# Patient Record
Sex: FEMALE | Race: WHITE | Employment: UNEMPLOYED | URBAN - METROPOLITAN AREA
[De-identification: names, ages, dates, MRNs, and addresses within clinical notes are randomized per-mention and may not be internally consistent; named-entity substitution may affect disease eponyms.]

---

## 2013-03-01 LAB — PAP SMEAR - HIM PATIENT REPORTED: NEGATIVE

## 2017-01-03 ENCOUNTER — TELEPHONE (OUTPATIENT)
Dept: PSYCHIATRY | Facility: CLINIC | Age: 37
End: 2017-01-03

## 2017-01-05 ENCOUNTER — MYC REFILL (OUTPATIENT)
Dept: INTERNAL MEDICINE | Facility: CLINIC | Age: 37
End: 2017-01-05

## 2017-01-05 ENCOUNTER — MYC REFILL (OUTPATIENT)
Dept: PSYCHIATRY | Facility: CLINIC | Age: 37
End: 2017-01-05

## 2017-01-05 ENCOUNTER — OFFICE VISIT (OUTPATIENT)
Dept: PALLIATIVE MEDICINE | Facility: CLINIC | Age: 37
End: 2017-01-05
Payer: COMMERCIAL

## 2017-01-05 VITALS
OXYGEN SATURATION: 98 % | DIASTOLIC BLOOD PRESSURE: 74 MMHG | WEIGHT: 124.8 LBS | SYSTOLIC BLOOD PRESSURE: 141 MMHG | BODY MASS INDEX: 20.15 KG/M2 | HEART RATE: 97 BPM

## 2017-01-05 DIAGNOSIS — K21.00 GASTROESOPHAGEAL REFLUX DISEASE WITH ESOPHAGITIS: ICD-10-CM

## 2017-01-05 DIAGNOSIS — M79.18 MYOFASCIAL PAIN: Primary | ICD-10-CM

## 2017-01-05 DIAGNOSIS — M79.18 MYOFACIAL MUSCLE PAIN: ICD-10-CM

## 2017-01-05 PROCEDURE — 99214 OFFICE O/P EST MOD 30 MIN: CPT | Performed by: NURSE PRACTITIONER

## 2017-01-05 ASSESSMENT — PAIN SCALES - GENERAL: PAINLEVEL: EXTREME PAIN (9)

## 2017-01-05 NOTE — PROGRESS NOTES
[facility]    CHIEF COMPLAINT: Pain, neck and shoulders    INTERVAL HISTORY:  Last seen on 12/1/16.        Recommendations/plan at the last visit included:  1. Schedule physical therapy visits with Myrna two times per month.   2. Schedule follow-up with MORGAN Caban NP-C in 4 weeks  1. Medication recommendations:   1. Low dose naltrexone 3.5 mg one capsule daily  2. Tizanidine 2 mg tablets; 1-2 tabs up to 3 times daily.     Since her last visit, Sheyla YO Cassi reports:  - LDN has not been helpful.   - Pain has been more right sided over the last month. Previously was more left sided.     Pain Information:   Pain quality: Unbearable    Pain timing: Constant     Pain rating: intensity ranges from 8/10 to 10/10, and averages 9/10 on a 0-10 scale.   Aggravating factors include: Movement   Relieving factors include: Sleep, ice, heat    Annual Controlled Substance Agreement/UDS due date: N/A    CURRENT RELEVANT PAIN MEDICATIONS:   NONE    Patient is using the medication as prescribed:  N/A  Is your medication helpful? N/A   Medication side effects? N/A      Previous Pain Relevant Medications: (H--helped; HI--Helped initially; SWH--Somewhat helpful; NH--No help; W--worse; SE--side effects; ?--Unsure if helpful)   NOTE: This medication information taken from patient's intake form, not medical records.    Opiates: Percocet: H, hydrocodone:H, tramadol: SE, muscle spasms and increased headaches   NSAIDS: Ibuprofen: SE, stomachache. Naproxen: SE, stomachache.    Muscle Relaxants: Clonazepam: Sedation, cyclobenzaprine: H, Robaxin: SE upset stomach, Zanaflex: H   Anti-migraine mediations: Excedrin Migraine: H   Anti-depressants: Bupropion: SE effected appetite, duloxetine: SE sedation, reduced libido, fluoxetine: Mental fog, drowsy, sertraline: H, venlafaxine: H   Sleep aids: Diazepam: H, takes for spasm, clonazepam: H for spasm, lorazepam: For anxiety, zolpidem: Took in 2009 for grief issues/not  sleeping   Anti-convulsants: None: Friends took both gabapentin and pregabalin and had a bad experience so patient is not wanting to try these medications as time    Topicals: Over-the-counter gels and creams: H   Other medications not covered above: Tylenol: SWH, Low dose naltrexone:NH    Any illicit drug use: Denies  Any use of prescriptions other than how they were prescribed:shahlaies  [place] Board of Pharmacy Data Base Reviewed:    YES; No concern for abuse or misuse of controlled medications based on this report. Multiple prescribers      Medications:  Current Outpatient Prescriptions   Medication Sig Dispense Refill     escitalopram (LEXAPRO) 20 MG tablet Take 1.5 tablets (30 mg) by mouth daily 45 tablet 1     omeprazole (PRILOSEC) 20 MG CR capsule Take 1 capsule (20 mg) by mouth daily 90 capsule 0     tiZANidine (ZANAFLEX) 2 MG tablet Take 1-2 tablets (2-4 mg) by mouth 3 times daily 180 tablet 0     COMPOUND (CMPD RX) - PHARMACY TO MIX COMPOUNDED MEDICATION Take one capsule daily in the morning 30 capsule 0     Prenatal Vit-Fe Fumarate-FA (PRENATAL MULTIVITAMIN  PLUS IRON) 27-0.8 MG TABS Take 1 tablet by mouth daily 30 tablet 11     loperamide (IMODIUM A-D) 2 MG tablet Start with 2 tabs (4 mg), then take one tab (2 mg) after each diarrheal stool.  Do not use more than  8 tabs (16 mg) per day. 60 tablet 0     lidocaine (XYLOCAINE) 5 % ointment Mix nickel sized amount with diclofenac gel. Apply 4 times daily to affected area. 100 g 3     Ketoprofen POWD Apply dime sized amount to affected areas up to three times daily. 100 g 3     Acetaminophen (TYLENOL PO) Take 500 mg by mouth every 4 hours as needed for mild pain or fever Takes 2 tablets of 500 mg         Review of Systems: A 10-point review of systems was negative, with the exception of chronic pain issues.      Social History: Reviewed; unchanged from initial consultation.      Family history: Reviewed; unchanged from initial consultation.     PHYSICAL  EXAM    Filed Vitals:    01/05/17 1539   BP: 141/74   Pulse: 97   Weight: 56.609 kg (124 lb 12.8 oz)   SpO2: 98%       Constitutional: healthy, alert and no distress  HEENT: Head atraumatic, normocephalic. Eyes without conjunctival injection or jaundice. Neck supple. No obvious neck masses.  Skin: No rash, lesions, or petechiae of exposed skin.   Extremities: Peripheral pulses intact. No clubbing, cyanosis, or edema.   Psychiatric/mental status: Alert, without lethargy or stupor. Appropriate affect. Mood normal.     Neurologic exam:  CN:  Cranial nerves 2-12 are grossly normal.    Musculoskeletal exam:  Cervical spine:   Tenderness in the cervical spine at midline. No   Shoulder girdle area quite spasmed.   Tenderness in the cervical paraspinal muscles. No  Thoracic spine:    Kyphosis. No   Tenderness in the thoracic spine at midline. No   Tenderness in the thoracic paraspinal muscles. No  Lumbar/Sacral spine:   Tenderness in the lumbar spine at midline. No   Tenderness in the lumbar paraspinal muscles.No     Psychiatric:  mentation appears normal., affect and mood normal    DIRE Score for ongoing opioid management is calculated as follows:    Diagnosis = 2 pts (slowly progressive; moderate pain/objective findings)    Intractability = 1 pt (few therapies tried; passive patient role)    Risk        Psych = 3 pts (no significant personality dysfunction/mental illness; good communication with clinic)         Chem Hlth = 2 pts (use of medications to cope with stress; chemical dependency in remission) medication focused       Reliability = 2 pts (occasional difficulties with compliance; generally reliable) late to appointments even after needing to reschedule because she was so late her first appointment with me.       Social = 2 pts (reduction in some relationships/life rolls)       (Psych + Chem hlth + Reliability + Social) = 12    Efficacy = 1 pt (poor function; minimal pain relief despite mod/high med dose)    DIRE  "Score = 13        7-13: likely NOT suitable candidate for long-term opioid analgesia       14-21: may be a suitable candidate for long-term opioid analgesia       DIAGNOSTIC TESTS:  Imaging Studies:   No new imaging    BEIGHTON SCALE: Total score: 9  Forward flexion of trunk with knees fully extended so hands at rest flat on the floor: Positive, 1 point  Hyperextension of the elbows beyond 10  bilaterally: Positive 2 points  Hyperextension of the knees beyond 10  bilaterally: Positive 2 points  Passive apposition of the thumbs to the flexor aspect of the forearm bilaterally: Positive 2 points  Passive dorsiflexion of the fifth digit beyond 90  bilaterally: positive 2 points    Assessment:   1.  Myofascial pain/EDS  2.  Severe anxiety/depression    Sheyla arrived on time for her appointment today despite inclement weather and feeling quite ill. As she struggles with getting to appointments, particularly presenting on time she was commended for this effort. Reviewed that she has not scheduled any physical therapy over the last month. Also reviewed missed mental health therapy appointments and being late for visits. Patient states she feels like she is \"on my last chance\". I confirmed to her that she needs to demonstrate responsibility commitment to taking care of her health and being respectful for all healthcare providers times. As evidenced by today's ability to get her appointment on time despite her illness and bad whether she is fully capable of this.    Low-dose naltrexone was not helpful for her generalized pain. We will not continue this medication. Reviewed taking Tylenol and ibuprofen alternating every 4 hours. Maximum recommended doses are provided for her information. Referral for acupuncture is given. I would like her to schedule physical therapy with Myrna as well.    Plan:    Diagnosis reviewed, treatment option addressed, and risk/benifits discussed.  Self-care instructions given.  I am recommending " a multidisciplinary treatment plan to help this patient better manage pain.        1. Schedule physical therapy visits with Myrna  2. Schedule follow-up with MORGAN Caban, NPChantelC in 4 weeks  3. Acupuncture referral given  4. Medication recommendations: No changes at this time. Max of Tylenol 3000 mg daily, Ibuprofen 3200 mg daily. Okay to take scheduled alternating every 4 hours.     Total time spent face to face was 30 minutes and more than 50% of face to face time was spent in counseling and/or coordination of care regarding the diagnosis and recommendations above.      MORGAN Cuenca, NP-C   [facility]

## 2017-01-05 NOTE — Clinical Note
[facility]  [address]  4th [address]  [place] [address]      January 6, 2017      Sheyla Ye  [address] [place] [address]  [place] [address]        Dear Sheyla,    This letter is a reminder that you are due to see your Primary Care Provider for an Annual Visit and needed labs. You must be seen by your Primary Care Provider on a yearly basis and have appropriate labs drawn for continued care and prescription refills. Please call [phone] to schedule an appointment for an Annual Visit with Dr Myrna Watson MD.     **Next annual visit due in February 2017.      You have been given a 90 day supply/refill of your Prilosec while you get your clinic visit/labs completed.      Regards,    Primary Care Center

## 2017-01-05 NOTE — PATIENT INSTRUCTIONS
After Visit Instructions:     Thank you for coming to [facility] for your care. It is my goal to partner with you to help you reach your optimal state of health.     I am recommending multidisciplinary care at this time.  The focus of care will be to continue gradual rehabilitation and pain management with medication adjustments as needed.    Continue daily self-care, identifying contributing factors, and monitoring variations in pain level. Continue to integrate self-care into your life.      1. Schedule physical therapy visits with Myrna  2. Schedule follow-up with MORGAN Caban NP-C in 4 weeks  3. Acupuncture referral given  4. Medication recommendations: No changes at this time. Max of Tylenol 3000 mg daily, Ibuprofen 3200 mg daily. Okay to take scheduled alternating every 4 hours.     MORGAN Cuenca NP-C  [facility]    Contact information: [facility]    Please call if any side effects, questions, or concerns arise.    Nurse Triage line:  [phone]   Call this number with any questions or concerns. You may leave a detailed message anytime. Calls are typically returned Monday through Friday between 8 AM and 4:30 PM. We usually get back to you within 2 business days depending on the issue/request.    Scheduling number: [phone].  Call this number to schedule or change appointments.          Medication Refills Policy:    For non-narcotic medications, please your pharmacy directly to request a refill and the pharmacy will call the Pain Management Center for authorization. Please allow 3-4 days for these refills.    For narcotic refills, call the nurse triage line and leave a message requesting your refill along with the name of the pharmacy that you use. Narcotic prescriptions will be mailed to your pharmacy or you may pick them up at the clinic.  Please call 7-10 days before your refill is due  The above policy  allows adequate time so that you do not run out of medication.    No Show - Late Cancellation - Late Arrival Policy  We believe regular attendance is key to your success in our program.    Any time you are unable to keep your appointment we ask that you call us at  least 24 hours in advance to let us know. This will allow us to offer the appointment time to another patient. The following is our policy for missed appointments. This also applies to appointments cancelled with less than 24 hours notice.    You will receive a letter after missing your 1st and 2nd appointments without contacting the clinic before your scheduled appointment time.     After missing 3 appointments without calling first, we will cancel all of your future appointments at [facility].    At that point, you will not be able to resume services unless approved by your care team  We understand that unforseen circumstances arise, however, out of respect for all concerned and to provide this appointment to another patient, this policy will be enforced.    Please note that most follow up appointments are 30 minutes long. If you arrive late, your provider may not be able to see you for the entire 30 minutes. Please also note that if you arrive more than 15 minutes for any appointment, it may be rescheduled.

## 2017-01-05 NOTE — NURSING NOTE
"Chief Complaint   Patient presents with     Pain       Initial /74 mmHg  Pulse 97  Wt 56.609 kg (124 lb 12.8 oz)  SpO2 98% Estimated body mass index is 20.15 kg/(m^2) as calculated from the following:    Height as of 7/1/16: 1.676 m (5' 6\").    Weight as of this encounter: 56.609 kg (124 lb 12.8 oz).  BP completed using cuff size: regular    "

## 2017-01-05 NOTE — MR AVS SNAPSHOT
After Visit Summary   1/5/2017    Sheyla Ye    MRN: [id number]           Patient Information     Date Of Birth          1980        Visit Information        Provider Department      1/5/2017 3:30 PM Michell Christianson APRN CNP [facility]        Today's Diagnoses     Myofascial pain    -  1     Myofacial muscle pain           Care Instructions    After Visit Instructions:     Thank you for coming to [facility] for your care. It is my goal to partner with you to help you reach your optimal state of health.     I am recommending multidisciplinary care at this time.  The focus of care will be to continue gradual rehabilitation and pain management with medication adjustments as needed.    Continue daily self-care, identifying contributing factors, and monitoring variations in pain level. Continue to integrate self-care into your life.      1. Schedule physical therapy visits with Myrna  2. Schedule follow-up with MORGAN Caban, NP-C in 4 weeks  3. Acupuncture referral given  4. Medication recommendations: No changes at this time. Max of Tylenol 3000 mg daily, Ibuprofen 3200 mg daily. Okay to take scheduled alternating every 4 hours.     MORGAN Cuenca, NP-C  [facility]    Contact information: [facility]    Please call if any side effects, questions, or concerns arise.    Nurse Triage line:  [phone]   Call this number with any questions or concerns. You may leave a detailed message anytime. Calls are typically returned Monday through Friday between 8 AM and 4:30 PM. We usually get back to you within 2 business days depending on the issue/request.    Scheduling number: [phone].  Call this number to schedule or change appointments.          Medication Refills Policy:    For non-narcotic medications, please your pharmacy directly to request a refill and the pharmacy will  call the Pain Management Center for authorization. Please allow 3-4 days for these refills.    For narcotic refills, call the nurse triage line and leave a message requesting your refill along with the name of the pharmacy that you use. Narcotic prescriptions will be mailed to your pharmacy or you may pick them up at the clinic.  Please call 7-10 days before your refill is due  The above policy allows adequate time so that you do not run out of medication.    No Show - Late Cancellation - Late Arrival Policy  We believe regular attendance is key to your success in our program.    Any time you are unable to keep your appointment we ask that you call us at  least 24 hours in advance to let us know. This will allow us to offer the appointment time to another patient. The following is our policy for missed appointments. This also applies to appointments cancelled with less than 24 hours notice.    You will receive a letter after missing your 1st and 2nd appointments without contacting the clinic before your scheduled appointment time.     After missing 3 appointments without calling first, we will cancel all of your future appointments at [facility].    At that point, you will not be able to resume services unless approved by your care team  We understand that unforseen circumstances arise, however, out of respect for all concerned and to provide this appointment to another patient, this policy will be enforced.    Please note that most follow up appointments are 30 minutes long. If you arrive late, your provider may not be able to see you for the entire 30 minutes. Please also note that if you arrive more than 15 minutes for any appointment, it may be rescheduled.                                   Follow-ups after your visit        Additional Services     ACUPUNCTURE REFERRAL       Your provider has referred you to: patient's preference    Please be aware that coverage of these services is subject  to the terms and limitations of your health insurance plan.  Call member services at your health plan with any benefit or coverage questions.                  Your next 10 appointments already scheduled     [place] 10, 2017 11:15 AM   Return Visit with Myrna Cruz PT   [facility] ([facility])    [address]  [address]  [facility] [address] [phone]            [place] 10, 2017  3:15 PM   Adult Med Follow UP with MORGAN Infante CNS   [facility] ([facility])    [address]  [address] [facility] [address] [phone]            Jan 18, 2017  9:00 AM   Return Visit with Efrain Crocker, PhD MAYTE   [facility] ([facility])    [address]  [facility] [address] [phone]            Jan 26, 2017  2:30 PM   Return Visit with Myrna Cruz PT   [facility] ([facility])    [address]  [facility] [address] [phone]            Feb 13, 2017  1:00 PM   Return Visit with Efrain Crocker, PhD MAYTE   [facility] ([facility])    [address]  [facility] [id number] [phone]              Who to contact     If you have questions or need follow up information about today's clinic visit or your schedule please contact [facility] directly at [phone].  Normal or non-critical lab and imaging results will be communicated to you by MyChart, letter or phone within 4 business days after the clinic has received the results. If you do not hear from us within 7 days, please contact the clinic through MyChart or phone. If you have a critical or abnormal lab result, we will notify you by phone as soon as possible.  Submit refill requests through [employer] or call your pharmacy and they will forward the refill request to us. Please allow 3  business days for your refill to be completed.          Additional Information About Your Visit        MyChart Information     [facility] gives you secure access to your electronic health record. If you see a primary care provider, you can also send messages to your care team and make appointments. If you have questions, please call your primary care clinic.  If you do not have a primary care provider, please call [phone] and they will assist you.        Care EveryWhere ID     This is your Care EveryWhere ID. This could be used by other organizations to access your [facility] medical records  [id number]        Your Vitals Were     Pulse Pulse Oximetry                97 98%           Blood Pressure from Last 3 Encounters:   01/05/17 141/74   12/02/16 117/81   12/01/16 139/80    Weight from Last 3 Encounters:   01/05/17 56.609 kg (124 lb 12.8 oz)   12/02/16 57.879 kg (127 lb 9.6 oz)   11/01/16 55.974 kg (123 lb 6.4 oz)              We Performed the Following     ACUPUNCTURE REFERRAL          Where to get your medicines      These medications were sent to [facility] [place], [place] - [address]  [address], [facility] [address]     Phone:  [phone]    - tiZANidine 2 MG tablet       Primary Care Provider Office Phone # Fax #    Myrnamoriah Watson [phone]       [facility] [address]  [facility] [address]        Thank you!     Thank you for choosing [facility]  for your care. Our goal is always to provide you with excellent care. Hearing back from our patients is one way we can continue to improve our services. Please take a few minutes to complete the written survey that you may receive in the mail after your visit with us. Thank you!             Your Updated Medication List - Protect others around you: Learn how to safely use, store and throw away your medicines at www.disposemymeds.org.          This list is  accurate as of: 1/5/17  4:08 PM.  Always use your most recent med list.                   Brand Name Dispense Instructions for use    COMPOUND - PHARMACY TO MIX COMPOUNDED MEDICATION    CMPD RX    30 capsule    Take one capsule daily in the morning       escitalopram 20 MG tablet    LEXAPRO    30 tablet    Take 1 tablet (20 mg) by mouth daily       Ketoprofen Powd     100 g    Apply dime sized amount to affected areas up to three times daily.       lidocaine 5 % ointment    XYLOCAINE    100 g    Mix nickel sized amount with diclofenac gel. Apply 4 times daily to affected area.       loperamide 2 MG tablet    IMODIUM A-D    60 tablet    Start with 2 tabs (4 mg), then take one tab (2 mg) after each diarrheal stool.  Do not use more than  8 tabs (16 mg) per day.       omeprazole 20 MG CR capsule    priLOSEC    30 capsule    Take 1 capsule (20 mg) by mouth daily       prenatal multivitamin  plus iron 27-0.8 MG Tabs per tablet     30 tablet    Take 1 tablet by mouth daily       tiZANidine 2 MG tablet    ZANAFLEX    180 tablet    Take 1-2 tablets (2-4 mg) by mouth 3 times daily       TYLENOL PO      Take 500 mg by mouth every 4 hours as needed for mild pain or fever Takes 2 tablets of 500 mg

## 2017-01-06 NOTE — TELEPHONE ENCOUNTER
Message from Sami:  Mayra Hatfield, RN Thu Jan 5, 2017 4:26 PM        ----- Message -----   From: Sheyla Ye   Sent: 1/5/2017 1:38 PM   To: [facility] Nursing Staff-  Subject: Medication Renewal Request     Original authorizing provider: MD Sheyla Renee would like a refill of the following medications:  omeprazole (PRILOSEC) 20 MG capsule [Myrna Watson MD]    Preferred pharmacy: [facility] [address]    Comment:      Medication renewals requested in this message routed to other providers:  escitalopram (LEXAPRO) 20 MG tablet [Mayra Lewis, APRN CNS]  tiZANidine (ZANAFLEX) 2 MG tablet [Michell Christianson APRN CNP]

## 2017-01-06 NOTE — TELEPHONE ENCOUNTER
Omeprazole 20 mg caps      Last Written Prescription Date:  10-31-16  Last Fill Quantity: 30,   # refills: 3  Last Office Visit : 2-2-16  Future Office visit:  none    Kathleen M Doege RN

## 2017-01-07 ENCOUNTER — TELEPHONE (OUTPATIENT)
Dept: PALLIATIVE MEDICINE | Facility: CLINIC | Age: 37
End: 2017-01-07

## 2017-01-07 NOTE — TELEPHONE ENCOUNTER
Prior auth will be required for Lidocaine 5% Oint  Please call [facility] at [phone] with ID [id number]    Thank you.

## 2017-01-09 NOTE — TELEPHONE ENCOUNTER
Initiated Pa via [employer] waiting for the respond.     Danitza De León MA  [facility]

## 2017-01-10 ENCOUNTER — OFFICE VISIT (OUTPATIENT)
Dept: PSYCHIATRY | Facility: CLINIC | Age: 37
End: 2017-01-10
Attending: CLINICAL NURSE SPECIALIST
Payer: COMMERCIAL

## 2017-01-10 ENCOUNTER — OFFICE VISIT (OUTPATIENT)
Dept: PALLIATIVE MEDICINE | Facility: CLINIC | Age: 37
End: 2017-01-10
Payer: COMMERCIAL

## 2017-01-10 VITALS
HEART RATE: 71 BPM | SYSTOLIC BLOOD PRESSURE: 121 MMHG | DIASTOLIC BLOOD PRESSURE: 78 MMHG | WEIGHT: 129.4 LBS | BODY MASS INDEX: 20.9 KG/M2

## 2017-01-10 DIAGNOSIS — F32.A DEPRESSION, UNSPECIFIED DEPRESSION TYPE: ICD-10-CM

## 2017-01-10 DIAGNOSIS — G89.29 CHRONIC PAIN: Primary | ICD-10-CM

## 2017-01-10 DIAGNOSIS — F90.2 ADHD (ATTENTION DEFICIT HYPERACTIVITY DISORDER), COMBINED TYPE: ICD-10-CM

## 2017-01-10 DIAGNOSIS — F41.9 ANXIETY: Primary | ICD-10-CM

## 2017-01-10 PROCEDURE — 97110 THERAPEUTIC EXERCISES: CPT | Mod: GP | Performed by: PHYSICAL THERAPIST

## 2017-01-10 PROCEDURE — 97112 NEUROMUSCULAR REEDUCATION: CPT | Mod: GP | Performed by: PHYSICAL THERAPIST

## 2017-01-10 PROCEDURE — 99212 OFFICE O/P EST SF 10 MIN: CPT | Mod: ZF

## 2017-01-10 RX ORDER — ESCITALOPRAM OXALATE 20 MG/1
30 TABLET ORAL DAILY
Qty: 45 TABLET | Refills: 1 | Status: SHIPPED | OUTPATIENT
Start: 2017-01-10 | End: 2017-02-10

## 2017-01-10 NOTE — TELEPHONE ENCOUNTER
If not covered, please let her know that Lidocaine 4% cream is available OTC.   MORGAN Cuenca, NP-C  [facility]

## 2017-01-10 NOTE — NURSING NOTE
Chief Complaint   Patient presents with     Recheck Medication     depression; ADHD; anxiety     Reviewed allergies, smoking status, and pharmacy preference  Administered abuse screening questions   Obtained weight, blood pressure and heart rate

## 2017-01-10 NOTE — Clinical Note
Thought I should let you know that I accidentally reopened your note because I thought it was my note (but actually I had not closed my note yet. To close your note (I did not make any changes since it wasn't my noted) I had to sign as if I addend it.  Scary mistake.  So it something looks like I signed your note it is an error. Mayra
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
## 2017-01-10 NOTE — PROGRESS NOTES
"Patient Name: Sheyla Ye    YOB: 1980   Medical Record Number: [id number]  Diagnosis: Data Unavailable    Subjective: Pt last seen by this therapist on 6/9/16.  At that time PT services were put on hold d/t patient's MH issues interfering with her ability to participate in PT meaningfully.  She returns today upon pain provider's recommendations and she indicates she feels she will be able to participate more consistently now.    Patient reports: reports currently her pain is right sided (\"mostly right shoulder/neck and hip\") \"90% right sided thing 10% left sided thing\" - states this change happened over past few months (previously her pain c/o were left sided > right sided - though \"widespread\").    Pain ranges: at best 7/10 at worst 9-10/10  Activity tolerance: Walking tolerance about 20 min; dynamic standing tolerance about 30 min; hard to get comfortable for sleep - wants to sleep too much; getting up from lower positions are challenging; household tasks that are challenging - laundry and dishes; work about 20H/wk tolerance - was previously working at [employer] but d/t her own limitations is doing less - transitioning out of this job.  All reported activity tolerance today is less than what patient reported at time of initial PT eval on 5/3/16.)  HEP/Self Care/Home Management Training:   Pacing/Mini Breaks/Self Care: not addressed today.  ;   Relaxation Practice: trying to implement relaxation techniques learned previously - 3-4x/wk ;   Posture Corrections: occasionally is aware of neutral but not very often ;   Walking program: no regular routine currently ;   Heat/Ice/TENS: using ice and heat daily  ;   HEP no consistent routine currently         Objective Findings:  Pt arrives 15 min late.    OBSERVATION: Is engaged, has flat affect, appears mildly anxious.  Sits with arms crossed tightly over her chest.  She demonstrates kyphotic posture in sitting and standing - in standing she also locks her " knees and hips in increased extension.  GAIT & LOCOMOTION: Unremarkable  RANGE OF MOTION: All WNL and at major joints demonstrates hypermobility.    MUSCLE PERFORMANCE: No core engagement observed to support posture.       Treatment Interventions:  Therapeutic Procedures/Exercises: (15 min)  Recumbent bike x 7 min, no resistance.  Instructed in walking program - do either outside or in hallways /rampwells (pt lives in handicapped accessible building where ramps are present instead of stairs).  5 min 1-2x/day, start with 4x/wk - gradually increase consistency.    Neuromuscular Reeducation:  (15 min)  Posture - reviewed neutral posture - sitting and standing and Luh Hutchison's power poses concept.      Discussed need for consistent follow through on coming to appts if she wishes to continue in PT;  additionally reviewed the need for arriving to appts on time which has been a significant problem for patient in the past.    __________________________________________________________________  Instruction on home program: walking program, posture/body awareness practice    Assessment:  Pt continues to express pain c/o limiting her functional mobility.  She demonstrates poor body awareness, posture mm imbalances and decreased strength (steven core) and endurance.  Her MH continues to be a significant contributing factor to her pain experience.  Her reported mobility tolerance is less today than what she reported at the time of her initial PT eval on 5/3/16.  Pt would continue to benefit from PT to work on developing sustainable and self directed self care strategies for pain management through HEP instruction, posture / body awareness training and self care education.  PT goals continue to be valid.      Recommendations: schedule 4 PT visits at 1x/wk.  If pt engages and participates consistently will reassess need for further visits.  If she does not participate consistently she will be discharged from PT.      Intensity Level: 1  (1=low intensity; 5=high intensity)  Demonstrates/Verbalizes Technique: 3 (1= poor technique-difficulty performing exercises,significant cues required; 5= good technique-performs exercises without cues)  Body Awareness: 2 (1=low awareness; 5=high awareness)  Posture/Stability: 2 (1= poor posture, stability; 5= good posture, stability)  Motivational Level: Cooperative, mildly anxious  Response to Teaching: cooperative, demonstrated ability and verbalized, recall/understanding  Factors that affect learning: None    _______________________________________________________________________  Plan of Care  Cont PT to support reactivation and integration of self regulation pain management skills.      Present:  MARTIN    Total Visit Time:  30 minutes    Therapist: Myrna Cruz PT             Date: 1/10/2017

## 2017-01-10 NOTE — TELEPHONE ENCOUNTER
Received PA denial for the Lidocaine 5% Patch. Left pt vm informing her that she can try the Lidocaine 4% cream OTC.     Danitza De León MA  [facility]

## 2017-01-11 ENCOUNTER — OFFICE VISIT (OUTPATIENT)
Dept: PSYCHIATRY | Facility: CLINIC | Age: 37
End: 2017-01-11
Attending: PSYCHOLOGIST
Payer: COMMERCIAL

## 2017-01-11 DIAGNOSIS — F33.1 MAJOR DEPRESSIVE DISORDER, RECURRENT EPISODE, MODERATE (H): ICD-10-CM

## 2017-01-11 DIAGNOSIS — F45.1 SOMATIC SYMPTOM DISORDER, PERSISTENT, MODERATE, WITH PREDOMINANT PAIN: Primary | ICD-10-CM

## 2017-01-11 NOTE — PROGRESS NOTES
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  Supervisor:   Robyn Dawn Psy.D.    Narrative Description of Session:   The patient was seen by Landry Solorzano, Ph.D., Postdoctoral Associate. The patient arrived on time for her appointment. She presented with casual attire and appropriate grooming. Mood was depressed with depressive and anxious affect.. Patient endorsed passive SI, but denied any intent or means. Patient elaborated by saying her passive SI was more related to hopelessness about her chronic pain issues. No abnormalities were noted in speech/through processes or content.    Patient began session by discussing worries that she had  screwed up again  by missing her last appointment with the writer.  Patient and clinician created collaborative, rather than punitive, plan to help increase the patient s probability of attending appointments on time and feel motivated to meaningfully participate in her therapy.     The rest of the session as spent helping the patient identify maladaptive and adaptive ways of handling interpersonal conflict. The patient mentioned that when feeling anger towards others, she often cries in front of these individuals, but then later expresses her anger to herself when alone.       Assessment:   Appearance:  Appropriately groomed, casually dressed  Behavior/relationship to examiner/demeanor:  Normal  Motor activity/EPS:  Normal  Gait:  Normal  Speech rate:  Normal  Speech volume:  Normal  Speech articulation:  Normal  Speech coherence:  Normal  Speech spontaneity:  Somewhat limited  Mood (subjective report):  Depressed  Affect (objective appearance):  Anxious and  depressed  Thought Process (Associations):  Goal-directed  Thought process (Rate):  Normal  Thought content: Normal  Abnormal Perception:  Denies  Insight:  Fair  Judgment:  Fair    Plan: Current goals include continuing to monitor the patient s passive SI, in addition to her depression and anxiety symptoms. Helping the patient understand the relationship between physical pain exacerbation and emotional suppression should help the patient express her emotions in a more adaptive manner.   I did not see this pt directly. This pt is discussed with me in individual psychotherapy supervision, and I agree with the plan as documented. Robyn Dawn Psy.D. , L.P.

## 2017-01-31 ENCOUNTER — OFFICE VISIT (OUTPATIENT)
Dept: PALLIATIVE MEDICINE | Facility: CLINIC | Age: 37
End: 2017-01-31
Payer: COMMERCIAL

## 2017-01-31 ENCOUNTER — OFFICE VISIT (OUTPATIENT)
Dept: PSYCHIATRY | Facility: CLINIC | Age: 37
End: 2017-01-31
Attending: PSYCHOLOGIST
Payer: COMMERCIAL

## 2017-01-31 DIAGNOSIS — G89.29 CHRONIC PAIN: Primary | ICD-10-CM

## 2017-01-31 DIAGNOSIS — F33.1 MAJOR DEPRESSIVE DISORDER, RECURRENT EPISODE, MODERATE (H): ICD-10-CM

## 2017-01-31 DIAGNOSIS — F45.1 SOMATIC SYMPTOM DISORDER, PERSISTENT, MODERATE, WITH PREDOMINANT PAIN: Primary | ICD-10-CM

## 2017-01-31 PROCEDURE — 97535 SELF CARE MNGMENT TRAINING: CPT | Mod: GP | Performed by: PHYSICAL THERAPIST

## 2017-01-31 PROCEDURE — 97112 NEUROMUSCULAR REEDUCATION: CPT | Mod: GP | Performed by: PHYSICAL THERAPIST

## 2017-01-31 NOTE — PROGRESS NOTES
"Patient Name: Sheyla Ye      YOB: 1980     Medical Record Number: [id number]  Diagnosis: Chronic pain  Visit Info Length of Visit: 50 (of which 25 minutes was spent counseling and reassuring pt with no actual PT treatment)  Arrived: On Time  Therapeutic Procedures/Exercises: NA; Therapeutic Activities: NA ; Neuromuscular Re-education: 10 min   Self Care / Home Management Training: 15 min   Exercise/Activity Education Instruction:  Self Care - discussed self care strategies to use day to day and importance of consistency in use.    Activity:  Kinesthetic Awareness of joint neutral positions for UEs.       Notes: Pt arrives today to PT session after two no-shows to PT.  She is apologetic and very tearful - worried that she has had \"three strikes\" and therefore she is going to be kicked out of the pain program.  (Pt was also a NS for the pain psychologist in the past week or two as well.)   Had a candid conversation with pt RE: no-shows, expectations for participation and this therapist's concerns that pt needs more intensive MH treatment (day program?) as her MH is continuing to get in the way of her participation at the pain clinic.  She became more tearful and indicated she is overwhelmed, feeling like she is \"at the end of her rope\" \"don't know what to do\", etc.  She did endorse suicidal thoughts at times (\"sometimes I think I should just jump off the bridge\") but denies that she will do it.  Asked pt directly if she is having these thoughts currently and she stated that while she thinks about it now and again she does not have a plan to do it at this point in time. She indicates she doesn't think anyone is hearing her when she is expressing how bad things are for her (her mood/anxiety, ability to cope, ability to focus her attention, \"be in my own skin\") and how much she is struggling day to day.      Patient reports:  widespread pain continues (making specific reference today to right " shoulder pain with occasional feelings of shoulder instability).      HEP/Self Care/Home Management Training:   Pacing/Mini Breaks/Self Care: not addressed today.  ;   Relaxation Practice: continues to practice some of the things she has learned previously ;   Posture Corrections: not consistent currently in neural positions ;   Walking program: indicates she did do some walking over the interval ;   Heat/Ice/TENS: not addressed today.   ;   HEP not addressed today.     Much of today's session spent in conversation with patient RE: her fears about being kicked out of pain program and the need for her to get her emotional well being more stable d/t its interference in meaningful participation in PT and at the pain clinic overall.  Have strongly encouraged her to keep her appt here at the pain clinic with MORGAN Caban CNP on 2/2/17 to discuss next steps.  Relayed to pt that this therapist has recommended the need for more intensive MH services to this pain provider and this will be part of the conversation with her provider when discussing continued POC at the pain clinic.  Pt appeared to be in agreement that something more intensive in regard to her MH may be beneficial but had many questions in terms of what those options might be.  Deferred to pain provider and also encouraged pt to discuss this potential option with her MH providers.  Additionally, recommended pt go to ER if she experiences active suicidal thoughts.            Demonstrates/Verbalizes Technique: 2 (1= poor technique-difficulty performing exercises,significant cues required; 5= good technique-performs exercises without cues)  Body Awareness: 1,2 (1=low awareness; 5=high awareness)  Posture/Stability: 2 (1= poor posture, stability; 5= good posture, stability)   Motivational Level:   Cooperative but emotional / tearful throughout session Participation:  Full   Patient Satisfaction:  satisfied   Response to Teaching:   demonstrated ability and  verbalized, recall/understanding  Factors that affect learning: None   Plan of Care  Plan to place PT on hold again until pt's MH is more stable such that she is better able to participate in pain PT services.     Therapist: Myrna Cruz PT                 Date: 1/31/2017

## 2017-02-01 NOTE — PROGRESS NOTES
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  Supervisor:   Robyn Dawn Psy.D.    Narrative Description of Session:   The patient was seen by Landry Solorzano, Ph.D., Postdoctoral Associate. The patient arrived on time for her appointment with casual attire and appropriate grooming. Mood was depressed with congruent affect. No abnormalities were noted in speech/through processes or content.    Patient continued to express worries about being  kicked out  of the pain management program to due to her poor adherence history. Patient asked the clinician for advice on how to handle this conversation with the other provider, and a collaborative approach was taken to help empower the patient to have a difficult interpersonal conversation.    Patient and clinician also addressed the patient s intermittent attendance with the writer. Through exploring the patient s ambivalence towards feelings that may arise in therapy, the patient seemed to have a renewed interest in committing to weekly therapy with the clinician.     The rest of the session as spent helping the patient identify maladaptive and adaptive ways of handling interpersonal conflict. The patient mentioned that when feeling anger towards others, she often cries in front of these individuals, but then later expresses her anger to herself when alone.     Assessment:   Appearance:  Appropriately groomed, casually dressed  Behavior/relationship to examiner/demeanor:  Normal  Motor activity/EPS:  Normal  Gait:  Normal  Speech rate:  Normal  Speech volume:  Normal  Speech articulation:  Normal  Speech coherence:  Normal  Speech spontaneity:  Somewhat  limited  Mood (subjective report):  Depressed  Affect (objective appearance):  Anxious and depressed  Thought Process (Associations):  Goal-directed  Thought process (Rate):  Normal  Thought content: Normal  Abnormal Perception:  Denies  Insight:  Fair  Judgment:  Fair    Plan: Work on developing a consistent attendance record will precede therapeutic goals. Working on organization (e.g., appointments, messiness at home) may help the patient feel more motivated and self-efficacious, thereby resulting in a higher attendance record.  Patient said she would begin a thought/feeling log and bring it to next session for discussion.     I did not see this pt directly. This pt is discussed with me in individual psychotherapy supervision, and I agree with the plan as documented. Robyn Dawn Psy.D. , L.P.

## 2017-02-02 ENCOUNTER — OFFICE VISIT (OUTPATIENT)
Dept: PALLIATIVE MEDICINE | Facility: CLINIC | Age: 37
End: 2017-02-02
Payer: COMMERCIAL

## 2017-02-02 VITALS
OXYGEN SATURATION: 100 % | BODY MASS INDEX: 21.32 KG/M2 | DIASTOLIC BLOOD PRESSURE: 82 MMHG | WEIGHT: 132 LBS | SYSTOLIC BLOOD PRESSURE: 127 MMHG | HEART RATE: 90 BPM | RESPIRATION RATE: 16 BRPM

## 2017-02-02 DIAGNOSIS — M62.838 MUSCLE SPASM: Primary | ICD-10-CM

## 2017-02-02 PROCEDURE — 99214 OFFICE O/P EST MOD 30 MIN: CPT | Performed by: NURSE PRACTITIONER

## 2017-02-02 RX ORDER — TIZANIDINE 2 MG/1
2-4 TABLET ORAL 3 TIMES DAILY
Qty: 90 TABLET | Refills: 0 | Status: SHIPPED | OUTPATIENT
Start: 2017-02-02 | End: 2017-05-31

## 2017-02-02 ASSESSMENT — PAIN SCALES - GENERAL: PAINLEVEL: EXTREME PAIN (8)

## 2017-02-02 NOTE — PATIENT INSTRUCTIONS
After Visit Instructions:     Thank you for coming to [facility] for your care.     At this time we will hold on work with the multidisciplinary pain management program. Work with your mental health team to address concerns. You can send a My Chart Message or call Michell at anytime but we will not be scheduling follow up visits with therapies.     MORGAN Cuenca, NP-C  [facility]    Contact information: [facility]    Please call if any side effects, questions, or concerns arise.    Nurse Triage line:  [phone]   Call this number with any questions or concerns. You may leave a detailed message anytime. Calls are typically returned Monday through Friday between 8 AM and 4:30 PM. We usually get back to you within 2 business days depending on the issue/request.    Scheduling number: [phone].  Call this number to schedule or change appointments.          Medication Refills Policy:    For non-narcotic medications, please your pharmacy directly to request a refill and the pharmacy will call the [facility] for authorization. Please allow 3-4 days for these refills.    For narcotic refills, call the nurse triage line and leave a message requesting your refill along with the name of the pharmacy that you use. Narcotic prescriptions will be mailed to your pharmacy or you may pick them up at the clinic.  Please call 7-10 days before your refill is due  The above policy allows adequate time so that you do not run out of medication.    No Show - Late Cancellation - Late Arrival Policy  We believe regular attendance is key to your success in our program.    Any time you are unable to keep your appointment we ask that you call us at  least 24 hours in advance to let us know. This will allow us to offer the appointment time to another patient. The following is our policy for missed appointments. This also applies to  appointments cancelled with less than 24 hours notice.    You will receive a letter after missing your 1st and 2nd appointments without contacting the clinic before your scheduled appointment time.     After missing 3 appointments without calling first, we will cancel all of your future appointments at [facility].    At that point, you will not be able to resume services unless approved by your care team  We understand that unforseen circumstances arise, however, out of respect for all concerned and to provide this appointment to another patient, this policy will be enforced.    Please note that most follow up appointments are 30 minutes long. If you arrive late, your provider may not be able to see you for the entire 30 minutes. Please also note that if you arrive more than 15 minutes for any appointment, it may be rescheduled.

## 2017-02-02 NOTE — MR AVS SNAPSHOT
After Visit Summary   2/2/2017    Sheyla Ye    MRN: [id number]           Patient Information     Date Of Birth          1980        Visit Information        Provider Department      2/2/2017 4:00 PM Michell Christianson APRN CNP [facility]        Today's Diagnoses     Muscle spasm    -  1       Care Instructions    After Visit Instructions:     Thank you for coming to [facility] for your care.     At this time we will hold on work with the multidisciplinary pain management program. Work with your mental health team to address concerns. You can send a My Chart Message or call Michell at anytime but we will not be scheduling follow up visits with therapies.     MORGAN Cuenca, NP-C  [facility]    Contact information: [facility]    Please call if any side effects, questions, or concerns arise.    Nurse Triage line:  [phone]   Call this number with any questions or concerns. You may leave a detailed message anytime. Calls are typically returned Monday through Friday between 8 AM and 4:30 PM. We usually get back to you within 2 business days depending on the issue/request.    Scheduling number: [phone].  Call this number to schedule or change appointments.          Medication Refills Policy:    For non-narcotic medications, please your pharmacy directly to request a refill and the pharmacy will call the [facility] for authorization. Please allow 3-4 days for these refills.    For narcotic refills, call the nurse triage line and leave a message requesting your refill along with the name of the pharmacy that you use. Narcotic prescriptions will be mailed to your pharmacy or you may pick them up at the clinic.  Please call 7-10 days before your refill is due  The above policy allows adequate time so that you do not run out of medication.    No Show - Late Cancellation -  Late Arrival Policy  We believe regular attendance is key to your success in our program.    Any time you are unable to keep your appointment we ask that you call us at  least 24 hours in advance to let us know. This will allow us to offer the appointment time to another patient. The following is our policy for missed appointments. This also applies to appointments cancelled with less than 24 hours notice.    You will receive a letter after missing your 1st and 2nd appointments without contacting the clinic before your scheduled appointment time.     After missing 3 appointments without calling first, we will cancel all of your future appointments at [facility].    At that point, you will not be able to resume services unless approved by your care team  We understand that unforseen circumstances arise, however, out of respect for all concerned and to provide this appointment to another patient, this policy will be enforced.    Please note that most follow up appointments are 30 minutes long. If you arrive late, your provider may not be able to see you for the entire 30 minutes. Please also note that if you arrive more than 15 minutes for any appointment, it may be rescheduled.                                   Follow-ups after your visit        Your next 10 appointments already scheduled     Feb 10, 2017  4:15 PM   Adult Med Follow UP with MORGAN Infante CNS   Psychiatry Clinic ([facility])    [address]  [address] [facility] [address]   [phone]            Feb 13, 2017  1:00 PM   Return Visit with Efrain Crocker, PhD [facility] ([facility])    [address] [facility] [address]  [facility] [address]   [phone]              Who to contact     If you have questions or need follow up information about today's clinic visit or your schedule please contact [facility]  directly at [phone].  Normal or non-critical lab and imaging results will be communicated to you by MyChart, letter or phone within 4 business days after the clinic has received the results. If you do not hear from us within 7 days, please contact the clinic through YouLicense[employer] or phone. If you have a critical or abnormal lab result, we will notify you by phone as soon as possible.  Submit refill requests through Eferio or call your pharmacy and they will forward the refill request to us. Please allow 3 business days for your refill to be completed.          Additional Information About Your Visit        YouLicensehart Information     Eferio gives you secure access to your electronic health record. If you see a primary care provider, you can also send messages to your care team and make appointments. If you have questions, please call your primary care clinic.  If you do not have a primary care provider, please call [phone] and they will assist you.        Care EveryWhere ID     This is your Care EveryWhere ID. This could be used by other organizations to access your [facility] medical records  [id number]        Your Vitals Were     Pulse Respirations Pulse Oximetry             90 16 100%          Blood Pressure from Last 3 Encounters:   02/02/17 127/82   01/10/17 121/78   01/05/17 141/74    Weight from Last 3 Encounters:   02/02/17 59.875 kg (132 lb)   01/10/17 58.695 kg (129 lb 6.4 oz)   01/05/17 56.609 kg (124 lb 12.8 oz)              Today, you had the following     No orders found for display         Where to get your medicines      These medications were sent to [facility] [place], [place] - [address]  [address] [address]     Phone:  [phone]    - tiZANidine 2 MG tablet       Primary Care Provider Office Phone # Fax #    Myrna Watson [phone]       [address]  [facility] [address]        Thank you!      Thank you for choosing [facility]  for your care. Our goal is always to provide you with excellent care. Hearing back from our patients is one way we can continue to improve our services. Please take a few minutes to complete the written survey that you may receive in the mail after your visit with us. Thank you!             Your Updated Medication List - Protect others around you: Learn how to safely use, store and throw away your medicines at www.disposemymeds.org.          This list is accurate as of: 2/2/17  4:48 PM.  Always use your most recent med list.                   Brand Name Dispense Instructions for use    COMPOUND - PHARMACY TO MIX COMPOUNDED MEDICATION    CMPD RX    30 capsule    Take one capsule daily in the morning       escitalopram 20 MG tablet    LEXAPRO    45 tablet    Take 1.5 tablets (30 mg) by mouth daily       Ketoprofen Powd     100 g    Apply dime sized amount to affected areas up to three times daily.       lidocaine 5 % ointment    XYLOCAINE    100 g    Mix nickel sized amount with diclofenac gel. Apply 4 times daily to affected area.       loperamide 2 MG tablet    IMODIUM A-D    60 tablet    Start with 2 tabs (4 mg), then take one tab (2 mg) after each diarrheal stool.  Do not use more than  8 tabs (16 mg) per day.       omeprazole 20 MG CR capsule    priLOSEC    90 capsule    Take 1 capsule (20 mg) by mouth daily       prenatal multivitamin  plus iron 27-0.8 MG Tabs per tablet     30 tablet    Take 1 tablet by mouth daily       tiZANidine 2 MG tablet    ZANAFLEX    90 tablet    Take 1-2 tablets (2-4 mg) by mouth 3 times daily       TYLENOL PO      Take 500 mg by mouth every 4 hours as needed for mild pain or fever Takes 2 tablets of 500 mg

## 2017-02-02 NOTE — NURSING NOTE
"Chief Complaint   Patient presents with     Pain       Initial /82 mmHg  Pulse 90  Resp 16  Wt 59.875 kg (132 lb)  SpO2 100% Estimated body mass index is 21.32 kg/(m^2) as calculated from the following:    Height as of 7/1/16: 1.676 m (5' 6\").    Weight as of this encounter: 59.875 kg (132 lb).  BP completed using cuff size: regular    Danitza De León MA  [facility]      "

## 2017-02-07 NOTE — PROGRESS NOTES
[facility]    CHIEF COMPLAINT: Pain in neck, shoulders, low back    INTERVAL HISTORY:  Last seen on 1/5/17.        Recommendations/plan at the last visit included:  1. Schedule physical therapy visits with Myrna  2. Schedule follow-up with MORGAN Caban NP-C in 4 weeks  3. Acupuncture referral given  4. Medication recommendations: No changes at this time. Max of Tylenol 3000 mg daily, Ibuprofen 3200 mg daily. Okay to take scheduled alternating every 4 hours    Since her last visit, Sheyla Ye reports:  - Since her last visit with me Sheyla has no showed one appointment with health psychology and 2 with physical therapy. She did attend 1 physical therapy session.  - Pain condition remains difficult for her.    Pain Information:   Pain quality: Unbearable    Pain timing: Constant     Pain rating: intensity ranges from 7/10 to 10/10, and averages 9/10 on a 0-10 scale.   Aggravating factors include: Moving   Relieving factors include: Sleep      Annual Controlled Substance Agreement/UDS due date: N/A    CURRENT RELEVANT PAIN MEDICATIONS:   OTC Tylenol and ibuprofen per package instructions    Patient is using the medication as prescribed:  N/A  Is your medication helpful? N/A   Medication side effects? N/A      Previous Pain Relevant Medications: (H--helped; HI--Helped initially; SWH--Somewhat helpful; NH--No help; W--worse; SE--side effects; ?--Unsure if helpful)   NOTE: This medication information taken from patient's intake form, not medical records.    Opiates: Percocet: H, hydrocodone:H, tramadol: SE, muscle spasms and increased headaches   NSAIDS: Ibuprofen: SE, stomachache. Naproxen: SE, stomachache.    Muscle Relaxants: Clonazepam: Sedation, cyclobenzaprine: H, Robaxin: SE upset stomach, Zanaflex: H   Anti-migraine mediations: Excedrin Migraine: H   Anti-depressants: Bupropion: SE effected appetite, duloxetine: SE sedation, reduced libido, fluoxetine: Mental fog, drowsy,  sertraline: H, venlafaxine: H   Sleep aids: Diazepam: H, takes for spasm, clonazepam: H for spasm, lorazepam: For anxiety, zolpidem: Took in 2009 for grief issues/not sleeping   Anti-convulsants: None: Friends took both gabapentin and pregabalin and had a bad experience so patient is not wanting to try these medications as time    Topicals: Over-the-counter gels and creams: H   Other medications not covered above: Tylenol: [facility], Low dose naltrexone:NH    Any illicit drug use: Denies  Any use of prescriptions other than how they were prescribed:denies  [place] Board of Pharmacy Data Base Reviewed:    YES; No concern for abuse or misuse of controlled medications based on this report. No controlled prescriptions given since July 2016        Medications:  Current Outpatient Prescriptions   Medication Sig Dispense Refill     tiZANidine (ZANAFLEX) 2 MG tablet Take 1-2 tablets (2-4 mg) by mouth 3 times daily 90 tablet 0     escitalopram (LEXAPRO) 20 MG tablet Take 1.5 tablets (30 mg) by mouth daily 45 tablet 1     omeprazole (PRILOSEC) 20 MG CR capsule Take 1 capsule (20 mg) by mouth daily 90 capsule 0     COMPOUND (CMPD RX) - PHARMACY TO MIX COMPOUNDED MEDICATION Take one capsule daily in the morning 30 capsule 0     Prenatal Vit-Fe Fumarate-FA (PRENATAL MULTIVITAMIN  PLUS IRON) 27-0.8 MG TABS Take 1 tablet by mouth daily 30 tablet 11     loperamide (IMODIUM A-D) 2 MG tablet Start with 2 tabs (4 mg), then take one tab (2 mg) after each diarrheal stool.  Do not use more than  8 tabs (16 mg) per day. 60 tablet 0     lidocaine (XYLOCAINE) 5 % ointment Mix nickel sized amount with diclofenac gel. Apply 4 times daily to affected area. 100 g 3     Ketoprofen POWD Apply dime sized amount to affected areas up to three times daily. 100 g 3     Acetaminophen (TYLENOL PO) Take 500 mg by mouth every 4 hours as needed for mild pain or fever Takes 2 tablets of 500 mg         Review of Systems: A 10-point review of systems was  negative, with the exception of chronic pain issues.      Social History: Reviewed; unchanged from initial consultation.      Family history: Reviewed; unchanged from initial consultation.     PHYSICAL EXAM    Filed Vitals:    02/02/17 1611   BP: 127/82   Pulse: 90   Resp: 16   Weight: 59.875 kg (132 lb)   SpO2: 100%       Constitutional: healthy, alert, moderate distress and crying  HEENT: Head atraumatic, normocephalic. Eyes without conjunctival injection or jaundice. Neck supple. No obvious neck masses.  Skin: No rash, lesions, or petechiae of exposed skin.  Psychiatric/mental status: Alert, without lethargy or stupor. Appropriate affect. Mood upset, tearful    Neurologic exam:  CN:  Cranial nerves 2-12 are grossly normal.      DIRE Score for ongoing opioid management is calculated as follows:    Diagnosis = 2 pts (slowly progressive; moderate pain/objective findings)    Intractability = 1 pt (few therapies tried; passive patient role)    Risk        Psych = 1 pt (serious personality dysfunction/mental illness that significantly interferes with care)         Chem Hlth = 2 pts (use of medications to cope with stress; chemical dependency in remission)       Reliability = 1 pt (medication misuse; missed appointments; rarely follows through)       Social = 1 pt (life in chaos; little family support/close relationships; loss normal life rolls)       (Psych + Chem hlth + Reliability + Social) = 8    Efficacy = 2 pts (moderate benefit/function; low med dose; too early/not tried meds)    DIRE Score = 10        7-13: likely NOT suitable candidate for long-term opioid analgesia       14-21: may be a suitable candidate for long-term opioid analgesia       DIAGNOSTIC TESTS:  Imaging Studies:   No new imaging    Assessment:   1.  Munir Danlos syndrome  2.  Chronic myofascial pain  3.  Depression with anxiety, severe    Sheyla presents for follow-up regarding chronic myofascial pain and joint pain. Majority of this  appointment was spent in discussion regarding her future in this multidisciplinary pain program. Since the onset of her treatment in this program she has struggled to follow through with recommendations with many no-show appointments. She remains focused on her perception that if I would prescribe medication for her she would be able to follow-through however when she initially came to me I was prescribing opiates for her and she continued to no-show appointments which is part of the reason that I discontinued prescribing for her. We discussed extensively that her severe anxiety is interfering with her her from receiving the care that she needs to manage her pain. I am recommending an intensive mental health program either a day day program or inpatient mental health care. I provided her with a brochure for the [facility] day behavioral health treatment program and asked that she discuss this with her current mental health providers.     She remains focused on passive external treatment options to manage her pain as opposed to following through on therapy recommendations. Until she has received significant treatment for her severe anxiety disorder I do not see that she has the potential to receive benefit from a multidisciplinary pain management program. I have explained to the patient that she is not discharged from the program but on hold until she has addressed her mental health issues. She is not to schedule with any therapies in the [facility] pain management clinic but she is allowed to schedule with me if she wishes to follow up.    Plan:    Diagnosis reviewed, treatment option addressed, and risk/benifits discussed.  Self-care instructions given.      At this time we will hold on work with the multidisciplinary pain management program. Work with your mental health team to address concerns. You can send a My Chart Message or call Michell at anytime but we will not be scheduling follow up visits with therapies.        Total time spent face to face was 45 minutes and more than 50% of face to face time was spent in counseling and/or coordination of care regarding the diagnosis and recommendations above.      MORGAN Cuenca, NP-C   [facility] [place]

## 2017-02-10 ENCOUNTER — OFFICE VISIT (OUTPATIENT)
Dept: PSYCHIATRY | Facility: CLINIC | Age: 37
End: 2017-02-10
Attending: CLINICAL NURSE SPECIALIST
Payer: COMMERCIAL

## 2017-02-10 VITALS
SYSTOLIC BLOOD PRESSURE: 133 MMHG | BODY MASS INDEX: 20.61 KG/M2 | WEIGHT: 127.6 LBS | DIASTOLIC BLOOD PRESSURE: 89 MMHG | HEART RATE: 104 BPM

## 2017-02-10 DIAGNOSIS — F41.9 ANXIETY: Primary | ICD-10-CM

## 2017-02-10 DIAGNOSIS — F32.A DEPRESSION, UNSPECIFIED DEPRESSION TYPE: ICD-10-CM

## 2017-02-10 DIAGNOSIS — F90.2 ADHD (ATTENTION DEFICIT HYPERACTIVITY DISORDER), COMBINED TYPE: ICD-10-CM

## 2017-02-10 PROCEDURE — 99212 OFFICE O/P EST SF 10 MIN: CPT | Mod: 25,ZF

## 2017-02-10 RX ORDER — ESCITALOPRAM OXALATE 20 MG/1
20 TABLET ORAL DAILY
Qty: 30 TABLET | Refills: 1 | Status: SHIPPED | OUTPATIENT
Start: 2017-02-10 | End: 2017-05-31

## 2017-02-10 NOTE — MR AVS SNAPSHOT
After Visit Summary   2/10/2017    Sheyla Ye    MRN: [id number]           Patient Information     Date Of Birth          1980        Visit Information        Provider Department      2/10/2017 4:15 PM Mayra Lewis APRN CNS [facility]iatry Clinic        Today's Diagnoses     Anxiety    -  1    ADHD (attention deficit hyperactivity disorder), combined type        Depression, unspecified depression type           Follow-ups after your visit        Who to contact     Please call your clinic at [phone] to:    Ask questions about your health    Make or cancel appointments    Discuss your medicines    Learn about your test results    Speak to your doctor   If you have compliments or concerns about an experience at your clinic, or if you wish to file a complaint, please contact [facility] Patient Relations at [phone] or email us at [email or web address]         Additional Information About Your Visit        MyChart Information     [employer] gives you secure access to your electronic health record. If you see a primary care provider, you can also send messages to your care team and make appointments. If you have questions, please call your primary care clinic.  If you do not have a primary care provider, please call [phone] and they will assist you.      [employer] is an electronic gateway that provides easy, online access to your medical records. With [employer], you can request a clinic appointment, read your test results, renew a prescription or communicate with your care team.     To access your existing account, please contact your [facility] or call [phone] for assistance.        Care EveryWhere ID     This is your Care EveryWhere ID. This could be used by other organizations to access your [place] medical records  [id number]        Your Vitals Were     Pulse BMI (Body Mass Index)                104 20.6  kg/m2           Blood Pressure from Last 3 Encounters:   02/10/17 133/89   02/02/17 127/82   01/10/17 121/78    Weight from Last 3 Encounters:   02/10/17 57.9 kg (127 lb 9.6 oz)   02/02/17 59.9 kg (132 lb)   01/10/17 58.7 kg (129 lb 6.4 oz)              Today, you had the following     No orders found for display         Today's Medication Changes          These changes are accurate as of: 2/10/17 11:59 PM.  If you have any questions, ask your nurse or doctor.               These medicines have changed or have updated prescriptions.        Dose/Directions    escitalopram 20 MG tablet   Commonly known as:  LEXAPRO   This may have changed:  how much to take   Used for:  Depression, unspecified depression type, Anxiety   Changed by:  Mayra Lewis APRN CNS        Dose:  20 mg   Take 1 tablet (20 mg) by mouth daily   Quantity:  30 tablet   Refills:  1            Where to get your medicines      These medications were sent to [facility] [address]  [address] [facility] [address], [facility] [address]     Phone:  [phone]     escitalopram 20 MG tablet                Primary Care Provider Office Phone # Fax #    Myrna Watson [phone]       [facility] [address] [facility] [address]  [facility] [address]        Thank you!     Thank you for choosing PSYCHIATRY CLINIC  for your care. Our goal is always to provide you with excellent care. Hearing back from our patients is one way we can continue to improve our services. Please take a few minutes to complete the written survey that you may receive in the mail after your visit with us. Thank you!             Your Updated Medication List - Protect others around you: Learn how to safely use, store and throw away your medicines at www.disposemymeds.org.          This list is accurate as of: 2/10/17 11:59 PM.  Always use your most recent med list.                   Brand Name Dispense Instructions for use     escitalopram 20 MG tablet    LEXAPRO    30 tablet    Take 1 tablet (20 mg) by mouth daily       Ketoprofen Powd     100 g    Apply dime sized amount to affected areas up to three times daily.       loperamide 2 MG tablet    IMODIUM A-D    60 tablet    Start with 2 tabs (4 mg), then take one tab (2 mg) after each diarrheal stool.  Do not use more than  8 tabs (16 mg) per day.       omeprazole 20 MG CR capsule    priLOSEC    90 capsule    Take 1 capsule (20 mg) by mouth daily       prenatal multivitamin  plus iron 27-0.8 MG Tabs per tablet     30 tablet    Take 1 tablet by mouth daily       tiZANidine 2 MG tablet    ZANAFLEX    90 tablet    Take 1-2 tablets (2-4 mg) by mouth 3 times daily       TYLENOL PO      Take 500 mg by mouth every 4 hours as needed for mild pain or fever Takes 2 tablets of 500 mg
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
## 2017-02-14 ENCOUNTER — OFFICE VISIT (OUTPATIENT)
Dept: PSYCHIATRY | Facility: CLINIC | Age: 37
End: 2017-02-14
Attending: PSYCHOLOGIST
Payer: COMMERCIAL

## 2017-02-14 DIAGNOSIS — F45.1 SOMATIC SYMPTOM DISORDER, PERSISTENT, MODERATE, WITH PREDOMINANT PAIN: Primary | ICD-10-CM

## 2017-02-14 DIAGNOSIS — F33.1 MAJOR DEPRESSIVE DISORDER, RECURRENT EPISODE, MODERATE (H): ICD-10-CM

## 2017-02-14 NOTE — MR AVS SNAPSHOT
After Visit Summary   2/14/2017    Sheyla Ye    MRN: [id number]           Patient Information     Date Of Birth          1980        Visit Information        Provider Department      2/14/2017 1:00 PM Landry Solorzano, PhD Psychiatry Clinic        Today's Diagnoses     Somatic symptom disorder, persistent, moderate, with predominant pain    -  1    Major depressive disorder, recurrent episode, moderate (H)           Follow-ups after your visit        Your next 10 appointments already scheduled     Feb 21, 2017  1:00 PM CST   Adult Psychotherapy with Landry Solorzano, PhD   Psychiatry Clinic ([facility])    [address]  [address] [place] [address]   [phone]            Feb 28, 2017  1:00 PM CST   Adult Psychotherapy with Landry Solorzano, PhD   Psychiatry Clinic ([facility])    [address]  [address] [place] [address]   [phone]            Mar 14, 2017  1:00 PM CDT   Adult Psychotherapy with Landry Solorzano, PhD   Psychiatry Clinic ([facility])    [address]  [address] [place] [address]   [phone]            Mar 21, 2017  1:00 PM CDT   Adult Psychotherapy with Landry Solorzano, PhD   Psychiatry Clinic ([facility])    [address]  [address] [place] [address]   [phone]            Mar 28, 2017  1:00 PM CDT   Adult Psychotherapy with Landry Solorzano, PhD   Psychiatry Clinic ([facility])    [address]  [address] [place] [address]   [phone]            Apr 04, 2017  1:00 PM CDT   Adult Psychotherapy with Landry Solorzano, PhD   Psychiatry Clinic ([facility])    [address]  [address] [place] [address]   [phone]            Apr 11, 2017  1:00 PM CDT   Adult Psychotherapy with  Landry Solorzano, PhD   Psychiatry Clinic ([facility])    [address]  [address] [facility] [address]   [phone]            Apr 18, 2017  1:00 PM CDT   Adult Psychotherapy with Landry Solorzano PhD   Psychiatry Clinic ([facility])    [address]  [address] [facility] [address]   [phone]            Apr 25, 2017  1:00 PM CDT   Adult Psychotherapy with Landry Solorzano PhD   Psychiatry Clinic ([facility])    [address]  [address] [facility] [address] [phone]              Who to contact     Please call your clinic at [phone] to:    Ask questions about your health    Make or cancel appointments    Discuss your medicines    Learn about your test results    Speak to your doctor   If you have compliments or concerns about an experience at your clinic, or if you wish to file a complaint, please contact [facility] Patient Relations at [phone] or email us at [email or web address]         Additional Information About Your Visit        [employer] Information     [employer] gives you secure access to your electronic health record. If you see a primary care provider, you can also send messages to your care team and make appointments. If you have questions, please call your primary care clinic.  If you do not have a primary care provider, please call [phone] and they will assist you.      [employer] is an electronic gateway that provides easy, online access to your medical records. With [employer], you can request a clinic appointment, read your test results, renew a prescription or communicate with your care team.     To access your existing account, please contact your [facility] or call [phone] for assistance.        Care EveryWhere ID     This is your Care EveryWhere ID. This could be used by  other organizations to access your [place] medical records  [id number]         Blood Pressure from Last 3 Encounters:   02/10/17 133/89   02/02/17 127/82   01/10/17 121/78    Weight from Last 3 Encounters:   02/10/17 57.9 kg (127 lb 9.6 oz)   02/02/17 59.9 kg (132 lb)   01/10/17 58.7 kg (129 lb 6.4 oz)              Today, you had the following     No orders found for display       Primary Care Provider Office Phone # Fax #    Myrna Nhung Watson [phone]       [facility] [address] [facility] [address]  [facility] [address]        Thank you!     Thank you for choosing PSYCHIATRY CLINIC  for your care. Our goal is always to provide you with excellent care. Hearing back from our patients is one way we can continue to improve our services. Please take a few minutes to complete the written survey that you may receive in the mail after your visit with us. Thank you!             Your Updated Medication List - Protect others around you: Learn how to safely use, store and throw away your medicines at www.disposemymeds.org.          This list is accurate as of: 2/14/17 11:59 PM.  Always use your most recent med list.                   Brand Name Dispense Instructions for use    escitalopram 20 MG tablet    LEXAPRO    30 tablet    Take 1 tablet (20 mg) by mouth daily       Ketoprofen Powd     100 g    Apply dime sized amount to affected areas up to three times daily.       loperamide 2 MG tablet    IMODIUM A-D    60 tablet    Start with 2 tabs (4 mg), then take one tab (2 mg) after each diarrheal stool.  Do not use more than  8 tabs (16 mg) per day.       omeprazole 20 MG CR capsule    priLOSEC    90 capsule    Take 1 capsule (20 mg) by mouth daily       prenatal multivitamin  plus iron 27-0.8 MG Tabs per tablet     30 tablet    Take 1 tablet by mouth daily       tiZANidine 2 MG tablet    ZANAFLEX    90 tablet    Take 1-2 tablets (2-4 mg) by mouth 3 times daily       TYLENOL PO      Take 500 mg by  mouth every 4 hours as needed for mild pain or fever Takes 2 tablets of 500 mg

## 2017-02-15 ASSESSMENT — PATIENT HEALTH QUESTIONNAIRE - PHQ9: SUM OF ALL RESPONSES TO PHQ QUESTIONS 1-9: 22

## 2017-02-15 NOTE — PROGRESS NOTES
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  Supervisor:   Robyn Dawn Psy.D.    Narrative Description of Session:   The patient was seen by Landry Solorzano, Ph.D., Postdoctoral Associate. The patient arrived ten minutes late for her appointment, and was dressed casually with appropriate grooming/hygiene. Her mood was anxious and depressed with congruent, tearful affect. During session, she became tearful and struggled to not laugh while crying, and her face trembled as she attempted to stay with strong negative emotion.  No abnormalities were noted in speech/through processes or content, and she denied SI.     Patient reported that her participation in the Pain Management Program has been  temporarily suspended  due to her intermittent attendance record. The patient reported feeling somewhat  confused  and  unsure  why she was being completely excluded from all aspects of the program, but a discussion of treatment boundaries appeared to help her gain insight into this issue.    As the session progressed, the patient agreed that she has  a lot of pain  that she  keep well-hidden.  As the clinician and patient discussed the costs of hiding this pain, the patient began to tear up, her face trembled, and she struggled to stay with her emotion. She was redirectable by the clinician once she began to intellectualize her experience to lessen the intensity of the negative affect.     Assessment:   Appearance:  Appropriately groomed, casually dressed  Behavior/relationship to examiner/demeanor:  Normal  Motor activity/EPS:  Normal  Gait:  Normal  Speech rate:  Normal  Speech volume:  Normal  Speech articulation:   Normal  Speech coherence:  Normal  Speech spontaneity:  Normal  Thought Process (Associations):  Goal-directed  Thought process (Rate):  Normal  Thought content: Normal  Abnormal Perception:  Denies  Insight:  Fair  Judgment:  Fair    Plan: The patient was encouraged to explore alternative methods of experiencing and understanding her emotion throughout the next week.    I did not see this pt directly. This pt is discussed with me in individual psychotherapy supervision, and I agree with the plan as documented. Robyn Dawn Psy.D. , L.P.

## 2017-02-21 ENCOUNTER — OFFICE VISIT (OUTPATIENT)
Dept: PSYCHIATRY | Facility: CLINIC | Age: 37
End: 2017-02-21
Attending: PSYCHOLOGIST
Payer: COMMERCIAL

## 2017-02-21 DIAGNOSIS — F45.1 SOMATIC SYMPTOM DISORDER, PERSISTENT, MODERATE, WITH PREDOMINANT PAIN: Primary | ICD-10-CM

## 2017-02-21 DIAGNOSIS — F33.1 MAJOR DEPRESSIVE DISORDER, RECURRENT EPISODE, MODERATE (H): ICD-10-CM

## 2017-02-21 NOTE — MR AVS SNAPSHOT
After Visit Summary   2/21/2017    Sheyla Ye    MRN: [id number]           Patient Information     Date Of Birth          1980        Visit Information        Provider Department      2/21/2017 1:00 PM Landry Solorzano, PhD Psychiatry Clinic        Today's Diagnoses     Somatic symptom disorder, persistent, moderate, with predominant pain    -  1    Major depressive disorder, recurrent episode, moderate (H)           Follow-ups after your visit        Your next 10 appointments already scheduled     Feb 28, 2017  1:00 PM CST   Adult Psychotherapy with Landry Solorzano, PhD   Psychiatry Clinic ([facility])    [address]  [address] [place] [address]   [phone]            Mar 14, 2017  1:00 PM CDT   Adult Psychotherapy with Landry Solorzano, PhD   Psychiatry Clinic ([facility])    [address]  [address] [place] [address]   [phone]            Mar 21, 2017  1:00 PM CDT   Adult Psychotherapy with Landry Solorzano, PhD   Psychiatry Clinic ([facility])    [address]  [address] [place] [address]   [phone]            Mar 28, 2017  1:00 PM CDT   Adult Psychotherapy with Landry Solorzano, PhD   Psychiatry Clinic ([facility])    [address]  [address] [place] [address]   [phone]            Apr 04, 2017  1:00 PM CDT   Adult Psychotherapy with Landry Solorzano, PhD   Psychiatry Clinic ([facility])    [address]  [address] [place] [address]   [phone]            Apr 11, 2017  1:00 PM CDT   Adult Psychotherapy with Landry Solorzano, PhD   Psychiatry Clinic ([facility])    [address]  [address] [place] [address]   [phone]            Apr 18, 2017  1:00 PM CDT   Adult Psychotherapy with  Landry Solorzano, PhD   Psychiatry Clinic ([facility])    [address]  [address] [facility] [address] [phone]            Apr 25, 2017  1:00 PM CDT   Adult Psychotherapy with Landry Solorzano, PhD   Psychiatry Clinic ([facility])    [address]  [address] [facility] [address] [phone]              Who to contact     Please call your clinic at [phone] to:    Ask questions about your health    Make or cancel appointments    Discuss your medicines    Learn about your test results    Speak to your doctor   If you have compliments or concerns about an experience at your clinic, or if you wish to file a complaint, please contact [facility] Patient Relations at [phone] or email us at [email or web address]         Additional Information About Your Visit        [employer] Information     [employer] gives you secure access to your electronic health record. If you see a primary care provider, you can also send messages to your care team and make appointments. If you have questions, please call your primary care clinic.  If you do not have a primary care provider, please call [phone] and they will assist you.      [employer] is an electronic gateway that provides easy, online access to your medical records. With [employer], you can request a clinic appointment, read your test results, renew a prescription or communicate with your care team.     To access your existing account, please contact your [facility] or call [phone] for assistance.        Care EveryWhere ID     This is your Care EveryWhere ID. This could be used by other organizations to access your [place] medical records  [id number]         Blood Pressure from Last 3 Encounters:   02/10/17 133/89   02/02/17 127/82   01/10/17 121/78    Weight from Last 3 Encounters:   02/10/17 57.9 kg  (127 lb 9.6 oz)   02/02/17 59.9 kg (132 lb)   01/10/17 58.7 kg (129 lb 6.4 oz)              Today, you had the following     No orders found for display       Primary Care Provider Office Phone # Fax #    Myrna Nhung Watson [phone]       [address]  [facility] [address]        Thank you!     Thank you for choosing PSYCHIATRY CLINIC  for your care. Our goal is always to provide you with excellent care. Hearing back from our patients is one way we can continue to improve our services. Please take a few minutes to complete the written survey that you may receive in the mail after your visit with us. Thank you!             Your Updated Medication List - Protect others around you: Learn how to safely use, store and throw away your medicines at www.disposemymeds.org.          This list is accurate as of: 2/21/17 11:59 PM.  Always use your most recent med list.                   Brand Name Dispense Instructions for use    escitalopram 20 MG tablet    LEXAPRO    30 tablet    Take 1 tablet (20 mg) by mouth daily       Ketoprofen Powd     100 g    Apply dime sized amount to affected areas up to three times daily.       loperamide 2 MG tablet    IMODIUM A-D    60 tablet    Start with 2 tabs (4 mg), then take one tab (2 mg) after each diarrheal stool.  Do not use more than  8 tabs (16 mg) per day.       omeprazole 20 MG CR capsule    priLOSEC    90 capsule    Take 1 capsule (20 mg) by mouth daily       prenatal multivitamin  plus iron 27-0.8 MG Tabs per tablet     30 tablet    Take 1 tablet by mouth daily       tiZANidine 2 MG tablet    ZANAFLEX    90 tablet    Take 1-2 tablets (2-4 mg) by mouth 3 times daily       TYLENOL PO      Take 500 mg by mouth every 4 hours as needed for mild pain or fever Takes 2 tablets of 500 mg

## 2017-02-22 NOTE — PROGRESS NOTES
[facility]  Therapy Progress Note    Patient Name:  Sheyla Ye    :   1980    Date:   2017     Diagnostic Codes:  Somatic Symptom Disorder (F45.1)   Major Depressive Disorder, Moderate, Recurrent, with anxious features (F33.1)  Type of Session:  Individual psychotherapy  Length of Session:   55 minutes  Practitioner:   Landry Solorzano, Ph.D.  Supervisor:   Robyn Dawn Psy.D.    Narrative Description of Session:   The patient was seen by Landry Solorzano, Ph.D., Postdoctoral Associate. The patient arrived on time for her session and was dressed casually with appropriate grooming/hygiene. Her mood was anxious and depressed with congruent affect. No abnormalities were noted in speech/through processes or content, and she denied SI.     The patient began the session by discussing the contrast in how she experiences herself, versus how others perceive her mental state.  That is, she shared how many people view her as  better  than she feels on  the inside.   The patient shared that if she allowed herself inside to be reflected on the outside, she would  disheveled, weak, and slow-moving.      This idea led the patient to share a prominent conflict she is currently having with her family. Specifically, the patient shared she feels pressure to move back home to care for ailing family members. Although the patient said she believes she would feel  more understood  by her family regarding her pain issues, but on the other hand, but she does not want to incur guilt for  not being around  for her family (e.g., taking care of her mother).     The rest of session was spent explaining the difference between shame and guilt, and gaining a better understanding of how the patient phenomenologically experiences and awais with these two emotions.     Assessment:   Appearance:  Appropriately groomed, casually dressed  Behavior/relationship to examiner/demeanor:  Normal  Motor activity/EPS:  Normal  Gait:  Normal  Speech  rate:  Normal  Speech volume:  Normal  Speech articulation:  Normal  Speech coherence:  Normal  Speech spontaneity:  Normal  Thought Process (Associations):  Goal-directed  Thought process (Rate):  Normal  Thought content: Normal  Abnormal Perception:  Denies  Insight:  Fair  Judgment:  Fair    Plan: Keeping journal of emotional experiences and corresponding physical symptoms.     I did not see this pt directly. This pt is discussed with me in individual psychotherapy supervision, and I agree with the plan as documented. Robyn Dawn Psy.D. , L.P.

## 2017-03-14 ENCOUNTER — OFFICE VISIT (OUTPATIENT)
Dept: PSYCHIATRY | Facility: CLINIC | Age: 37
End: 2017-03-14
Attending: PSYCHOLOGIST
Payer: COMMERCIAL

## 2017-03-14 DIAGNOSIS — F33.1 MAJOR DEPRESSIVE DISORDER, RECURRENT EPISODE, MODERATE (H): ICD-10-CM

## 2017-03-14 DIAGNOSIS — F45.1 SOMATIC SYMPTOM DISORDER, PERSISTENT, MODERATE, WITH PREDOMINANT PAIN: Primary | ICD-10-CM

## 2017-03-14 NOTE — MR AVS SNAPSHOT
After Visit Summary   3/14/2017    Sheyla Ye    MRN: [id number]           Patient Information     Date Of Birth          1980        Visit Information        Provider Department      3/14/2017 1:00 PM Landry Solorzano, PhD Psychiatry Clinic        Today's Diagnoses     Somatic symptom disorder, persistent, moderate, with predominant pain    -  1    Major depressive disorder, recurrent episode, moderate (H)           Follow-ups after your visit        Your next 10 appointments already scheduled     Mar 21, 2017  1:00 PM CDT   Adult Psychotherapy with Landry Solorzano, PhD   Psychiatry Clinic ([facility])    [address]  [address] [place] [address]   [phone]            Mar 28, 2017  1:00 PM CDT   Adult Psychotherapy with Landry Solorzano, PhD   Psychiatry Clinic ([facility])    [address]  [address] [place] [address]   [phone]            Apr 04, 2017  1:00 PM CDT   Adult Psychotherapy with Landry Solorzano, PhD   Psychiatry Clinic ([facility])    [address]  [address] [place] [address]   [phone]            Apr 11, 2017  1:00 PM CDT   Adult Psychotherapy with Landry Solorzano, PhD   Psychiatry Clinic ([facility])    [address]  [address] [place] [address]   [phone]            Apr 18, 2017  1:00 PM CDT   Adult Psychotherapy with Landry Solorzano, PhD   Psychiatry Clinic ([facility])    [address]  [address] [place] [address]   [phone]            Apr 25, 2017  1:00 PM CDT   Adult Psychotherapy with Landry Solorzano, PhD   Psychiatry Clinic ([facility])    [address]  [address] [place] [address]   [phone]              Who to contact     Please call your clinic at  [phone] to:    Ask questions about your health    Make or cancel appointments    Discuss your medicines    Learn about your test results    Speak to your doctor   If you have compliments or concerns about an experience at your clinic, or if you wish to file a complaint, please contact [facility] Patient Relations at [phone] or email us at [email or web address]         Additional Information About Your Visit        MyChart Information     [employer] gives you secure access to your electronic health record. If you see a primary care provider, you can also send messages to your care team and make appointments. If you have questions, please call your primary care clinic.  If you do not have a primary care provider, please call [phone] and they will assist you.      [employer] is an electronic gateway that provides easy, online access to your medical records. With [employer], you can request a clinic appointment, read your test results, renew a prescription or communicate with your care team.     To access your existing account, please contact your [facility] or call [phone] for assistance.        Care EveryWhere ID     This is your Care EveryWhere ID. This could be used by other organizations to access your [place] medical records  [id number]         Blood Pressure from Last 3 Encounters:   02/10/17 133/89   02/02/17 127/82   01/10/17 121/78    Weight from Last 3 Encounters:   02/10/17 57.9 kg (127 lb 9.6 oz)   02/02/17 59.9 kg (132 lb)   01/10/17 58.7 kg (129 lb 6.4 oz)              Today, you had the following     No orders found for display       Primary Care Provider Office Phone # Fax #    Myrna Watson [phone]       [address] [address]        Thank you!     Thank you for choosing [facility]ATRY CLINIC  for your care. Our goal is always to provide you with excellent  care. Hearing back from our patients is one way we can continue to improve our services. Please take a few minutes to complete the written survey that you may receive in the mail after your visit with us. Thank you!             Your Updated Medication List - Protect others around you: Learn how to safely use, store and throw away your medicines at www.disposemymeds.org.          This list is accurate as of: 3/14/17 11:59 PM.  Always use your most recent med list.                   Brand Name Dispense Instructions for use    escitalopram 20 MG tablet    LEXAPRO    30 tablet    Take 1 tablet (20 mg) by mouth daily       Ketoprofen Powd     100 g    Apply dime sized amount to affected areas up to three times daily.       loperamide 2 MG tablet    IMODIUM A-D    60 tablet    Start with 2 tabs (4 mg), then take one tab (2 mg) after each diarrheal stool.  Do not use more than  8 tabs (16 mg) per day.       omeprazole 20 MG CR capsule    priLOSEC    90 capsule    Take 1 capsule (20 mg) by mouth daily       prenatal multivitamin  plus iron 27-0.8 MG Tabs per tablet     30 tablet    Take 1 tablet by mouth daily       tiZANidine 2 MG tablet    ZANAFLEX    90 tablet    Take 1-2 tablets (2-4 mg) by mouth 3 times daily       TYLENOL PO      Take 500 mg by mouth every 4 hours as needed for mild pain or fever Takes 2 tablets of 500 mg

## 2017-03-16 NOTE — PROGRESS NOTES
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  Supervisor:   Robyn Dawn Psy.D.    Narrative Description of Session:   The patient was seen by Landry Solorzano, Ph.D., Postdoctoral Associate. She arrived on time for her session and was dressed casually with appropriate grooming/hygiene. Her mood was anxious and depressed with congruent affect. No abnormalities were noted in speech/thought processes or content, and she denied SI.     The patient opened session by sharing her reaction to the clinician after hearing two weeks ago that he could not accommodate her after she missed her last appointment. The patient said she  just fell apart crying and broke down,  but in-session was unable to identify reasons for this emotion. Likewise, she appeared unable to reconnect with her emotional experience and instead said she felt  puzzled.      The patient proposed that her reaction may be rooted in difficulties she experienced  from her family, but this idea appeared to be a more intellectualized hypothesis than a  felt  wondering. Instead, the clinician reframed the issue as a recapitulation of frustration she experiences towards herself, as she missed the session due to not setting her alarm the night before the appointment.     Towards the end of the session, the patient expressed ambivalent fear towards a provider in the clinic because she worried that this provider was  mad  at her for not following through on appointments. As the session ended, the clinician asked the patient to reflect on why this pattern (i.e., repeatedly missing appointments with various providers until they ultimately refuse to see  her anymore) continues to persist.     Assessment:   Appearance:  Appropriately groomed, casually dressed  Behavior/relationship to examiner/demeanor:  Normal  Motor activity/EPS:  Normal  Gait:  Normal  Speech rate:  Normal  Speech volume:  Normal  Speech articulation:  Normal  Speech coherence:  Normal  Speech spontaneity:  Normal  Thought Process (Associations):  Goal-directed  Thought process (Rate):  Normal  Thought content: Normal  Abnormal Perception:  Denies  Insight:  Fair  Judgment:  Fair    Plan: Help the patient reflect on, and gain insight into, treatment-interfering behavior. Once addressed, this should allow the patient to increase her engagement and investment into treatment.     I did not see this pt directly. This pt is discussed with me in individual psychotherapy supervision, and I agree with the plan as documented. Robyn Dawn Psy.D. , L.P.

## 2017-03-21 ENCOUNTER — OFFICE VISIT (OUTPATIENT)
Dept: PSYCHIATRY | Facility: CLINIC | Age: 37
End: 2017-03-21
Attending: PSYCHOLOGIST
Payer: COMMERCIAL

## 2017-03-21 DIAGNOSIS — F33.1 MAJOR DEPRESSIVE DISORDER, RECURRENT EPISODE, MODERATE (H): ICD-10-CM

## 2017-03-21 DIAGNOSIS — R19.7 DIARRHEA: ICD-10-CM

## 2017-03-21 DIAGNOSIS — F45.1 SOMATIC SYMPTOM DISORDER, PERSISTENT, MODERATE, WITH PREDOMINANT PAIN: Primary | ICD-10-CM

## 2017-03-21 RX ORDER — LOPERAMIDE HYDROCHLORIDE 2 MG/1
TABLET ORAL
Qty: 60 TABLET | Refills: 0 | Status: SHIPPED | OUTPATIENT
Start: 2017-03-21 | End: 2018-10-12

## 2017-03-21 NOTE — MR AVS SNAPSHOT
After Visit Summary   3/21/2017    Sheyla Ye    MRN: [id number]           Patient Information     Date Of Birth          1980        Visit Information        Provider Department      3/21/2017 1:00 PM Landry Solorzano, PhD Psychiatry Clinic        Today's Diagnoses     Somatic symptom disorder, persistent, moderate, with predominant pain    -  1    Major depressive disorder, recurrent episode, moderate (H)           Follow-ups after your visit        Your next 10 appointments already scheduled     Mar 28, 2017  1:00 PM CDT   Adult Psychotherapy with Landry Solorzano, PhD   Psychiatry Clinic ([facility])    [address]  [address] [place] [address]   [phone]            Apr 04, 2017  1:00 PM CDT   Adult Psychotherapy with Landry Solorzano, PhD   Psychiatry Clinic ([facility])    [address]  [address] [place] [address]   [phone]            Apr 11, 2017  1:00 PM CDT   Adult Psychotherapy with Landry Solorzano, PhD   Psychiatry Clinic ([facility])    [address]  [address] [place] [address]   [phone]            Apr 18, 2017  1:00 PM CDT   Adult Psychotherapy with Landry Solorzano, PhD   Psychiatry Clinic ([facility])    [address]  [address] [place] [address]   [phone]            Apr 25, 2017  1:00 PM CDT   Adult Psychotherapy with Landry Solorzano, PhD   Psychiatry Clinic ([facility])    [address]  [address] [place] [address]   [phone]              Who to contact     Please call your clinic at [phone] to:    Ask questions about your health    Make or cancel appointments    Discuss your medicines    Learn about your test results    Speak to your doctor   If you have compliments or concerns about an experience at your  clinic, or if you wish to file a complaint, please contact [facility] Patient Relations at [phone] or email us at [email or web address]         Additional Information About Your Visit        MyChart Information     [employer] gives you secure access to your electronic health record. If you see a primary care provider, you can also send messages to your care team and make appointments. If you have questions, please call your primary care clinic.  If you do not have a primary care provider, please call [phone] and they will assist you.      [employer] is an electronic gateway that provides easy, online access to your medical records. With [employer], you can request a clinic appointment, read your test results, renew a prescription or communicate with your care team.     To access your existing account, please contact your [facility] or call [phone] for assistance.        Care EveryWhere ID     This is your Care EveryWhere ID. This could be used by other organizations to access your [place] medical records  [id number]         Blood Pressure from Last 3 Encounters:   02/10/17 133/89   02/02/17 127/82   01/10/17 121/78    Weight from Last 3 Encounters:   02/10/17 57.9 kg (127 lb 9.6 oz)   02/02/17 59.9 kg (132 lb)   01/10/17 58.7 kg (129 lb 6.4 oz)              Today, you had the following     No orders found for display         Where to get your medicines      These medications were sent to [facility] [place], [place] - [address]  [address] [address]     Phone:  [phone]     loperamide 2 MG tablet          Primary Care Provider Office Phone # Fax #    Myrna Watson [phone]       [address]  [facility] [address]        Thank you!     Thank you for choosing PSY[facility]IATRY CLINIC  for your care. Our goal is always to provide you with  excellent care. Hearing back from our patients is one way we can continue to improve our services. Please take a few minutes to complete the written survey that you may receive in the mail after your visit with us. Thank you!             Your Updated Medication List - Protect others around you: Learn how to safely use, store and throw away your medicines at www.disposemymeds.org.          This list is accurate as of: 3/21/17 11:59 PM.  Always use your most recent med list.                   Brand Name Dispense Instructions for use    escitalopram 20 MG tablet    LEXAPRO    30 tablet    Take 1 tablet (20 mg) by mouth daily       Ketoprofen Powd     100 g    Apply dime sized amount to affected areas up to three times daily.       loperamide 2 MG tablet    IMODIUM A-D    60 tablet    Start with 2 tabs (4 mg), then take one tab (2 mg) after each diarrheal stool.  Do not use more than  8 tabs (16 mg) per day.       omeprazole 20 MG CR capsule    priLOSEC    90 capsule    Take 1 capsule (20 mg) by mouth daily       prenatal multivitamin  plus iron 27-0.8 MG Tabs per tablet     30 tablet    Take 1 tablet by mouth daily       tiZANidine 2 MG tablet    ZANAFLEX    90 tablet    Take 1-2 tablets (2-4 mg) by mouth 3 times daily       TYLENOL PO      Take 500 mg by mouth every 4 hours as needed for mild pain or fever Takes 2 tablets of 500 mg

## 2017-03-21 NOTE — TELEPHONE ENCOUNTER
loperamide (IMODIUM A-D) 2 MG tablet      Last Written Prescription Date:  10/31/2016  Last Fill Quantity: 60,   # refills: 0  Last Office Visit : 2/2/2016  Future Office visit:  0    Appointment overdue-  Letter sent and [employer] message to patient.  Refill sent for 30 day supply

## 2017-03-21 NOTE — LETTER
[facility]IMARY [facility]ARE [facility]INIC  [address]  [address]  [facility] [facility] [address]      March 21, 2017      Sheyla Ye  [address]     [facility] [address]        Dear Sheyla,    This letter is a reminder that you are overdue to see your Primary Care Provider for an Annual Visit. You must be seen by your Primary Care Provider on a yearly basis and have appropriate labs drawn for continued care and prescription refills. Please call [phone] to schedule an appointment for an Annual Visit with Dr PIERO AGUIRRE.     You have been given a 30 day supply/refill of your loperamide (IMODIUM A-D) 2 MG tablet while you get your clinic visit/labs completed.      Regards,    Primary Care Center

## 2017-03-22 NOTE — PROGRESS NOTES
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  Supervisor:   Robyn Dawn Psy.D.    Narrative Description of Session:   The patient was seen by Landry Solorzano, Ph.D., Postdoctoral Associate. She arrived on time for her session and was dressed casually with appropriate grooming/hygiene. Her mood was anxious and depressed with congruent affect. No abnormalities were noted in speech/thought processes or content, and she denied SI.     Session began by the patient talking about the confusion she experiences from connecting to, and then quickly disconnecting from her emotional experience. The clinician highlighted the adaptiveness of this process, and asked how it might be reflected in the therapeutic relationship. This led to a discussion again about the patient s self-reported  crying episode  she had a few weeks ago when she was reminded that the clinician would be out of town. The patient was able to say aloud that she  missed and needed  the clinician, and elaborated by saying that this therapeutic space is a rare instance in which she can talk about her issues without fears of being judged, persecuted, or told to  suck it up.     The patient reported that after she noticed the thought of  needing  to talk to the clinician, she had a subsequent thought of  [she will] be fine  for the next few weeks. This sharing sparked a conversation about the patient s tendency to facilitate between needing and rejecting help, to which the patient responded that she has always had difficulty with healthy dependence.     The patient also asked for advice about how handle a new, upcoming appointment with another psychiatrist  at an outside clinic. The patient said she realized that she was feeling  nervous  about this appointment, but was not sure why she felt this way.  Objective advice and suggestions were given to the patient as session ended, which appeared to lessen some of her overt anxiety.     Assessment:   Appearance:  Appropriately groomed, casually dressed  Behavior/relationship to examiner/demeanor:  Normal  Motor activity/EPS:  Normal  Gait:  Normal  Speech rate:  Normal  Speech volume:  Normal  Speech articulation:  Normal  Speech coherence:  Normal  Speech spontaneity:  Normal  Thought Process (Associations):  Goal-directed  Thought process (Rate):  Normal  Thought content: Normal  Abnormal Perception:  Denies  Insight:  Fair  Judgment:  Fair    Plan: Continue helping the patient deepen her emotional experience and gain insight into the reasons why she is often afraid to seek help that may contain a possibility of refusal (i.e., need/fear dilemma).     I did not see this pt directly. This pt is discussed with me in individual psychotherapy supervision, and I agree with the plan as documented. Robyn Dawn Psy.D. , L.P.

## 2017-04-04 ENCOUNTER — TELEPHONE (OUTPATIENT)
Dept: PSYCHIATRY | Facility: CLINIC | Age: 37
End: 2017-04-04

## 2017-04-04 ENCOUNTER — OFFICE VISIT (OUTPATIENT)
Dept: PSYCHIATRY | Facility: CLINIC | Age: 37
End: 2017-04-04
Attending: PSYCHOLOGIST
Payer: COMMERCIAL

## 2017-04-04 DIAGNOSIS — F33.1 MAJOR DEPRESSIVE DISORDER, RECURRENT EPISODE, MODERATE (H): ICD-10-CM

## 2017-04-04 DIAGNOSIS — F45.1 SOMATIC SYMPTOM DISORDER, PERSISTENT, MODERATE, WITH PREDOMINANT PAIN: Primary | ICD-10-CM

## 2017-04-04 NOTE — MR AVS SNAPSHOT
After Visit Summary   4/4/2017    Sheyla Ye    MRN: [id number]           Patient Information     Date Of Birth          1980        Visit Information        Provider Department      4/4/2017 1:00 PM Landry Solorzano, PhD [facility]        Today's Diagnoses     Somatic symptom disorder, persistent, moderate, with predominant pain    -  1    Major depressive disorder, recurrent episode, moderate (H)           Follow-ups after your visit        Your next 10 appointments already scheduled     Apr 07, 2017  3:30 PM CDT   (Arrive by 3:15 PM)   PHYSICAL with Myrna Watson MD   [facility] ([facility])    [address] [place] [address]  [facility] [address] [phone]            Apr 11, 2017  1:00 PM CDT   Adult Psychotherapy with Landry Solorzano, PhD   Psychiatry Clinic ([facility])    [address]  [facility] [address] [phone]            Apr 18, 2017  1:00 PM CDT   Adult Psychotherapy with Landry Solorzano, PhD   Psychiatry Clinic ([facility])    [address]  [address] [place] [id number] [phone]            Apr 25, 2017  1:00 PM CDT   Adult Psychotherapy with Landry Solorzano, PhD   Psychiatry Clinic ([facility])    [address]  [facility] [id number] [phone]              Who to contact     Please call your clinic at [phone] to:    Ask questions about your health    Make or cancel appointments    Discuss your medicines    Learn about your test results    Speak to your doctor   If you have compliments or concerns about an experience at your clinic, or if you wish to file a complaint, please contact [facility] Patient Relations at [phone] or email us at [email or web address]         Additional Information  About Your Visit        [employer] Information     [employer] gives you secure access to your electronic health record. If you see a primary care provider, you can also send messages to your care team and make appointments. If you have questions, please call your primary care clinic.  If you do not have a primary care provider, please call [phone] and they will assist you.      [employer] is an electronic gateway that provides easy, online access to your medical records. With [employer], you can request a clinic appointment, read your test results, renew a prescription or communicate with your care team.     To access your existing account, please contact your [facility] or call [phone] for assistance.        Care EveryWhere ID     This is your Care EveryWhere ID. This could be used by other organizations to access your [place] medical records  [id number]         Blood Pressure from Last 3 Encounters:   02/10/17 133/89   02/02/17 127/82   01/10/17 121/78    Weight from Last 3 Encounters:   02/10/17 57.9 kg (127 lb 9.6 oz)   02/02/17 59.9 kg (132 lb)   01/10/17 58.7 kg (129 lb 6.4 oz)              Today, you had the following     No orders found for display       Primary Care Provider Office Phone # Fax #    Myrnamoriah Watson [phone]       [address] [address]        Thank you!     Thank you for choosing [facility]  for your care. Our goal is always to provide you with excellent care. Hearing back from our patients is one way we can continue to improve our services. Please take a few minutes to complete the written survey that you may receive in the mail after your visit with us. Thank you!             Your Updated Medication List - Protect others around you: Learn how to safely use, store and throw away your medicines at www.disposemymeds.org.          This list is accurate as of: 4/4/17 11:59 PM.  Always use  your most recent med list.                   Brand Name Dispense Instructions for use    escitalopram 20 MG tablet    LEXAPRO    30 tablet    Take 1 tablet (20 mg) by mouth daily       Ketoprofen Powd     100 g    Apply dime sized amount to affected areas up to three times daily.       loperamide 2 MG tablet    IMODIUM A-D    60 tablet    Start with 2 tabs (4 mg), then take one tab (2 mg) after each diarrheal stool.  Do not use more than  8 tabs (16 mg) per day.       omeprazole 20 MG CR capsule    priLOSEC    90 capsule    Take 1 capsule (20 mg) by mouth daily       prenatal multivitamin  plus iron 27-0.8 MG Tabs per tablet     30 tablet    Take 1 tablet by mouth daily       tiZANidine 2 MG tablet    ZANAFLEX    90 tablet    Take 1-2 tablets (2-4 mg) by mouth 3 times daily       TYLENOL PO      Take 500 mg by mouth every 4 hours as needed for mild pain or fever Takes 2 tablets of 500 mg

## 2017-04-05 NOTE — PROGRESS NOTES
[facility]  Therapy Progress Note    Patient Name:  Sheyla Ye    :   1980    Date:   2017     Diagnostic Codes:  Somatic Symptom Disorder (F45.1)   Major Depressive Disorder, Moderate, Recurrent, with anxious features (F33.1)  Type of Session:  Individual psychotherapy  Length of Session:   40 minutes  Practitioner:   Landry Solorzano, Ph.D.  Supervisor:   Robyn Dawn Psy.D.    Narrative Description of Session:   The patient was seen by Landry Solorzano, Ph.D., Postdoctoral Associate. She arrived fifteen minutes late for her session and was dressed casually with appropriate grooming/hygiene. Her mood was anxious and with congruent affect. No abnormalities were noted in speech/thought processes or content, and she denied SI.     Session began by processing the patient s fear of  punishment  for being late to session. Although the patient acknowledged her rapid heartrate, shortness of breath, and flushed face likely reflected her lateness, she was unable to identify concrete emotions or thoughts around the reaction she feared the clinician would have.    The patient then shared a bind she was placed in by her mother, where she could either move home to take care of her mother, or her mother would cut her off entirely from the family (i.e.,  don t even bother coming to my ). The patient struggled to connect with the feelings of sadness and/or anger she felt, instead saying she did not want to cry, as she put her hands in front of her face while saying this to the clinician.     The patient said she missed last session because she thought the clinician was out of town, and felt  embarrassed  to the point that she chose to not call the clinician back. This led into a discussion of the patient s fear of depending on others, as the clinician observed that the patient frequently does not attend session when her week is full of negative events.  The session ended by the patient saying she would return to  session next week  if [the clinician] wants [her] to come back.      Assessment:   Appearance:  Appropriately groomed, casually dressed  Behavior/relationship to examiner/demeanor:  Normal  Motor activity/EPS:  Normal  Gait:  Normal  Speech rate:  Normal  Speech volume:  Normal  Speech articulation:  Normal  Speech coherence:  Normal  Speech spontaneity:  Normal  Thought Process (Associations):  Goal-directed  Thought process (Rate):  Normal  Thought content: Normal  Abnormal Perception:  Denies  Insight:  Fair  Judgment:  Fair    Plan: Help the patient reconnect mind and body, as she often struggles to identify physical correlates of her intense emotional experiences related to dependence, enmeshment, and fears of abandonment or rejection.      I did not see this pt directly. This pt is discussed with me in individual psychotherapy supervision, and I agree with the plan as documented. Robyn Dawn Psy.D. , L.P.

## 2017-04-11 ENCOUNTER — TELEPHONE (OUTPATIENT)
Dept: PSYCHIATRY | Facility: CLINIC | Age: 37
End: 2017-04-11

## 2017-04-11 NOTE — TELEPHONE ENCOUNTER
"Writer contacted patient 15 minutes after the hour to inquire about whether she was planning on attending session, after which she said she was \"five minutes away.\" The patient arrived to the  at 1:45 (instead of 1:00 PM, as planned), at which the clinician said that they could not meet. Writer gave the patient the BDI-II and CHIQUIS, and asked her to complete these measures before leaving, and confirmed next week's appt at 1:00 time slot.   "

## 2017-04-14 ENCOUNTER — HOSPITAL ENCOUNTER (EMERGENCY)
Facility: CLINIC | Age: 37
Discharge: HOME | End: 2017-04-14
Attending: EMERGENCY MEDICINE | Admitting: EMERGENCY MEDICINE
Payer: COMMERCIAL

## 2017-04-14 VITALS
DIASTOLIC BLOOD PRESSURE: 62 MMHG | SYSTOLIC BLOOD PRESSURE: 108 MMHG | OXYGEN SATURATION: 98 % | TEMPERATURE: 98.2 F | WEIGHT: 129.56 LBS | BODY MASS INDEX: 20.91 KG/M2 | RESPIRATION RATE: 16 BRPM | HEART RATE: 94 BPM

## 2017-04-14 DIAGNOSIS — S61.011A LACERATION OF RIGHT THUMB, INITIAL ENCOUNTER: ICD-10-CM
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
## 2017-04-14 DIAGNOSIS — Q79.60 EHLERS-DANLOS DISEASE: ICD-10-CM

## 2017-04-14 DIAGNOSIS — S61.411A HAND LACERATION, RIGHT, INITIAL ENCOUNTER: ICD-10-CM

## 2017-04-14 DIAGNOSIS — S61.210A LACERATION OF RIGHT INDEX FINGER WITHOUT FOREIGN BODY WITHOUT DAMAGE TO NAIL, INITIAL ENCOUNTER: ICD-10-CM

## 2017-04-14 PROCEDURE — 12002 RPR S/N/AX/GEN/TRNK2.6-7.5CM: CPT | Mod: Z6 | Performed by: EMERGENCY MEDICINE

## 2017-04-14 PROCEDURE — 99283 EMERGENCY DEPT VISIT LOW MDM: CPT | Performed by: EMERGENCY MEDICINE

## 2017-04-14 PROCEDURE — 12002 RPR S/N/AX/GEN/TRNK2.6-7.5CM: CPT | Performed by: EMERGENCY MEDICINE

## 2017-04-14 PROCEDURE — 99283 EMERGENCY DEPT VISIT LOW MDM: CPT | Mod: 25 | Performed by: EMERGENCY MEDICINE

## 2017-04-14 RX ORDER — LIDOCAINE HYDROCHLORIDE 20 MG/ML
INJECTION, SOLUTION EPIDURAL; INFILTRATION; INTRACAUDAL; PERINEURAL
Status: DISCONTINUED
Start: 2017-04-14 | End: 2017-04-14 | Stop reason: HOSPADM

## 2017-04-14 RX ORDER — LIDOCAINE HYDROCHLORIDE 10 MG/ML
INJECTION, SOLUTION INFILTRATION; PERINEURAL
Status: DISCONTINUED
Start: 2017-04-14 | End: 2017-04-14 | Stop reason: HOSPADM

## 2017-04-14 NOTE — DISCHARGE INSTRUCTIONS
Please make an appointment to follow up with Your Primary Care Provider in 7 days for suture removal   You can call to discuss the appropriate follow up timing with your doctor.         Extremity Laceration: Sutures, Staples, or Tape  A laceration is a cut through the skin. If it is deep, it may require stitches (sutures) or staples to close so it can heal. Minor cuts may be treated with surgical tape closures.   X-rays may be done if something may have entered the skin through the cut. You may also need a tetanus shot if you are not up to date on this vaccination.  Home care    Follow the health care provider s instructions on how to care for the cut.    Wash your hands with soap and warm water before and after caring for your wound. This is to help prevent infection.    Keep the wound clean and dry. If a bandage was applied and it becomes wet or dirty, replace it. Otherwise, leave it in place for the first 24 hours, then change it once a day or as directed.    If sutures or staples were used, clean the wound daily:    After removing the bandage, wash the area with soap and water. Use a wet cotton swab to loosen and remove any blood or crust that forms.    After cleaning, keep the wound clean and dry. Talk with your doctor before applying any antibiotic ointment to the wound. Reapply the bandage.    You may remove the bandage to shower as usual after the first 24 hours, but do not soak the area in water (no swimming) until the stitches or staples are removed.    If surgical tape closures were used, keep the area clean and dry. If it becomes wet, blot it dry with a towel.    The doctor may prescribe an antibiotic cream or ointment to prevent infection. Do not stop taking this medication until you have finished the prescribed course or the doctor tells you to stop. The doctor may also prescribe medications for pain. Follow the doctor s instructions for taking these medications.    Avoid activities that may reopen  your wound.  Follow-up care  Follow up with your health care provider. Most skin wounds heal within ten days. However, an infection may sometimes occur despite proper treatment. Therefore, check the wound daily for the signs of infection listed below. Stitches and staples should be removed within 7-14 days. If surgical tape closures were used, you may remove them after 10 days if they have not fallen off by then.   When to seek medical advice  Call your health care provider right away if any of these occur:    Wound bleeding not controlled by direct pressure    Signs of infection, including increasing pain in the wound, increasing wound redness or swelling, or pus or bad odor coming from the wound    Fever of 100.4 F (38 C) or higher or as directed by your healthcare provider    Stitches or staples come apart or fall out or surgical tape falls off before 7 days    Wound edges re-open    Wound changes colors    Numbness around the wound     Decreased movement around the injured area    8579-2470 [employer]. [address] [place] [address], [place] [address]. All rights reserved. This information is not intended as a substitute for professional medical care. Always follow your healthcare professional's instructions.

## 2017-04-14 NOTE — ED PROVIDER NOTES
History     Chief Complaint   Patient presents with     Laceration     States she was standing on a stool.  Fell.  Sustained laceration on left index finger.  Bleeding controlled.      HPI  Sheyla Ye 36 female with h/o EDS presenting  s/p mechanical fall from a stool. She grabbed onto a cardboard boxes of paper and sustained multiple lacerations against this. She believes she may have hit her head uncertain  of loc but remembers the events leading up to and those immediately right after her fall. She is right-handed.     She sustained cuts to her  index finger, fourth finger, and thumb all on her right hand and immediately cleaned them with soap and copious amounts of water, and subsequently covered them with band aids and presents to the ED. Laceration sustained at approximately 6 AM while at work.    This part of the medical record was transcribed by Mayo Hopson Medical Scribe, from a dictation done by Mando Villavicencio MD.     PAST MEDICAL HISTORY  Past Medical History:   Diagnosis Date     Anxiety      Chronic pain      DJD (degenerative joint disease), lumbar      Munir-Danlos disease      Fibromyalgia      PAST SURGICAL HISTORY  History reviewed. No pertinent surgical history.  FAMILY HISTORY  Family History   Problem Relation Age of Onset     Aneurysm Brother      20's, autopsy showed RP bleed     Thyroid Disease Mother      Aneurysm Other      brain aneurysm, 30's     SOCIAL HISTORY  Social History   Substance Use Topics     Smoking status: Current Every Day Smoker     Packs/day: 0.25     Types: Cigarettes     Smokeless tobacco: Never Used      Comment: smokes 6 cigarettes a day     Alcohol use 4.0 oz/week     8 Standard drinks or equivalent per week     MEDICATIONS  Current Facility-Administered Medications   Medication     lidocaine (PF) (XYLOCAINE) 2 % injection     lidocaine 1 % injection     Current Outpatient Prescriptions   Medication     escitalopram (LEXAPRO) 20 MG tablet     tiZANidine  (ZANAFLEX) 2 MG tablet     omeprazole (PRILOSEC) 20 MG CR capsule     Prenatal Vit-Fe Fumarate-FA (PRENATAL MULTIVITAMIN  PLUS IRON) 27-0.8 MG TABS     Acetaminophen (TYLENOL PO)     loperamide (IMODIUM A-D) 2 MG tablet     Ketoprofen POWD     ALLERGIES  Allergies   Allergen Reactions     Seasonal Allergies        I have reviewed the Medications, Allergies, Past Medical and Surgical History, and Social History in the Epic system.    Review of Systems   12 point review of symptoms was performed and is negative except as noted above.     Physical Exam   BP: 117/65  Pulse: 94  Temp: 98.2  F (36.8  C)  Resp: 16  Weight: 58.8 kg (129 lb 9 oz)  SpO2: 98 %  Physical Exam   HEENT: The head is normocephalic and atraumatic. Pupils are equal round and reactive to light. Extraocular motions are intact. There is no scleral icterus. There is no facial swelling. The neck nontender and supple .   CV: . 2+ radial pulses bilaterally, regular no tachycardia   PULM: unlabored   EXT: Full range of motion.  No edema.  NEURO: Cranial nerves II through XII are intact and symmetric. Bilateral upper and lower extremities grossly show full range of motion without any focal deficits.  SKIN: No rashes or ecchymosis.   HAND: All lacerations are on her right hand. Laceration #1 is over her thumb very superficial 1 cm along the radial aspect of the thumb overlying the first phalanx. Laceration #2 is a  curvilinear 1.5 cm laceration oblique against the pulp finger pad of the index finger laceration #3 is a curvilinear 1 cm laceration over the pulp laying obliquely along the pad of the fourth finger reveals. There are also some very small superficial scratches along the palm.    No active bleeding.     PSYCH: Calm and cooperative, interactive.         ED Course     ED Course     Laceration repair  Date/Time: 4/14/2017 1:12 PM  Performed by: BEE POTTER  Authorized by: BEE POTTER   Consent: Verbal consent obtained.  Risks and benefits:  risks, benefits and alternatives were discussed  Consent given by: patient  Patient understanding: patient states understanding of the procedure being performed  Patient consent: the patient's understanding of the procedure matches consent given  Procedure consent: procedure consent matches procedure scheduled  Relevant documents: relevant documents present and verified  Site marked: the operative site was marked  Patient identity confirmed: verbally with patient and arm band  Body area: upper extremity (R thumb, index and ring fingers as in note)  Wound length (cm): 6 total.  Foreign bodies: no foreign bodies  Tendon involvement: none  Nerve involvement: superficial  Vascular damage: no  Anesthesia: local infiltration    Anesthesia:  Anesthesia: local infiltration  Local Anesthetic: lidocaine 1% without epinephrine   Anesthetic total: 5 mL  Sedation:  Patient sedated: no    Preparation: Patient was prepped and draped in the usual sterile fashion.  Irrigation solution: tap water  Irrigation method: tap  Amount of cleaning: standard  Debridement: none  Degree of undermining: none  Skin closure: 5-0 nylon  Number of sutures: 14  Technique: simple  Approximation: close  Approximation difficulty: simple  Dressing: 4x4 sterile gauze (finger splint to index finger)                     Labs Ordered and Resulted from Time of ED Arrival Up to the Time of Departure from the ED - No data to display    Assessments & Plan (with Medical Decision Making)   35F with   Multiple small hand lacerations as described. She does have some numbness along the pad distal to the laceration of her index finger of her dominant hand.  These were all cleaned and anesthetized irrigated and subsequently repaired as noted above.  Tetanus up-to-date.  We ll follow with PCP for suture removal approximately 5-7 days.   She ll return for any worsening signs or symptoms of infection, suture loss, or any other concerns of worsening.    - Patient ready and  eager for discharge. Care plan, follow up plan, and reasons to return immediately to the ED were dicussed with the patient and summarized as noted in the discharge instructions.       This part of the medical record was transcribed by Mayo Hopson Medical Scribe, from a dictation done by Mando Villavicencio MD.     I have reviewed the nursing notes.    I have reviewed the findings, diagnosis, plan and need for follow up with the patient.    New Prescriptions    No medications on file       Final diagnoses:   Hand laceration, right, initial encounter   Munir-Danlos disease       4/14/2017   [facility], [place], EMERGENCY DEPARTMENT     Mando Villavicencio MD  04/14/17 [id number]

## 2017-04-14 NOTE — ED AVS SNAPSHOT
[facility], Emergency Department    [address]    [place] [address]    Phone:  [phone]    Fax:  [phone]                                       Sheyla Ye   MRN: [id number]    Department:  [facility], Emergency Department   Date of Visit:  4/14/2017           After Visit Summary Signature Page     I have received my discharge instructions, and my questions have been answered. I have discussed any challenges I see with this plan with the nurse or doctor.    ..........................................................................................................................................  Patient/Patient Representative Signature      ..........................................................................................................................................  Patient Representative Print Name and Relationship to Patient    ..................................................               ................................................  Date                                            Time    ..........................................................................................................................................  Reviewed by Signature/Title    ...................................................              ..............................................  Date                                                            Time

## 2017-04-14 NOTE — ED AVS SNAPSHOT
[facility], Emergency Department    [address]    [place] [address]    Phone:  [phone]    Fax:  [phone]                                       Sheyla Ye   MRN: [id number]    Department:  [facility], Emergency Department   Date of Visit:  4/14/2017           Patient Information     Date Of Birth          1980        Your diagnoses for this visit were:     Hand laceration, right, initial encounter     Munir-Danlos disease        You were seen by Mando Villavicencio MD.        Discharge Instructions       Please make an appointment to follow up with Your Primary Care Provider in 7 days for suture removal   You can call to discuss the appropriate follow up timing with your doctor.         Extremity Laceration: Sutures, Staples, or Tape  A laceration is a cut through the skin. If it is deep, it may require stitches (sutures) or staples to close so it can heal. Minor cuts may be treated with surgical tape closures.   X-rays may be done if something may have entered the skin through the cut. You may also need a tetanus shot if you are not up to date on this vaccination.  Home care    Follow the health care provider s instructions on how to care for the cut.    Wash your hands with soap and warm water before and after caring for your wound. This is to help prevent infection.    Keep the wound clean and dry. If a bandage was applied and it becomes wet or dirty, replace it. Otherwise, leave it in place for the first 24 hours, then change it once a day or as directed.    If sutures or staples were used, clean the wound daily:    After removing the bandage, wash the area with soap and water. Use a wet cotton swab to loosen and remove any blood or crust that forms.    After cleaning, keep the wound clean and dry. Talk with your doctor before applying any antibiotic ointment to the wound. Reapply the bandage.    You may remove the bandage to shower as usual after the first 24 hours, but do  not soak the area in water (no swimming) until the stitches or staples are removed.    If surgical tape closures were used, keep the area clean and dry. If it becomes wet, blot it dry with a towel.    The doctor may prescribe an antibiotic cream or ointment to prevent infection. Do not stop taking this medication until you have finished the prescribed course or the doctor tells you to stop. The doctor may also prescribe medications for pain. Follow the doctor s instructions for taking these medications.    Avoid activities that may reopen your wound.  Follow-up care  Follow up with your health care provider. Most skin wounds heal within ten days. However, an infection may sometimes occur despite proper treatment. Therefore, check the wound daily for the signs of infection listed below. Stitches and staples should be removed within 7-14 days. If surgical tape closures were used, you may remove them after 10 days if they have not fallen off by then.   When to seek medical advice  Call your health care provider right away if any of these occur:    Wound bleeding not controlled by direct pressure    Signs of infection, including increasing pain in the wound, increasing wound redness or swelling, or pus or bad odor coming from the wound    Fever of 100.4 F (38 C) or higher or as directed by your healthcare provider    Stitches or staples come apart or fall out or surgical tape falls off before 7 days    Wound edges re-open    Wound changes colors    Numbness around the wound     Decreased movement around the injured area    2605-9753 [employer]. [address] [place] [address]. All rights reserved. This information is not intended as a substitute for professional medical care. Always follow your healthcare professional's instructions.          Future Appointments        Provider Department Dept Phone Center    4/18/2017 1:00 PM Landry Solorzano, PhD Psychiatry Clinic [phone] [facility] CLIN     4/25/2017 1:00 PM Landry Solorzano, PhD Psychiatry Clinic [phone] [facility]    5/31/2017 3:00 PM Myrna Watson MD [facility] [phone] [facility]      24 Hour Appointment Hotline       To make an appointment at any [facility] call [phone] ([phone]). If you don't have a family doctor or clinic, we will help you find one. [facility] are conveniently located to serve the needs of you and your family.             Review of your medicines      Our records show that you are taking the medicines listed below. If these are incorrect, please call your family doctor or clinic.        Dose / Directions Last dose taken    escitalopram 20 MG tablet   Commonly known as:  LEXAPRO   Dose:  20 mg   Quantity:  30 tablet        Take 1 tablet (20 mg) by mouth daily   Refills:  1        Ketoprofen Powd   Quantity:  100 g        Apply dime sized amount to affected areas up to three times daily.   Refills:  3        loperamide 2 MG tablet   Commonly known as:  IMODIUM A-D   Quantity:  60 tablet        Start with 2 tabs (4 mg), then take one tab (2 mg) after each diarrheal stool.  Do not use more than  8 tabs (16 mg) per day.   Refills:  0        omeprazole 20 MG CR capsule   Commonly known as:  priLOSEC   Dose:  20 mg   Quantity:  90 capsule        Take 1 capsule (20 mg) by mouth daily   Refills:  0        prenatal multivitamin  plus iron 27-0.8 MG Tabs per tablet   Dose:  1 tablet   Quantity:  30 tablet        Take 1 tablet by mouth daily   Refills:  11        tiZANidine 2 MG tablet   Commonly known as:  ZANAFLEX   Dose:  2-4 mg   Quantity:  90 tablet        Take 1-2 tablets (2-4 mg) by mouth 3 times daily   Refills:  0        TYLENOL PO   Dose:  500 mg        Take 500 mg by mouth every 4 hours as needed for mild pain or fever Takes 2 tablets of 500 mg   Refills:  0                Orders Needing Specimen Collection     None      Pending Results     No orders found from 4/12/2017  to 4/15/2017.            Pending Culture Results     No orders found from 4/12/2017 to 4/15/2017.            Thank you for choosing [facility]       Thank you for choosing [facility] for your care. Our goal is always to provide you with excellent care. Hearing back from our patients is one way we can continue to improve our services. Please take a few minutes to complete the written survey that you may receive in the mail after you visit with us. Thank you!        [employer] Information     [employer] gives you secure access to your electronic health record. If you see a primary care provider, you can also send messages to your care team and make appointments. If you have questions, please call your primary care clinic.  If you do not have a primary care provider, please call [phone] and they will assist you.        Care EveryWhere ID     This is your Care EveryWhere ID. This could be used by other organizations to access your [facility] medical records  [id number]        After Visit Summary       This is your record. Keep this with you and show to your community pharmacist(s) and doctor(s) at your next visit.

## 2017-04-19 ENCOUNTER — TELEPHONE (OUTPATIENT)
Dept: PSYCHIATRY | Facility: CLINIC | Age: 37
End: 2017-04-19

## 2017-04-21 ENCOUNTER — ALLIED HEALTH/NURSE VISIT (OUTPATIENT)
Dept: INTERNAL MEDICINE | Facility: CLINIC | Age: 37
End: 2017-04-21

## 2017-04-21 DIAGNOSIS — Z48.02 ENCOUNTER FOR REMOVAL OF SUTURES: Primary | ICD-10-CM

## 2017-04-21 NOTE — NURSING NOTE
Chief Complaint   Patient presents with     Allied Health Visit     pt is here for a suture removal      Sheyla Ye comes into clinic today at the request of [facility] for suture removal.    This service provided today was under the direct supervision of Danielle MORA CNP, who was available, if needed.    Per chart sutures were placed 4/14/17 at [facility] and to be removes 5-7 days later.   Sutures x 14 were removed from patients right hand (thumb, index and ring fingers) with the help of Danielle MORA CNP. Right Index and Ring finger steri-striped.       Manuela Plata CMA at 2:47 PM on 4/21/2017

## 2017-04-21 NOTE — MR AVS SNAPSHOT
After Visit Summary   4/21/2017    Sheyla Ye    MRN: [id number]           Patient Information     Date Of Birth          1980        Visit Information        Provider Department      4/21/2017 2:00 PM Nurse, [facility]        Today's Diagnoses     Encounter for removal of sutures    -  1       Follow-ups after your visit        Your next 10 appointments already scheduled     Apr 25, 2017  1:00 PM CDT   Adult Psychotherapy with Landry Solorzano, PhD   Psychiatry Clinic ([facility])    [address]  [address] [facility] [id number] [phone]            May 31, 2017  3:00 PM CDT   (Arrive by 2:45 PM)   PHYSICAL with Myrna Watson MD   [facility] ([facility])    [address] [address] [phone]              Who to contact     Please call your clinic at [phone] to:    Ask questions about your health    Make or cancel appointments    Discuss your medicines    Learn about your test results    Speak to your doctor   If you have compliments or concerns about an experience at your clinic, or if you wish to file a complaint, please contact [facility] Patient Relations at [phone] or email us at [email or web address]         Additional Information About Your Visit        MyChart Information     [employer] gives you secure access to your electronic health record. If you see a primary care provider, you can also send messages to your care team and make appointments. If you have questions, please call your primary care clinic.  If you do not have a primary care provider, please call [phone] and they will assist you.      [employer] is an electronic gateway that provides easy, online access to your medical records. With [employer], you can request a clinic appointment, read your test results, renew a  prescription or communicate with your care team.     To access your existing account, please contact your [facility] or call [phone] for assistance.        Care EveryWhere ID     This is your Care EveryWhere ID. This could be used by other organizations to access your [facility] medical records  [id number]        Your Vitals Were     Last Period                   04/13/2017            Blood Pressure from Last 3 Encounters:   04/14/17 108/62   02/02/17 127/82   01/05/17 141/74    Weight from Last 3 Encounters:   04/14/17 58.8 kg (129 lb 9 oz)   02/02/17 59.9 kg (132 lb)   01/05/17 56.6 kg (124 lb 12.8 oz)              Today, you had the following     No orders found for display       Primary Care Provider Office Phone # Fax #    Myrnamoriah Watson [phone]       [address]  [facility] [address]        Thank you!     Thank you for choosing [facility]  for your care. Our goal is always to provide you with excellent care. Hearing back from our patients is one way we can continue to improve our services. Please take a few minutes to complete the written survey that you may receive in the mail after your visit with us. Thank you!             Your Updated Medication List - Protect others around you: Learn how to safely use, store and throw away your medicines at www.disposemymeds.org.          This list is accurate as of: 4/21/17  3:20 PM.  Always use your most recent med list.                   Brand Name Dispense Instructions for use    escitalopram 20 MG tablet    LEXAPRO    30 tablet    Take 1 tablet (20 mg) by mouth daily       Ketoprofen Powd     100 g    Apply dime sized amount to affected areas up to three times daily.       loperamide 2 MG tablet    IMODIUM A-D    60 tablet    Start with 2 tabs (4 mg), then take one tab (2 mg) after each diarrheal stool.  Do not use more than  8 tabs (16 mg) per day.        omeprazole 20 MG CR capsule    priLOSEC    90 capsule    Take 1 capsule (20 mg) by mouth daily       prenatal multivitamin  plus iron 27-0.8 MG Tabs per tablet     30 tablet    Take 1 tablet by mouth daily       tiZANidine 2 MG tablet    ZANAFLEX    90 tablet    Take 1-2 tablets (2-4 mg) by mouth 3 times daily       TYLENOL PO      Take 500 mg by mouth every 4 hours as needed for mild pain or fever Takes 2 tablets of 500 mg

## 2017-04-24 DIAGNOSIS — M62.838 MUSCLE SPASM: ICD-10-CM

## 2017-04-24 RX ORDER — TIZANIDINE 2 MG/1
2-4 TABLET ORAL 3 TIMES DAILY
Qty: 90 TABLET | Refills: 0 | OUTPATIENT
Start: 2017-04-24

## 2017-04-24 NOTE — TELEPHONE ENCOUNTER
Received fax request from   pharmacy requesting refill(s) for tiZANidine (ZANAFLEX) 2 MG tablet    Last refilled on 3/14/17    Pt last seen on 2/2/17  Next appt scheduled for none    Danitza De León MA  [facility]      Will facilitate refill.

## 2017-04-25 ENCOUNTER — OFFICE VISIT (OUTPATIENT)
Dept: PSYCHIATRY | Facility: CLINIC | Age: 37
End: 2017-04-25
Attending: PSYCHOLOGIST
Payer: COMMERCIAL

## 2017-04-25 DIAGNOSIS — F33.1 MAJOR DEPRESSIVE DISORDER, RECURRENT EPISODE, MODERATE (H): ICD-10-CM

## 2017-04-25 DIAGNOSIS — F45.1 SOMATIC SYMPTOM DISORDER, PERSISTENT, MODERATE, WITH PREDOMINANT PAIN: Primary | ICD-10-CM

## 2017-04-25 NOTE — MR AVS SNAPSHOT
After Visit Summary   4/25/2017    Sheyla Ye    MRN: [id number]           Patient Information     Date Of Birth          1980        Visit Information        Provider Department      4/25/2017 4:00 PM Landry Solorzano, PhD Psychiatry Clinic        Today's Diagnoses     Somatic symptom disorder, persistent, moderate, with predominant pain    -  1    Major depressive disorder, recurrent episode, moderate (H)           Follow-ups after your visit        Your next 10 appointments already scheduled     May 01, 2017  4:00 PM CDT   Adult Psychotherapy with Landry Solorzano, PhD   Psychiatry Clinic ([facility])    [address]  [address] [place] [address]   [phone]            May 08, 2017  4:00 PM CDT   Adult Psychotherapy with Lnadry Solorzano, PhD   Psychiatry Clinic ([facility])    [address]  [address] [place] [address]   [phone]            May 15, 2017  4:00 PM CDT   Adult Psychotherapy with Landry Solorzano, PhD   Psychiatry Clinic ([facility])    [address]  [address] [place] [address]   [phone]            May 22, 2017  4:00 PM CDT   Adult Psychotherapy with Landry Solorzano, PhD   Psychiatry Clinic ([facility])    [address]  [address] [place] [address]   [phone]            May 31, 2017  3:00 PM CDT   (Arrive by 2:45 PM)   PHYSICAL with Myrna Watson MD   [facility] ([facility])    [address] [place] [id number] [phone]              Who to contact     Please call your clinic at [phone] to:    Ask questions about your health    Make or cancel appointments    Discuss your medicines    Learn about your test results    Speak to your doctor   If you have compliments or concerns about an  experience at your clinic, or if you wish to file a complaint, please contact [facility] Patient Relations at [phone] or email us at [email or web address]         Additional Information About Your Visit        [employer] Information     [employer] gives you secure access to your electronic health record. If you see a primary care provider, you can also send messages to your care team and make appointments. If you have questions, please call your primary care clinic.  If you do not have a primary care provider, please call [phone] and they will assist you.      [employer] is an electronic gateway that provides easy, online access to your medical records. With [employer], you can request a clinic appointment, read your test results, renew a prescription or communicate with your care team.     To access your existing account, please contact your [facility] or call [phone] for assistance.        Care EveryWhere ID     This is your Care EveryWhere ID. This could be used by other organizations to access your [facility] medical records  [id number]        Your Vitals Were     Last Period                   04/13/2017            Blood Pressure from Last 3 Encounters:   04/14/17 108/62   02/10/17 133/89   02/02/17 127/82    Weight from Last 3 Encounters:   04/14/17 58.8 kg (129 lb 9 oz)   02/10/17 57.9 kg (127 lb 9.6 oz)   02/02/17 59.9 kg (132 lb)              Today, you had the following     No orders found for display       Primary Care Provider Office Phone # Fax #    Myrna Nhung Watson [phone]       [place] [id number]  [facility] [address]        Thank you!     Thank you for choosing PSYCHIATRY CLINIC  for your care. Our goal is always to provide you with excellent care. Hearing back from our patients is one way we can continue to improve our services. Please take a few minutes to complete the written  survey that you may receive in the mail after your visit with us. Thank you!             Your Updated Medication List - Protect others around you: Learn how to safely use, store and throw away your medicines at www.disposemymeds.org.          This list is accurate as of: 4/25/17 11:59 PM.  Always use your most recent med list.                   Brand Name Dispense Instructions for use    escitalopram 20 MG tablet    LEXAPRO    30 tablet    Take 1 tablet (20 mg) by mouth daily       Ketoprofen Powd     100 g    Apply dime sized amount to affected areas up to three times daily.       loperamide 2 MG tablet    IMODIUM A-D    60 tablet    Start with 2 tabs (4 mg), then take one tab (2 mg) after each diarrheal stool.  Do not use more than  8 tabs (16 mg) per day.       omeprazole 20 MG CR capsule    priLOSEC    90 capsule    Take 1 capsule (20 mg) by mouth daily       prenatal multivitamin  plus iron 27-0.8 MG Tabs per tablet     30 tablet    Take 1 tablet by mouth daily       tiZANidine 2 MG tablet    ZANAFLEX    90 tablet    Take 1-2 tablets (2-4 mg) by mouth 3 times daily       TYLENOL PO      Take 500 mg by mouth every 4 hours as needed for mild pain or fever Takes 2 tablets of 500 mg

## 2017-04-27 NOTE — PROGRESS NOTES
"[facility]  Therapy Progress Note    Patient Name:  Sheyla Ye    :   1980    Date:   2017     Diagnostic Codes:  Somatic Symptom Disorder (F45.1)   Major Depressive Disorder, Moderate, Recurrent, with anxious features (F33.1)  Type of Session:  Individual psychotherapy  Length of Session:   45 minutes  Practitioner:   Landry Solorzano, Ph.D.  Supervisor:   Robyn Dawn Psy.D.    Narrative Description of Session:   The patient was seen by Landry Solorzano, Ph.D., Postdoctoral Associate. She arrived ten minutes late for her session and was dressed casually with appropriate grooming/hygiene. Her mood was anxious and with congruent affect. No abnormalities were noted in speech/thought processes or content, and she denied SI.     Session began by reiterating therapeutic boundaries, with an emphasis on the importance of consistent attendance if the patient truly wants to make progress in therapy. The patient still continues to express confusion as to why she struggles to be consistent. A discussion of abandonment fears and compulsive self-reliance appeared to give the patient some degree of insight, as she often questions whether she is \"worth\" other people's (e.g., the clinician) time. The patient was able to link how her inability to depend on others, in addition to her \"stubborness,\" make it difficult to express painful emotions in the presence of other people.     The patient said she spent a majority of the past week \"in bed feeling really depressed, which led to a discussion of whether this level of care is appropriate for her, or whether she needs to attend an intensive outpatient therapy program, as her BDI-II and CHIQUIS indicated severe anxiety and depression. The patient agreed to think about this over the next week and discuss this issue in session. She was able to contract for safety, and insisted she knew to go to the ER if her depression worsens, or if she begins to experience significant suicidal " ideation.     Assessment:   Appearance:  Appropriately groomed, casually dressed  Behavior/relationship to examiner/demeanor:  Normal  Motor activity/EPS:  Normal  Gait:  Normal  Speech rate:  Normal  Speech volume:  Normal  Speech articulation:  Normal  Speech coherence:  Normal  Speech spontaneity:  Normal  Thought Process (Associations):  Goal-directed  Thought process (Rate):  Normal  Thought content: Normal  Abnormal Perception:  Denies  Insight:  Poor-to-fair  Judgment:  Fair    Plan: Increase the patient's intrinsic motivation for consistent session attendance, in addition to psychosomatic interventions aimed at helping the patient understand the link between emotional suppression and pain symptoms.       I did not see this pt directly. This pt is discussed with me in individual psychotherapy supervision, and I agree with the plan as documented. Robyn Dawn Psy.D. , L.P.

## 2017-05-08 ENCOUNTER — TELEPHONE (OUTPATIENT)
Dept: PSYCHIATRY | Facility: CLINIC | Age: 37
End: 2017-05-08

## 2017-05-31 ENCOUNTER — OFFICE VISIT (OUTPATIENT)
Dept: INTERNAL MEDICINE | Facility: CLINIC | Age: 37
End: 2017-05-31

## 2017-05-31 VITALS
TEMPERATURE: 98.7 F | HEART RATE: 108 BPM | WEIGHT: 126.3 LBS | RESPIRATION RATE: 18 BRPM | BODY MASS INDEX: 20.3 KG/M2 | OXYGEN SATURATION: 96 % | SYSTOLIC BLOOD PRESSURE: 103 MMHG | DIASTOLIC BLOOD PRESSURE: 71 MMHG | HEIGHT: 66 IN

## 2017-05-31 DIAGNOSIS — K21.9 GASTROESOPHAGEAL REFLUX DISEASE WITHOUT ESOPHAGITIS: ICD-10-CM

## 2017-05-31 DIAGNOSIS — M62.838 MUSCLE SPASM: ICD-10-CM

## 2017-05-31 DIAGNOSIS — Z30.9 ENCOUNTER FOR CONTRACEPTIVE MANAGEMENT, UNSPECIFIED CONTRACEPTIVE ENCOUNTER TYPE: Primary | ICD-10-CM

## 2017-05-31 DIAGNOSIS — F41.9 ANXIETY: ICD-10-CM

## 2017-05-31 RX ORDER — LORAZEPAM 0.5 MG/1
0.5 TABLET ORAL EVERY 6 HOURS PRN
Qty: 10 TABLET | Refills: 0 | Status: SHIPPED | OUTPATIENT
Start: 2017-05-31 | End: 2018-10-12

## 2017-05-31 RX ORDER — HYDROXYZINE HYDROCHLORIDE 50 MG/1
50-100 TABLET, FILM COATED ORAL EVERY 6 HOURS PRN
Qty: 30 TABLET | Refills: 0 | Status: SHIPPED | OUTPATIENT
Start: 2017-05-31 | End: 2018-10-12

## 2017-05-31 RX ORDER — ACETAMINOPHEN AND CODEINE PHOSPHATE 120; 12 MG/5ML; MG/5ML
1 SOLUTION ORAL DAILY
Qty: 28 TABLET | Refills: 11 | Status: SHIPPED | OUTPATIENT
Start: 2017-05-31 | End: 2018-10-12

## 2017-05-31 RX ORDER — TIZANIDINE 2 MG/1
2-4 TABLET ORAL 3 TIMES DAILY
Qty: 90 TABLET | Refills: 0 | Status: SHIPPED | OUTPATIENT
Start: 2017-05-31 | End: 2018-03-15

## 2017-05-31 ASSESSMENT — PAIN SCALES - GENERAL: PAINLEVEL: EXTREME PAIN (8)

## 2017-05-31 NOTE — PATIENT INSTRUCTIONS
[facility] Medication Refill Request Information:  * Please contact your pharmacy regarding ANY request for medication refills.  ** [facility] Prescription Fax = [phone]  * Please allow 3 business days for routine medication refills.  * Please allow 5 business days for controlled substance medication refills.     [facility] Test Result notification information:  *You will be notified with in 7-10 days of your appointment day regarding the results of your test.  If you are on MyChart you will be notified as soon as the provider has reviewed the results and signed off on them.    [facility] [phone] (follow up in 1 month for physical)

## 2017-05-31 NOTE — NURSING NOTE
Chief Complaint   Patient presents with     Physical     Patient is here for annual physical.      Leonarda Khalil LPN at 3:32 PM on 5/31/2017.

## 2017-05-31 NOTE — MR AVS SNAPSHOT
After Visit Summary   5/31/2017    Sheyla Ye    MRN: [id number]           Patient Information     Date Of Birth          1980        Visit Information        Provider Department      5/31/2017 3:00 PM Myrna Watson MD [facility]        Today's Diagnoses     Encounter for contraceptive management, unspecified contraceptive encounter type    -  1    Muscle spasm        Anxiety        Gastroesophageal reflux disease without esophagitis          Care Instructions    Primary Care Center Medication Refill Request Information:  * Please contact your pharmacy regarding ANY request for medication refills.  ** [facility] Prescription Fax = [phone]  * Please allow 3 business days for routine medication refills.  * Please allow 5 business days for controlled substance medication refills.     Primary Care Center Test Result notification information:  *You will be notified with in 7-10 days of your appointment day regarding the results of your test.  If you are on MyChart you will be notified as soon as the provider has reviewed the results and signed off on them.    [facility] [phone] (follow up in 1 month for physical)          Follow-ups after your visit        Who to contact     Please call your clinic at [phone] to:    Ask questions about your health    Make or cancel appointments    Discuss your medicines    Learn about your test results    Speak to your doctor   If you have compliments or concerns about an experience at your clinic, or if you wish to file a complaint, please contact [facility] Patient Relations at [phone] or email us at [email or web address]         Additional Information About Your Visit        MyChart Information     [employer] gives you secure access to your electronic health record. If you see a primary care provider, you can also send messages to your care team and make appointments. If you  "have questions, please call your primary care clinic.  If you do not have a primary care provider, please call [phone] and they will assist you.      Revee is an electronic gateway that provides easy, online access to your medical records. With Revee, you can request a clinic appointment, read your test results, renew a prescription or communicate with your care team.     To access your existing account, please contact your [facility] or call [phone] for assistance.        Care EveryWhere ID     This is your Care EveryWhere ID. This could be used by other organizations to access your [place] medical records  [id number]        Your Vitals Were     Pulse Temperature Respirations Height Last Period Pulse Oximetry    108 98.7  F (37.1  C) (Oral) 18 1.677 m (5' 6.02\") 05/15/2017 (Approximate) 96%    Breastfeeding? BMI (Body Mass Index)                No 20.37 kg/m2           Blood Pressure from Last 3 Encounters:   05/31/17 103/71   04/14/17 108/62   02/02/17 127/82    Weight from Last 3 Encounters:   05/31/17 57.3 kg (126 lb 4.8 oz)   04/14/17 58.8 kg (129 lb 9 oz)   02/02/17 59.9 kg (132 lb)              Today, you had the following     No orders found for display         Today's Medication Changes          These changes are accurate as of: 5/31/17  4:57 PM.  If you have any questions, ask your nurse or doctor.               Start taking these medicines.        Dose/Directions    hydrOXYzine 50 MG tablet   Commonly known as:  ATARAX   Used for:  Anxiety   Started by:  Myrna Watson MD        Dose:   mg   Take 1-2 tablets ( mg) by mouth every 6 hours as needed for anxiety   Quantity:  30 tablet   Refills:  0       LORazepam 0.5 MG tablet   Commonly known as:  ATIVAN   Used for:  Anxiety   Started by:  Myrna Watson MD        Dose:  0.5 mg   Take 1 tablet (0.5 mg) by mouth every 6 hours as needed for anxiety   Quantity:  10 tablet "   Refills:  0       norethindrone 0.35 MG per tablet   Commonly known as:  MICRONOR   Used for:  Encounter for contraceptive management, unspecified contraceptive encounter type   Started by:  Myrna Watson MD        Dose:  1 tablet   Take 1 tablet (0.35 mg) by mouth daily   Quantity:  28 tablet   Refills:  11       ranitidine 150 MG tablet   Commonly known as:  ZANTAC   Used for:  Gastroesophageal reflux disease without esophagitis   Started by:  Myrna Watson MD        Dose:  150 mg   Take 1 tablet (150 mg) by mouth 2 times daily   Quantity:  60 tablet   Refills:  11         Stop taking these medicines if you haven't already. Please contact your care team if you have questions.     escitalopram 20 MG tablet   Commonly known as:  LEXAPRO   Stopped by:  Myrna Watson MD                Where to get your medicines      These medications were sent to [facility] [address] [facility] [address]  [address] [facility] [address] [place] [id number]    Hours:  TRANSPLANT PHONE NUMBER [phone] Phone:  [phone]     hydrOXYzine 50 MG tablet    norethindrone 0.35 MG per tablet    ranitidine 150 MG tablet    tiZANidine 2 MG tablet         Some of these will need a paper prescription and others can be bought over the counter.  Ask your nurse if you have questions.     Bring a paper prescription for each of these medications     LORazepam 0.5 MG tablet                Primary Care Provider Office Phone # Fax #    Myrna Watson [phone]       [address] [address]        Thank you!     Thank you for choosing [facility]  for your care. Our goal is always to provide you with excellent care. Hearing back from our patients is one way we can continue to improve our services. Please take a few minutes to complete the written survey that you may receive in the mail after your  visit with us. Thank you!             Your Updated Medication List - Protect others around you: Learn how to safely use, store and throw away your medicines at www.disposemymeds.org.          This list is accurate as of: 5/31/17  4:57 PM.  Always use your most recent med list.                   Brand Name Dispense Instructions for use    hydrOXYzine 50 MG tablet    ATARAX    30 tablet    Take 1-2 tablets ( mg) by mouth every 6 hours as needed for anxiety       Ketoprofen Powd     100 g    Apply dime sized amount to affected areas up to three times daily.       loperamide 2 MG tablet    IMODIUM A-D    60 tablet    Start with 2 tabs (4 mg), then take one tab (2 mg) after each diarrheal stool.  Do not use more than  8 tabs (16 mg) per day.       LORazepam 0.5 MG tablet    ATIVAN    10 tablet    Take 1 tablet (0.5 mg) by mouth every 6 hours as needed for anxiety       norethindrone 0.35 MG per tablet    MICRONOR    28 tablet    Take 1 tablet (0.35 mg) by mouth daily       omeprazole 20 MG CR capsule    priLOSEC    90 capsule    Take 1 capsule (20 mg) by mouth daily       prenatal multivitamin  plus iron 27-0.8 MG Tabs per tablet     30 tablet    Take 1 tablet by mouth daily       ranitidine 150 MG tablet    ZANTAC    60 tablet    Take 1 tablet (150 mg) by mouth 2 times daily       tiZANidine 2 MG tablet    ZANAFLEX    90 tablet    Take 1-2 tablets (2-4 mg) by mouth 3 times daily       TYLENOL PO      Take 500 mg by mouth every 4 hours as needed for mild pain or fever Takes 2 tablets of 500 mg

## 2017-05-31 NOTE — PROGRESS NOTES
Chief complaint:  Sheyla Ye is a 36 year old female presents for   Chief Complaint   Patient presents with     Physical     Patient is here for annual physical.         SUBJECTIVE:  Therapy had been going well, but her insurance lapsed.  She had to reaply and coverage will start in June.      Got frustrated with trying ssri's.  She went through a number of different ones without much success; although now that she is off the lexapro she does note an increase in anxiety and panic attacks.  Attacks will limit her ability to get out of the house and to appts.  She will be establishing with a new psychiatrist in June.    She is currently working as a PCA.  She struggles with the shoulder pain as she has to do some lifting.  She is planning to quit soon and will need to find new housing.      Continues to smoke.  Struggles with stopping due to anxiety.    Medications and allergies were reviewed by me today.     SocHx:   History   Smoking Status     Current Every Day Smoker     Packs/day: 0.25     Types: Cigarettes   Smokeless Tobacco     Never Used     Comment: smokes 6 cigarettes a day        Patient Active Problem List   Diagnosis     Cervical kyphosis     Cervical spondylosis without myelopathy     Cervical stenosis of spinal canal     Cervical radiculopathy     Pain management contract agreement     Anxiety     ADHD (attention deficit hyperactivity disorder), combined type     Joint hyperextensibility of multiple sites     [facility]     Other chronic pain     Tobacco use disorder     Chronic pain     Long term (current) use of opiate analgesic     Bilateral low back pain without sciatica     Depression, unspecified depression type     Munir-Danlos disease       Review Of Systems   5 point ROS completed and negative except noted above, including Gen, CV, Resp, GI, MS    PE:  /71 (BP Location: Right arm, Patient Position: Chair, Cuff Size: Adult  "Regular)  Pulse 108  Temp 98.7  F (37.1  C) (Oral)  Resp 18  Ht 1.677 m (5' 6.02\")  Wt 57.3 kg (126 lb 4.8 oz)  LMP 05/15/2017 (Approximate)  SpO2 96%  Breastfeeding? No  BMI 20.37 kg/m2  Gen: no distress, comfortable, pleasant   Eyes: anicteric, normal extra-ocular movements   Cardiovascular: regular rate and rhythm, normal S1 and S2, no murmurs, rubs or gallops, peripheral pulses full and symmetric   Respiratory: clear to auscultation, no wheezes or crackles, normal breath sounds   Gastrointestinal: positive bowel sounds, nontender  MSK: full ROM of shoulder bl.  Pain with palpation over the cervical spinal muscles  Skin: no concerning lesions, no jaundice   Psychological: appropriate mood       ASSESSMENT/PLAN:    Encounter for contraceptive management, unspecified contraceptive encounter type  Resume OCP.  [facility]  - norethindrone (MICRONOR) 0.35 MG per tablet  Dispense: 28 tablet; Refill: 11    Muscle spasm  Discussed lack of evidence for opiods.  She is frustrated with this.  - tiZANidine (ZANAFLEX) 2 MG tablet  Dispense: 90 tablet; Refill: 0    Anxiety  She is agreeable to restarting a SSRI.  Given that she has tried numerous ones, will defer starting the SSRI to her upcoming psych appt.  Limited quantity of ativan given to use for panic attacks until that time  - LORazepam (ATIVAN) 0.5 MG tablet  Dispense: 10 tablet; Refill: 0  - hydrOXYzine (ATARAX) 50 MG tablet  Dispense: 30 tablet; Refill: 0    Gastroesophageal reflux disease without esophagitis  Try zantac in place of prilosec  - ranitidine (ZANTAC) 150 MG tablet  Dispense: 60 tablet; Refill: 11    HM:  Return for pap if needed (getting results)    RTC: 1-2 mo    Sugey Watson MD  "

## 2017-06-05 ENCOUNTER — OFFICE VISIT (OUTPATIENT)
Dept: PSYCHIATRY | Facility: CLINIC | Age: 37
End: 2017-06-05
Attending: PSYCHOLOGIST

## 2017-06-05 DIAGNOSIS — F45.1 SOMATIC SYMPTOM DISORDER, PERSISTENT, MODERATE, WITH PREDOMINANT PAIN: Primary | ICD-10-CM

## 2017-06-05 DIAGNOSIS — F33.1 MAJOR DEPRESSIVE DISORDER, RECURRENT EPISODE, MODERATE (H): ICD-10-CM

## 2017-06-05 NOTE — MR AVS SNAPSHOT
After Visit Summary   6/5/2017    Sheyla Ye    MRN: [id number]           Patient Information     Date Of Birth          1980        Visit Information        Provider Department      6/5/2017 4:00 PM Landry Solorzano, PhD [facility]        Today's Diagnoses     Somatic symptom disorder, persistent, moderate, with predominant pain    -  1    Major depressive disorder, recurrent episode, moderate (H)           Follow-ups after your visit        Your next 10 appointments already scheduled     Jun 12, 2017  4:00 PM CDT   Adult Psychotherapy with Landry Solorzano, PhD   Psychiatry Clinic ([facility])    [address]  [address] [place] [address]   [phone]            Jun 19, 2017  4:00 PM CDT   Adult Psychotherapy with Landry Solorzano, PhD   Psychiatry Clinic ([facility])    [address]  [address] [place] [address]   [phone]            Jun 26, 2017  4:00 PM CDT   Adult Psychotherapy with Landry Solorzano, PhD   Psychiatry Clinic ([facility])    [address]  [address] [place] [address]   [phone]            Jul 03, 2017  4:00 PM CDT   Adult Psychotherapy with Landry Solorzano, PhD   Psychiatry Clinic ([facility])    [address]  [address] [place] [address]   [phone]            Jul 10, 2017  4:00 PM CDT   Adult Psychotherapy with Landry Solorzano, PhD   Psychiatry Clinic ([facility])    [address]  [address] [place] [address]   [phone]              Who to contact     Please call your clinic at [phone] to:    Ask questions about your health    Make or cancel appointments    Discuss your medicines    Learn about your test results    Speak to your doctor   If you have compliments or concerns about an experience at your  clinic, or if you wish to file a complaint, please contact [facility] Patient Relations at [phone] or email us at [email or web address]         Additional Information About Your Visit        Kinoptohart Information     SplitG[employer]st gives you secure access to your electronic health record. If you see a primary care provider, you can also send messages to your care team and make appointments. If you have questions, please call your primary care clinic.  If you do not have a primary care provider, please call [phone] and they will assist you.      Zipcar is an electronic gateway that provides easy, online access to your medical records. With Zipcar, you can request a clinic appointment, read your test results, renew a prescription or communicate with your care team.     To access your existing account, please contact your [facility] or call [phone] for assistance.        Care EveryWhere ID     This is your Care EveryWhere ID. This could be used by other organizations to access your [place] medical records  [id number]        Your Vitals Were     Last Period                   05/15/2017 (Approximate)            Blood Pressure from Last 3 Encounters:   05/31/17 103/71   04/14/17 108/62   02/10/17 133/89    Weight from Last 3 Encounters:   05/31/17 57.3 kg (126 lb 4.8 oz)   04/14/17 58.8 kg (129 lb 9 oz)   02/10/17 57.9 kg (127 lb 9.6 oz)              Today, you had the following     No orders found for display       Primary Care Provider Office Phone # Fax #    Myrnamoriah Watson [phone]       [address] [address]        Thank you!     Thank you for choosing PS[facility]ATRY CLINIC  for your care. Our goal is always to provide you with excellent care. Hearing back from our patients is one way we can continue to improve our services. Please take a few minutes to complete the written  survey that you may receive in the mail after your visit with us. Thank you!             Your Updated Medication List - Protect others around you: Learn how to safely use, store and throw away your medicines at www.disposemymeds.org.          This list is accurate as of: 6/5/17 11:59 PM.  Always use your most recent med list.                   Brand Name Dispense Instructions for use    hydrOXYzine 50 MG tablet    ATARAX    30 tablet    Take 1-2 tablets ( mg) by mouth every 6 hours as needed for anxiety       Ketoprofen Powd     100 g    Apply dime sized amount to affected areas up to three times daily.       loperamide 2 MG tablet    IMODIUM A-D    60 tablet    Start with 2 tabs (4 mg), then take one tab (2 mg) after each diarrheal stool.  Do not use more than  8 tabs (16 mg) per day.       LORazepam 0.5 MG tablet    ATIVAN    10 tablet    Take 1 tablet (0.5 mg) by mouth every 6 hours as needed for anxiety       norethindrone 0.35 MG per tablet    MICRONOR    28 tablet    Take 1 tablet (0.35 mg) by mouth daily       omeprazole 20 MG CR capsule    priLOSEC    90 capsule    Take 1 capsule (20 mg) by mouth daily       prenatal multivitamin  plus iron 27-0.8 MG Tabs per tablet     30 tablet    Take 1 tablet by mouth daily       ranitidine 150 MG tablet    ZANTAC    60 tablet    Take 1 tablet (150 mg) by mouth 2 times daily       tiZANidine 2 MG tablet    ZANAFLEX    90 tablet    Take 1-2 tablets (2-4 mg) by mouth 3 times daily       TYLENOL PO      Take 500 mg by mouth every 4 hours as needed for mild pain or fever Takes 2 tablets of 500 mg

## 2017-06-06 NOTE — PROGRESS NOTES
[facility]  Therapy Progress Note    Patient Name:  Sheyla Ye    :   1980    Date:   2017     Diagnostic Codes:  Somatic Symptom Disorder (F45.1)   Major Depressive Disorder, Moderate, Recurrent, with anxious features (F33.1)  Type of Session:  Individual psychotherapy  Length of Session:   45 minutes  Practitioner:   Landry Solorzano, Ph.D.  Supervisor:   Robyn Dawn Psy.D.    Narrative Description of Session:   The patient was seen by Landry Solorzano, Ph.D., Postdoctoral Associate. She arrived ten minutes late for her session and was dressed casually with appropriate grooming/hygiene. Her mood was anxious and with congruent affect. No abnormalities were noted in speech/thought processes or content, and she denied SI.     Session began by the patient sharing the last month had been quite difficult for her because her insurance was inactive. 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 The clinician offered that her increased pain, anxiety, and depression may be related, if not exacerbated, by her inability or unwillingness to put herself first when she is struggling. The rest of session was spent helping the patient identify certain techniques that she could use to lessen the frequency and severity of panic attacks she has recently experienced.      Assessment:   Appearance:  Appropriately groomed, casually dressed  Behavior/relationship to examiner/demeanor:  Normal  Motor activity/EPS:  Normal  Gait:  Normal  Speech rate:  Normal  Speech volume:  Normal  Speech articulation:  Normal  Speech coherence:  Normal  Speech spontaneity:  Normal  Thought Process (Associations):  Goal-directed  Thought process (Rate):  Normal  Thought content: Normal  Abnormal Perception:  Denies  Insight:  Poor-to-fair  Judgment:  Fair    Plan: Help the patient establish effective anxiety management techniques, and  increase her ability to assert her own needs and maintain healthy boundaries with others.     I did not see this pt directly. This pt is discussed with me in individual psychotherapy supervision, and I agree with the plan as documented. Robyn Dawn Psy.D. , L.P.

## 2017-06-22 ENCOUNTER — TELEPHONE (OUTPATIENT)
Dept: PSYCHIATRY | Facility: CLINIC | Age: 37
End: 2017-06-22

## 2017-06-22 NOTE — TELEPHONE ENCOUNTER
"At 2:30 PM today, patient called writer to inform him that she \"just [could] not make it today to the appt. Pt elaborated stating she had begun walking, felt anxious and sick, and decided to turn around and walk back home. Given that she was calling 1/2 hour before her appt time, the writer encouraged the patient to consider the role of her panic anxiety in her decision to go back home, and to instead consider getting a ride to her appointment. Patient said she would consider this option, and call the writer back shortly to inform him whether or not she would be attending her scheduled appointment.      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     "

## 2017-06-26 ENCOUNTER — OFFICE VISIT (OUTPATIENT)
Dept: PSYCHIATRY | Facility: CLINIC | Age: 37
End: 2017-06-26
Attending: PSYCHOLOGIST

## 2017-06-26 DIAGNOSIS — F45.1 SOMATIC SYMPTOM DISORDER, PERSISTENT, MODERATE, WITH PREDOMINANT PAIN: Primary | ICD-10-CM

## 2017-06-26 DIAGNOSIS — F33.1 MAJOR DEPRESSIVE DISORDER, RECURRENT EPISODE, MODERATE (H): ICD-10-CM

## 2017-06-26 NOTE — MR AVS SNAPSHOT
After Visit Summary   6/26/2017    Sheyla Ye    MRN: [id number]           Patient Information     Date Of Birth          1980        Visit Information        Provider Department      6/26/2017 4:00 PM Landry Solorzano, PhD [facility]iatry Clinic        Today's Diagnoses     Somatic symptom disorder, persistent, moderate, with predominant pain    -  1    Major depressive disorder, recurrent episode, moderate (H)           Follow-ups after your visit        Your next 10 appointments already scheduled     Jul 10, 2017  4:00 PM CDT   Adult Psychotherapy with Landry Solorzano, PhD   Psychiatry Clinic ([facility])    [address]  [address] [facility] [id number] [phone]              Who to contact     Please call your clinic at [phone] to:    Ask questions about your health    Make or cancel appointments    Discuss your medicines    Learn about your test results    Speak to your doctor   If you have compliments or concerns about an experience at your clinic, or if you wish to file a complaint, please contact [facility] Patient Relations at [phone] or email us at [email or web address]         Additional Information About Your Visit        MyChart Information     [employer] gives you secure access to your electronic health record. If you see a primary care provider, you can also send messages to your care team and make appointments. If you have questions, please call your primary care clinic.  If you do not have a primary care provider, please call [phone] and they will assist you.      [employer] is an electronic gateway that provides easy, online access to your medical records. With IfOnly, you can request a clinic appointment, read your test results, renew a prescription or communicate with your care team.     To access your existing account, please contact your [facility]  or call [phone] for assistance.        Care EveryWhere ID     This is your Care EveryWhere ID. This could be used by other organizations to access your [place] medical records  [id number]        Your Vitals Were     Last Period                   05/15/2017 (Approximate)            Blood Pressure from Last 3 Encounters:   05/31/17 103/71   04/14/17 108/62   02/10/17 133/89    Weight from Last 3 Encounters:   05/31/17 57.3 kg (126 lb 4.8 oz)   04/14/17 58.8 kg (129 lb 9 oz)   02/10/17 57.9 kg (127 lb 9.6 oz)              Today, you had the following     No orders found for display       Primary Care Provider Office Phone # Fax #    Myrna Nhung Watson [phone]       [facility] [address] [facility] [address]  [facility] [address]        Equal Access to Services     JOSE STALLINGS : Hadii aad ku hadasho Soomaali, waaxda luqadaha, qaybta kaalmada adeegyada, waxay idiin hayaan boo larios . So [facility] [phone].    ATENCIÓN: Si habla español, tiene a owens disposición servicios gratuitos de asistencia lingüística. Gina al [phone].    We comply with applicable federal civil rights laws and [place] laws. We do not discriminate on the basis of race, color, national origin, age, disability sex, sexual orientation or gender identity.            Thank you!     Thank you for choosing PSYCHIATRY CLINIC  for your care. Our goal is always to provide you with excellent care. Hearing back from our patients is one way we can continue to improve our services. Please take a few minutes to complete the written survey that you may receive in the mail after your visit with us. Thank you!             Your Updated Medication List - Protect others around you: Learn how to safely use, store and throw away your medicines at www.disposemymeds.org.          This list is accurate as of: 6/26/17 11:59 PM.  Always use your most recent med list.                   Brand Name Dispense Instructions for use  Diagnosis    hydrOXYzine 50 MG tablet    ATARAX    30 tablet    Take 1-2 tablets ( mg) by mouth every 6 hours as needed for anxiety    Anxiety       Ketoprofen Powd     100 g    Apply dime sized amount to affected areas up to three times daily.    Myofacial muscle pain       loperamide 2 MG tablet    IMODIUM A-D    60 tablet    Start with 2 tabs (4 mg), then take one tab (2 mg) after each diarrheal stool.  Do not use more than  8 tabs (16 mg) per day.    Diarrhea       LORazepam 0.5 MG tablet    ATIVAN    10 tablet    Take 1 tablet (0.5 mg) by mouth every 6 hours as needed for anxiety    Anxiety       norethindrone 0.35 MG per tablet    MICRONOR    28 tablet    Take 1 tablet (0.35 mg) by mouth daily    Encounter for contraceptive management, unspecified contraceptive encounter type       omeprazole 20 MG CR capsule    priLOSEC    90 capsule    Take 1 capsule (20 mg) by mouth daily    Gastroesophageal reflux disease with esophagitis       prenatal multivitamin  plus iron 27-0.8 MG Tabs per tablet     30 tablet    Take 1 tablet by mouth daily    Depression, unspecified depression type       ranitidine 150 MG tablet    ZANTAC    60 tablet    Take 1 tablet (150 mg) by mouth 2 times daily    Gastroesophageal reflux disease without esophagitis       tiZANidine 2 MG tablet    ZANAFLEX    90 tablet    Take 1-2 tablets (2-4 mg) by mouth 3 times daily    Muscle spasm       TYLENOL PO      Take 500 mg by mouth every 4 hours as needed for mild pain or fever Takes 2 tablets of 500 mg

## 2017-06-28 NOTE — PROGRESS NOTES
"[facility]  Therapy Progress Note    Patient Name:  Sheyla Ye    :   1980    Date:   2017     Diagnostic Codes:  Somatic Symptom Disorder (F45.1)   Major Depressive Disorder, Moderate, Recurrent, with anxious features (F33.1)  Type of Session:  Individual psychotherapy  Length of Session:   52 minutes  Practitioner:   Landry Solorzano, Ph.D.  Supervisor:   Robyn Dawn Psy.D.    Narrative Description of Session:   The patient was seen by Landry Solorzano, Ph.D., Postdoctoral Associate. She arrived on time to session, and was casually dressed with appropriate grooming. Her mood was anxious with congruent affect, and no abnormalities were noted in speech/thought processes or content. She denied any current SI.    Session began by processing last week's events, as the patient described feeling very overwhelmed, depressed, and anxious all week. The clinician reminded the patient that during such times of extreme distress, it appears that she typically stays away from potential sources of relief (i.e., treatment). The patient shared that her fear of crying in public or being pitied by others was very uncomfortable for her. The patient then began to share how her maladaptive childhood/adolescent experiences of crying, relying on others for emotional support, and attempting to set caretaking boundaries with others have largely failed. The patient shared that she also went through a significant traumatic experience that she spends a lot of time \"trying to not think about [the trauma].\"     Clinician shared he would be out of town next week, and the dyad's last session in this clinician would be Monday, 7/10/2017. Lastly, termination was revisited, and the patient shared that she would like to continue therapy with the clinician at his new clinician in mid-August.      Assessment:   Appearance:  Appropriately groomed, casually dressed  Behavior/relationship to examiner/demeanor:  Normal  Motor activity/EPS:  " Normal  Gait:  Normal  Speech rate:  Normal  Speech volume:  Normal  Speech articulation:  Normal  Speech coherence:  Normal  Speech spontaneity:  Normal  Thought Process (Associations):  Goal-directed  Thought process (Rate):  Normal  Thought content: Normal  Abnormal Perception:  Denies  Insight:  Poor-to-fair  Judgment:  Fair    Plan: Continue helping the patient transition to the new clinic in mid-July.         I did not see this pt directly. This pt is discussed with me in individual psychotherapy supervision, and I agree with the plan as documented. Robyn Dawn Psy.D. , L.P.

## 2018-03-15 ENCOUNTER — OFFICE VISIT (OUTPATIENT)
Dept: INTERNAL MEDICINE | Facility: CLINIC | Age: 38
End: 2018-03-15
Payer: MEDICAID

## 2018-03-15 VITALS
DIASTOLIC BLOOD PRESSURE: 85 MMHG | HEART RATE: 103 BPM | OXYGEN SATURATION: 96 % | TEMPERATURE: 98.6 F | SYSTOLIC BLOOD PRESSURE: 121 MMHG | WEIGHT: 126.1 LBS | BODY MASS INDEX: 20.34 KG/M2

## 2018-03-15 DIAGNOSIS — R42 LIGHTHEADEDNESS: ICD-10-CM

## 2018-03-15 DIAGNOSIS — G89.29 OTHER CHRONIC PAIN: ICD-10-CM

## 2018-03-15 DIAGNOSIS — R39.15 URINARY URGENCY: Primary | ICD-10-CM

## 2018-03-15 DIAGNOSIS — M62.838 MUSCLE SPASM: ICD-10-CM

## 2018-03-15 DIAGNOSIS — K21.9 GASTROESOPHAGEAL REFLUX DISEASE, ESOPHAGITIS PRESENCE NOT SPECIFIED: ICD-10-CM

## 2018-03-15 LAB
ALBUMIN UR-MCNC: NEGATIVE MG/DL
ANION GAP SERPL CALCULATED.3IONS-SCNC: 5 MMOL/L (ref 3–14)
APPEARANCE UR: CLEAR
BILIRUB UR QL STRIP: NEGATIVE
BUN SERPL-MCNC: 13 MG/DL (ref 7–30)
CALCIUM SERPL-MCNC: 8.5 MG/DL (ref 8.5–10.1)
CHLORIDE SERPL-SCNC: 107 MMOL/L (ref 94–109)
CO2 SERPL-SCNC: 25 MMOL/L (ref 20–32)
COLOR UR AUTO: YELLOW
CREAT SERPL-MCNC: 0.75 MG/DL (ref 0.52–1.04)
ERYTHROCYTE [DISTWIDTH] IN BLOOD BY AUTOMATED COUNT: 12.2 % (ref 10–15)
GFR SERPL CREATININE-BSD FRML MDRD: 87 ML/MIN/1.7M2
GLUCOSE SERPL-MCNC: 83 MG/DL (ref 70–99)
GLUCOSE UR STRIP-MCNC: NEGATIVE MG/DL
HCT VFR BLD AUTO: 36.8 % (ref 35–47)
HGB BLD-MCNC: 12.5 G/DL (ref 11.7–15.7)
HGB UR QL STRIP: NEGATIVE
KETONES UR STRIP-MCNC: NEGATIVE MG/DL
LEUKOCYTE ESTERASE UR QL STRIP: ABNORMAL
MCH RBC QN AUTO: 32.8 PG (ref 26.5–33)
MCHC RBC AUTO-ENTMCNC: 34 G/DL (ref 31.5–36.5)
MCV RBC AUTO: 97 FL (ref 78–100)
NITRATE UR QL: NEGATIVE
PH UR STRIP: 7 PH (ref 5–7)
PLATELET # BLD AUTO: 326 10E9/L (ref 150–450)
POTASSIUM SERPL-SCNC: 4 MMOL/L (ref 3.4–5.3)
RBC # BLD AUTO: 3.81 10E12/L (ref 3.8–5.2)
RBC #/AREA URNS AUTO: 1 /HPF (ref 0–2)
SODIUM SERPL-SCNC: 137 MMOL/L (ref 133–144)
SOURCE: ABNORMAL
SP GR UR STRIP: 1.01 (ref 1–1.03)
SQUAMOUS #/AREA URNS AUTO: 1 /HPF (ref 0–1)
TSH SERPL DL<=0.005 MIU/L-ACNC: 0.5 MU/L (ref 0.4–4)
UROBILINOGEN UR STRIP-MCNC: 0 MG/DL (ref 0–2)
WBC # BLD AUTO: 10.2 10E9/L (ref 4–11)
WBC #/AREA URNS AUTO: <1 /HPF (ref 0–5)

## 2018-03-15 RX ORDER — LIDOCAINE 50 MG/G
OINTMENT TOPICAL PRN
Qty: 50 G | Refills: 0 | Status: SHIPPED | OUTPATIENT
Start: 2018-03-15 | End: 2019-01-29

## 2018-03-15 RX ORDER — TIZANIDINE 2 MG/1
2-4 TABLET ORAL 3 TIMES DAILY PRN
Qty: 90 TABLET | Refills: 0 | Status: SHIPPED | OUTPATIENT
Start: 2018-03-15 | End: 2018-10-12

## 2018-03-15 RX ORDER — LORATADINE 10 MG/1
10 TABLET ORAL DAILY
COMMUNITY
End: 2020-09-29

## 2018-03-15 ASSESSMENT — PAIN SCALES - GENERAL: PAINLEVEL: EXTREME PAIN (8)

## 2018-03-15 NOTE — MR AVS SNAPSHOT
After Visit Summary   3/15/2018    Sheyla Ye    MRN: [id number]           Patient Information     Date Of Birth          1980        Visit Information        Provider Department      3/15/2018 3:35 PM Radha Karimi MD [facility]        Today's Diagnoses     Urinary urgency    -  1    Muscle spasm        Lightheadedness        Other chronic pain        Gastroesophageal reflux disease, esophagitis presence not specified          Care Instructions    Primary Care Center: [phone]     Primary Care Center Medication Refill Request Information:  * Please contact your pharmacy regarding ANY request for medication refills.  ** [facility] Prescription Fax = [phone]  * Please allow 3 business days for routine medication refills.  * Please allow 5 business days for controlled substance medication refills.     Primary Care Center Test Result notification information:  *You will be notified with in 7-10 days of your appointment day regarding the results of your test.  If you are on MyChart you will be notified as soon as the provider has reviewed the results and signed off on them.          Follow-ups after your visit        Additional Services     PAIN MANAGEMENT REFERRAL       Your provider has referred you to: [facility]: [facility] [place] [phone] [email or web address]      Please call [phone] to make an appointment. Clinic is located: [facility] [address] [place] [address] [id number] 4th [place] [address]      Please complete the following questions:    Procedure/Referral: Referral Only -  Comprehensive Evaluation and Management    What is your diagnosis for the patient's pain? Chronic neck, shoulder, and back pain. Known degenerative disease    What are your specific questions for the pain specialist? Reestablishing care    Are there any red flags  that may impact the assessment or management of the patient? None         Please note the Pre-Op Pain Consult must be scheduled 2-3 weeks prior to the patient's surgery.  Patient's trying to schedule within 2 weeks of surgery may not be accommodated.     Pre-Op Pain Consults are only good for 30 days.    **ANY DIAGNOSTIC TESTS THAT ARE NOT IN EPIC SHOULD BE SENT TO THE PAIN CENTER**    REGARDING OPIOID MEDICATIONS:  The discussion of opioids management, appropriateness of therapy, and dosing will be discussed in patients being seen for evaluation.  The pain management clinics are not long-term prescribing clinics, with transition of prescribing of medications ultimately going back to the referring provider/PCP.  If prescribing is taken over at the pain clinic, it is in actively involved patients whom are appropriate for opioids, urine drug screening is completed, and long-term prescribing plan has been determined.  Therefore, we will not be automatically taking over prescribing at the patient's first visit.  Is this agreeable to you? agrees.     Please be aware that coverage of these services is subject to the terms and limitations of your health insurance plan.  Call member services at your health plan with any benefit or coverage questions.      Please bring the following with you to your appointment:    (1) Any X-Rays, CTs or MRIs which have been performed.  Contact the facility where they were done to arrange for  prior to your scheduled appointment.    (2) List of current medications   (3) This referral request   (4) Any documents/labs given to you for this referral                  Your next 10 appointments already scheduled     Mar 15, 2018  5:00 PM CDT   LAB with  LAB    Health Lab ([facility])    [address]  1st Floor  [facility] [address] [phone]           Please do not eat 10-12 hours before your appointment if you are coming in fasting for labs on  lipids, cholesterol, or glucose (sugar). This does not apply to pregnant women. Water, hot tea and black coffee (with nothing added) are okay. Do not drink other fluids, diet soda or chew gum.            Apr 25, 2018  4:00 PM CDT   (Arrive by 3:45 PM)   Return Visit with Myrna Watson MD   [facility] ([facility])    [address]  4th [facility] [address] [phone]              Future tests that were ordered for you today     Open Future Orders        Priority Expected Expires Ordered    CBC with platelets Routine 3/15/2018 3/29/2018 3/15/2018    TSH with free T4 reflex Routine 3/15/2018 3/29/2018 3/15/2018    UA with Micro reflex to Culture Routine 3/15/2018 3/15/2019 3/15/2018    Basic metabolic panel Routine 3/15/2018 3/29/2018 3/15/2018            Who to contact     Please call your clinic at [phone] to:    Ask questions about your health    Make or cancel appointments    Discuss your medicines    Learn about your test results    Speak to your doctor            Additional Information About Your Visit        [employer] Information     [employer] gives you secure access to your electronic health record. If you see a primary care provider, you can also send messages to your care team and make appointments. If you have questions, please call your primary care clinic.  If you do not have a primary care provider, please call [phone] and they will assist you.      [employer] is an electronic gateway that provides easy, online access to your medical records. With [employer], you can request a clinic appointment, read your test results, renew a prescription or communicate with your care team.     To access your existing account, please contact your [facility] or call [phone] for assistance.        Care EveryWhere ID     This is your Care EveryWhere ID. This could be used by other organizations to access your  [place] medical records  [id number]        Your Vitals Were     Pulse Temperature Pulse Oximetry BMI (Body Mass Index)          103 98.6  F (37  C) (Oral) 96% 20.34 kg/m2         Blood Pressure from Last 3 Encounters:   03/15/18 121/85   05/31/17 103/71   04/14/17 108/62    Weight from Last 3 Encounters:   03/15/18 57.2 kg (126 lb 1.6 oz)   05/31/17 57.3 kg (126 lb 4.8 oz)   04/14/17 58.8 kg (129 lb 9 oz)              We Performed the Following     PAIN MANAGEMENT REFERRAL          Today's Medication Changes          These changes are accurate as of 3/15/18  4:50 PM.  If you have any questions, ask your nurse or doctor.               Start taking these medicines.        Dose/Directions    lidocaine 5 % ointment   Commonly known as:  XYLOCAINE   Used for:  Muscle spasm, Other chronic pain   Started by:  Radha Karimi MD        Apply topically as needed for moderate pain   Quantity:  50 g   Refills:  0         These medicines have changed or have updated prescriptions.        Dose/Directions    * omeprazole 20 MG CR capsule   Commonly known as:  priLOSEC   This may have changed:  Another medication with the same name was added. Make sure you understand how and when to take each.   Used for:  Gastroesophageal reflux disease with esophagitis   Changed by:  Radha Karimi MD        Dose:  20 mg   Take 1 capsule (20 mg) by mouth daily   Quantity:  90 capsule   Refills:  0       * omeprazole 20 MG CR capsule   Commonly known as:  priLOSEC   This may have changed:  You were already taking a medication with the same name, and this prescription was added. Make sure you understand how and when to take each.   Used for:  Gastroesophageal reflux disease, esophagitis presence not specified   Changed by:  Radha Karimi MD        Dose:  20 mg   Take 1 capsule (20 mg) by mouth daily   Quantity:  90 capsule   Refills:  1       tiZANidine 2 MG tablet   Commonly known as:  ZANAFLEX   This may have changed:    -  when to take this  - reasons to take this   Used for:  Muscle spasm   Changed by:  Radha Karimi MD        Dose:  2-4 mg   Take 1-2 tablets (2-4 mg) by mouth 3 times daily as needed for muscle spasms   Quantity:  90 tablet   Refills:  0       * Notice:  This list has 2 medication(s) that are the same as other medications prescribed for you. Read the directions carefully, and ask your doctor or other care provider to review them with you.         Where to get your medicines      These medications were sent to [place], [place] - [address]  [address], [place] [address]    Hours:  TRANSPLANT PHONE NUMBER [phone] Phone:  [phone]     lidocaine 5 % ointment    omeprazole 20 MG CR capsule    tiZANidine 2 MG tablet                Primary Care Provider Office Phone # Fax #    Myrna Nhung Watson [phone]       [address]  [facility] [place] [address]        Equal Access to Services     GARRETT STALLINGS AH: Hadii leonard ku hadasho Soomaali, waaxda luqadaha, qaybta kaalmada adeegyada, waxay idiin haygino larios . So [facility] [phone].    ATENCIÓN: Si habla español, tiene a owens disposición servicios gratuitos de asistencia lingüística. [facility] [phone].    We comply with applicable federal civil rights laws and [place] laws. We do not discriminate on the basis of race, color, national origin, age, disability, sex, sexual orientation, or gender identity.            Thank you!     Thank you for choosing [facility]IMARY [facility]ARE CLINIC  for your care. Our goal is always to provide you with excellent care. Hearing back from our patients is one way we can continue to improve our services. Please take a few minutes to complete the written survey that you may receive in the mail after your visit with us. Thank you!             Your Updated Medication List - Protect others around you: Learn how to safely  use, store and throw away your medicines at www.disposemymeds.org.          This list is accurate as of 3/15/18  4:50 PM.  Always use your most recent med list.                   Brand Name Dispense Instructions for use Diagnosis    ADVIL PO      Take by mouth as needed for moderate pain        hydrOXYzine 50 MG tablet    ATARAX    30 tablet    Take 1-2 tablets ( mg) by mouth every 6 hours as needed for anxiety    Anxiety       Ketoprofen Powd     100 g    Apply dime sized amount to affected areas up to three times daily.    Myofacial muscle pain       lidocaine 5 % ointment    XYLOCAINE    50 g    Apply topically as needed for moderate pain    Muscle spasm, Other chronic pain       loperamide 2 MG tablet    IMODIUM A-D    60 tablet    Start with 2 tabs (4 mg), then take one tab (2 mg) after each diarrheal stool.  Do not use more than  8 tabs (16 mg) per day.    Diarrhea       loratadine 10 MG tablet    CLARITIN     Take 10 mg by mouth daily        LORazepam 0.5 MG tablet    ATIVAN    10 tablet    Take 1 tablet (0.5 mg) by mouth every 6 hours as needed for anxiety    Anxiety       norethindrone 0.35 MG per tablet    MICRONOR    28 tablet    Take 1 tablet (0.35 mg) by mouth daily    Encounter for contraceptive management, unspecified contraceptive encounter type       * omeprazole 20 MG CR capsule    priLOSEC    90 capsule    Take 1 capsule (20 mg) by mouth daily    Gastroesophageal reflux disease with esophagitis       * omeprazole 20 MG CR capsule    priLOSEC    90 capsule    Take 1 capsule (20 mg) by mouth daily    Gastroesophageal reflux disease, esophagitis presence not specified       prenatal multivitamin plus iron 27-0.8 MG Tabs per tablet     30 tablet    Take 1 tablet by mouth daily    Depression, unspecified depression type       ranitidine 150 MG tablet    ZANTAC    60 tablet    Take 1 tablet (150 mg) by mouth 2 times daily    Gastroesophageal reflux disease without esophagitis       tiZANidine 2 MG  tablet    ZANAFLEX    90 tablet    Take 1-2 tablets (2-4 mg) by mouth 3 times daily as needed for muscle spasms    Muscle spasm       TYLENOL PO      Take 500 mg by mouth every 4 hours as needed for mild pain or fever Takes 2 tablets of 500 mg        * Notice:  This list has 2 medication(s) that are the same as other medications prescribed for you. Read the directions carefully, and ask your doctor or other care provider to review them with you.

## 2018-03-15 NOTE — NURSING NOTE
Chief Complaint   Patient presents with     Pain     Patient is here to discuss pain in neck and shoulders and low back.      UTI     Patient was dx at [facility] with a UTI about a month ago and finished course of antibiotics. Pt still has some concerns.      Leonarda Khalil LPN at 3:41 PM on 3/15/2018.

## 2018-03-15 NOTE — PROGRESS NOTES
"                     PRIMARY CARE CENTER       SUBJECTIVE:  Sheyla Ye is a 37 year old female with a history of Munir-Danlos, degenerative joint disease, ADHD, anxiety, and depression who presents with urinary urgency, chronic neck, shoulder, and low back pain, and lightheadedness when standing.    Patient usually follows with Dr. Watson. Last seen by Dr. Watson in 5/2017 with shoulder pain and was prescribed tizanidine. Had lapse in insurance coverage for 6-8 months.    1. Urinary urgency: Was diagnosed with an UTI at [facility] 4 weeks ago when she had urinary urgency and was treated with an antibiotic for 3 days. Patient uncertain what antibiotic she was treated with, was likely Bactrim. Notes that she has now has recurrent urinary urgency, feelings of incomplete voiding, and occasional left-sided pelvic cramping. No dysuria. Has some blood in urine but notes that she has had some vaginal spotting with the use of her birth control. No fevers or chills.    2. Requests for referral to Pain Management. Previously followed with Pain Management at [facility] for chronic neck/shoulder/back pain. Patient notes that she has had chronic pain since 2012. Previous imaging has showed degenerative changes of tne cervical spine. Left shoulder x-ray in 2015 was normal. Per patient, she was placed on \"hold\" from her previous Pain Management clinic for frequent no-shows. She currently works as a live-in PCA and notes that she may lose her position since her pain has been limiting her work. She is wondering whether to apply for disability. Requests for refill of tizanidine and lidocaine ointment.    3. Lightheadedness: Since 6/2017, has had lightheadedness and tremors when standing from sitting position. No LOC.    4. GERD: Would like prescription for omeprazole 20 mg/day. Currently buys OTC.    5. Anxiety and depression: Patient is currently working on reestablishing care with a psychiatrist.    Medications " and allergies reviewed by me today.     ROS:   Constitutional, neuro, ENT, endocrine, pulmonary, cardiac, gastrointestinal, genitourinary, musculoskeletal, integument and psychiatric systems are negative, except as otherwise noted.    OBJECTIVE:    /85 (BP Location: Right arm, Patient Position: Standing, Cuff Size: Adult Regular)  Pulse 103  Temp 98.6  F (37  C) (Oral)  Wt 57.2 kg (126 lb 1.6 oz)  SpO2 96%  BMI 20.34 kg/m2   Wt Readings from Last 1 Encounters:   03/15/18 57.2 kg (126 lb 1.6 oz)     Gen: Alert, oriented, pleasant, NAD, talkative and rapid speech  HEENT: NC/AT, PERRL, EOMI, moist mucous membranes, oropharynx w/o erythema, exudate, or lesions.  Neck: No cervical lymphadenopathy  CV: tachycardic, regular rhythm, normal S1 and S2, no murmurs, 2+ bilateral radial pulses  Respiratory: CTAB, good air movement bilaterally, no wheezes, rales, or rhonchi  Abdomen: Soft, NTND. BS present. No palpable masses.  Extremities: No LE edema  Neuro: CN II-XII grossly intact, moves all extremities, occasional bilateral upper extremity tremors  Skin: No visible rashes. Scattered ecchymoses on upper extremities     ASSESSMENT/PLAN:    Sheyla was seen today for pain and uti.    Diagnoses and all orders for this visit:    Urinary urgency  Will assess for possible UTI. Treated for UTI 4 weeks ago. Was likely treated with Bactrim (patient did not know what antibiotic she was treatd with).        -     UA with Micro reflex to Culture; Future    Muscle spasm  Has chronic neck, back, and shoulder pain. Has known degenerative joint disease.  -     tiZANidine (ZANAFLEX) 2 MG tablet; Take 1-2 tablets (2-4 mg) by mouth 3 times daily as needed for muscle spasms  -     lidocaine (XYLOCAINE) 5 % ointment; Apply topically as needed for moderate pain  -     PAIN MANAGEMENT REFERRAL    Lightheadedness  Orthostatics were assessed in clinic and were negative. Will assess for anemia, electrolyte abnormalities, and thyroid  dysfunction, especially in the setting of tremors.  -     CBC with platelets; Future  -     TSH with free T4 reflex; Future  -     Basic metabolic panel; Future    Gastroesophageal reflux disease, esophagitis presence not specified  -     omeprazole (PRILOSEC) 20 MG CR capsule; Take 1 capsule (20 mg) by mouth daily      Health maintenance: Notes that she has completed a Pap with HPV testing at [facility] within the last year. Was advised by [facility] to complete Pap smears every 3 years. Notes history of a colposcopy.    Patient should return to clinic for follow-up with me in 2 months or with Dr. Watson.    Patient was seen and plan of care discussed with Dr. Steward.     Radha Karimi MD, PhD  PGY-2 Internal Medicine  Pager: [phone]    Mar 15, 2018      I was present during the visit and the patient was seen and examined by me in conjunction with the resident.  I discussed the pertinent history, exam, results and plan with the resident and agree with the documentation above with the following exceptions: none.      Joceline Steward MD

## 2018-03-15 NOTE — PATIENT INSTRUCTIONS
[facility]: [phone]     [facility] Medication Refill Request Information:  * Please contact your pharmacy regarding ANY request for medication refills.  ** [facility] Prescription Fax = [phone]  * Please allow 3 business days for routine medication refills.  * Please allow 5 business days for controlled substance medication refills.     [facility] Test Result notification information:  *You will be notified with in 7-10 days of your appointment day regarding the results of your test.  If you are on MyChart you will be notified as soon as the provider has reviewed the results and signed off on them.

## 2018-03-16 ENCOUNTER — NURSE TRIAGE (OUTPATIENT)
Dept: NURSING | Facility: CLINIC | Age: 38
End: 2018-03-16

## 2018-03-16 NOTE — TELEPHONE ENCOUNTER
"Pt saw a different doctor yesterday (not her PCP) and got Rx for tizanidine (2mg) take 1-2 tabs TID PRN. She was dispensed #90 tabs. Says she usually gets #180 tabs of tizanidine when she fills this Rx. Advised pt call clinic on Mon 3/18 when they reopen. She has enough tizanidine for now so the quantity issue can be addressed during clinic. Likely provider was unfamiliar w/ her usual quantity. Pt voiced understanding and agreement. Malia Hernandez RN/FNA      Additional Information    Negative: Drug overdose and nurse unable to answer question    Negative: Caller requesting information not related to medicine    Negative: Caller requesting a prescription for Strep throat and has a positive culture result    Negative: Rash while taking a medication or within 3 days of stopping it    Negative: Immunization reaction suspected    Negative: [1] Asthma and [2] having symptoms of asthma (cough, wheezing, etc)    Negative: MORE THAN A DOUBLE DOSE of a prescription or over-the-counter (OTC) drug    Negative: [1] DOUBLE DOSE (an extra dose or lesser amount) of over-the-counter (OTC) drug AND [2] any symptoms (e.g., dizziness, nausea, pain, sleepiness)    Negative: [1] DOUBLE DOSE (an extra dose or lesser amount) of prescription drug AND [2] any symptoms (e.g., dizziness, nausea, pain, sleepiness)    Negative: Took another person's prescription drug    Negative: [1] DOUBLE DOSE (an extra dose or lesser amount) of prescription drug AND [2] NO symptoms (Exception: a double dose of antibiotics)    Negative: Diabetes drug error or overdose (e.g., insulin or extra dose)    Negative: [1] Request for URGENT new prescription or refill of \"essential\" medication (i.e., likelihood of harm to patient if not taken) AND [2] triager unable to fill per unit policy    Negative: [1] Prescription not at pharmacy AND [2] was prescribed today by PCP    Negative: Pharmacy calling with prescription questions and triager unable to answer question    " Negative: Caller has URGENT medication question about med that PCP prescribed and triager unable to answer question    Negative: Caller requesting a NON-URGENT new prescription or refill and triager unable to refill per unit policy    Negative: Caller has medication question about med not prescribed by PCP and triager unable to answer question (e.g., compatibility with other med, storage)    Negative: [1] DOUBLE DOSE (an extra dose or lesser amount) of over-the-counter (OTC) drug AND [2] NO symptoms (all triage questions negative)    Negative: [1] DOUBLE DOSE (an extra dose or lesser amount) of antibiotic drug AND [2] NO symptoms (all triage questions negative)    Negative: Caller has medication question only, adult not sick, and triager answers question    Caller has medication question, adult has minor symptoms, caller declines triage, and triager answers question    Commented on: Caller has NON-URGENT medication question about med that PCP prescribed and triager unable to answer question     Has enough Rx for weekend so question can be addressed during clinic    Protocols used: MEDICATION QUESTION CALL-ADULT-

## 2018-03-29 ENCOUNTER — TELEPHONE (OUTPATIENT)
Dept: INTERNAL MEDICINE | Facility: CLINIC | Age: 38
End: 2018-03-29

## 2018-03-29 NOTE — TELEPHONE ENCOUNTER
Baclofen would be acceptable (doesn't have the same contraindication as tizanidine and Flexeril). Methocarbamol would also be okay.

## 2018-03-29 NOTE — TELEPHONE ENCOUNTER
Regarding: Please call   Perri from [facility] downstairs   [phone]     Calling regarding a drug interaction with a medication that was sent yesterday evening    March 29, 2018 12:33 PM  Spoke with Alphonse Rayo at [facility]. Patient was seen by Dr. Karimi on 3/15/18, and was prescribed tizanidine. When Perri tried to fill it, she was alerted to the fact that patient is also on fluvoxamine, which is prescribed by her psychiatrist. Perri spoke with patient who said that she isn't taking her fluvoxamine all the time, and is considering talking to her psychiatrist about stopping it. However, tizanidine is absolutely contraindicated with fluvoxamine, as is cyclobenzaprine. Is there something else that could be prescribed.     I do have a message out to patient to call back to find out dosing of fluvoxamine.   Rhoda Valdez RN, Care Coordinator

## 2018-04-17 ENCOUNTER — OFFICE VISIT (OUTPATIENT)
Dept: ANESTHESIOLOGY | Facility: CLINIC | Age: 38
End: 2018-04-17
Payer: MEDICAID

## 2018-04-17 VITALS
BODY MASS INDEX: 20.41 KG/M2 | SYSTOLIC BLOOD PRESSURE: 124 MMHG | WEIGHT: 127 LBS | RESPIRATION RATE: 16 BRPM | DIASTOLIC BLOOD PRESSURE: 83 MMHG | HEIGHT: 66 IN | HEART RATE: 98 BPM

## 2018-04-17 DIAGNOSIS — M79.18 MYOFASCIAL PAIN: Primary | ICD-10-CM

## 2018-04-17 RX ORDER — DIAZEPAM 5 MG
TABLET ORAL
Refills: 0 | Status: ON HOLD | COMMUNITY
Start: 2018-03-12 | End: 2021-01-01

## 2018-04-17 RX ORDER — FLUVOXAMINE MALEATE 25 MG
TABLET ORAL
Refills: 11 | COMMUNITY
Start: 2018-02-02 | End: 2018-11-28

## 2018-04-17 RX ORDER — DEXTROAMPHETAMINE SACCHARATE, AMPHETAMINE ASPARTATE, DEXTROAMPHETAMINE SULFATE AND AMPHETAMINE SULFATE 5; 5; 5; 5 MG/1; MG/1; MG/1; MG/1
40 TABLET ORAL DAILY
Refills: 0 | COMMUNITY
Start: 2018-03-12 | End: 2020-09-29

## 2018-04-17 RX ORDER — MELOXICAM 7.5 MG/1
7.5 TABLET ORAL DAILY
Qty: 30 TABLET | Refills: 1 | Status: SHIPPED | OUTPATIENT
Start: 2018-04-17 | End: 2018-10-12

## 2018-04-17 ASSESSMENT — ANXIETY QUESTIONNAIRES
6. BECOMING EASILY ANNOYED OR IRRITABLE: MORE THAN HALF THE DAYS
5. BEING SO RESTLESS THAT IT IS HARD TO SIT STILL: SEVERAL DAYS
GAD7 TOTAL SCORE: 17
GAD7 TOTAL SCORE: 17
2. NOT BEING ABLE TO STOP OR CONTROL WORRYING: NEARLY EVERY DAY
4. TROUBLE RELAXING: NEARLY EVERY DAY
3. WORRYING TOO MUCH ABOUT DIFFERENT THINGS: NEARLY EVERY DAY
7. FEELING AFRAID AS IF SOMETHING AWFUL MIGHT HAPPEN: MORE THAN HALF THE DAYS
1. FEELING NERVOUS, ANXIOUS, OR ON EDGE: NEARLY EVERY DAY
7. FEELING AFRAID AS IF SOMETHING AWFUL MIGHT HAPPEN: MORE THAN HALF THE DAYS
GAD7 TOTAL SCORE: 17

## 2018-04-17 ASSESSMENT — ENCOUNTER SYMPTOMS
SORE THROAT: 0
STIFFNESS: 1
NERVOUS/ANXIOUS: 1
DECREASED CONCENTRATION: 1
NECK MASS: 0
EXERCISE INTOLERANCE: 1
HALLUCINATIONS: 0
LIGHT-HEADEDNESS: 1
NUMBNESS: 1
WEIGHT LOSS: 0
NAUSEA: 1
NECK PAIN: 1
BACK PAIN: 1
MUSCLE CRAMPS: 1
EYE PAIN: 0
SPEECH CHANGE: 0
DOUBLE VISION: 0
BOWEL INCONTINENCE: 0
ORTHOPNEA: 0
ABDOMINAL PAIN: 0
RECTAL PAIN: 0
LOSS OF CONSCIOUSNESS: 0
EYE IRRITATION: 0
DECREASED APPETITE: 1
SWOLLEN GLANDS: 0
HYPERTENSION: 0
TASTE DISTURBANCE: 0
HOARSE VOICE: 1
INSOMNIA: 1
ALTERED TEMPERATURE REGULATION: 1
LEG PAIN: 1
PARALYSIS: 0
NIGHT SWEATS: 1
SLEEP DISTURBANCES DUE TO BREATHING: 0
TINGLING: 1
PANIC: 1
DIARRHEA: 1
DEPRESSION: 1
HEADACHES: 1
WEIGHT GAIN: 0
HEARTBURN: 1
FEVER: 0
FATIGUE: 1
BRUISES/BLEEDS EASILY: 1
SYNCOPE: 0
DIZZINESS: 1
MYALGIAS: 1
SINUS CONGESTION: 1
POLYPHAGIA: 0
HYPOTENSION: 0
JOINT SWELLING: 0
CHILLS: 1
WEAKNESS: 1
EYE WATERING: 1
SMELL DISTURBANCE: 0
CONSTIPATION: 0
VOMITING: 1
DISTURBANCES IN COORDINATION: 0
JAUNDICE: 1
BLOATING: 0
SEIZURES: 0
BLOOD IN STOOL: 0
INCREASED ENERGY: 1
TREMORS: 1
SINUS PAIN: 0
MEMORY LOSS: 1
POLYDIPSIA: 0
TROUBLE SWALLOWING: 1
MUSCLE WEAKNESS: 1
ARTHRALGIAS: 1
PALPITATIONS: 1
EYE REDNESS: 1

## 2018-04-17 ASSESSMENT — PATIENT HEALTH QUESTIONNAIRE - PHQ9
SUM OF ALL RESPONSES TO PHQ QUESTIONS 1-9: 19
SUM OF ALL RESPONSES TO PHQ QUESTIONS 1-9: 19
10. IF YOU CHECKED OFF ANY PROBLEMS, HOW DIFFICULT HAVE THESE PROBLEMS MADE IT FOR YOU TO DO YOUR WORK, TAKE CARE OF THINGS AT HOME, OR GET ALONG WITH OTHER PEOPLE: EXTREMELY DIFFICULT

## 2018-04-17 ASSESSMENT — PAIN SCALES - GENERAL: PAINLEVEL: EXTREME PAIN (8)

## 2018-04-17 NOTE — MR AVS SNAPSHOT
After Visit Summary   4/17/2018    Sheyla Ye    MRN: [id number]           Patient Information     Date Of Birth          1980        Visit Information        Provider Department      4/17/2018 1:50 PM Swapnil Carlisle MD [facility]        Today's Diagnoses     Myofascial pain    -  1      Care Instructions    1.  tiZANidine (ZANAFLEX) 2 MG tablets from the pharmacy. Use medication as directed.     2. Meloxicam- 7.5 mg take 1 time daily,  Take with food.     Follow up: 6-8 weeks in clinic with Dr. Carlisle.       To speak with a nurse, schedule/reschedule/cancel a clinic appointment, or request a medication refill call: [phone]     You can also reach us by [email or web address]: [email or web address]    For refills, please call on Monday, 1 week before your medication runs out. The doctors are not always in clinic, so this gives us time to get your prescriptions ready.  Please let us know the name of the medication you are requesting a refill of.                                     Follow-ups after your visit        Your next 10 appointments already scheduled     Apr 25, 2018  4:00 PM CDT   (Arrive by 3:45 PM)   Return Visit with Myrna Watson MD   [facility] ([facility])    [address] [place] [id number] [phone]              Who to contact     Please call your clinic at [phone] to:    Ask questions about your health    Make or cancel appointments    Discuss your medicines    Learn about your test results    Speak to your doctor            Additional Information About Your Visit        MyChart Information     [employer] gives you secure access to your electronic health record. If you see a primary care provider, you can also send messages to your care team and make appointments. If you have questions, please call your primary care clinic.  If you do  "not have a primary care provider, please call [phone] and they will assist you.      [employer] is an electronic gateway that provides easy, online access to your medical records. With [employer], you can request a clinic appointment, read your test results, renew a prescription or communicate with your care team.     To access your existing account, please contact your [facility] or call [phone] for assistance.        Care EveryWhere ID     This is your Care EveryWhere ID. This could be used by other organizations to access your [place] medical records  [id number]        Your Vitals Were     Pulse Respirations Height BMI (Body Mass Index)          98 16 1.676 m (5' 6\") 20.5 kg/m2         Blood Pressure from Last 3 Encounters:   04/17/18 124/83   03/15/18 121/85   05/31/17 103/71    Weight from Last 3 Encounters:   04/17/18 57.6 kg (127 lb)   03/15/18 57.2 kg (126 lb 1.6 oz)   05/31/17 57.3 kg (126 lb 4.8 oz)              Today, you had the following     No orders found for display         Today's Medication Changes          These changes are accurate as of 4/17/18  3:46 PM.  If you have any questions, ask your nurse or doctor.               Start taking these medicines.        Dose/Directions    meloxicam 7.5 MG tablet   Commonly known as:  MOBIC   Used for:  Myofascial pain   Started by:  Swapnil Carlisle MD        Dose:  7.5 mg   Take 1 tablet (7.5 mg) by mouth daily   Quantity:  30 tablet   Refills:  1            Where to get your medicines      These medications were sent to [place], [place] - [address]  [address], [facility] [address]    Hours:  TRANSPLANT PHONE NUMBER [phone] Phone:  [phone]     meloxicam 7.5 MG tablet                Primary Care Provider Office Phone # Fax #    Myrna Watson [phone]       [address] [place] [address] [address] [place] [id number]      "   Equal Access to Services     [facility] HospitalHOWIE : Hadii leonard edwards vinay Couch, waaxda luqadaha, qaybta kahirenmarcie isaacs, luis enrique knott. So [facility] [phone].    ATENCIÓN: Si habla español, tiene a owens disposición servicios gratuitos de asistencia lingüística. Gina al [phone].    We comply with applicable federal civil rights laws and [place] laws. We do not discriminate on the basis of race, color, national origin, age, disability, sex, sexual orientation, or gender identity.            Thank you!     Thank you for choosing [facility]  for your care. Our goal is always to provide you with excellent care. Hearing back from our patients is one way we can continue to improve our services. Please take a few minutes to complete the written survey that you may receive in the mail after your visit with us. Thank you!             Your Updated Medication List - Protect others around you: Learn how to safely use, store and throw away your medicines at www.disposemymeds.org.          This list is accurate as of 4/17/18  3:46 PM.  Always use your most recent med list.                   Brand Name Dispense Instructions for use Diagnosis    ADVIL PO      Take by mouth as needed for moderate pain        amphetamine-dextroamphetamine 20 MG per tablet    ADDERALL     TAKE UP TO 1 TABLET BY MOUTH 3 TIMES DAILY ABOUT 4 HOURS APART AS NEEDED FOR ADHD SYMPTOMS        diazepam 5 MG tablet    VALIUM     TAKE UP TO 2 TABS DAILY BY MOUTH AS NEEDED FOR ANXIETY. USE SPARINGLY        fluvoxaMINE 25 MG tablet    LUVOX     TAKE 1/2 PILL BY MOUTH TWICE DAILY, INCREASING TO 1 PILL TWICE DAILY WHEN TOLERATED.        hydrOXYzine 50 MG tablet    ATARAX    30 tablet    Take 1-2 tablets ( mg) by mouth every 6 hours as needed for anxiety    Anxiety       Ketoprofen Powd     100 g    Apply dime sized amount to affected areas up to three times daily.    Myofacial muscle pain        lidocaine 5 % ointment    XYLOCAINE    50 g    Apply topically as needed for moderate pain    Muscle spasm, Other chronic pain       loperamide 2 MG tablet    IMODIUM A-D    60 tablet    Start with 2 tabs (4 mg), then take one tab (2 mg) after each diarrheal stool.  Do not use more than  8 tabs (16 mg) per day.    Diarrhea       loratadine 10 MG tablet    CLARITIN     Take 10 mg by mouth daily        LORazepam 0.5 MG tablet    ATIVAN    10 tablet    Take 1 tablet (0.5 mg) by mouth every 6 hours as needed for anxiety    Anxiety       meloxicam 7.5 MG tablet    MOBIC    30 tablet    Take 1 tablet (7.5 mg) by mouth daily    Myofascial pain       norethindrone 0.35 MG per tablet    MICRONOR    28 tablet    Take 1 tablet (0.35 mg) by mouth daily    Encounter for contraceptive management, unspecified contraceptive encounter type       omeprazole 20 MG CR capsule    priLOSEC    90 capsule    Take 1 capsule (20 mg) by mouth daily    Gastroesophageal reflux disease with esophagitis       prenatal multivitamin plus iron 27-0.8 MG Tabs per tablet     30 tablet    Take 1 tablet by mouth daily    Depression, unspecified depression type       ranitidine 150 MG tablet    ZANTAC    60 tablet    Take 1 tablet (150 mg) by mouth 2 times daily    Gastroesophageal reflux disease without esophagitis       tiZANidine 2 MG tablet    ZANAFLEX    90 tablet    Take 1-2 tablets (2-4 mg) by mouth 3 times daily as needed for muscle spasms    Muscle spasm       TYLENOL PO      Take 500 mg by mouth every 4 hours as needed for mild pain or fever Takes 2 tablets of 500 mg

## 2018-04-17 NOTE — PROGRESS NOTES
The patient is a 37-year-old female with a history of Munir-Danlos syndrome and a chief complaint of neck pain.  Her pain is stated to be 7-8 out of 10 in severity.  Her pain is stated to be dull, achy, and constant.  She states that most of her pain is on the right side of her neck.  She has been seen in several different pain clinics including the [facility] and the [facility].  Her stated goal for pain is to achieve a level of approximately 5 out of 10 in severity.  She states that her pain is alleviated by lidocaine gel, tizanidine, Percocet, ice, heat, and pressure.  She states that her pain is exacerbated by work, doing laundry, doing dishes, showering, and excessive movement.  She has tried several different modalities with varying amounts of efficacy to include trigger point injections nerve root injections topical analgesics and Ultram.  These agents were not effective she states that she has had muscle relaxants and Percocet and these have helped her.  She is not sure if a TENS unit was effective.  She states that she is never never tried gabapentin.  She presents today for initial evaluation.  Current Outpatient Prescriptions   Medication     Acetaminophen (TYLENOL PO)     amphetamine-dextroamphetamine (ADDERALL) 20 MG per tablet     diazepam (VALIUM) 5 MG tablet     fluvoxaMINE (LUVOX) 25 MG tablet     Ibuprofen (ADVIL PO)     lidocaine (XYLOCAINE) 5 % ointment     loperamide (IMODIUM A-D) 2 MG tablet     loratadine (CLARITIN) 10 MG tablet     meloxicam (MOBIC) 7.5 MG tablet     omeprazole (PRILOSEC) 20 MG CR capsule     Prenatal Vit-Fe Fumarate-FA (PRENATAL MULTIVITAMIN  PLUS IRON) 27-0.8 MG TABS     hydrOXYzine (ATARAX) 50 MG tablet     Ketoprofen POWD     LORazepam (ATIVAN) 0.5 MG tablet     norethindrone (MICRONOR) 0.35 MG per tablet     ranitidine (ZANTAC) 150 MG tablet     tiZANidine (ZANAFLEX) 2 MG tablet     No current facility-administered medications for this visit.      Allergies    Allergen Reactions     Seasonal Allergies      Past Medical History:   Diagnosis Date     Anxiety      Chronic pain      DJD (degenerative joint disease), lumbar      Munir-Danlos disease      Fibromyalgia      Past Surgical History:   Procedure Laterality Date     wisdom teeth removal       Family History   Problem Relation Age of Onset     Aneurysm Brother      20's, autopsy showed RP bleed     Thyroid Disease Mother      Aneurysm Other      brain aneurysm, 30's     Social History     Social History     Marital status: Single     Spouse name: N/A     Number of children: N/A     Years of education: N/A     Occupational History     Not on file.     Social History Main Topics     Smoking status: Current Every Day Smoker     Packs/day: 0.50     Years: 20.00     Types: Cigarettes     Smokeless tobacco: Never Used     Alcohol use 4.0 oz/week     8 Standard drinks or equivalent per week     Drug use: No     Sexual activity: Not on file     Other Topics Concern     Not on file     Social History Narrative      ROS: 10 point ROS neg other than the symptoms noted above in the HPI.  Physical Exam   Constitutional: She is oriented to person, place, and time. She appears well-developed and well-nourished.   HENT:   Head: Normocephalic and atraumatic.   Right Ear: External ear normal.   Left Ear: External ear normal.   Nose: Nose normal.   Mouth/Throat: Oropharynx is clear and moist.   Eyes: Conjunctivae and EOM are normal. Pupils are equal, round, and reactive to light.   Neck: Spinous process tenderness and muscular tenderness present. No rigidity. No edema, no erythema and normal range of motion present.   Cardiovascular: Normal rate, regular rhythm, normal heart sounds and intact distal pulses.    Pulmonary/Chest: Effort normal and breath sounds normal.   Abdominal: Soft. Bowel sounds are normal.   Musculoskeletal:        Cervical back: She exhibits decreased range of motion, tenderness, bony tenderness, pain and spasm.  She exhibits no swelling, no edema, no deformity, no laceration and normal pulse.   Neurological: She is alert and oriented to person, place, and time. She has normal reflexes.   Skin: Skin is warm and dry.   Psychiatric: She has a normal mood and affect.   Nursing note and vitals reviewed.    A/P: The patient is a 37-year-old woman with a history of chronic neck pain.  She has a history of Munir-Danlos syndrome.  Based on her historical and physical exam findings it seems that her pain may be secondary to a myofascial cause.  My plan is to  1.  Continue Zanaflex  2.  Add meloxicam 7.5 mg p.o. daily.  3.  Patient may consider Tylenol as needed with a maximum dose of 3 g per day.  4.  Consider a TENS unit.  5.  Consider Neurontin  Patient verbalizes an understanding of our plan is to return to clinic in 4-6 weeks for reevaluation.        CT CERVICAL SPINE W/O CONTRAST 5/7/2015 4:41 PM     History: eval facet joints, assess OPLL,Spinal stenosis in cervical  region     Comparison:  Cervical spine MRI 12/30/2014.     Technique: Using multidetector thin collimation helical acquisition  technique, axial, coronal and sagittal 3 mm thickness CT images of the  cervical spine were obtained without intravenous contrast. Images are  reviewed in bone and soft tissue windows.     Findings:   There is reversal of the normal cervical lordosis with  apex at C5-C6.  AP diameter of the spinal canal in the mid and lower  cervical spine is at the lower limit of normal. The lateral masses of  C1 appear normally aligned on C2. Cervical vertebral alignment is  within normal limits. There is no evidence of fracture or   prevertebral soft tissue swelling.  There is diffuse sclerosis of the  C6 vertebral body with anterior bony bridging of C5-C7 and moderate  disc height loss at C4-C5, C5-C6 and C6-C7.     Findings on a level by level basis are as follows:     C2-3:  No spinal canal or neural foraminal stenosis.     C3-4:  No spinal canal or  neural foraminal stenosis. Focal hypodense  lesion of the right lateral mass of C3 with thickened internal  trabeculae and somewhat thickened surrounding cortex without any  cortical destruction. This corresponds to the area of T1 and T2  hyperintensity, which does not fat suppress on STIR, noted on MRI of  the cervical spine of 12/30/2014.     C4-5:  Moderate disc height narrowing.     C5-6:  Moderate disc height narrowing with prominent anterior  hypertrophic spurring. Posterolaterally oriented osteophytes  contribute to mild right neural foraminal stenosis. Left neural  foramen is patent.     C6-7:  Moderate disc height loss with prominent anterior hypertrophic  spurring. No evident spinal canal stenosis or neural foraminal  stenosis.     C7-T1: No spinal canal or neural foraminal stenosis.     No abnormality is noted of the visualized paraspinous tissues.     IMPRESSION  Impression:    1. Reversal of the normal cervical lordosis with apex at C5-C6 and  focal degenerative changes, predominantly anterior hypertrophic  spurring, at C5-C7 with diffuese sclerosis of C6 vertebral body . Mild  right neural foraminal stenosis at C5-C6 is also noted.  No spinal  canal stenosis.     2. Focal lucent lesion of the right C3 lateral mass with thickened  surrounding cortex and thickened internal trabeculae. This corresponds  to a T1 and T2 hyperintense lesion noted on 12/30/2014 MRI and most  likely represents a benign hemangioma.     I have personally reviewed the examination and initial interpretation  and I agree with the findings.     ANNI AMADO MD  Answers for HPI/ROS submitted by the patient on 4/17/2018   General Symptoms: Yes  Skin Symptoms: No  HENT Symptoms: Yes  EYE SYMPTOMS: Yes  HEART SYMPTOMS: Yes  LUNG SYMPTOMS: No  INTESTINAL SYMPTOMS: Yes  URINARY SYMPTOMS: No  GYNECOLOGIC SYMPTOMS: No  BREAST SYMPTOMS: No  SKELETAL SYMPTOMS: Yes  BLOOD SYMPTOMS: Yes  NERVOUS SYSTEM SYMPTOMS: Yes  MENTAL HEALTH SYMPTOMS:  Yes  Fever: No  Loss of appetite: Yes  Weight loss: No  Weight gain: No  Fatigue: Yes  Night sweats: Yes  Chills: Yes  Increased stress: Yes  Excessive hunger: No  Excessive thirst: No  Feeling hot or cold when others believe the temperature is normal: Yes  Loss of height: No  Post-operative complications: No  Surgical site pain: No  Hallucinations: No  Change in or Loss of Energy: Yes  Hyperactivity: No  Confusion: Yes  Ear pain: No  Ear discharge: No  Hearing loss: No  Tinnitus: Yes  Nosebleeds: No  Congestion: Yes  Sinus pain: No  Trouble swallowing: Yes   Voice hoarseness: Yes  Mouth sores: No  Sore throat: No  Tooth pain: No  Gum tenderness: No  Bleeding gums: No  Change in taste: No  Change in sense of smell: No  Dry mouth: No  Hearing aid used: No  Neck lump: No  Eye pain: No  Vision loss: No  Dry eyes: No  Watery eyes: Yes  Eye bulging: No  Double vision: No  Flashing of lights: Yes  Spots: No  Floaters: No  Redness: Yes  Crossed eyes: No  Tunnel Vision: No  Yellowing of eyes: No  Eye irritation: No  Chest pain or pressure: Yes  Fast or irregular heartbeat: Yes  Pain in legs with walking: Yes  Trouble breathing while lying down: No  Fingers or toes appear blue: No  High blood pressure: No  Low blood pressure: No  Fainting: No  Murmurs: No  Pacemaker: No  Varicose veins: No  Edema or swelling: No  Wake up at night with shortness of breath: No  Light-headedness: Yes  Exercise intolerance: Yes  Heart burn or indigestion: Yes  Nausea: Yes  Vomiting: Yes  Abdominal pain: No  Bloating: No  Constipation: No  Diarrhea: Yes  Blood in stool: No  Black stools: No  Rectal or Anal pain: No  Fecal incontinence: No  Yellowing of skin or eyes: Yes  Vomit with blood: No  Change in stools: No  Back pain: Yes  Muscle aches: Yes  Neck pain: Yes  Swollen joints: No  Joint pain: Yes  Bone pain: Yes  Muscle cramps: Yes  Muscle weakness: Yes  Joint stiffness: Yes  Bone fracture: No  Anemia: No  Swollen glands: No  Easy bleeding or  bruising: Yes  Trouble with coordination: No  Dizziness or trouble with balance: Yes  Fainting or black-out spells: No  Memory loss: Yes  Headache: Yes  Seizures: No  Speech problems: No  Tingling: Yes  Tremor: Yes  Weakness: Yes  Difficulty walking: Yes  Paralysis: No  Numbness: Yes  Nervous or Anxious: Yes  Depression: Yes  Trouble sleeping: Yes  Trouble thinking or concentrating: Yes  Mood changes: Yes  Panic attacks: Yes  LASHAWN 7 TOTAL SCORE: 17  PHQ-2 Score: 6  If you checked off any problems, how difficult have these problems made it for you to do your work, take care of things at home, or get along with other people?: Extremely difficult  PHQ9 TOTAL SCORE: 19

## 2018-04-17 NOTE — PATIENT INSTRUCTIONS
1.  tiZANidine (ZANAFLEX) 2 MG tablets from the pharmacy. Use medication as directed.     2. Meloxicam- 7.5 mg take 1 time daily,  Take with food.     Follow up: 6-8 weeks in clinic with Dr. Cralisle.       To speak with a nurse, schedule/reschedule/cancel a clinic appointment, or request a medication refill call: [phone]     You can also reach us by [employer]: [email or web address]    For refills, please call on Monday, 1 week before your medication runs out. The doctors are not always in clinic, so this gives us time to get your prescriptions ready.  Please let us know the name of the medication you are requesting a refill of.

## 2018-04-17 NOTE — LETTER
4/17/2018       RE: Sheyla Ye  [address]   [place] [address]     Dear Colleague,    Thank you for referring your patient, Sheyla Ye, to the [facility] at [facility]. Please see a copy of my visit note below.    The patient is a 37-year-old female with a history of Munir-Danlos syndrome and a chief complaint of neck pain.  Her pain is stated to be 7-8 out of 10 in severity.  Her pain is stated to be dull, achy, and constant.  She states that most of her pain is on the right side of her neck.  She has been seen in several different pain clinics including the [facility] [place] [facility] and the [facility].  Her stated goal for pain is to achieve a level of approximately 5 out of 10 in severity.  She states that her pain is alleviated by lidocaine gel, tizanidine, Percocet, ice, heat, and pressure.  She states that her pain is exacerbated by work, doing laundry, doing dishes, showering, and excessive movement.  She has tried several different modalities with varying amounts of efficacy to include trigger point injections nerve root injections topical analgesics and Ultram.  These agents were not effective she states that she has had muscle relaxants and Percocet and these have helped her.  She is not sure if a TENS unit was effective.  She states that she is never never tried gabapentin.  She presents today for initial evaluation.  Current Outpatient Prescriptions   Medication     Acetaminophen (TYLENOL PO)     amphetamine-dextroamphetamine (ADDERALL) 20 MG per tablet     diazepam (VALIUM) 5 MG tablet     fluvoxaMINE (LUVOX) 25 MG tablet     Ibuprofen (ADVIL PO)     lidocaine (XYLOCAINE) 5 % ointment     loperamide (IMODIUM A-D) 2 MG tablet     loratadine (CLARITIN) 10 MG tablet     meloxicam (MOBIC) 7.5 MG tablet     omeprazole (PRILOSEC) 20 MG CR capsule     Prenatal Vit-Fe Fumarate-FA (PRENATAL MULTIVITAMIN  PLUS IRON)  27-0.8 MG TABS     hydrOXYzine (ATARAX) 50 MG tablet     Ketoprofen POWD     LORazepam (ATIVAN) 0.5 MG tablet     norethindrone (MICRONOR) 0.35 MG per tablet     ranitidine (ZANTAC) 150 MG tablet     tiZANidine (ZANAFLEX) 2 MG tablet     No current facility-administered medications for this visit.      Allergies   Allergen Reactions     Seasonal Allergies      Past Medical History:   Diagnosis Date     Anxiety      Chronic pain      DJD (degenerative joint disease), lumbar      Munir-Danlos disease      Fibromyalgia      Past Surgical History:   Procedure Laterality Date     wisdom teeth removal       Family History   Problem Relation Age of Onset     Aneurysm Brother      20's, autopsy showed RP bleed     Thyroid Disease Mother      Aneurysm Other      brain aneurysm, 30's     Social History     Social History     Marital status: Single     Spouse name: N/A     Number of children: N/A     Years of education: N/A     Occupational History     Not on file.     Social History Main Topics     Smoking status: Current Every Day Smoker     Packs/day: 0.50     Years: 20.00     Types: Cigarettes     Smokeless tobacco: Never Used     Alcohol use 4.0 oz/week     8 Standard drinks or equivalent per week     Drug use: No     Sexual activity: Not on file     Other Topics Concern     Not on file     Social History Narrative      ROS: 10 point ROS neg other than the symptoms noted above in the HPI.  Physical Exam   Constitutional: She is oriented to person, place, and time. She appears well-developed and well-nourished.   HENT:   Head: Normocephalic and atraumatic.   Right Ear: External ear normal.   Left Ear: External ear normal.   Nose: Nose normal.   Mouth/Throat: Oropharynx is clear and moist.   Eyes: Conjunctivae and EOM are normal. Pupils are equal, round, and reactive to light.   Neck: Spinous process tenderness and muscular tenderness present. No rigidity. No edema, no erythema and normal range of motion present.    Cardiovascular: Normal rate, regular rhythm, normal heart sounds and intact distal pulses.    Pulmonary/Chest: Effort normal and breath sounds normal.   Abdominal: Soft. Bowel sounds are normal.   Musculoskeletal:        Cervical back: She exhibits decreased range of motion, tenderness, bony tenderness, pain and spasm. She exhibits no swelling, no edema, no deformity, no laceration and normal pulse.   Neurological: She is alert and oriented to person, place, and time. She has normal reflexes.   Skin: Skin is warm and dry.   Psychiatric: She has a normal mood and affect.   Nursing note and vitals reviewed.    A/P: The patient is a 37-year-old woman with a history of chronic neck pain.  She has a history of Munir-Danlos syndrome.  Based on her historical and physical exam findings it seems that her pain may be secondary to a myofascial cause.  My plan is to  1.  Continue Zanaflex  2.  Add meloxicam 7.5 mg p.o. daily.  3.  Patient may consider Tylenol as needed with a maximum dose of 3 g per day.  4.  Consider a TENS unit.  5.  Consider Neurontin  Patient verbalizes an understanding of our plan is to return to clinic in 4-6 weeks for reevaluation.        CT CERVICAL SPINE W/O CONTRAST 5/7/2015 4:41 PM     History: eval facet joints, assess OPLL,Spinal stenosis in cervical  region     Comparison:  Cervical spine MRI 12/30/2014.     Technique: Using multidetector thin collimation helical acquisition  technique, axial, coronal and sagittal 3 mm thickness CT images of the  cervical spine were obtained without intravenous contrast. Images are  reviewed in bone and soft tissue windows.     Findings:   There is reversal of the normal cervical lordosis with  apex at C5-C6.  AP diameter of the spinal canal in the mid and lower  cervical spine is at the lower limit of normal. The lateral masses of  C1 appear normally aligned on C2. Cervical vertebral alignment is  within normal limits. There is no evidence of fracture or    prevertebral soft tissue swelling.  There is diffuse sclerosis of the  C6 vertebral body with anterior bony bridging of C5-C7 and moderate  disc height loss at C4-C5, C5-C6 and C6-C7.     Findings on a level by level basis are as follows:     C2-3:  No spinal canal or neural foraminal stenosis.     C3-4:  No spinal canal or neural foraminal stenosis. Focal hypodense  lesion of the right lateral mass of C3 with thickened internal  trabeculae and somewhat thickened surrounding cortex without any  cortical destruction. This corresponds to the area of T1 and T2  hyperintensity, which does not fat suppress on STIR, noted on MRI of  the cervical spine of 12/30/2014.     C4-5:  Moderate disc height narrowing.     C5-6:  Moderate disc height narrowing with prominent anterior  hypertrophic spurring. Posterolaterally oriented osteophytes  contribute to mild right neural foraminal stenosis. Left neural  foramen is patent.     C6-7:  Moderate disc height loss with prominent anterior hypertrophic  spurring. No evident spinal canal stenosis or neural foraminal  stenosis.     C7-T1: No spinal canal or neural foraminal stenosis.     No abnormality is noted of the visualized paraspinous tissues.     IMPRESSION  Impression:    1. Reversal of the normal cervical lordosis with apex at C5-C6 and  focal degenerative changes, predominantly anterior hypertrophic  spurring, at C5-C7 with diffuese sclerosis of C6 vertebral body . Mild  right neural foraminal stenosis at C5-C6 is also noted.  No spinal  canal stenosis.     2. Focal lucent lesion of the right C3 lateral mass with thickened  surrounding cortex and thickened internal trabeculae. This corresponds  to a T1 and T2 hyperintense lesion noted on 12/30/2014 MRI and most  likely represents a benign hemangioma.     I have personally reviewed the examination and initial interpretation  and I agree with the findings.     ANNI AMADO MD      Again, thank you for allowing me to  participate in the care of your patient.      Sincerely,    Swapnil Carlisle MD

## 2018-04-18 ASSESSMENT — PATIENT HEALTH QUESTIONNAIRE - PHQ9: SUM OF ALL RESPONSES TO PHQ QUESTIONS 1-9: 19

## 2018-04-18 ASSESSMENT — ANXIETY QUESTIONNAIRES: GAD7 TOTAL SCORE: 17

## 2018-04-23 NOTE — TELEPHONE ENCOUNTER
Left additional message for patient. Have not yet spoken with her about alternative medication for tizanidine. She does have an appointment scheduled to see Dr. Watson on Wed, 4/25, so will provide her with update, and this can be discussed at appointment.

## 2018-05-08 ENCOUNTER — HEALTH MAINTENANCE LETTER (OUTPATIENT)
Age: 38
End: 2018-05-08

## 2018-08-03 DIAGNOSIS — R93.0 ABNORMAL COMPUTED TOMOGRAPHY ANGIOGRAPHY OF HEAD: ICD-10-CM

## 2018-08-03 DIAGNOSIS — Z82.49 FAMILY HISTORY OF ANEURYSM OF BLOOD VESSEL OF BRAIN: Primary | ICD-10-CM

## 2018-08-27 ENCOUNTER — OFFICE VISIT (OUTPATIENT)
Dept: ANESTHESIOLOGY | Facility: CLINIC | Age: 38
End: 2018-08-27
Payer: MEDICAID

## 2018-08-27 VITALS
HEIGHT: 66 IN | SYSTOLIC BLOOD PRESSURE: 120 MMHG | WEIGHT: 130 LBS | HEART RATE: 83 BPM | DIASTOLIC BLOOD PRESSURE: 76 MMHG | BODY MASS INDEX: 20.89 KG/M2 | RESPIRATION RATE: 16 BRPM

## 2018-08-27 DIAGNOSIS — M62.830 BACK MUSCLE SPASM: Primary | ICD-10-CM

## 2018-08-27 DIAGNOSIS — M54.12 CERVICAL RADICULOPATHY: ICD-10-CM

## 2018-08-27 RX ORDER — BACLOFEN 5 MG/1
5 TABLET ORAL 3 TIMES DAILY
Qty: 90 TABLET | Refills: 1 | Status: SHIPPED | OUTPATIENT
Start: 2018-08-27 | End: 2018-11-02

## 2018-08-27 ASSESSMENT — ANXIETY QUESTIONNAIRES
GAD7 TOTAL SCORE: 16
7. FEELING AFRAID AS IF SOMETHING AWFUL MIGHT HAPPEN: MORE THAN HALF THE DAYS
1. FEELING NERVOUS, ANXIOUS, OR ON EDGE: NEARLY EVERY DAY
4. TROUBLE RELAXING: NEARLY EVERY DAY
2. NOT BEING ABLE TO STOP OR CONTROL WORRYING: NEARLY EVERY DAY
7. FEELING AFRAID AS IF SOMETHING AWFUL MIGHT HAPPEN: MORE THAN HALF THE DAYS
GAD7 TOTAL SCORE: 16
6. BECOMING EASILY ANNOYED OR IRRITABLE: SEVERAL DAYS
5. BEING SO RESTLESS THAT IT IS HARD TO SIT STILL: SEVERAL DAYS
3. WORRYING TOO MUCH ABOUT DIFFERENT THINGS: NEARLY EVERY DAY
GAD7 TOTAL SCORE: 16

## 2018-08-27 ASSESSMENT — PATIENT HEALTH QUESTIONNAIRE - PHQ9
SUM OF ALL RESPONSES TO PHQ QUESTIONS 1-9: 19
10. IF YOU CHECKED OFF ANY PROBLEMS, HOW DIFFICULT HAVE THESE PROBLEMS MADE IT FOR YOU TO DO YOUR WORK, TAKE CARE OF THINGS AT HOME, OR GET ALONG WITH OTHER PEOPLE: EXTREMELY DIFFICULT
SUM OF ALL RESPONSES TO PHQ QUESTIONS 1-9: 19

## 2018-08-27 ASSESSMENT — PAIN SCALES - GENERAL: PAINLEVEL: EXTREME PAIN (9)

## 2018-08-27 NOTE — MR AVS SNAPSHOT
After Visit Summary   8/27/2018    Sheyla Ye    MRN: [id number]           Patient Information     Date Of Birth          1980        Visit Information        Provider Department      8/27/2018 10:00 AM Swapnil Carlisle MD [facility]        Today's Diagnoses     Back muscle spasm    -  1    Cervical radiculopathy          Care Instructions    1. Stop taking mobic (meloxicam)    2. Increase baclofen to 5mg three times per day    3. Make an appointment for a cervical thoracic MRI    IMAGING SERVICES HOURS:    Please call [phone] to schedule imaging exams.    All imaging modalities are available from 6:30 a.m. - 8:30 p.m. Monday through Friday  X-ray, CT, and General Ultrasound appointments are available from 7 a.m. - 3:30 p.m. on Saturdays    X-ray and CT appointments are available from 8 a.m. - 4:30 p.m. on Sundays  MRI is available 7 - a.m. - 6 p.m. on Saturdays and Sundays    4. Make an appointment with physical therapy for a TENS Unit. Call [phone]      Follow up:    After your MRI at your convenenice      To speak with a nurse, schedule/reschedule/cancel a clinic appointment, or request a medication refill call: [phone]     You can also reach us by [employer] net: [email or web address]    For refills, please call on Monday, 1 week before your medication runs out. The doctors are not always in clinic, so this gives us time to get your prescriptions ready.  Please let us know the name of the medication you are requesting a refill of.                                     Follow-ups after your visit        Additional Services     PHYSICAL THERAPY REFERRAL       *This therapy referral will be filtered to a centralized scheduling office at [facility] and the patient will receive a call to schedule an appointment at a [place] location most convenient for them. *     [facility] provides  "Physical Therapy evaluation and treatment and many specialty services across the [facility] system.  If requesting a specialty program, please choose from the list below.    If you have not heard from the scheduling office within 2 business days, please call [phone] for all locations, with the exception of [place], please call [phone] and [place], please call [phone]  Treatment: Evaluation & Treatment  Special Instructions/Modalities: TENS Unit trial  Special Programs: None    Please be aware that coverage of these services is subject to the terms and limitations of your health insurance plan.  Call member services at your health plan with any benefit or coverage questions.      **Note to Provider:  If you are referring outside of [facility] for the therapy appointment, please list the name of the location in the \"special instructions\" above, print the referral and give to the patient to schedule the appointment.                  Future tests that were ordered for you today     Open Future Orders        Priority Expected Expires Ordered    MR Cervical Spine w/o Contrast Routine  8/27/2019 8/27/2018    MR Thoracic Spine w/o Contrast Routine  8/27/2019 8/27/2018            Who to contact     Please call your clinic at [phone] to:    Ask questions about your health    Make or cancel appointments    Discuss your medicines    Learn about your test results    Speak to your doctor            Additional Information About Your Visit        SkyRank Information     SkyRank gives you secure access to your electronic health record. If you see a primary care provider, you can also send messages to your care team and make appointments. If you have questions, please call your primary care clinic.  If you do not have a primary care provider, please call [phone] and they will assist you.      SkyRank is an electronic gateway that provides easy, online access to your medical records. With SkyRank, you can " "request a clinic appointment, read your test results, renew a prescription or communicate with your care team.     To access your existing account, please contact your [facility] or call [phone] for assistance.        Care EveryWhere ID     This is your Care EveryWhere ID. This could be used by other organizations to access your [facility] medical records  [id number]        Your Vitals Were     Pulse Respirations Height BMI (Body Mass Index)          83 16 1.676 m (5' 6\") 20.98 kg/m2         Blood Pressure from Last 3 Encounters:   08/27/18 120/76   04/17/18 124/83   03/15/18 121/85    Weight from Last 3 Encounters:   08/27/18 59 kg (130 lb)   04/17/18 57.6 kg (127 lb)   03/15/18 57.2 kg (126 lb 1.6 oz)              We Performed the Following     PHYSICAL THERAPY REFERRAL          Today's Medication Changes          These changes are accurate as of 8/27/18 11:50 AM.  If you have any questions, ask your nurse or doctor.               These medicines have changed or have updated prescriptions.        Dose/Directions    * BACLOFEN PO   This may have changed:  Another medication with the same name was added. Make sure you understand how and when to take each.   Changed by:  Swapnil Carlisle MD        Dose:  5 mg   Take 5 mg by mouth 2 times daily   Refills:  0       * Baclofen 5 MG Tabs   This may have changed:  You were already taking a medication with the same name, and this prescription was added. Make sure you understand how and when to take each.   Used for:  Back muscle spasm   Changed by:  Swapnil Carlisle MD        Dose:  5 mg   Take 5 mg by mouth 3 times daily   Quantity:  90 tablet   Refills:  1       * Notice:  This list has 2 medication(s) that are the same as other medications prescribed for you. Read the directions carefully, and ask your doctor or other care provider to review them with you.         Where to get your medicines      These medications were sent to " [place], [place] - [address]  [address], [facility] [address]    Hours:  TRANSPLANT PHONE NUMBER [phone] Phone:  [phone]     Baclofen 5 MG Tabs                Primary Care Provider Office Phone # Fax #    Myrna Nhung Watson [phone]       [address]  [facility] [address]        Equal Access to Services     JOSE STALLINGS : Hadii aad ku hadasho Soomaali, waaxda luqadaha, qaybta kaalmada adeegyada, waxay idiin hayaan adeeg kharash la'gino knott. So [facility] [phone].    ATENCIÓN: Si habla español, tiene a owens disposición servicios gratuitos de asistencia lingüística. [facility] [phone].    We comply with applicable federal civil rights laws and [place] laws. We do not discriminate on the basis of race, color, national origin, age, disability, sex, sexual orientation, or gender identity.            Thank you!     Thank you for choosing [facility]  for your care. Our goal is always to provide you with excellent care. Hearing back from our patients is one way we can continue to improve our services. Please take a few minutes to complete the written survey that you may receive in the mail after your visit with us. Thank you!             Your Updated Medication List - Protect others around you: Learn how to safely use, store and throw away your medicines at www.disposemymeds.org.          This list is accurate as of 8/27/18 11:50 AM.  Always use your most recent med list.                   Brand Name Dispense Instructions for use Diagnosis    ADVIL PO      Take by mouth as needed for moderate pain        amphetamine-dextroamphetamine 20 MG per tablet    ADDERALL     TAKE UP TO 1 TABLET BY MOUTH 3 TIMES DAILY ABOUT 4 HOURS APART AS NEEDED FOR ADHD SYMPTOMS        * BACLOFEN PO      Take 5 mg by mouth 2 times daily        * Baclofen 5 MG Tabs     90 tablet    Take 5 mg  by mouth 3 times daily    Back muscle spasm       diazepam 5 MG tablet    VALIUM     TAKE UP TO 2 TABS DAILY BY MOUTH AS NEEDED FOR ANXIETY. USE SPARINGLY        fluvoxaMINE 25 MG tablet    LUVOX     TAKE 1/2 PILL BY MOUTH TWICE DAILY, INCREASING TO 1 PILL TWICE DAILY WHEN TOLERATED.        hydrOXYzine 50 MG tablet    ATARAX    30 tablet    Take 1-2 tablets ( mg) by mouth every 6 hours as needed for anxiety    Anxiety       Ketoprofen Powd     100 g    Apply dime sized amount to affected areas up to three times daily.    Myofacial muscle pain       LAMICTAL PO      Take 50 mg by mouth 2 times daily        lidocaine 5 % ointment    XYLOCAINE    50 g    Apply topically as needed for moderate pain    Muscle spasm, Other chronic pain       loperamide 2 MG tablet    IMODIUM A-D    60 tablet    Start with 2 tabs (4 mg), then take one tab (2 mg) after each diarrheal stool.  Do not use more than  8 tabs (16 mg) per day.    Diarrhea       loratadine 10 MG tablet    CLARITIN     Take 10 mg by mouth daily        LORazepam 0.5 MG tablet    ATIVAN    10 tablet    Take 1 tablet (0.5 mg) by mouth every 6 hours as needed for anxiety    Anxiety       meloxicam 7.5 MG tablet    MOBIC    30 tablet    Take 1 tablet (7.5 mg) by mouth daily    Myofascial pain       norethindrone 0.35 MG per tablet    MICRONOR    28 tablet    Take 1 tablet (0.35 mg) by mouth daily    Encounter for contraceptive management, unspecified contraceptive encounter type       omeprazole 20 MG CR capsule    priLOSEC    90 capsule    Take 1 capsule (20 mg) by mouth daily    Gastroesophageal reflux disease with esophagitis       prenatal multivitamin plus iron 27-0.8 MG Tabs per tablet     30 tablet    Take 1 tablet by mouth daily    Depression, unspecified depression type       ranitidine 150 MG tablet    ZANTAC    60 tablet    Take 1 tablet (150 mg) by mouth 2 times daily    Gastroesophageal reflux disease without esophagitis       tiZANidine 2 MG tablet     ZANAFLEX    90 tablet    Take 1-2 tablets (2-4 mg) by mouth 3 times daily as needed for muscle spasms    Muscle spasm       TYLENOL PO      Take 500 mg by mouth every 4 hours as needed for mild pain or fever Takes 2 tablets of 500 mg        * Notice:  This list has 2 medication(s) that are the same as other medications prescribed for you. Read the directions carefully, and ask your doctor or other care provider to review them with you.

## 2018-08-27 NOTE — PROGRESS NOTES
"[facility] Outpatient Consultation    REASON FOR PAIN CONSULTATION:  Sheyla Ye is a 38 year old female I am seeing in clinic for a follow up regarding her neck and arm pain. Previous history is significant for Munir-Danlos syndrome. She states that most of her pain is on the right side of her neck, however today it is left sided and radiates to the fourth and fifth digit on her left hand. She has been seen in several different pain clinics including the [place] [facility] and the [facility].     PAIN HISTORY: Started in 2012  Pain ranges in intensity from 5 to 9,   and averages 8 on the zero to ten numerical rating scale  Quality of the pain is dull, aching constant  Aggravating factors include standing or sitting for long periods  Relieving factors include pressure application and sleep    IMPACT OF PAIN: Cannot work, difficulty performing self care. When pain is right sided she has difficulty witting.     DIAGNOSTIC TESTS: Cervical MRI in 2014 showed kyphotic deformity near C4, Posterior disc osteophyte complex at C5-C6 and C6-C7 with associated neural foraminal narrowing. Facet arthropathy in upper cervical C3-C4. High T2 focus within canal posterior to T1. (taken from MRI report)    PAST PAIN TREATMENT: Has been seen by several other physicians and been on multiple different medications, none of which improve the pain. Has been to PT, acupuncture and had \"nerve injections\"     CURRENT MEDICATIONS:   Current Outpatient Prescriptions Ordered in [facility]   Medication Sig Dispense Refill     Acetaminophen (TYLENOL PO) Take 500 mg by mouth every 4 hours as needed for mild pain or fever Takes 2 tablets of 500 mg       amphetamine-dextroamphetamine (ADDERALL) 20 MG per tablet TAKE UP TO 1 TABLET BY MOUTH 3 TIMES DAILY ABOUT 4 HOURS APART AS NEEDED FOR ADHD SYMPTOMS  0     BACLOFEN PO Take 5 mg by mouth 2 times daily       diazepam (VALIUM) 5 MG tablet TAKE UP TO 2 TABS DAILY BY MOUTH AS " NEEDED FOR ANXIETY. USE SPARINGLY  0     LamoTRIgine (LAMICTAL PO) Take 50 mg by mouth 2 times daily       lidocaine (XYLOCAINE) 5 % ointment Apply topically as needed for moderate pain 50 g 0     meloxicam (MOBIC) 7.5 MG tablet Take 1 tablet (7.5 mg) by mouth daily 30 tablet 1     omeprazole (PRILOSEC) 20 MG CR capsule Take 1 capsule (20 mg) by mouth daily 90 capsule 0     fluvoxaMINE (LUVOX) 25 MG tablet TAKE 1/2 PILL BY MOUTH TWICE DAILY, INCREASING TO 1 PILL TWICE DAILY WHEN TOLERATED.  11     hydrOXYzine (ATARAX) 50 MG tablet Take 1-2 tablets ( mg) by mouth every 6 hours as needed for anxiety (Patient not taking: Reported on 3/15/2018) 30 tablet 0     Ibuprofen (ADVIL PO) Take by mouth as needed for moderate pain       Ketoprofen POWD Apply dime sized amount to affected areas up to three times daily. (Patient not taking: Reported on 3/15/2018) 100 g 3     loperamide (IMODIUM A-D) 2 MG tablet Start with 2 tabs (4 mg), then take one tab (2 mg) after each diarrheal stool.  Do not use more than  8 tabs (16 mg) per day. (Patient not taking: Reported on 8/27/2018) 60 tablet 0     loratadine (CLARITIN) 10 MG tablet Take 10 mg by mouth daily       LORazepam (ATIVAN) 0.5 MG tablet Take 1 tablet (0.5 mg) by mouth every 6 hours as needed for anxiety (Patient not taking: Reported on 3/15/2018) 10 tablet 0     norethindrone (MICRONOR) 0.35 MG per tablet Take 1 tablet (0.35 mg) by mouth daily (Patient not taking: Reported on 4/17/2018) 28 tablet 11     Prenatal Vit-Fe Fumarate-FA (PRENATAL MULTIVITAMIN  PLUS IRON) 27-0.8 MG TABS Take 1 tablet by mouth daily (Patient not taking: Reported on 8/27/2018) 30 tablet 11     ranitidine (ZANTAC) 150 MG tablet Take 1 tablet (150 mg) by mouth 2 times daily (Patient not taking: Reported on 3/15/2018) 60 tablet 11     tiZANidine (ZANAFLEX) 2 MG tablet Take 1-2 tablets (2-4 mg) by mouth 3 times daily as needed for muscle spasms (Patient not taking: Reported on 4/17/2018) 90 tablet 0      No current Epic-ordered facility-administered medications on file.        Answers for HPI/ROS submitted by the patient on 8/27/2018   LASHAWN 7 TOTAL SCORE: 16  PHQ-2 Score: 6  If you checked off any problems, how difficult have these problems made it for you to do your work, take care of things at home, or get along with other people?: Extremely difficult  PHQ9 TOTAL SCORE: 19      ALLERGIES:   Allergies   Allergen Reactions     Seasonal Allergies        PAST MEDICATION TRIALS:  Opiates: Percocet: H, hydrocodone:H, tramadol: SE, muscle spasms and increased headaches  NSAIDS: Ibuprofen: SE, stomachache. Naproxen: SE, stomachache.  Muscle Relaxants: Clonazepam: Sedation, cyclobenzaprine: H, Robaxin: SE upset stomach, Zanaflex: H   Anti-migraine mediations: Excedrin Migraine: H  Anti-depressants: Bupropion: SE effected appetite, duloxetine: SE sedation, reduced libido, fluoxetine: Mental fog, drowsy, sertraline: H, venlafaxine: H  Sleep aids: Diazepam: H, takes for spasm, clonazepam: H for spasm, lorazepam: For anxiety, zolpidem: Took in 2009 for grief issues/not sleeping  Anti-convulsants: None: Friends took both gabapentin and pregabalin and had a bad experience so patient is not wanting to try these medications as time.          Topicals: Over-the-counter gels and creams: H  Other medications not covered above: Tylenol: [facility], Low dose naltrexone:[facility]    PAST MEDICAL AND PSYCHIATRIC HISTORY:  has a past medical history of Anxiety; Chronic pain; DJD (degenerative joint disease), lumbar; Munir-Danlos disease; and Fibromyalgia.    PAST SURGICAL HISTORY:  has a past surgical history that includes wisdom teeth removal.    FAMILY HISTORY: @[facility]@     [facility] [employer]:       Smoking: Smokes 0.5 ppd (10 pack year history)      Alcohol: 8 standard drinks a week      Illicit drugs: denies    SOCIAL HISTORY: Has had difficulties recently with a family emergency. Was also recently fired from her job. Please see the  "separate psychosocial evaluation by the health psychologist for additional information.    REVIEW OF SYSTEMS: Please refer to the patient's health questionnaire which I reviewed in detail with her.  This review covered 13 bodily systems. Among the positives she reports muscle pain, increased stress, and loss of energy.     PHYSICAL EXAMINATION:  VITAL SIGNS:  Blood pressure 120/76, pulse 83, resp. rate 16, height 1.676 m (5' 6\"), weight 59 kg (130 lb), not currently breastfeeding.  ROS: 10 point ROS neg other than the symptoms noted above in the HPI.  Physical Exam   Constitutional: She is oriented to person, place, and time. She appears well-developed and well-nourished.   Head: Normocephalic and atraumatic.    Neck: Spinous process tenderness and muscular tenderness present. No rigidity. No edema, no erythema and normal range of motion present.    Musculoskeletal:        Cervical back: She exhibits decreased range of motion, tenderness, bony tenderness, pain and spasm. She exhibits no swelling, no edema, no deformity, no laceration and normal pulse. Hyperextensible joints in upper extremities.    Neurological: She is alert and oriented to person, place, and time. She has normal reflexes and strength bilaterally.   Skin: Skin is warm and dry.   Psychiatric: She has a normal mood and affect.   Nursing note and vitals reviewed.       A/P:The patient is a 37-year-old woman with a history of chronic neck pain.  She has a history of Munir-Danlos syndrome.  Based on her historical and physical exam findings it seems that her pain may be secondary to a myofascial cause.  My plan is to  1. Increase Baclofen to 5mg tid  2. discontinue meloxicam (due to stomach upset)  3.  Patient may consider Tylenol as needed with a maximum dose of 3 g per day.  4.  Refer to PT for TENS unit trial  5. Schedule for a Cervical and Thoracic MRI  Patient verbalizes an understanding of our plan is to return to clinicfor reevaluation after " MRI.      FOLLOW-UP: Come back after MRI and we will try some trigger point injections for her scapular pain.          TIME SPENT: 40 minutes including 20 minutes counseling her about her diagnosis and treatment options.    Myrna Buck - Medical student     I saw and examined the patient with the resident and agree with the findings and the plan of care as documented in the medical student note.   Swapnil Carlisle IV, MD

## 2018-08-27 NOTE — LETTER
"8/27/2018     RE: Sheyla Ye  [address]  [facility] [address]     Dear Colleague,    Thank you for referring your patient, Sheyla Ye, to the [facility] at [facility]. Please see a copy of my visit note below.    [facility] Adult Chronic Pain Service Outpatient Consultation    REASON FOR PAIN CONSULTATION:  Sheyla Ye is a 38 year old female I am seeing in clinic for a follow up regarding her neck and arm pain. Previous history is significant for Munir-Danlos syndrome. She states that most of her pain is on the right side of her neck, however today it is left sided and radiates to the fourth and fifth digit on her left hand. She has been seen in several different pain clinics including the [place] [facility] and the [facility] clinic.      PAIN HISTORY: Started in 2012  Pain ranges in intensity from 5 to 9,   and averages 8 on the zero to ten numerical rating scale  Quality of the pain is dull, aching constant  Aggravating factors include standing or sitting for long periods  Relieving factors include pressure application and sleep    IMPACT OF PAIN: Cannot work, difficulty performing self care. When pain is right sided she has difficulty witting.     DIAGNOSTIC TESTS: Cervical MRI in 2014 showed kyphotic deformity near C4, Posterior disc osteophyte complex at C5-C6 and C6-C7 with associated neural foraminal narrowing. Facet arthropathy in upper cervical C3-C4. High T2 focus within canal posterior to T1. (taken from MRI report)    PAST PAIN TREATMENT: Has been seen by several other physicians and been on multiple different medications, none of which improve the pain. Has been to PT, acupuncture and had \"nerve injections\"     CURRENT MEDICATIONS:   Current Outpatient Prescriptions Ordered in [facility]   Medication Sig Dispense Refill     Acetaminophen (TYLENOL PO) Take 500 mg by mouth every 4 hours as needed for mild pain or " fever Takes 2 tablets of 500 mg       amphetamine-dextroamphetamine (ADDERALL) 20 MG per tablet TAKE UP TO 1 TABLET BY MOUTH 3 TIMES DAILY ABOUT 4 HOURS APART AS NEEDED FOR ADHD SYMPTOMS  0     BACLOFEN PO Take 5 mg by mouth 2 times daily       diazepam (VALIUM) 5 MG tablet TAKE UP TO 2 TABS DAILY BY MOUTH AS NEEDED FOR ANXIETY. USE SPARINGLY  0     LamoTRIgine (LAMICTAL PO) Take 50 mg by mouth 2 times daily       lidocaine (XYLOCAINE) 5 % ointment Apply topically as needed for moderate pain 50 g 0     meloxicam (MOBIC) 7.5 MG tablet Take 1 tablet (7.5 mg) by mouth daily 30 tablet 1     omeprazole (PRILOSEC) 20 MG CR capsule Take 1 capsule (20 mg) by mouth daily 90 capsule 0     fluvoxaMINE (LUVOX) 25 MG tablet TAKE 1/2 PILL BY MOUTH TWICE DAILY, INCREASING TO 1 PILL TWICE DAILY WHEN TOLERATED.  11     hydrOXYzine (ATARAX) 50 MG tablet Take 1-2 tablets ( mg) by mouth every 6 hours as needed for anxiety (Patient not taking: Reported on 3/15/2018) 30 tablet 0     Ibuprofen (ADVIL PO) Take by mouth as needed for moderate pain       Ketoprofen POWD Apply dime sized amount to affected areas up to three times daily. (Patient not taking: Reported on 3/15/2018) 100 g 3     loperamide (IMODIUM A-D) 2 MG tablet Start with 2 tabs (4 mg), then take one tab (2 mg) after each diarrheal stool.  Do not use more than  8 tabs (16 mg) per day. (Patient not taking: Reported on 8/27/2018) 60 tablet 0     loratadine (CLARITIN) 10 MG tablet Take 10 mg by mouth daily       LORazepam (ATIVAN) 0.5 MG tablet Take 1 tablet (0.5 mg) by mouth every 6 hours as needed for anxiety (Patient not taking: Reported on 3/15/2018) 10 tablet 0     norethindrone (MICRONOR) 0.35 MG per tablet Take 1 tablet (0.35 mg) by mouth daily (Patient not taking: Reported on 4/17/2018) 28 tablet 11     Prenatal Vit-Fe Fumarate-FA (PRENATAL MULTIVITAMIN  PLUS IRON) 27-0.8 MG TABS Take 1 tablet by mouth daily (Patient not taking: Reported on 8/27/2018) 30 tablet 11      ranitidine (ZANTAC) 150 MG tablet Take 1 tablet (150 mg) by mouth 2 times daily (Patient not taking: Reported on 3/15/2018) 60 tablet 11     tiZANidine (ZANAFLEX) 2 MG tablet Take 1-2 tablets (2-4 mg) by mouth 3 times daily as needed for muscle spasms (Patient not taking: Reported on 4/17/2018) 90 tablet 0     No current Epic-ordered facility-administered medications on file.        Answers for HPI/ROS submitted by the patient on 8/27/2018   LASHAWN 7 TOTAL SCORE: 16  PHQ-2 Score: 6  If you checked off any problems, how difficult have these problems made it for you to do your work, take care of things at home, or get along with other people?: Extremely difficult  PHQ9 TOTAL SCORE: 19      ALLERGIES:   Allergies   Allergen Reactions     Seasonal Allergies        PAST MEDICATION TRIALS:  Opiates: Percocet: H, hydrocodone:H, tramadol: SE, muscle spasms and increased headaches  NSAIDS: Ibuprofen: SE, stomachache. Naproxen: SE, stomachache.  Muscle Relaxants: Clonazepam: Sedation, cyclobenzaprine: H, Robaxin: SE upset stomach, Zanaflex: H   Anti-migraine mediations: Excedrin Migraine: H  Anti-depressants: Bupropion: SE effected appetite, duloxetine: SE sedation, reduced libido, fluoxetine: Mental fog, drowsy, sertraline: H, venlafaxine: H  Sleep aids: Diazepam: H, takes for spasm, clonazepam: H for spasm, lorazepam: For anxiety, zolpidem: Took in 2009 for grief issues/not sleeping  Anti-convulsants: None: Friends took both gabapentin and pregabalin and had a bad experience so patient is not wanting to try these medications as time.          Topicals: Over-the-counter gels and creams: H  Other medications not covered above: Tylenol: [facility], Low dose naltrexone:[facility]    PAST MEDICAL AND PSYCHIATRIC HISTORY:  has a past medical history of Anxiety; Chronic pain; DJD (degenerative joint disease), lumbar; Munir-Danlos disease; and Fibromyalgia.    PAST SURGICAL HISTORY:  has a past surgical history that includes wisdom teeth  "removal.    FAMILY HISTORY: @[facility]@     HEALTH & LIFESTYLE PRACTICES:       Smoking: Smokes 0.5 ppd (10 pack year history)      Alcohol: 8 standard drinks a week      Illicit drugs: denies    SOCIAL HISTORY: Has had difficulties recently with a family emergency. Was also recently fired from her job. Please see the separate psychosocial evaluation by the health psychologist for additional information.    REVIEW OF SYSTEMS: Please refer to the patient's health questionnaire which I reviewed in detail with her.  This review covered 13 bodily systems. Among the positives she reports muscle pain, increased stress, and loss of energy.     PHYSICAL EXAMINATION:  VITAL SIGNS:  Blood pressure 120/76, pulse 83, resp. rate 16, height 1.676 m (5' 6\"), weight 59 kg (130 lb), not currently breastfeeding.  ROS: 10 point ROS neg other than the symptoms noted above in the HPI.  Physical Exam   Constitutional: She is oriented to person, place, and time. She appears well-developed and well-nourished.   Head: Normocephalic and atraumatic.    Neck: Spinous process tenderness and muscular tenderness present. No rigidity. No edema, no erythema and normal range of motion present.    Musculoskeletal:        Cervical back: She exhibits decreased range of motion, tenderness, bony tenderness, pain and spasm. She exhibits no swelling, no edema, no deformity, no laceration and normal pulse. Hyperextensible joints in upper extremities.    Neurological: She is alert and oriented to person, place, and time. She has normal reflexes and strength bilaterally.   Skin: Skin is warm and dry.   Psychiatric: She has a normal mood and affect.   Nursing note and vitals reviewed.       A/P:The patient is a 37-year-old woman with a history of chronic neck pain.  She has a history of Munir-Danlos syndrome.  Based on her historical and physical exam findings it seems that her pain may be secondary to a myofascial cause.  My plan is to  1. Increase Baclofen to 5mg " tid  2. discontinue meloxicam (due to stomach upset)  3.  Patient may consider Tylenol as needed with a maximum dose of 3 g per day.  4.  Refer to PT for TENS unit trial  5. Schedule for a Cervical and Thoracic MRI  Patient verbalizes an understanding of our plan is to return to clinicfor reevaluation after MRI.      FOLLOW-UP: Come back after MRI and we will try some trigger point injections for her scapular pain.          TIME SPENT: 40 minutes including 20 minutes counseling her about her diagnosis and treatment options.    Myrna Buck - Medical student     I saw and examined the patient with the resident and agree with the findings and the plan of care as documented in the medical student note.   Swapnil Carlisle IV, MD

## 2018-08-27 NOTE — PATIENT INSTRUCTIONS
1. Stop taking mobic (meloxicam)    2. Increase baclofen to 5mg three times per day    3. Make an appointment for a cervical thoracic MRI    IMAGING SERVICES HOURS:    Please call [phone] to schedule imaging exams.    All imaging modalities are available from 6:30 a.m. - 8:30 p.m. Monday through Friday  X-ray, CT, and General Ultrasound appointments are available from 7 a.m. - 3:30 p.m. on Saturdays    X-ray and CT appointments are available from 8 a.m. - 4:30 p.m. on Sundays  MRI is available 7 - a.m. - 6 p.m. on Saturdays and Sundays    4. Make an appointment with physical therapy for a TENS Unit. Call [phone]      Follow up:    After your MRI at your convenenice      To speak with a nurse, schedule/reschedule/cancel a clinic appointment, or request a medication refill call: [phone]     You can also reach us by Cycle: [email or web address]    For refills, please call on Monday, 1 week before your medication runs out. The doctors are not always in clinic, so this gives us time to get your prescriptions ready.  Please let us know the name of the medication you are requesting a refill of.

## 2018-08-28 ASSESSMENT — PATIENT HEALTH QUESTIONNAIRE - PHQ9: SUM OF ALL RESPONSES TO PHQ QUESTIONS 1-9: 19

## 2018-08-28 ASSESSMENT — ANXIETY QUESTIONNAIRES: GAD7 TOTAL SCORE: 16

## 2018-10-04 ENCOUNTER — THERAPY VISIT (OUTPATIENT)
Dept: PHYSICAL THERAPY | Facility: CLINIC | Age: 38
End: 2018-10-04
Payer: COMMERCIAL

## 2018-10-04 DIAGNOSIS — M54.2 NECK PAIN: Primary | ICD-10-CM

## 2018-10-04 PROCEDURE — 97530 THERAPEUTIC ACTIVITIES: CPT | Mod: GP | Performed by: PHYSICAL THERAPIST

## 2018-10-04 PROCEDURE — 97161 PT EVAL LOW COMPLEX 20 MIN: CPT | Mod: GP | Performed by: PHYSICAL THERAPIST

## 2018-10-04 PROCEDURE — 97014 ELECTRIC STIMULATION THERAPY: CPT | Mod: GP | Performed by: PHYSICAL THERAPIST

## 2018-10-04 NOTE — LETTER
DEPARTMENT OF HEALTH AND HUMAN SERVICES  CENTERS FOR MEDICARE & MEDICAID SERVICES    PLAN/UPDATED PLAN OF PROGRESS FOR OUTPATIENT REHABILITATION    PATIENTS NAME:  Sheyla Ye   : 1980  PROVIDER NUMBER:    [id number]  [id number]   PROVIDER NAME: [facility]  MEDICAL RECORD NUMBER: [id number]   START OF CARE DATE:  SOC Date: 10/04/18   TYPE:  PT  PRIMARY/TREATMENT DIAGNOSIS: (Pertinent Medical Diagnosis)  Neck pain  VISITS FROM START OF CARE:  Rxs Used: 1     Physical Therapy Initial Evaluation  2018  MD Instructions/Precautions/Restrictions: PT eval and treat.   Therapist Impression:   Sheyla Ye presents with findings consistent with mechanical back pain and EDS, with related impairments limiting her ability to sit, stand, walk, work on computer. Skilled PT services are necessary in order to reduce impairments and improve independent function.     Subjective:   Date of Onset: 18 (MD orders, chronic condition)  C/C: Mostly neck/B shoulder pain, also occasional LBP, though this is not as much of a problem right now. 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    Quality of pain is dull and aching. Pains are described as constant in nature. Pain is worse: constant. Pain is rated 8/10.   History of symptoms: Pains began gradually as the result of insidious onset. Since onset, symptoms are unchanged.  Worsened by: Nothing in particular.    Alleviated by: Nothing, maybe Theracane.   General health as reported by patient: good  Pertinent medical/surgical history: Refer to health history in EMR. Imaging: MRI (cervical/thoracic scheduled for next week). Current occupational status: Currently not working. Patient's goals are: decrease pain. Return to MD:  PRN.     Objective:  CERVICAL EXAMINATION    Posture: Forward head on neck and Rounded shoulders            PATIENTS NAME:  Sheyla Ye   : 1980    Active ROM Limitation   Flexion Min,  no effect   Extension Min, incr pain   Protraction    Retraction Min, incr pain    L R   Rotation Min/nil, incr on R Min/nil, incr on L   Sidebend Min, no effect (stretch) Min, no effect (stretch)     Assessment/Plan:    The patient is a 38 year old female with chief complaint of back pain.    The patient has the following significant findings with corresponding treatment plan.  Diagnosis 1:  Mechanical back pain    Pain -  hot/cold therapy, US, electric stimulation, mechanical traction, manual therapy, splint/taping/bracing/orthotics, self management, education, directional preference exercise and home program  Decreased ROM/flexibility - manual therapy, therapeutic exercise and home program  Decreased joint mobility - manual therapy, therapeutic exercise and home program  Decreased strength - therapeutic exercise, therapeutic activities and home program  Impaired muscle performance - neuro re-education and home program  Decreased function - therapeutic activities and home program  Impaired posture - neuro re-education, therapeutic activities and home program  Instability -  Therapeutic Activity, Therapeutic Exercise, Neuromuscular Re-education, Splinting/Taping/Bracing/Orthotic, home program  Therapy Evaluation Codes:   1) History comprised of:   Personal factors that impact the plan of care:      Please refer to health history in EMR.    Comorbidity factors that impact the plan of care are:      Please refer to health history in EMR.     Medications impacting care: None.  2) Examination of Body Systems comprised of:   Body structures and functions that impact the plan of care:      Cervical spine.   Activity limitations that impact the plan of care are:      Lifting, Reading/Computer work, Sitting and Standing.   Clinical presentation characteristics are:    Stable/Uncomplicated.  3) Presentation comprised of:   Presentation scored as Low complexity with uncomplicated characteristics..  4) Decision-Making    Low  "complexity using standardized patient assessment instrument and/or measureable assessment of functional outcome.  Cumulative Therapy Evaluation is: Low complexity.    Previous and current functional limitations:  (See Goal Flow Sheet for this information)    Short term and Long term goals: (See Goal Flow Sheet for this information)   Communication ability:  Patient appears to be able to clearly communicate and understand verbal and written communication and follow directions correctly.  Treatment Explanation - The following has been discussed with the patient: RX ordered/plan of care, anticipated outcomes, and possible risks and side effects.  This patient would benefit from PT intervention to resume normal activities.   Rehab potential is fair to meet PT goals, limited by pain coping behaviors, patient expectations, comorbidities, chronicity of symptoms, and failure to improve with prior interventions.  Frequency:  1 X week, once daily    PATIENTS NAME:  Sheyla Ye   : 1980    Duration:  for 3 weeks to trial TENS, instruct in use of home-unit, and establish independence in HEP  Discharge Plan: Achieve all LTGs, be independent in home treatment program, and reach maximal therapeutic benefit.  Please refer to the daily flowsheet for treatment today, total treatment time and time spent performing 1:1 timed codes.     Caregiver Signature/Credentials _____________________________ Date ________       Treating Provider: Олег Clement DPT, OCS   I have reviewed and certified the need for these services and plan of treatment while under my care.        PHYSICIAN'S SIGNATURE:   ____________________________________  Date___________    Swapnil Carlisle MD    Certification period:  Beginning of Cert date period: 10/04/18 to  End of Cert period date: 19     Functional Level Progress Report: Please see attached \"Goal Flow sheet for Functional level.\"    ____X____ Continue Services or       ________ DC Services   "              Service dates: From  SOC Date: 10/04/18 date to present

## 2018-10-04 NOTE — PROGRESS NOTES
Physical Therapy Initial Evaluation  October 4, 2018     MD Instructions/Precautions/Restrictions: PT eval and treat.     Therapist Impression:   Sheyla Ye presents with findings consistent with mechanical back pain and EDS, with related impairments limiting her ability to sit, stand, walk, work on computer. Skilled PT services are necessary in order to reduce impairments and improve independent function.     Subjective:   Date of Onset: 8/27/18 (MD orders, chronic condition)  C/C: Mostly neck/B shoulder pain, also occasional LBP, though this is not as much of a problem right now. 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    Quality of pain is dull and aching. Pains are described as constant in nature. Pain is worse: constant. Pain is rated 8/10.   History of symptoms: Pains began gradually as the result of insidious onset. Since onset, symptoms are unchanged.  Worsened by: Nothing in particular.    Alleviated by: Nothing, maybe Theracane.   General health as reported by patient: good  Pertinent medical/surgical history: Refer to health history in EMR. Imaging: MRI (cervical/thoracic scheduled for next week). Current occupational status: Currently not working. Patient's goals are: decrease pain. Return to MD:  PRN.     Objective:  CERVICAL EXAMINATION    Posture: Forward head on neck and Rounded shoulders    Active ROM Limitation   Flexion Min, no effect   Extension Min, incr pain   Protraction    Retraction Min, incr pain    L R   Rotation Min/nil, incr on R Min/nil, incr on L   Sidebend Min, no effect (stretch) Min, no effect (stretch)     Assessment/Plan:    The patient is a 38 year old female with chief complaint of back pain.    The patient has the following significant findings with corresponding treatment plan.  Diagnosis 1:  Mechanical back pain    Pain -  hot/cold therapy, US, electric stimulation, mechanical traction, manual therapy, splint/taping/bracing/orthotics, self  management, education, directional preference exercise and home program  Decreased ROM/flexibility - manual therapy, therapeutic exercise and home program  Decreased joint mobility - manual therapy, therapeutic exercise and home program  Decreased strength - therapeutic exercise, therapeutic activities and home program  Impaired muscle performance - neuro re-education and home program  Decreased function - therapeutic activities and home program  Impaired posture - neuro re-education, therapeutic activities and home program  Instability -  Therapeutic Activity, Therapeutic Exercise, Neuromuscular Re-education, Splinting/Taping/Bracing/Orthotic, home program    Therapy Evaluation Codes:   1) History comprised of:   Personal factors that impact the plan of care:      Please refer to health history in EMR.    Comorbidity factors that impact the plan of care are:      Please refer to health history in EMR.     Medications impacting care: None.  2) Examination of Body Systems comprised of:   Body structures and functions that impact the plan of care:      Cervical spine.   Activity limitations that impact the plan of care are:      Lifting, Reading/Computer work, Sitting and Standing.   Clinical presentation characteristics are:    Stable/Uncomplicated.  3) Presentation comprised of:   Presentation scored as Low complexity with uncomplicated characteristics..  4) Decision-Making    Low complexity using standardized patient assessment instrument and/or measureable assessment of functional outcome.  Cumulative Therapy Evaluation is: Low complexity.    Previous and current functional limitations:  (See Goal Flow Sheet for this information)    Short term and Long term goals: (See Goal Flow Sheet for this information)     Communication ability:  Patient appears to be able to clearly communicate and understand verbal and written communication and follow directions correctly.  Treatment Explanation - The following has been  discussed with the patient: RX ordered/plan of care, anticipated outcomes, and possible risks and side effects.  This patient would benefit from PT intervention to resume normal activities.   Rehab potential is fair to meet PT goals, limited by pain coping behaviors, patient expectations, comorbidities, chronicity of symptoms, and failure to improve with prior interventions.    Frequency:  1 X week, once daily  Duration:  for 3 weeks to trial TENS, instruct in use of home-unit, and establish independence in HEP  Discharge Plan: Achieve all LTGs, be independent in home treatment program, and reach maximal therapeutic benefit.    Please refer to the daily flowsheet for treatment today, total treatment time and time spent performing 1:1 timed codes.

## 2018-10-04 NOTE — MR AVS SNAPSHOT
After Visit Summary   10/4/2018    Sheyla Ye    MRN: [id number]           Patient Information     Date Of Birth          1980        Visit Information        Provider Department      10/4/2018 12:20 PM Олег Clement, PT [facility]        Today's Diagnoses     Neck pain    -  1       Follow-ups after your visit        Your next 10 appointments already scheduled     Oct 08, 2018  5:00 PM CDT   CTA  HEAD NECK WITH CONTRAST with UCCT1   [facility] [place] [facility] ([facility])    [address] [address] [phone]           How do I prepare for my exam? (Food and drink instructions) **You will have contrast for this exam.** Do not eat or drink for 2 hours before your exam. If you need to take medicine, you may take it with small sips of water. (We may ask you to take liquid medicine as well.)  The day before your exam, drink extra fluids at least six 8-ounce glasses (unless your doctor tells you to restrict your fluids).  How do I prepare for my exam? (Other instructions) Patients over 70 or patients with diabetes or kidney problems: If you haven t had a blood test (creatinine test) within the last 30 days, the Cardiologist/Radiologist may require you to get this test prior to your exam.  What should I wear: Please wear loose clothing, such as a sweat suit or jogging clothes.  Avoid snaps, zippers and other metal. We may ask you to undress and put on a hospital gown.  How long does the exam take: Most scans take less than 20 minutes.  What should I bring: Please bring any scans or X-rays taken at other hospitals, if similar tests were done. Also bring a list of your medicines, including vitamins, minerals and over-the-counter drugs. It is safest to leave personal items at home.  Do I need a :  No  is needed.  What do I need to tell my doctor? Be sure to tell your doctor: * If you have any allergies. * If  there s any chance you are pregnant. * If you are breastfeeding. * If you have diabetes as your medication may need to be adjusted for this exam.  What should I do after the exam: No restrictions, You may resume normal activities.  What is this test: A CT (computed tomography) scan is a series of pictures that allows us to look inside your body. The scanner creates images of the body in cross sections, much like slices of bread. This helps us see any problems more clearly. You may receive contrast (X-ray dye) before or during your scan. Contrast is given through an IV (small needle in your arm).  Who should I call with questions: If you have any questions, please call the Imaging Department where you will have your exam. Directions, parking instructions, and other information is available on our website, [email or web address].            Oct 08, 2018  5:30 PM CDT   MR CERVICAL SPINE W/O CONTRAST with [id number]   [facility] MRI ([facility])    [address] [place] [address] [phone]           How do I prepare for my exam? (Food and drink instructions) **If you will be receiving sedation or general anesthesia, please see special notes below.**  How do I prepare for my exam? (Other instructions) Take your medicines as usual, unless your doctor tells you not to. Please remove any body piercings and hair extensions before you arrive. Follow your doctor s orders. If you do not, we may have to postpone your exam. You may or may not receive IV contrast for this exam pending the discretion of the Radiologist.  You do not need to do anything special to prepare. **If you will be receiving sedation or general anesthesia, please see special notes below.**  What should I wear:  The MRI machine uses a strong magnet. Please wear clothes without metal (snaps, zippers). A sweatsuit works well, or we may give you a hospital gown.  How long does the exam take: Most  tests take 30 to 60 minutes.  HOWEVER, IF YOUR DOCTOR PRESCRIBES ANESTHESIA please plan on spending four to five hours in the recovery room.  What should I bring: Bring a list of your current medicines to your exam (including vitamins, minerals and over-the-counter drugs). Also bring the results of similar scans you may have had.  Do I need a : **If you will be receiving sedation or general anesthesia, please see special notes below.**  What should I do after the exam? No Restrictions, You may resume normal activities.  What is this test: MRI (magnetic resonance imaging) uses a strong magnet and radio waves to look inside the body. An MRA (magnetic resonance angiogram) does the same thing, but it lets us look at your blood vessels. A computer turns the radio waves into pictures showing cross sections of the body, much like slices of bread. This helps us see any problems more clearly.  Who should I call with questions: Please call the Imaging Department at your exam site with any questions. Directions, parking instructions, and other information is available on our website, [email or web address].  How do I prepare if I m having sedation or anesthesia? **IMPORTANT** THE INSTRUCTIONS BELOW ARE ONLY FOR THOSE PATIENTS WHO HAVE BEEN TOLD THEY WILL RECEIVE SEDATION OR GENERAL ANESTHESIA DURING THEIR MRI PROCEDURE:  IF YOU WILL RECEIVE SEDATION (take medicine to help you relax during your exam): You must get the medicine from your doctor before you arrive. Bring the medicine to the exam. Do not take it at home. Arrive one hour early. Bring someone who can take you home after the test. Your medicine will make you sleepy. After the exam, you may not drive, take a bus or take a taxi by yourself. No eating 8 hours before your exam. You may have clear liquids up until 4 hours before your exam. (Clear liquids include water, clear tea, black coffee and fruit juice without pulp.)  IF YOU WILL RECEIVE ANESTHESIA (be asleep for  your exam): Arrive 1 1/2 hours early. Bring someone who can take you home after the test. You may not drive, take a bus or take a taxi by yourself. No eating 8 hours before your exam. You may have clear liquids up until 4 hours before your exam. (Clear liquids include water, clear tea, black coffee and fruit juice without pulp.) You will spend four to five hours in the recovery room.            Oct 08, 2018  6:15 PM CDT   MR THORACIC SPINE W/O CONTRAST with [id number]   [facility] MRI ([facility])    [address] [address] [phone]           How do I prepare for my exam? (Food and drink instructions) **If you will be receiving sedation or general anesthesia, please see special notes below.**  How do I prepare for my exam? (Other instructions) Take your medicines as usual, unless your doctor tells you not to. Please remove any body piercings and hair extensions before you arrive. Follow your doctor s orders. If you do not, we may have to postpone your exam. You may or may not receive IV contrast for this exam pending the discretion of the Radiologist.  You do not need to do anything special to prepare. **If you will be receiving sedation or general anesthesia, please see special notes below.**  What should I wear:  The MRI machine uses a strong magnet. Please wear clothes without metal (snaps, zippers). A sweatsuit works well, or we may give you a hospital gown.  How long does the exam take: Most tests take 30 to 60 minutes.  HOWEVER, IF YOUR DOCTOR PRESCRIBES ANESTHESIA please plan on spending four to five hours in the recovery room.  What should I bring: Bring a list of your current medicines to your exam (including vitamins, minerals and over-the-counter drugs). Also bring the results of similar scans you may have had.  Do I need a : **If you will be receiving sedation or general anesthesia, please see special notes below.**  What  should I do after the exam? No Restrictions, You may resume normal activities.  What is this test: MRI (magnetic resonance imaging) uses a strong magnet and radio waves to look inside the body. An MRA (magnetic resonance angiogram) does the same thing, but it lets us look at your blood vessels. A computer turns the radio waves into pictures showing cross sections of the body, much like slices of bread. This helps us see any problems more clearly.  Who should I call with questions: Please call the Imaging Department at your exam site with any questions. Directions, parking instructions, and other information is available on our website, [email or web address].  How do I prepare if I m having sedation or anesthesia? **IMPORTANT** THE INSTRUCTIONS BELOW ARE ONLY FOR THOSE PATIENTS WHO HAVE BEEN TOLD THEY WILL RECEIVE SEDATION OR GENERAL ANESTHESIA DURING THEIR MRI PROCEDURE:  IF YOU WILL RECEIVE SEDATION (take medicine to help you relax during your exam): You must get the medicine from your doctor before you arrive. Bring the medicine to the exam. Do not take it at home. Arrive one hour early. Bring someone who can take you home after the test. Your medicine will make you sleepy. After the exam, you may not drive, take a bus or take a taxi by yourself. No eating 8 hours before your exam. You may have clear liquids up until 4 hours before your exam. (Clear liquids include water, clear tea, black coffee and fruit juice without pulp.)  IF YOU WILL RECEIVE ANESTHESIA (be asleep for your exam): Arrive 1 1/2 hours early. Bring someone who can take you home after the test. You may not drive, take a bus or take a taxi by yourself. No eating 8 hours before your exam. You may have clear liquids up until 4 hours before your exam. (Clear liquids include water, clear tea, black coffee and fruit juice without pulp.) You will spend four to five hours in the recovery room.            Oct 11, 2018  4:50 PM CDT   [facility] Spine with Mariel YO  DANN Hi   [facility] ([facility] [place])    [address] 5th Floor  [facility] [id number] [phone]            Oct 12, 2018 10:30 AM CDT   (Arrive by 10:15 AM)   Return Vascular Visit with Peewee Chavez MD   [facility] ([facility])    [address] [facility] [address] [address] [facility]  [address]  [facility] [id number] [phone]            Nov 28, 2018  2:50 PM CST   (Arrive by 2:35 PM)   New Patient Visit with MORGAN Chaudhry CNP   [facility] ([facility])    [address] [facility]  4th Floor  [facility] [address] [phone]              Who to contact     If you have questions or need follow up information about today's clinic visit or your schedule please contact [facility] directly at [phone].  Normal or non-critical lab and imaging results will be communicated to you by [employer], letter or phone within 4 business days after the clinic has received the results. If you do not hear from us within 7 days, please contact the clinic through [employer] or phone. If you have a critical or abnormal lab result, we will notify you by phone as soon as possible.  Submit refill requests through [employer] or call your pharmacy and they will forward the refill request to us. Please allow 3 business days for your refill to be completed.          Additional Information About Your Visit        [employer] Information     [employer] gives you secure access to your electronic health record. If you see a primary care provider, you can also send messages to your care team and make appointments. If you have questions, please call your primary care clinic.  If you do not have a primary care provider, please call [phone] and they will assist you.        Care EveryWhere ID     This is your Care EveryWhere ID. This could be used by other organizations to access  your [place] medical records  [id number]         Blood Pressure from Last 3 Encounters:   08/27/18 120/76   04/17/18 124/83   03/15/18 121/85    Weight from Last 3 Encounters:   08/27/18 59 kg (130 lb)   04/17/18 57.6 kg (127 lb)   03/15/18 57.2 kg (126 lb 1.6 oz)              We Performed the Following     ELECTRIC STIMULATION THERAPY     HC PT EVAL, LOW COMPLEXITY     JES CERT REPORT     [facility] INITIAL EVAL REPORT     THERAPEUTIC ACTIVITIES        Primary Care Provider Office Phone # Fax #    Myrna Nhung Watson [phone]       [address] [place] [address] [address] [place] [id number]        Equal Access to Services     JOSE STALLINGS : Hadii leonard woodwardo Iza, waaxda luqadaha, qaybta kaalmada adeegyamarcie, luis enrique knott. So [facility] [phone].    ATENCIÓN: Si habla español, tiene a owens disposición servicios gratuitos de asistencia lingüística. [facility] [phone].    We comply with applicable federal civil rights laws and [place] laws. We do not discriminate on the basis of race, color, national origin, age, disability, sex, sexual orientation, or gender identity.            Thank you!     Thank you for choosing [facility]  for your care. Our goal is always to provide you with excellent care. Hearing back from our patients is one way we can continue to improve our services. Please take a few minutes to complete the written survey that you may receive in the mail after your visit with us. Thank you!             Your Updated Medication List - Protect others around you: Learn how to safely use, store and throw away your medicines at www.disposemymeds.org.          This list is accurate as of 10/4/18  4:19 PM.  Always use your most recent med list.                   Brand Name Dispense Instructions for use Diagnosis    ADVIL PO      Take by mouth as needed for moderate pain        amphetamine-dextroamphetamine 20 MG per tablet    ADDERALL     TAKE UP TO  1 TABLET BY MOUTH 3 TIMES DAILY ABOUT 4 HOURS APART AS NEEDED FOR ADHD SYMPTOMS        * BACLOFEN PO      Take 5 mg by mouth 2 times daily        * Baclofen 5 MG Tabs     90 tablet    Take 5 mg by mouth 3 times daily    Back muscle spasm       diazepam 5 MG tablet    VALIUM     TAKE UP TO 2 TABS DAILY BY MOUTH AS NEEDED FOR ANXIETY. USE SPARINGLY        fluvoxaMINE 25 MG tablet    LUVOX     TAKE 1/2 PILL BY MOUTH TWICE DAILY, INCREASING TO 1 PILL TWICE DAILY WHEN TOLERATED.        hydrOXYzine 50 MG tablet    ATARAX    30 tablet    Take 1-2 tablets ( mg) by mouth every 6 hours as needed for anxiety    Anxiety       Ketoprofen Powd     100 g    Apply dime sized amount to affected areas up to three times daily.    Myofacial muscle pain       LAMICTAL PO      Take 50 mg by mouth 2 times daily        lidocaine 5 % ointment    XYLOCAINE    50 g    Apply topically as needed for moderate pain    Muscle spasm, Other chronic pain       loperamide 2 MG tablet    IMODIUM A-D    60 tablet    Start with 2 tabs (4 mg), then take one tab (2 mg) after each diarrheal stool.  Do not use more than  8 tabs (16 mg) per day.    Diarrhea       loratadine 10 MG tablet    CLARITIN     Take 10 mg by mouth daily        LORazepam 0.5 MG tablet    ATIVAN    10 tablet    Take 1 tablet (0.5 mg) by mouth every 6 hours as needed for anxiety    Anxiety       meloxicam 7.5 MG tablet    MOBIC    30 tablet    Take 1 tablet (7.5 mg) by mouth daily    Myofascial pain       norethindrone 0.35 MG per tablet    MICRONOR    28 tablet    Take 1 tablet (0.35 mg) by mouth daily    Encounter for contraceptive management, unspecified contraceptive encounter type       omeprazole 20 MG CR capsule    priLOSEC    90 capsule    Take 1 capsule (20 mg) by mouth daily    Gastroesophageal reflux disease with esophagitis       prenatal multivitamin plus iron 27-0.8 MG Tabs per tablet     30 tablet    Take 1 tablet by mouth daily    Depression, unspecified depression  type       ranitidine 150 MG tablet    ZANTAC    60 tablet    Take 1 tablet (150 mg) by mouth 2 times daily    Gastroesophageal reflux disease without esophagitis       tiZANidine 2 MG tablet    ZANAFLEX    90 tablet    Take 1-2 tablets (2-4 mg) by mouth 3 times daily as needed for muscle spasms    Muscle spasm       TYLENOL PO      Take 500 mg by mouth every 4 hours as needed for mild pain or fever Takes 2 tablets of 500 mg        * Notice:  This list has 2 medication(s) that are the same as other medications prescribed for you. Read the directions carefully, and ask your doctor or other care provider to review them with you.

## 2018-10-08 ENCOUNTER — RADIANT APPOINTMENT (OUTPATIENT)
Dept: CT IMAGING | Facility: CLINIC | Age: 38
End: 2018-10-08
Attending: INTERNAL MEDICINE
Payer: MEDICAID

## 2018-10-08 ENCOUNTER — RADIANT APPOINTMENT (OUTPATIENT)
Dept: MRI IMAGING | Facility: CLINIC | Age: 38
End: 2018-10-08
Payer: MEDICAID

## 2018-10-08 DIAGNOSIS — M54.12 CERVICAL RADICULOPATHY: ICD-10-CM

## 2018-10-08 DIAGNOSIS — Z82.49 FAMILY HISTORY OF ANEURYSM OF BLOOD VESSEL OF BRAIN: ICD-10-CM

## 2018-10-08 DIAGNOSIS — R93.0 ABNORMAL COMPUTED TOMOGRAPHY ANGIOGRAPHY OF HEAD: ICD-10-CM

## 2018-10-08 RX ORDER — IOPAMIDOL 755 MG/ML
75 INJECTION, SOLUTION INTRAVASCULAR ONCE
Status: COMPLETED | OUTPATIENT
Start: 2018-10-08 | End: 2018-10-08

## 2018-10-08 RX ADMIN — IOPAMIDOL 75 ML: 755 INJECTION, SOLUTION INTRAVASCULAR at 17:04

## 2018-10-08 NOTE — DISCHARGE INSTRUCTIONS

## 2018-10-12 ENCOUNTER — OFFICE VISIT (OUTPATIENT)
Dept: CARDIOLOGY | Facility: CLINIC | Age: 38
End: 2018-10-12
Attending: INTERNAL MEDICINE
Payer: COMMERCIAL

## 2018-10-12 VITALS
SYSTOLIC BLOOD PRESSURE: 121 MMHG | OXYGEN SATURATION: 100 % | WEIGHT: 124.9 LBS | BODY MASS INDEX: 20.07 KG/M2 | DIASTOLIC BLOOD PRESSURE: 75 MMHG | HEART RATE: 86 BPM | HEIGHT: 66 IN

## 2018-10-12 DIAGNOSIS — Z82.49 FAMILY HISTORY OF ANEURYSM OF BLOOD VESSEL OF BRAIN: ICD-10-CM

## 2018-10-12 DIAGNOSIS — M24.80 JOINT HYPEREXTENSIBILITY OF MULTIPLE SITES: Primary | ICD-10-CM

## 2018-10-12 PROCEDURE — 99214 OFFICE O/P EST MOD 30 MIN: CPT | Mod: ZP | Performed by: INTERNAL MEDICINE

## 2018-10-12 ASSESSMENT — PAIN SCALES - GENERAL: PAINLEVEL: NO PAIN (0)

## 2018-10-12 NOTE — PROGRESS NOTES
Sheyla Ye is a 38 year old female who is presenting at the current time to discuss her diagnosi(es) of :       Joint hyperextensibility of multiple sites  Family history of aneurysm of blood vessel of brain .      Review Of Systems  Skin: negative  Eyes: negative  Ears/Nose/Throat: negative  Respiratory: No shortness of breath, dyspnea on exertion, cough, or hemoptysis  Cardiovascular: negative  Gastrointestinal: negative  Genitourinary: negative  Musculoskeletal: negative  Neurologic: negative  Psychiatric: negative  Hematologic/Lymphatic/Immunologic: negative  Endocrine: negative    PAST MEDICAL HISTORY:                  Past Medical History:   Diagnosis Date     Anxiety      Chronic pain      DJD (degenerative joint disease), lumbar      Munir-Danlos disease      Fibromyalgia        PAST SURGICAL HISTORY:                  Past Surgical History:   Procedure Laterality Date     wisdom teeth removal         CURRENT MEDICATIONS:                  Current Outpatient Prescriptions   Medication Sig Dispense Refill     Acetaminophen (TYLENOL PO) Take 500 mg by mouth every 4 hours as needed for mild pain or fever Takes 2 tablets of 500 mg       amphetamine-dextroamphetamine (ADDERALL) 20 MG per tablet TAKE UP TO 1 TABLET BY MOUTH 3 TIMES DAILY ABOUT 4 HOURS APART AS NEEDED FOR ADHD SYMPTOMS  0     Baclofen 5 MG TABS Take 5 mg by mouth 3 times daily 90 tablet 1     diazepam (VALIUM) 5 MG tablet TAKE UP TO 2 TABS DAILY BY MOUTH AS NEEDED FOR ANXIETY. USE SPARINGLY  0     Ibuprofen (ADVIL PO) Take by mouth as needed for moderate pain       LamoTRIgine (LAMICTAL PO) Take 50 mg by mouth 2 times daily       lidocaine (XYLOCAINE) 5 % ointment Apply topically as needed for moderate pain 50 g 0     omeprazole (PRILOSEC) 20 MG CR capsule Take 1 capsule (20 mg) by mouth daily 90 capsule 0     fluvoxaMINE (LUVOX) 25 MG tablet TAKE 1/2 PILL BY MOUTH TWICE DAILY, INCREASING TO 1 PILL TWICE DAILY WHEN TOLERATED.  11      hydrOXYzine (ATARAX) 50 MG tablet Take 1-2 tablets ( mg) by mouth every 6 hours as needed for anxiety (Patient not taking: Reported on 3/15/2018) 30 tablet 0     Ketoprofen POWD Apply dime sized amount to affected areas up to three times daily. (Patient not taking: Reported on 3/15/2018) 100 g 3     loperamide (IMODIUM A-D) 2 MG tablet Start with 2 tabs (4 mg), then take one tab (2 mg) after each diarrheal stool.  Do not use more than  8 tabs (16 mg) per day. (Patient not taking: Reported on 8/27/2018) 60 tablet 0     loratadine (CLARITIN) 10 MG tablet Take 10 mg by mouth daily       LORazepam (ATIVAN) 0.5 MG tablet Take 1 tablet (0.5 mg) by mouth every 6 hours as needed for anxiety (Patient not taking: Reported on 3/15/2018) 10 tablet 0     meloxicam (MOBIC) 7.5 MG tablet Take 1 tablet (7.5 mg) by mouth daily (Patient not taking: Reported on 10/12/2018) 30 tablet 1     norethindrone (MICRONOR) 0.35 MG per tablet Take 1 tablet (0.35 mg) by mouth daily (Patient not taking: Reported on 4/17/2018) 28 tablet 11     Prenatal Vit-Fe Fumarate-FA (PRENATAL MULTIVITAMIN  PLUS IRON) 27-0.8 MG TABS Take 1 tablet by mouth daily (Patient not taking: Reported on 8/27/2018) 30 tablet 11     ranitidine (ZANTAC) 150 MG tablet Take 1 tablet (150 mg) by mouth 2 times daily (Patient not taking: Reported on 3/15/2018) 60 tablet 11     tiZANidine (ZANAFLEX) 2 MG tablet Take 1-2 tablets (2-4 mg) by mouth 3 times daily as needed for muscle spasms (Patient not taking: Reported on 4/17/2018) 90 tablet 0       ALLERGIES:                  Allergies   Allergen Reactions     Seasonal Allergies        SOCIAL HISTORY:                  Social History     Social History     Marital status: Single     Spouse name: N/A     Number of children: N/A     Years of education: N/A     Occupational History     Not on file.     Social History Main Topics     Smoking status: Current Every Day Smoker     Packs/day: 0.50     Years: 20.00     Types:  Cigarettes     Smokeless tobacco: Never Used     Alcohol use 4.0 oz/week     8 Standard drinks or equivalent per week     Drug use: No     Sexual activity: Not on file     Other Topics Concern     Not on file     Social History Narrative       FAMILY HISTORY:                   Family History   Problem Relation Age of Onset     Aneurysm Brother      20's, autopsy showed RP bleed     Thyroid Disease Mother      Aneurysm Other      brain aneurysm, 30's          Physical exam Reveals:    O/P: WNL  HEENT: WNL  NECK: No JVD, thyromegaly, or lymphadenopathy  HEART: RRR, no murmurs, gallops, or rubs  LUNGS: CTA bilaterally without rales, wheezes, or rhonchi  GI: NABS, nondistended, nontender, soft  EXT:without cyanosis, clubbing, or edema  NEURO: nonfocal  : no flank tenderness      CTA HEAD NECK WITH CONTRAST 10/8/2018 5:14 PM     Head CT without contrast  CT angiogram of the neck   CT angiogram of the base of the brain with contrast  Reconstruction by the Radiologist on the 3D workstation     Provided History:  ; Family history of aneurysm of blood vessel of  brain; Abnormal computed tomography angiography of head  ICD-10: Family history of aneurysm of blood vessel of brain; Abnormal  computed tomography angiography of head     Comparison:  CT of the head and neck 10/2/2015..       Technique:  HEAD CT:  Using multidetector thin collimation helical acquisition  technique, axial, coronal and sagittal CT images from the skull base  to the vertex were obtained without intravenous contrast.   HEAD and NECK CTA: During rapid bolus intravenous injection of  nonionic contrast material, axial images were obtained using thin  collimation multidetector helical technique from the base of the skull  through the [place] of [place]. This CT angiogram data was reconstructed  at thin intervals with mild overlap. Images were sent to the [employer]  workstation, and 3D reconstructions were obtained. The axial source  images, multiplanar  reformations, 3D reconstructions in both maximum  intensity projection display and volume rendered models were reviewed,  with reconstructions performed by the technologist and the  radiologist.     Contrast: ISOVUE 370 75cc     Findings:  Head CT: There is no intracranial hemorrhage, mass effect, or midline  shift. Gray/white matter differentiation in both cerebral hemispheres  is preserved. Ventricles are proportionate to the cerebral sulci.      Head CTA demonstrates no aneurysm or stenosis of the major  intracranial arteries. 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     Neck CTA demonstrates no stenosis of the major cervical arteries. The  origins of the great vessels from the aortic arch are patent. The  normal distal right internal carotid artery measures 4 mm. The normal  distal left internal carotid artery measures 4 mm.     Multilevel spondylosis of the cervical spine, moderate at C5-C6 and  C6-C7. Unchanged sclerosis of the C6 vertebral body. Old fractures of  anterior osteophytes at C5-C6 and C6-C7. Straightening of the normal  cervical lordosis.      No mass is noted within the visualized portions of the cervical soft  tissues or lung apices.          Impression:      1. Head CTA demonstrates no aneurysm or stenosis of the major  intracranial arteries.     2. Neck CTA demonstrates no stenosis of the major cervical arteries.     3. No intracranial hemorrhage on the noncontrast head CT.       I have personally reviewed the examination and initial interpretation  and I agree with the findings.     OC AGUIRRE MD      CTA ANGIOGRAM HEAD NECK 10/2/2015 5:19 PM     Head CT without contrast  CT angiogram of the neck   CT angiogram of the base of the brain with contrast  Reconstruction by the Radiologist on the 3D workstation     History:  joint hyperextensibility, chronic pain, family history  mutltifocal aneurysmal disease in multiple family members,Other  specific joint  derangements of unspecified joint, not elsewhere  classified     Comparison:  None.       Technique:  HEAD CT:  Using multidetector thin collimation helical acquisition  technique, axial, coronal and sagittal CT images from the skull base  to the vertex were obtained without intravenous contrast.   HEAD and NECK CTA: During rapid bolus intravenous injection of  nonionic contrast material, axial images were obtained using thin  collimation multidetector helical technique from the base of the skull  through the [place] of [place]. This CT angiogram data was reconstructed  at thin intervals with mild overlap. Images were sent to the [employer]  workstation, and 3D reconstructions were obtained. The axial source  images, multiplanar reformations, 3D reconstructions in both maximum  intensity projection display and volume rendered models were reviewed,  with reconstructions performed by the technologist and the  radiologist.     Contrast: 75cc of Isovue 370     Findings:  Head CT: There is no evidence of intracranial hemorrhage, mass effect,  or midline shift. Gray/white matter differentiation in both cerebral  hemispheres is preserved. Ventricles are proportionate to the cerebral  sulci. Bony calvaria and bones of the skull base are normal.  Visualized mastoid air cells and paranasal sinuses are clear.     Head CTA demonstrates no definite aneurysm or stenosis of the major  intracranial arteries. The anterior communicating artery is patent.  The posterior communicating arteries are patent bilaterally. Mild  normal variant fenestration of the proximal basilar artery.     Neck CTA demonstrates no evident stenosis of the major cervical  arteries. The origins of the great vessels from the aortic arch are  patent.     No mass is noted within the visualized portions of the cervical soft  tissues or lung apices. Slight reversal of the cervical lordosis.  Moderate degenerative at C5-C6 and C6-7.  Probable old fractures of  prominent  anterior osteophytes at C5 and C6. Moderate sclerosis of the  C6 vertebral body, given the probable old fractures of the prominent  anterior osteophytes, this is likely posttraumatic.     IMPRESSION    Impression:      1. 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 Basilar  artery fenestration can be associated with formation of saccular  aneurysms. Otherwise no definite aneurysm, stenosis, or occlusion of  the major intracranial arteries    2. Neck CTA demonstrates no stenosis of the major cervical arteries.     3. Normal noncontrast head CT.    4. Moderate degenerative change at C5-C6 and C6-C7 with apparent  probable old fractures of prominent anterior osteophytes with  sclerosis of the T6 vertebral body which is likely posttraumatic.     I have personally reviewed the examination and initial interpretation  and I agree with the findings.     PRIMITIVO ASIF MD      A/P:        (M24.80) Joint hyperextensibility of multiple sites  (primary encounter diagnosis)  Comment: She has had genomic testing for aortopathies including EDS, and no known mutations were ascertained. Her head CTA in  revealed fenestration of the basilar artery, and none is noted presently.  Plan: repeat CTA head and neck in three years. If no aneurysmal changes noted at that time, stop imaging. Also, as her brother  of an aortic dissection in his mid 20s, she is concenred about aortic dissection risk and as such we will order a CTA C/A/P in three years as well as a TTE    (Z82.49) Family history of aneurysm of blood vessel of brain  Plan: as above

## 2018-10-12 NOTE — NURSING NOTE
Cardiac/Vascular Testing: Patient given instructions regarding  echocardiogram , CTA chest/abdomen/pelvis and CTA head/neck in 3 years. Discussed purpose, preparation, procedure and when to expect results reported back to the patient. Patient demonstrated understanding of this information and agreed to call with further questions or concerns.  Med Reconcile: Reviewed and verified all current medications with the patient. The updated medication list was printed and given to the patient.  Return Appointment: 3 years with Dr. Chavez.  Imaging should be done at least 3 days prior.  Patient given instructions regarding scheduling next clinic visit. Patient demonstrated understanding of this information and agreed to call with further questions or concerns.  Patient stated she understood all health information given and agreed to call with further questions or concerns.

## 2018-10-12 NOTE — NURSING NOTE
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     MERNA Almaraz  10:38 AM

## 2018-10-12 NOTE — MR AVS SNAPSHOT
After Visit Summary   10/12/2018    Sheyla Ye    MRN: [id number]           Patient Information     Date Of Birth          1980        Visit Information        Provider Department      10/12/2018 10:30 AM Peewee Chavez MD [facility]        Today's Diagnoses     Joint hyperextensibility of multiple sites    -  1    Family history of aneurysm of blood vessel of brain          Care Instructions    You were seen today in the Cardiovascular Clinic at the [facility].      Cardiology Providers you saw during your visit:   Dr. Chavez    Diagnosis:    (M24.80) Joint hyperextensibility of multiple sites  (primary encounter diagnosis)    (Z82.49) Family history of aneurysm of blood vessel of brain      Results:  CTA head/neck reviewed    Recommendations:   CTA head/neck, CT chest/abdomen/pelvis and echo in 3 years.  At your convenience at least 3 days prior to follow up appointment.    Follow-up:  3 years with Dr. Chavez.      For emergencies call 911.    For any scheduling needs, please call [phone].     Thank you for your visit today!     Please call if you have any questions or concerns.  Marc Villegas RN              Follow-ups after your visit        Additional Services     Follow-Up with Vascular Cardiologist       Schedule CTA head/neck for a tleast 3 days prior to appointment.                  Your next 10 appointments already scheduled     Oct 19, 2018 10:50 AM CDT   JES Spine with Олег Clement PT   [facility] ([address] [address]   [phone]            Oct 22, 2018  5:10 PM CDT   JES Spine with Олег Clement PT   [facility] ([address] [address]   [phone]            Oct 29, 2018  4:30 PM CDT   JES Spine with Олег Clement PT   [facility]  [place] ([facility])    [address] [address]   [phone]            Nov 28, 2018  2:50 PM CST   (Arrive by 2:35 PM)   New Patient Visit with MORGAN Chaudhry CNP   [facility] ([facility])    [address]  [address]  [facility] [address] [phone]              Future tests that were ordered for you today     Open Future Orders        Priority Expected Expires Ordered    CTA Chest Abdomen Pelvis w Contrast Routine 10/12/2019 10/12/2019 10/12/2018    Echocardiogram Routine 10/12/2019 10/12/2019 10/12/2018    CTA Head Neck with Contrast Routine 10/12/2019 10/12/2019 10/12/2018    Follow-Up with Vascular Cardiologist Routine 8/12/2021 2/10/2022 10/12/2018            Who to contact     If you have questions or need follow up information about today's clinic visit or your schedule please contact [facility] directly at [phone].  Normal or non-critical lab and imaging results will be communicated to you by [employer], letter or phone within 4 business days after the clinic has received the results. If you do not hear from us within 7 days, please contact the clinic through [employer] or phone. If you have a critical or abnormal lab result, we will notify you by phone as soon as possible.  Submit refill requests through [employer] or call your pharmacy and they will forward the refill request to us. Please allow 3 business days for your refill to be completed.          Additional Information About Your Visit        [employer] Information     [employer] gives you secure access to your electronic health record. If you see a primary care provider, you can also send messages to your care team and make appointments. If you have questions, please call your primary care clinic.  If you do not have a primary care provider, please call [phone] and they will assist you.        Care EveryWhere  "ID     This is your Care EveryWhere ID. This could be used by other organizations to access your [facility] medical records  [id number]        Your Vitals Were     Pulse Height Pulse Oximetry BMI (Body Mass Index)          86 1.676 m (5' 6\") 100% 20.16 kg/m2         Blood Pressure from Last 3 Encounters:   10/12/18 121/75   08/27/18 120/76   04/17/18 124/83    Weight from Last 3 Encounters:   10/12/18 56.7 kg (124 lb 14.4 oz)   08/27/18 59 kg (130 lb)   04/17/18 57.6 kg (127 lb)               Primary Care Provider Office Phone # Fax #    Myrna Nhung Watson [phone]       [address] [place] [address] [address] [place] [address]        Equal Access to Services     GARRETT STALLINGS : Hadii leonard edwards hadasho Sotoan, waaxda luqadaha, qaybta kaalmada adeegyada, luis enrique larios . So [facility] [phone].    ATENCIÓN: Si habla español, tiene a owens disposición servicios gratuitos de asistencia lingüística. Gina al [phone].    We comply with applicable federal civil rights laws and [place] laws. We do not discriminate on the basis of race, color, national origin, age, disability, sex, sexual orientation, or gender identity.            Thank you!     Thank you for choosing [facility]  for your care. Our goal is always to provide you with excellent care. Hearing back from our patients is one way we can continue to improve our services. Please take a few minutes to complete the written survey that you may receive in the mail after your visit with us. Thank you!             Your Updated Medication List - Protect others around you: Learn how to safely use, store and throw away your medicines at www.disposemymeds.org.          This list is accurate as of 10/12/18 11:30 AM.  Always use your most recent med list.                   Brand Name Dispense Instructions for use Diagnosis    ADVIL PO      Take by mouth as needed for moderate pain        amphetamine-dextroamphetamine 20 MG per " tablet    ADDERALL     TAKE UP TO 1 TABLET BY MOUTH 3 TIMES DAILY ABOUT 4 HOURS APART AS NEEDED FOR ADHD SYMPTOMS        Baclofen 5 MG Tabs     90 tablet    Take 5 mg by mouth 3 times daily    Back muscle spasm       diazepam 5 MG tablet    VALIUM     TAKE UP TO 2 TABS DAILY BY MOUTH AS NEEDED FOR ANXIETY. USE SPARINGLY        fluvoxaMINE 25 MG tablet    LUVOX     TAKE 1/2 PILL BY MOUTH TWICE DAILY, INCREASING TO 1 PILL TWICE DAILY WHEN TOLERATED.        LAMICTAL PO      Take 50 mg by mouth 2 times daily        lidocaine 5 % ointment    XYLOCAINE    50 g    Apply topically as needed for moderate pain    Muscle spasm, Other chronic pain       loratadine 10 MG tablet    CLARITIN     Take 10 mg by mouth daily        omeprazole 20 MG CR capsule    priLOSEC    90 capsule    Take 1 capsule (20 mg) by mouth daily    Gastroesophageal reflux disease with esophagitis       TYLENOL PO      Take 500 mg by mouth every 4 hours as needed for mild pain or fever Takes 2 tablets of 500 mg

## 2018-10-12 NOTE — LETTER
10/12/2018      RE: Sheyla Ye  [address]  [facility] [address]       Dear Colleague,    Thank you for the opportunity to participate in the care of your patient, Sheyla Ye, at the [facility] at [facility]. Please see a copy of my visit note below.    Sheyla Ye is a 38 year old female who is presenting at the current time to discuss her diagnosi(es) of :       Joint hyperextensibility of multiple sites  Family history of aneurysm of blood vessel of brain .      Review Of Systems  Skin: negative  Eyes: negative  Ears/Nose/Throat: negative  Respiratory: No shortness of breath, dyspnea on exertion, cough, or hemoptysis  Cardiovascular: negative  Gastrointestinal: negative  Genitourinary: negative  Musculoskeletal: negative  Neurologic: negative  Psychiatric: negative  Hematologic/Lymphatic/Immunologic: negative  Endocrine: negative    PAST MEDICAL HISTORY:                  Past Medical History:   Diagnosis Date     Anxiety      Chronic pain      DJD (degenerative joint disease), lumbar      Munir-Danlos disease      Fibromyalgia        PAST SURGICAL HISTORY:                  Past Surgical History:   Procedure Laterality Date     wisdom teeth removal         CURRENT MEDICATIONS:                  Current Outpatient Prescriptions   Medication Sig Dispense Refill     Acetaminophen (TYLENOL PO) Take 500 mg by mouth every 4 hours as needed for mild pain or fever Takes 2 tablets of 500 mg       amphetamine-dextroamphetamine (ADDERALL) 20 MG per tablet TAKE UP TO 1 TABLET BY MOUTH 3 TIMES DAILY ABOUT 4 HOURS APART AS NEEDED FOR ADHD SYMPTOMS  0     Baclofen 5 MG TABS Take 5 mg by mouth 3 times daily 90 tablet 1     diazepam (VALIUM) 5 MG tablet TAKE UP TO 2 TABS DAILY BY MOUTH AS NEEDED FOR ANXIETY. USE SPARINGLY  0     Ibuprofen (ADVIL PO) Take by mouth as needed for moderate pain       LamoTRIgine (LAMICTAL PO) Take 50 mg by mouth 2 times daily        lidocaine (XYLOCAINE) 5 % ointment Apply topically as needed for moderate pain 50 g 0     omeprazole (PRILOSEC) 20 MG CR capsule Take 1 capsule (20 mg) by mouth daily 90 capsule 0     fluvoxaMINE (LUVOX) 25 MG tablet TAKE 1/2 PILL BY MOUTH TWICE DAILY, INCREASING TO 1 PILL TWICE DAILY WHEN TOLERATED.  11     hydrOXYzine (ATARAX) 50 MG tablet Take 1-2 tablets ( mg) by mouth every 6 hours as needed for anxiety (Patient not taking: Reported on 3/15/2018) 30 tablet 0     Ketoprofen POWD Apply dime sized amount to affected areas up to three times daily. (Patient not taking: Reported on 3/15/2018) 100 g 3     loperamide (IMODIUM A-D) 2 MG tablet Start with 2 tabs (4 mg), then take one tab (2 mg) after each diarrheal stool.  Do not use more than  8 tabs (16 mg) per day. (Patient not taking: Reported on 8/27/2018) 60 tablet 0     loratadine (CLARITIN) 10 MG tablet Take 10 mg by mouth daily       LORazepam (ATIVAN) 0.5 MG tablet Take 1 tablet (0.5 mg) by mouth every 6 hours as needed for anxiety (Patient not taking: Reported on 3/15/2018) 10 tablet 0     meloxicam (MOBIC) 7.5 MG tablet Take 1 tablet (7.5 mg) by mouth daily (Patient not taking: Reported on 10/12/2018) 30 tablet 1     norethindrone (MICRONOR) 0.35 MG per tablet Take 1 tablet (0.35 mg) by mouth daily (Patient not taking: Reported on 4/17/2018) 28 tablet 11     Prenatal Vit-Fe Fumarate-FA (PRENATAL MULTIVITAMIN  PLUS IRON) 27-0.8 MG TABS Take 1 tablet by mouth daily (Patient not taking: Reported on 8/27/2018) 30 tablet 11     ranitidine (ZANTAC) 150 MG tablet Take 1 tablet (150 mg) by mouth 2 times daily (Patient not taking: Reported on 3/15/2018) 60 tablet 11     tiZANidine (ZANAFLEX) 2 MG tablet Take 1-2 tablets (2-4 mg) by mouth 3 times daily as needed for muscle spasms (Patient not taking: Reported on 4/17/2018) 90 tablet 0       ALLERGIES:                  Allergies   Allergen Reactions     Seasonal Allergies        SOCIAL HISTORY:                   Social History     Social History     Marital status: Single     Spouse name: N/A     Number of children: N/A     Years of education: N/A     Occupational History     Not on file.     Social History Main Topics     Smoking status: Current Every Day Smoker     Packs/day: 0.50     Years: 20.00     Types: Cigarettes     Smokeless tobacco: Never Used     Alcohol use 4.0 oz/week     8 Standard drinks or equivalent per week     Drug use: No     Sexual activity: Not on file     Other Topics Concern     Not on file     Social History Narrative       FAMILY HISTORY:                   Family History   Problem Relation Age of Onset     Aneurysm Brother      20's, autopsy showed RP bleed     Thyroid Disease Mother      Aneurysm Other      brain aneurysm, 30's          Physical exam Reveals:    O/P: WNL  HEENT: WNL  NECK: No JVD, thyromegaly, or lymphadenopathy  HEART: RRR, no murmurs, gallops, or rubs  LUNGS: CTA bilaterally without rales, wheezes, or rhonchi  GI: NABS, nondistended, nontender, soft  EXT:without cyanosis, clubbing, or edema  NEURO: nonfocal  : no flank tenderness      CTA HEAD NECK WITH CONTRAST 10/8/2018 5:14 PM     Head CT without contrast  CT angiogram of the neck   CT angiogram of the base of the brain with contrast  Reconstruction by the Radiologist on the 3D workstation     Provided History:  ; Family history of aneurysm of blood vessel of  brain; Abnormal computed tomography angiography of head  ICD-10: Family history of aneurysm of blood vessel of brain; Abnormal  computed tomography angiography of head     Comparison:  CT of the head and neck 10/2/2015..       Technique:  HEAD CT:  Using multidetector thin collimation helical acquisition  technique, axial, coronal and sagittal CT images from the skull base  to the vertex were obtained without intravenous contrast.   HEAD and NECK CTA: During rapid bolus intravenous injection of  nonionic contrast material, axial images were obtained using  thin  collimation multidetector helical technique from the base of the skull  through the [place]. This CT angiogram data was reconstructed  at thin intervals with mild overlap. Images were sent to the [employer]  workstation, and 3D reconstructions were obtained. The axial source  images, multiplanar reformations, 3D reconstructions in both maximum  intensity projection display and volume rendered models were reviewed,  with reconstructions performed by the technologist and the  radiologist.     Contrast: ISOVUE 370 75cc     Findings:  Head CT: There is no intracranial hemorrhage, mass effect, or midline  shift. Gray/white matter differentiation in both cerebral hemispheres  is preserved. Ventricles are proportionate to the cerebral sulci.      Head CTA demonstrates no aneurysm or stenosis of the major  intracranial arteries. 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     Neck CTA demonstrates no stenosis of the major cervical arteries. The  origins of the great vessels from the aortic arch are patent. The  normal distal right internal carotid artery measures 4 mm. The normal  distal left internal carotid artery measures 4 mm.     Multilevel spondylosis of the cervical spine, moderate at C5-C6 and  C6-C7. Unchanged sclerosis of the C6 vertebral body. Old fractures of  anterior osteophytes at C5-C6 and C6-C7. Straightening of the normal  cervical lordosis.      No mass is noted within the visualized portions of the cervical soft  tissues or lung apices.          Impression:      1. Head CTA demonstrates no aneurysm or stenosis of the major  intracranial arteries.     2. Neck CTA demonstrates no stenosis of the major cervical arteries.     3. No intracranial hemorrhage on the noncontrast head CT.       I have personally reviewed the examination and initial interpretation  and I agree with the findings.     OC AGUIRRE MD      CTA ANGIOGRAM HEAD NECK 10/2/2015 5:19 PM     Head  CT without contrast  CT angiogram of the neck   CT angiogram of the base of the brain with contrast  Reconstruction by the Radiologist on the 3D workstation     History:  joint hyperextensibility, chronic pain, family history  mutltifocal aneurysmal disease in multiple family members,Other  specific joint derangements of unspecified joint, not elsewhere  classified     Comparison:  None.       Technique:  HEAD CT:  Using multidetector thin collimation helical acquisition  technique, axial, coronal and sagittal CT images from the skull base  to the vertex were obtained without intravenous contrast.   HEAD and NECK CTA: During rapid bolus intravenous injection of  nonionic contrast material, axial images were obtained using thin  collimation multidetector helical technique from the base of the skull  through the [place]. This CT angiogram data was reconstructed  at thin intervals with mild overlap. Images were sent to the [employer]  workstation, and 3D reconstructions were obtained. The axial source  images, multiplanar reformations, 3D reconstructions in both maximum  intensity projection display and volume rendered models were reviewed,  with reconstructions performed by the technologist and the  radiologist.     Contrast: 75cc of Isovue 370     Findings:  Head CT: There is no evidence of intracranial hemorrhage, mass effect,  or midline shift. Gray/white matter differentiation in both cerebral  hemispheres is preserved. Ventricles are proportionate to the cerebral  sulci. Bony calvaria and bones of the skull base are normal.  Visualized mastoid air cells and paranasal sinuses are clear.     Head CTA demonstrates no definite aneurysm or stenosis of the major  intracranial arteries. The anterior communicating artery is patent.  The posterior communicating arteries are patent bilaterally. Mild  normal variant fenestration of the proximal basilar artery.     Neck CTA demonstrates no evident stenosis of the major  cervical  arteries. The origins of the great vessels from the aortic arch are  patent.     No mass is noted within the visualized portions of the cervical soft  tissues or lung apices. Slight reversal of the cervical lordosis.  Moderate degenerative at C5-C6 and C6-7.  Probable old fractures of  prominent anterior osteophytes at C5 and C6. Moderate sclerosis of the  C6 vertebral body, given the probable old fractures of the prominent  anterior osteophytes, this is likely posttraumatic.     IMPRESSION    Impression:      1. 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 Basilar  artery fenestration can be associated with formation of saccular  aneurysms. Otherwise no definite aneurysm, stenosis, or occlusion of  the major intracranial arteries    2. Neck CTA demonstrates no stenosis of the major cervical arteries.     3. Normal noncontrast head CT.    4. Moderate degenerative change at C5-C6 and C6-C7 with apparent  probable old fractures of prominent anterior osteophytes with  sclerosis of the T6 vertebral body which is likely posttraumatic.     I have personally reviewed the examination and initial interpretation  and I agree with the findings.     PRIMITIVO ASIF MD      A/P:        (M24.80) Joint hyperextensibility of multiple sites  (primary encounter diagnosis)  Comment: She has had genomic testing for aortopathies including EDS, and no known mutations were ascertained. Her head CTA in  revealed fenestration of the basilar artery, and none is noted presently.  Plan: repeat CTA head and neck in three years. If no aneurysmal changes noted at that time, stop imaging. Also, as her brother  of an aortic dissection in his mid 20s, she is concenred about aortic dissection risk and as such we will order a CTA C/A/P in three years as well as a TTE    (Z82.49) Family history of aneurysm of blood vessel of brain  Plan: as above       Peewee Chavez MD

## 2018-10-12 NOTE — PATIENT INSTRUCTIONS
You were seen today in the Cardiovascular Clinic at the [facility].      Cardiology Providers you saw during your visit:   Dr. Chavez    Diagnosis:    (M24.80) Joint hyperextensibility of multiple sites  (primary encounter diagnosis)    (Z82.49) Family history of aneurysm of blood vessel of brain      Results:  CTA head/neck reviewed    Recommendations:   CTA head/neck, CT chest/abdomen/pelvis and echo in 3 years.  At your convenience at least 3 days prior to follow up appointment.    Follow-up:  3 years with Dr. Chavez.      For emergencies call [phone].    For any scheduling needs, please call [phone].     Thank you for your visit today!     Please call if you have any questions or concerns.  Marc Villegas RN

## 2018-10-19 ENCOUNTER — THERAPY VISIT (OUTPATIENT)
Dept: PHYSICAL THERAPY | Facility: CLINIC | Age: 38
End: 2018-10-19
Payer: COMMERCIAL

## 2018-10-19 DIAGNOSIS — M54.2 NECK PAIN: ICD-10-CM

## 2018-10-19 PROCEDURE — 97110 THERAPEUTIC EXERCISES: CPT | Mod: GP | Performed by: PHYSICAL THERAPIST

## 2018-10-19 PROCEDURE — 97530 THERAPEUTIC ACTIVITIES: CPT | Mod: GP | Performed by: PHYSICAL THERAPIST

## 2018-10-19 NOTE — MR AVS SNAPSHOT
After Visit Summary   10/19/2018    Sheyla Ye    MRN: [id number]           Patient Information     Date Of Birth          1980        Visit Information        Provider Department      10/19/2018 10:50 AM Олег Clement PT [facility] [employer] [facility] [employer] [facility]        Today's Diagnoses     Neck pain           Follow-ups after your visit        Your next 10 appointments already scheduled     Oct 29, 2018  4:30 PM CDT   [facility] with Олег Clement PT   [facility]    [address] [id number] [phone]            Nov 28, 2018  2:50 PM CST   (Arrive by 2:35 PM)   New Patient Visit with MORGAN [facility] for Comprehensive [facility] Management ([facility])    [address]  [address] [facility] [address] [phone]              Who to contact     If you have questions or need follow up information about today's clinic visit or your schedule please contact [email or web address] [facility] directly at [phone].  Normal or non-critical lab and imaging results will be communicated to you by HealthSynchhart, letter or phone within 4 business days after the clinic has received the results. If you do not hear from us within 7 days, please contact the clinic through [employer] Mediat or phone. If you have a critical or abnormal lab result, we will notify you by phone as soon as possible.  Submit refill requests through ADARTIS or call your pharmacy and they will forward the refill request to us. Please allow 3 business days for your refill to be completed.          Additional Information About Your Visit        HealthSynchhart Information     ADARTIS gives you secure access to your electronic health record. If you see a primary care provider, you can also send messages to your care team and make appointments. If you have questions, please call your primary care  clinic.  If you do not have a primary care provider, please call [phone] and they will assist you.        Care EveryWhere ID     This is your Care EveryWhere ID. This could be used by other organizations to access your [place] medical records  [id number]         Blood Pressure from Last 3 Encounters:   10/12/18 121/75   08/27/18 120/76   04/17/18 124/83    Weight from Last 3 Encounters:   10/12/18 56.7 kg (124 lb 14.4 oz)   08/27/18 59 kg (130 lb)   04/17/18 57.6 kg (127 lb)              We Performed the Following     THERAPEUTIC ACTIVITIES     THERAPEUTIC EXERCISES        Primary Care Provider Office Phone # Fax #    Myrna Nhung Watson [phone]       [address] [place] [address] [address] [place] [address]        Equal Access to Services     JOSE STALLINGS : Hadii aad ku hadasho Soomaali, waaxda luqadaha, qaybta kaalmada adeegyada, luis enrique drummond haygino larios . So [facility] [phone].    ATENCIÓN: Si habla español, tiene a owens disposición servicios gratuitos de asistencia lingüística. Gina al [phone].    We comply with applicable federal civil rights laws and [place] laws. We do not discriminate on the basis of race, color, national origin, age, disability, sex, sexual orientation, or gender identity.            Thank you!     Thank you for choosing [facility]  for your care. Our goal is always to provide you with excellent care. Hearing back from our patients is one way we can continue to improve our services. Please take a few minutes to complete the written survey that you may receive in the mail after your visit with us. Thank you!             Your Updated Medication List - Protect others around you: Learn how to safely use, store and throw away your medicines at www.disposemymeds.org.          This list is accurate as of 10/19/18 12:47 PM.  Always use your most recent med list.                   Brand Name Dispense Instructions for use  Diagnosis    ADVIL PO      Take by mouth as needed for moderate pain        amphetamine-dextroamphetamine 20 MG per tablet    ADDERALL     TAKE UP TO 1 TABLET BY MOUTH 3 TIMES DAILY ABOUT 4 HOURS APART AS NEEDED FOR ADHD SYMPTOMS        Baclofen 5 MG Tabs     90 tablet    Take 5 mg by mouth 3 times daily    Back muscle spasm       diazepam 5 MG tablet    VALIUM     TAKE UP TO 2 TABS DAILY BY MOUTH AS NEEDED FOR ANXIETY. USE SPARINGLY        fluvoxaMINE 25 MG tablet    LUVOX     TAKE 1/2 PILL BY MOUTH TWICE DAILY, INCREASING TO 1 PILL TWICE DAILY WHEN TOLERATED.        LAMICTAL PO      Take 50 mg by mouth 2 times daily        lidocaine 5 % ointment    XYLOCAINE    50 g    Apply topically as needed for moderate pain    Muscle spasm, Other chronic pain       loratadine 10 MG tablet    CLARITIN     Take 10 mg by mouth daily        omeprazole 20 MG CR capsule    priLOSEC    90 capsule    Take 1 capsule (20 mg) by mouth daily    Gastroesophageal reflux disease with esophagitis       TYLENOL PO      Take 500 mg by mouth every 4 hours as needed for mild pain or fever Takes 2 tablets of 500 mg

## 2018-11-02 DIAGNOSIS — M62.830 BACK MUSCLE SPASM: ICD-10-CM

## 2018-11-02 RX ORDER — BACLOFEN 5 MG/1
5 TABLET ORAL 3 TIMES DAILY
Qty: 90 TABLET | Refills: 3 | Status: SHIPPED | OUTPATIENT
Start: 2018-11-02 | End: 2018-11-28

## 2018-11-28 ENCOUNTER — OFFICE VISIT (OUTPATIENT)
Dept: ANESTHESIOLOGY | Facility: CLINIC | Age: 38
End: 2018-11-28
Payer: MEDICAID

## 2018-11-28 VITALS
RESPIRATION RATE: 16 BRPM | BODY MASS INDEX: 19.93 KG/M2 | DIASTOLIC BLOOD PRESSURE: 71 MMHG | WEIGHT: 124 LBS | HEIGHT: 66 IN | HEART RATE: 93 BPM | SYSTOLIC BLOOD PRESSURE: 112 MMHG

## 2018-11-28 DIAGNOSIS — M62.830 BACK MUSCLE SPASM: ICD-10-CM

## 2018-11-28 DIAGNOSIS — Q79.60 EHLERS-DANLOS SYNDROME: Primary | ICD-10-CM

## 2018-11-28 DIAGNOSIS — M47.812 SPONDYLOSIS OF CERVICAL REGION WITHOUT MYELOPATHY OR RADICULOPATHY: ICD-10-CM

## 2018-11-28 RX ORDER — BACLOFEN 5 MG/1
5-10 TABLET ORAL 3 TIMES DAILY PRN
Qty: 90 TABLET | Refills: 3 | Status: SHIPPED | OUTPATIENT
Start: 2018-11-28 | End: 2019-02-15 | Stop reason: DRUGHIGH

## 2018-11-28 RX ORDER — CELECOXIB 100 MG/1
100 CAPSULE ORAL 2 TIMES DAILY PRN
Qty: 60 CAPSULE | Refills: 1 | Status: SHIPPED | OUTPATIENT
Start: 2018-11-28 | End: 2019-02-07

## 2018-11-28 ASSESSMENT — PAIN SCALES - GENERAL: PAINLEVEL: EXTREME PAIN (8)

## 2018-11-28 NOTE — PATIENT INSTRUCTIONS
1. Call to schedule your procedure.     2. Trigger Point Injections completed today-   Call us with any concerns for infection: redness and drainage at the injection site, fever, chills, sweats    Follow up: 4-6 weeks after Procedure.       When calling to schedule your procedure appointment, also make your follow up clinic appointment 4-6 weeks after the procedure.    Please call [phone] to schedule, reschedule, or cancel your procedure appointment.   Phones are answered Monday - Friday from 7:30 - 4:00pm.  Leave a voicemail with your name, birth date, and phone number if no one is available to take your call.     Your procedure: Cervical Epidural Steroid Injection.     On the day of the procedure  1. Arrive 1 hour earlier than your scheduled time, to the [facility]  Address: [address] [place] [address]  2. Check in on the 5th floor for your procedure    If you must reschedule your procedure more than two times, you must follow up in clinic before rescheduling again.  Preparing for your procedure    CAUTION - FAILURE TO FOLLOW THESE PRE-PROCEDURE INSTRUCTIONS WILL RESULT IN YOUR PROCEDURE BEING RESCHEDULED.      Pregnancy  If you are pregnant, or think you may be pregnant, please notify our staff. This may or may not affect the ability to perform the procedure.       You must have a  take you home after your procedure. Transportation by taxi or para-transit is okay as long as you have a responsible adult accompany you. You must provide your 's full name and contact number at time of check in.     Fasting Protocol You may have NOTHING SOLID TO EAT 8 HOURS prior to arrival at the procedure area.     You may have CLEAR LIQUIDS UP TO 2 HOURS prior to arrival.    Broth and candy are considered solid food and require an eight hour fast.     Clear liquids include water, clear fruit juice (no pulp), carbonated beverages, ice, black coffee, black tea, clear jello. No  alcohol containing beverages.   Medications If you take any medications, DO NOT STOP. Take your medications as usual the day of your procedure with a sip of water AT LEAST 2 HOURS PRIOR TO ARRIVAL.    Antibiotics If you are currently taking antibiotics, you must complete the entire dose 7 days prior to your scheduled procedure. You must be clear of any signs or symptoms of infection. If you begin antibiotics, please contact our clinic for instructions.     Fever, Chills, or Rash If you experience a fever of higher than 100 degrees, chills, rash, or open wounds during the one week before your procedure, please call the clinic to see if you may proceed with your procedure.      Medication Hold List  **Patients under Cardiology/Neurology care should consult their provider prior to the pain procedure to verify pre-procedure medication instructions. The information below contains general guidelines.**    Blood Thinners If you are taking daily ASPIRIN, PLAVIX, OR OTHER BLOOD THINNERS SUCH AS COUMADIN/WARFARIN, we will need your prescribing doctor to sign a release permitting you to stop these medications. Once approved by your prescribing doctor - STOP ALL BLOOD THINNERS BASED ON THE TIME TABLE BELOW PRIOR TO YOUR PROCEDURE. If you have been instructed to stop WARFARIN(COUMADIN), you must have an INR lab drawn the day before your procedure. . Your INR must be within normal limits before we can perform your injection. MEDICATIONS CAN BE RESTARTED AFTER YOUR PROCEDURE.    14 DAY HOLD  Ticlid (ticlopidine)    10 DAY HOLD  Effient (Prasugel)    3 DAY HOLD  Xarelto (rivaroxaban) 7 DAY HOLD  Anacin, Bufferin, Ecotrin, Excedrin, Aggrenox (Aspirin)  Brilinta (ticagrelor)  Coumadin (Warfarin)  Pradexa (Dabigatran)  Elmiron (Pentosan)  Plavix (Clopidogrel Bisulfate)  Pletal (Cilostazol)    24 HOUR HOLD  Lovenox (enoxaparin)  Agrylin (Anagrelide)        Non-steroidal Anti-inflammatories (NSAIDs) DO NOT TAKE any non-steroidal  anti-inflammatory medications (NSAIDs) listed on the table below. MEDICATIONS CAN BE RESTARTED AFTER YOUR PROCEDURE. Celebrex is OK to take and does not need to be discontinued.     Medications to stop:  3 DAY HOLD  Advil, Motrin (Ibuprofen)  Arthrotec (diciofenac sodium/misoprostol)  Clinoril (Sulindac)  Indocin (Indomethacin)  Lodine (Etodolac)  Toradol (Ketorolac)  Vicoprofen (Hydrocodone and Ibuprofen)  Voltaren (Diclotenac)    14 DAY HOLD  Daypro (Oxaprozin)  Feldene (Piroxicam)   7 DAY HOLD  Aleve (Naproxen sodium)  Darvon compound (contains aspirin)  Naprosyn (Naproxen)  Norgesic Forte (contains aspirin)  Mobic (Meloxicam)  Oruvall (Ketoprofen)  Percodan (contains aspirin)  Relafen (Nabumetone)  Salsalate  Trilisate  Vitamin E (more than 400 mg per day)  Any medication containing aspirin                To speak with a nurse, schedule/reschedule/cancel a clinic appointment, or request a medication refill call: [phone]     You can also reach us by [place]: [email or web address]    For refills, please call on Monday, 1 week before your medication runs out. The doctors are not always in clinic, so this gives us time to get your prescriptions ready.  Please let us know the name of the medication you are requesting a refill of.

## 2018-11-28 NOTE — MR AVS SNAPSHOT
After Visit Summary   11/28/2018    Sheyla Ye    MRN: [id number]           Patient Information     Date Of Birth          1980        Visit Information        Provider Department      11/28/2018 2:50 PM Soledad Nice APRN [facility] [place] [facility]        Care Instructions    1. Call to schedule your procedure.     2. Trigger Point Injections completed today-   Call us with any concerns for infection: redness and drainage at the injection site, fever, chills, sweats    Follow up: 4-6 weeks after Procedure.       When calling to schedule your procedure appointment, also make your follow up clinic appointment 4-6 weeks after the procedure.    Please call [phone] to schedule, reschedule, or cancel your procedure appointment.   Phones are answered Monday - Friday from 7:30 - 4:00pm.  Leave a voicemail with your name, birth date, and phone number if no one is available to take your call.     Your procedure: Cervical Epidural Steroid Injection.     On the day of the procedure  1. Arrive 1 hour earlier than your scheduled time, to the [facility]  Address: [address] [place] [address]  2. Check in on the 5th floor for your procedure    If you must reschedule your procedure more than two times, you must follow up in clinic before rescheduling again.  Preparing for your procedure    CAUTION - FAILURE TO FOLLOW THESE PRE-PROCEDURE INSTRUCTIONS WILL RESULT IN YOUR PROCEDURE BEING RESCHEDULED.      Pregnancy  If you are pregnant, or think you may be pregnant, please notify our staff. This may or may not affect the ability to perform the procedure.       You must have a  take you home after your procedure. Transportation by taxi or para-transit is okay as long as you have a responsible adult accompany you. You must provide your 's full name and contact number at time of check in.     Fasting Protocol You may have NOTHING  SOLID TO EAT 8 HOURS prior to arrival at the procedure area.     You may have CLEAR LIQUIDS UP TO 2 HOURS prior to arrival.    Broth and candy are considered solid food and require an eight hour fast.     Clear liquids include water, clear fruit juice (no pulp), carbonated beverages, ice, black coffee, black tea, clear jello. No alcohol containing beverages.   Medications If you take any medications, DO NOT STOP. Take your medications as usual the day of your procedure with a sip of water AT LEAST 2 HOURS PRIOR TO ARRIVAL.    Antibiotics If you are currently taking antibiotics, you must complete the entire dose 7 days prior to your scheduled procedure. You must be clear of any signs or symptoms of infection. If you begin antibiotics, please contact our clinic for instructions.     Fever, Chills, or Rash If you experience a fever of higher than 100 degrees, chills, rash, or open wounds during the one week before your procedure, please call the clinic to see if you may proceed with your procedure.      Medication Hold List  **Patients under Cardiology/Neurology care should consult their provider prior to the pain procedure to verify pre-procedure medication instructions. The information below contains general guidelines.**    Blood Thinners If you are taking daily ASPIRIN, PLAVIX, OR OTHER BLOOD THINNERS SUCH AS COUMADIN/WARFARIN, we will need your prescribing doctor to sign a release permitting you to stop these medications. Once approved by your prescribing doctor - STOP ALL BLOOD THINNERS BASED ON THE TIME TABLE BELOW PRIOR TO YOUR PROCEDURE. If you have been instructed to stop WARFARIN(COUMADIN), you must have an INR lab drawn the day before your procedure. . Your INR must be within normal limits before we can perform your injection. MEDICATIONS CAN BE RESTARTED AFTER YOUR PROCEDURE.    14 DAY HOLD  Ticlid (ticlopidine)    10 DAY HOLD  Effient (Prasugel)    3 DAY HOLD  Xarelto (rivaroxaban) 7 DAY HOLD  Anacin,  Bufferin, Ecotrin, Excedrin, Aggrenox (Aspirin)  Brilinta (ticagrelor)  Coumadin (Warfarin)  Pradexa (Dabigatran)  Elmiron (Pentosan)  Plavix (Clopidogrel Bisulfate)  Pletal (Cilostazol)    24 HOUR HOLD  Lovenox (enoxaparin)  Agrylin (Anagrelide)        Non-steroidal Anti-inflammatories (NSAIDs) DO NOT TAKE any non-steroidal anti-inflammatory medications (NSAIDs) listed on the table below. MEDICATIONS CAN BE RESTARTED AFTER YOUR PROCEDURE. Celebrex is OK to take and does not need to be discontinued.     Medications to stop:  3 DAY HOLD  Advil, Motrin (Ibuprofen)  Arthrotec (diciofenac sodium/misoprostol)  Clinoril (Sulindac)  Indocin (Indomethacin)  Lodine (Etodolac)  Toradol (Ketorolac)  Vicoprofen (Hydrocodone and Ibuprofen)  Voltaren (Diclotenac)    14 DAY HOLD  Daypro (Oxaprozin)  Feldene (Piroxicam)   7 DAY HOLD  Aleve (Naproxen sodium)  Darvon compound (contains aspirin)  Naprosyn (Naproxen)  Norgesic Forte (contains aspirin)  Mobic (Meloxicam)  Oruvall (Ketoprofen)  Percodan (contains aspirin)  Relafen (Nabumetone)  Salsalate  Trilisate  Vitamin E (more than 400 mg per day)  Any medication containing aspirin                To speak with a nurse, schedule/reschedule/cancel a clinic appointment, or request a medication refill call: [phone]     You can also reach us by [employer]: [email or web address]    For refills, please call on Monday, 1 week before your medication runs out. The doctors are not always in clinic, so this gives us time to get your prescriptions ready.  Please let us know the name of the medication you are requesting a refill of.                                     Follow-ups after your visit        Who to contact     Please call your clinic at [phone] to:    Ask questions about your health    Make or cancel appointments    Discuss your medicines    Learn about your test results    Speak to your doctor            Additional Information About Your Visit        [facility]  "Information     [employer] gives you secure access to your electronic health record. If you see a primary care provider, you can also send messages to your care team and make appointments. If you have questions, please call your primary care clinic.  If you do not have a primary care provider, please call [phone] and they will assist you.      [employer] is an electronic gateway that provides easy, online access to your medical records. With [employer], you can request a clinic appointment, read your test results, renew a prescription or communicate with your care team.     To access your existing account, please contact your [facility] or call [phone] for assistance.        Care EveryWhere ID     This is your Care EveryWhere ID. This could be used by other organizations to access your [facility] medical records  [id number]        Your Vitals Were     Pulse Respirations Height BMI (Body Mass Index)          93 16 1.676 m (5' 6\") 20.01 kg/m2         Blood Pressure from Last 3 Encounters:   11/28/18 112/71   10/12/18 121/75   08/27/18 120/76    Weight from Last 3 Encounters:   11/28/18 56.2 kg (124 lb)   10/12/18 56.7 kg (124 lb 14.4 oz)   08/27/18 59 kg (130 lb)              Today, you had the following     No orders found for display       Primary Care Provider Office Phone # Fax #    Myrna Nhung Watson [phone]       [address] [facility] [address]  [facility] [address]        Equal Access to Services     JOSE STALLINGS : Hadii aad ku hadasho Soomaali, waaxda luqadaha, qaybta kaalmada adeegyada, waxtara hoda haygino larios . So [facility] [phone].    ATENCIÓN: Si habla español, tiene a owens disposición servicios gratuitos de asistencia lingüística. Llame al [phone].    We comply with applicable federal civil rights laws and [place] laws. We do not discriminate on the basis of race, color, national origin, age, disability, sex, sexual orientation, or " gender identity.            Thank you!     Thank you for choosing [facility]  for your care. Our goal is always to provide you with excellent care. Hearing back from our patients is one way we can continue to improve our services. Please take a few minutes to complete the written survey that you may receive in the mail after your visit with us. Thank you!             Your Updated Medication List - Protect others around you: Learn how to safely use, store and throw away your medicines at www.disposemymeds.org.          This list is accurate as of 11/28/18  3:43 PM.  Always use your most recent med list.                   Brand Name Dispense Instructions for use Diagnosis    ADVIL PO      Take by mouth as needed for moderate pain        amphetamine-dextroamphetamine 20 MG tablet    ADDERALL     TAKE UP TO 1 TABLET BY MOUTH 3 TIMES DAILY ABOUT 4 HOURS APART AS NEEDED FOR ADHD SYMPTOMS        Baclofen 5 MG Tabs     90 tablet    Take 5 mg by mouth 3 times daily    Back muscle spasm       diazepam 5 MG tablet    VALIUM     TAKE UP TO 2 TABS DAILY BY MOUTH AS NEEDED FOR ANXIETY. USE SPARINGLY        LAMICTAL PO      Take 50 mg by mouth 2 times daily        lidocaine 5 % external ointment    XYLOCAINE    50 g    Apply topically as needed for moderate pain    Muscle spasm, Other chronic pain       loratadine 10 MG tablet    CLARITIN     Take 10 mg by mouth daily        omeprazole 20 MG DR capsule    priLOSEC    90 capsule    Take 1 capsule (20 mg) by mouth daily    Gastroesophageal reflux disease with esophagitis       TYLENOL PO      Take 500 mg by mouth every 4 hours as needed for mild pain or fever Takes 2 tablets of 500 mg

## 2018-11-28 NOTE — PROGRESS NOTES
"[facility] Outpatient Follow-up     REASON FOR PAIN CONSULTATION:  Sheyla Ye is a 38 year old female I am seeing in clinic for a follow up regarding her neck and arm pain. Previous history is significant for Munir-Danlos syndrome. She states that most of her pain is on the right side of her neck, however today it is left sided and radiates to the fourth and fifth digit on her left hand. She has been seen in several different pain clinics including the [place] pain clinics and the [facility] clinic.   Since last seen, she had cervical and thoracic MRIs completed. Thoracic spine unremarkable. Cervical spine with multilevel spondylosis most prominent at C5-6 and C6-7 with resultant moderate canal and neuroforaminal stenosis. Due to a dilated spinal cord at C2 to T1, a follow up MRI in six months was recommended by radiology due to possibility of early stage syrinx.      PAIN HISTORY: Started in 2012  Pain ranges in intensity from 5 to 9,   and averages 8 on the zero to ten numerical rating scale  Quality of the pain is dull, aching constant  Aggravating factors include standing or sitting for long periods  Relieving factors include pressure application and sleep     IMPACT OF PAIN: Cannot work, difficulty performing self care. When pain is right sided she has difficulty witting.      DIAGNOSTIC TESTS: Cervical MRI in 2014 showed kyphotic deformity near C4, Posterior disc osteophyte complex at C5-C6 and C6-C7 with associated neural foraminal narrowing. Facet arthropathy in upper cervical C3-C4. High T2 focus within canal posterior to T1. (taken from MRI report).      PAST PAIN TREATMENT: Has been seen by several other physicians and been on multiple different medications, none of which improve the pain. Has been to PT, acupuncture and had \"nerve injections\"      CURRENT MEDICATIONS:    Current Medications and Prescriptions Ordered in Epic           Current Outpatient Prescriptions Ordered in [facility] "   Medication Sig Dispense Refill     Acetaminophen (TYLENOL PO) Take 500 mg by mouth every 4 hours as needed for mild pain or fever Takes 2 tablets of 500 mg         amphetamine-dextroamphetamine (ADDERALL) 20 MG per tablet TAKE UP TO 1 TABLET BY MOUTH 3 TIMES DAILY ABOUT 4 HOURS APART AS NEEDED FOR ADHD SYMPTOMS   0     BACLOFEN PO Take 5 mg by mouth 2 times daily         diazepam (VALIUM) 5 MG tablet TAKE UP TO 2 TABS DAILY BY MOUTH AS NEEDED FOR ANXIETY. USE SPARINGLY   0     LamoTRIgine (LAMICTAL PO) Take 50 mg by mouth 2 times daily         lidocaine (XYLOCAINE) 5 % ointment Apply topically as needed for moderate pain 50 g 0     meloxicam (MOBIC) 7.5 MG tablet Take 1 tablet (7.5 mg) by mouth daily 30 tablet 1     omeprazole (PRILOSEC) 20 MG CR capsule Take 1 capsule (20 mg) by mouth daily 90 capsule 0     fluvoxaMINE (LUVOX) 25 MG tablet TAKE 1/2 PILL BY MOUTH TWICE DAILY, INCREASING TO 1 PILL TWICE DAILY WHEN TOLERATED.   11     hydrOXYzine (ATARAX) 50 MG tablet Take 1-2 tablets ( mg) by mouth every 6 hours as needed for anxiety (Patient not taking: Reported on 3/15/2018) 30 tablet 0     Ibuprofen (ADVIL PO) Take by mouth as needed for moderate pain         Ketoprofen POWD Apply dime sized amount to affected areas up to three times daily. (Patient not taking: Reported on 3/15/2018) 100 g 3     loperamide (IMODIUM A-D) 2 MG tablet Start with 2 tabs (4 mg), then take one tab (2 mg) after each diarrheal stool.  Do not use more than  8 tabs (16 mg) per day. (Patient not taking: Reported on 8/27/2018) 60 tablet 0     loratadine (CLARITIN) 10 MG tablet Take 10 mg by mouth daily         LORazepam (ATIVAN) 0.5 MG tablet Take 1 tablet (0.5 mg) by mouth every 6 hours as needed for anxiety (Patient not taking: Reported on 3/15/2018) 10 tablet 0     norethindrone (MICRONOR) 0.35 MG per tablet Take 1 tablet (0.35 mg) by mouth daily (Patient not taking: Reported on 4/17/2018) 28 tablet 11     Prenatal Vit-Fe  Fumarate-FA (PRENATAL MULTIVITAMIN  PLUS IRON) 27-0.8 MG TABS Take 1 tablet by mouth daily (Patient not taking: Reported on 8/27/2018) 30 tablet 11     ranitidine (ZANTAC) 150 MG tablet Take 1 tablet (150 mg) by mouth 2 times daily (Patient not taking: Reported on 3/15/2018) 60 tablet 11     tiZANidine (ZANAFLEX) 2 MG tablet Take 1-2 tablets (2-4 mg) by mouth 3 times daily as needed for muscle spasms (Patient not taking: Reported on 4/17/2018) 90 tablet 0      No current [facility]-ordered facility-administered medications on file.               ALLERGIES:        Allergies   Allergen Reactions     Seasonal Allergies           PAST MEDICATION TRIALS:  Opiates: Percocet: H, hydrocodone:H, tramadol: SE, muscle spasms and increased headaches  NSAIDS: Ibuprofen: SE, stomachache. Naproxen: SE, stomachache.  Muscle Relaxants: Clonazepam: Sedation, cyclobenzaprine: H, Robaxin: SE upset stomach, Zanaflex: H   Anti-migraine mediations: Excedrin Migraine: H  Anti-depressants: Bupropion: SE effected appetite, duloxetine: SE sedation, reduced libido, fluoxetine: Mental fog, drowsy, sertraline: H, venlafaxine: H  Sleep aids: Diazepam: H, takes for spasm, clonazepam: H for spasm, lorazepam: For anxiety, zolpidem: Took in 2009 for grief issues/not sleeping  Anti-convulsants: None: Friends took both gabapentin and pregabalin and had a bad experience so patient is not wanting to try these medications as time.          Topicals: Over-the-counter gels and creams: H  Other medications not covered above: Tylenol: [facility], Low dose naltrexone:[facility]     PAST MEDICAL AND PSYCHIATRIC HISTORY:  has a past medical history of Anxiety; Chronic pain; DJD (degenerative joint disease), lumbar; Munir-Danlos disease; and Fibromyalgia.     PAST SURGICAL HISTORY:  has a past surgical history that includes wisdom teeth removal.     FAMILY HISTORY: @[facility]@      HEALTH & LIFESTYLE PRACTICES:       Smoking: Smokes 0.5 ppd (10 pack year history)      Alcohol: 8 standard  "drinks a week      Illicit drugs: denies     SOCIAL HISTORY: Has had difficulties recently with a family emergency. Was also recently fired from her job. Please see the separate psychosocial evaluation by the health psychologist for additional information.     REVIEW OF SYSTEMS: Please refer to the patient's health questionnaire which I reviewed in detail with her.  This review covered 13 bodily systems. Among the positives she reports muscle pain, increased stress, and loss of energy.      PHYSICAL EXAMINATION:  VITAL SIGNS:  Blood pressure 120/76, pulse 83, resp. rate 16, height 1.676 m (5' 6\"), weight 59 kg (130 lb), not currently breastfeeding.  ROS: 10 point ROS neg other than the symptoms noted above in the HPI.  Physical Exam   Constitutional: She is oriented to person, place, and time. She appears well-developed and well-nourished.   Head: Normocephalic and atraumatic.    Neck: Spinous process tenderness and muscular tenderness present. No rigidity. No edema, no erythema and normal range of motion present.    Musculoskeletal:        Cervical back: She exhibits decreased range of motion, tenderness, bony tenderness, pain and spasm. She exhibits no swelling, no edema, no deformity, no laceration and normal pulse. Hyperextensible joints in upper extremities.    Neurological: She is alert and oriented to person, place, and time. She has normal reflexes and strength bilaterally.   Skin: Skin is warm and dry.   Psychiatric: She has a normal mood and affect.   Nursing note and vitals reviewed.        A/P:The patient is a 37-year-old woman with a history of chronic neck pain in the setting of cervical spondylosis.  She has a history of Munir-Danlos syndrome.  Based on her historical and physical exam findings it seems that her pain may be secondary to a myofascial cause.    1. Continue Baclofen to 5mg tid  2.  Patient may consider Tylenol as needed with a maximum dose of 3 g per day.  3. Continue with physical " therapy.   4. Trigger point injections performed today (see note below).   5. C5-6 interlaminar epidural steroid injection ordered today after review of MRI.     Patient verbalizes an understanding of our plan; return for follow up with Dr. Carlisle in clinic 4-6 weeks following procedure. Will place order for six month cervical/thoracic MRI repeat at this appointment to evaluate for possible syrinx.     Procedure:Trigger point injections  Diagnosis: Myofascial upper back and neck pain  Consent:  Risks and benefits were discussed with the pt who agreed to the above procedure. The pt signed the consent form.  Procedure Description:  Pause for the cause was done. Pt was in the sitting position. The posterior neck and upper back was cleaned with chlorhexidine. Using clean technique, a 27G needle, 1.5 inch was used to inject 0.5% bupivicaine in the following areas:  1. Cervical paraspinals - bilateral   2. Trapezius - bilateral   3. Rhomboid major- bilateral     Total volume injected: 10 ml of 0.5% bupivacaine   The pt tolerated the procedure well without complications.     Total time face-to-face: 45 minutes    MORGAN Chaudhry, ROHIT-BC  Pain Management  Department of Interventional Pain Management and Anesthesiology   [facility]

## 2018-11-28 NOTE — LETTER
11/28/2018       RE: Sheyla Ye  [address]  [facility] [address]     Dear Colleague,    Thank you for referring your patient, Sheyla Ye, to the [facility] CLINIC FOR COMPREHENSIVE PAIN MANAGEMENT at [facility]. Please see a copy of my visit note below.    [facility] Adult Chronic Pain Service Outpatient Follow-up     REASON FOR PAIN CONSULTATION:  Sheyla Ye is a 38 year old female I am seeing in clinic for a follow up regarding her neck and arm pain. Previous history is significant for Munir-Danlos syndrome. She states that most of her pain is on the right side of her neck, however today it is left sided and radiates to the fourth and fifth digit on her left hand. She has been seen in several different pain clinics including the [place] pain clinics and the [facility] clinic.   Since last seen, she had cervical and thoracic MRIs completed. Thoracic spine unremarkable. Cervical spine with multilevel spondylosis most prominent at C5-6 and C6-7 with resultant moderate canal and neuroforaminal stenosis. Due to a dilated spinal cord at C2 to T1, a follow up MRI in six months was recommended by radiology due to possibility of early stage syrinx.      PAIN HISTORY: Started in 2012  Pain ranges in intensity from 5 to 9,   and averages 8 on the zero to ten numerical rating scale  Quality of the pain is dull, aching constant  Aggravating factors include standing or sitting for long periods  Relieving factors include pressure application and sleep     IMPACT OF PAIN: Cannot work, difficulty performing self care. When pain is right sided she has difficulty witting.      DIAGNOSTIC TESTS: Cervical MRI in 2014 showed kyphotic deformity near C4, Posterior disc osteophyte complex at C5-C6 and C6-C7 with associated neural foraminal narrowing. Facet arthropathy in upper cervical C3-C4. High T2 focus within canal posterior to T1. (taken from MRI report).      PAST PAIN TREATMENT:  "Has been seen by several other physicians and been on multiple different medications, none of which improve the pain. Has been to PT, acupuncture and had \"nerve injections\"      CURRENT MEDICATIONS:    Current Medications and Prescriptions Ordered in Epic           Current Outpatient Prescriptions Ordered in [facility]   Medication Sig Dispense Refill     Acetaminophen (TYLENOL PO) Take 500 mg by mouth every 4 hours as needed for mild pain or fever Takes 2 tablets of 500 mg         amphetamine-dextroamphetamine (ADDERALL) 20 MG per tablet TAKE UP TO 1 TABLET BY MOUTH 3 TIMES DAILY ABOUT 4 HOURS APART AS NEEDED FOR ADHD SYMPTOMS   0     BACLOFEN PO Take 5 mg by mouth 2 times daily         diazepam (VALIUM) 5 MG tablet TAKE UP TO 2 TABS DAILY BY MOUTH AS NEEDED FOR ANXIETY. USE SPARINGLY   0     LamoTRIgine (LAMICTAL PO) Take 50 mg by mouth 2 times daily         lidocaine (XYLOCAINE) 5 % ointment Apply topically as needed for moderate pain 50 g 0     meloxicam (MOBIC) 7.5 MG tablet Take 1 tablet (7.5 mg) by mouth daily 30 tablet 1     omeprazole (PRILOSEC) 20 MG CR capsule Take 1 capsule (20 mg) by mouth daily 90 capsule 0     fluvoxaMINE (LUVOX) 25 MG tablet TAKE 1/2 PILL BY MOUTH TWICE DAILY, INCREASING TO 1 PILL TWICE DAILY WHEN TOLERATED.   11     hydrOXYzine (ATARAX) 50 MG tablet Take 1-2 tablets ( mg) by mouth every 6 hours as needed for anxiety (Patient not taking: Reported on 3/15/2018) 30 tablet 0     Ibuprofen (ADVIL PO) Take by mouth as needed for moderate pain         Ketoprofen POWD Apply dime sized amount to affected areas up to three times daily. (Patient not taking: Reported on 3/15/2018) 100 g 3     loperamide (IMODIUM A-D) 2 MG tablet Start with 2 tabs (4 mg), then take one tab (2 mg) after each diarrheal stool.  Do not use more than  8 tabs (16 mg) per day. (Patient not taking: Reported on 8/27/2018) 60 tablet 0     loratadine (CLARITIN) 10 MG tablet Take 10 mg by mouth daily         LORazepam " (ATIVAN) 0.5 MG tablet Take 1 tablet (0.5 mg) by mouth every 6 hours as needed for anxiety (Patient not taking: Reported on 3/15/2018) 10 tablet 0     norethindrone (MICRONOR) 0.35 MG per tablet Take 1 tablet (0.35 mg) by mouth daily (Patient not taking: Reported on 4/17/2018) 28 tablet 11     Prenatal Vit-Fe Fumarate-FA (PRENATAL MULTIVITAMIN  PLUS IRON) 27-0.8 MG TABS Take 1 tablet by mouth daily (Patient not taking: Reported on 8/27/2018) 30 tablet 11     ranitidine (ZANTAC) 150 MG tablet Take 1 tablet (150 mg) by mouth 2 times daily (Patient not taking: Reported on 3/15/2018) 60 tablet 11     tiZANidine (ZANAFLEX) 2 MG tablet Take 1-2 tablets (2-4 mg) by mouth 3 times daily as needed for muscle spasms (Patient not taking: Reported on 4/17/2018) 90 tablet 0      No current Epic-ordered facility-administered medications on file.         ALLERGIES:        Allergies   Allergen Reactions     Seasonal Allergies           PAST MEDICATION TRIALS:  Opiates: Percocet: H, hydrocodone:H, tramadol: SE, muscle spasms and increased headaches  NSAIDS: Ibuprofen: SE, stomachache. Naproxen: SE, stomachache.  Muscle Relaxants: Clonazepam: Sedation, cyclobenzaprine: H, Robaxin: SE upset stomach, Zanaflex: H   Anti-migraine mediations: Excedrin Migraine: H  Anti-depressants: Bupropion: SE effected appetite, duloxetine: SE sedation, reduced libido, fluoxetine: Mental fog, drowsy, sertraline: H, venlafaxine: H  Sleep aids: Diazepam: H, takes for spasm, clonazepam: H for spasm, lorazepam: For anxiety, zolpidem: Took in 2009 for grief issues/not sleeping  Anti-convulsants: None: Friends took both gabapentin and pregabalin and had a bad experience so patient is not wanting to try these medications as time.          Topicals: Over-the-counter gels and creams: H  Other medications not covered above: Tylenol: [facility], Low dose naltrexone:NH     PAST MEDICAL AND PSYCHIATRIC HISTORY:  has a past medical history of Anxiety; Chronic pain; DJD  "(degenerative joint disease), lumbar; Munir-Danlos disease; and Fibromyalgia.     PAST SURGICAL HISTORY:  has a past surgical history that includes wisdom teeth removal.     FAMILY HISTORY: @[facility]@      HEALTH & LIFESTYLE PRACTICES:       Smoking: Smokes 0.5 ppd (10 pack year history)      Alcohol: 8 standard drinks a week      Illicit drugs: denies     SOCIAL HISTORY: Has had difficulties recently with a family emergency. Was also recently fired from her job. Please see the separate psychosocial evaluation by the health psychologist for additional information.     PHYSICAL EXAMINATION:  VITAL SIGNS:  Blood pressure 120/76, pulse 83, resp. rate 16, height 1.676 m (5' 6\"), weight 59 kg (130 lb), not currently breastfeeding.  ROS: 10 point ROS neg other than the symptoms noted above in the HPI.  Physical Exam   Constitutional: She is oriented to person, place, and time. She appears well-developed and well-nourished.   Head: Normocephalic and atraumatic.    Neck: Spinous process tenderness and muscular tenderness present. No rigidity. No edema, no erythema and normal range of motion present.    Musculoskeletal:        Cervical back: She exhibits decreased range of motion, tenderness, bony tenderness, pain and spasm. She exhibits no swelling, no edema, no deformity, no laceration and normal pulse. Hyperextensible joints in upper extremities.    Neurological: She is alert and oriented to person, place, and time. She has normal reflexes and strength bilaterally.   Skin: Skin is warm and dry.   Psychiatric: She has a normal mood and affect.   Nursing note and vitals reviewed.        A/P:The patient is a 37-year-old woman with a history of chronic neck pain in the setting of cervical spondylosis.  She has a history of Munir-Danlos syndrome.  Based on her historical and physical exam findings it seems that her pain may be secondary to a myofascial cause.    1. Continue Baclofen to 5mg tid  2.  Patient may consider Tylenol as " needed with a maximum dose of 3 g per day.  3. Continue with physical therapy.   4. Trigger point injections performed today (see note below).   5. C5-6 interlaminar epidural steroid injection ordered today after review of MRI.     Patient verbalizes an understanding of our plan; return for follow up with Dr. Carlisle in clinic 4-6 weeks following procedure. Will place order for six month cervical/thoracic MRI repeat at this appointment to evaluate for possible syrinx.     Procedure:Trigger point injections  Diagnosis: Myofascial upper back and neck pain  Consent:  Risks and benefits were discussed with the pt who agreed to the above procedure. The pt signed the consent form.  Procedure Description:  Pause for the cause was done. Pt was in the sitting position. The posterior neck and upper back was cleaned with chlorhexidine. Using clean technique, a 27G needle, 1.5 inch was used to inject 0.5% bupivicaine in the following areas:  1. Cervical paraspinals - bilateral   2. Trapezius - bilateral   3. Rhomboid major- bilateral     Total volume injected: 10 ml of 0.5% bupivacaine   The pt tolerated the procedure well without complications.     Total time face-to-face: 45 minutes    MORGAN Chaudhry, ROHIT-BC  Pain Management  Department of Interventional Pain Management and Anesthesiology   [facility]

## 2018-12-06 ENCOUNTER — TELEPHONE (OUTPATIENT)
Dept: ANESTHESIOLOGY | Facility: CLINIC | Age: 38
End: 2018-12-06

## 2018-12-06 NOTE — TELEPHONE ENCOUNTER
Attempted to returns patient phone call to schedule Cervical Epidural Steroid Injection. Left detailed voicemail requesting patient call back [phone]. Informed patient if for any reason she gets my voicemail to please a leave detailed message with best time to call back.

## 2019-01-29 DIAGNOSIS — K21.00 GASTROESOPHAGEAL REFLUX DISEASE WITH ESOPHAGITIS: ICD-10-CM

## 2019-01-29 DIAGNOSIS — G89.29 OTHER CHRONIC PAIN: ICD-10-CM

## 2019-01-29 DIAGNOSIS — M62.838 MUSCLE SPASM: ICD-10-CM

## 2019-01-29 RX ORDER — LIDOCAINE 50 MG/G
OINTMENT TOPICAL PRN
Qty: 50 G | Refills: 1 | Status: SHIPPED | OUTPATIENT
Start: 2019-01-29 | End: 2020-07-08

## 2019-01-30 ENCOUNTER — TELEPHONE (OUTPATIENT)
Dept: INTERNAL MEDICINE | Facility: CLINIC | Age: 39
End: 2019-01-30

## 2019-01-30 ENCOUNTER — TELEPHONE (OUTPATIENT)
Dept: TRANSPLANT | Facility: CLINIC | Age: 39
End: 2019-01-30
Payer: COMMERCIAL

## 2019-01-30 NOTE — TELEPHONE ENCOUNTER
[facility] Prior Authorization Team Request    Medication: Omeprazole 20mg  Dosing: Take one capsule by mouth once dily  NDC (required for Medicaid members):     Insurance   BIN:[id number]  PCN:[id number]  Grp:[id number]  ID:[id number]    CoverMyMeds Key (if applicable):     Additional documentation:     Filling Pharmacy:[facility]   Phone Number: [phone]  Contact:  PHARM [employer] PA ([id number]) please send all responses to this pool.  Pharmacy NPI (required for Medicaid members):

## 2019-01-30 NOTE — TELEPHONE ENCOUNTER
[facility] Prior Authorization Team Request    Medication: Lidocaine 5% ointment  Dosing: Apply topically as needed for moderate pain  NDC (required for Medicaid members):     Insurance   BIN: [id number]  PCN: [id number]  Grp:[id number]  ID:[id number]    CoverMyMeds Key (if applicable):     Additional documentation:     Filling [facility]acy: [facility]  Phone Number:[phone]  Contact: STEF PHARM PRICILA PA (P [id number]) please send all responses to this pool.  Pharmacy NPI (required for Medicaid members):

## 2019-02-07 DIAGNOSIS — M47.812 SPONDYLOSIS OF CERVICAL REGION WITHOUT MYELOPATHY OR RADICULOPATHY: ICD-10-CM

## 2019-02-07 DIAGNOSIS — Q79.60 EHLERS-DANLOS SYNDROME: ICD-10-CM

## 2019-02-07 RX ORDER — CELECOXIB 100 MG/1
100 CAPSULE ORAL 2 TIMES DAILY PRN
Qty: 60 CAPSULE | Refills: 1 | Status: SHIPPED | OUTPATIENT
Start: 2019-02-07 | End: 2019-05-02

## 2019-02-07 NOTE — TELEPHONE ENCOUNTER
Medication refilled per protocol.    Celebrex- Take 1 capsule (100 mg) by mouth 2 times daily as needed for moderate pain.    Pt last seen in clinic on 11/28/18.    Pt is scheduled to have a procedure on 4/30/19, and has a scheduled follow up appointment with MAUREEN Nice on 5/3/19.    Mariel Rodriguez LPN

## 2019-02-09 NOTE — TELEPHONE ENCOUNTER
Central Prior Authorization Team   Phone: [phone]      PA Initiation    Medication: Lidocaine 5% ointment-PA Initiated  Insurance Company: [employer] - Phone [phone] Fax [phone]  Pharmacy Filling the Rx: [facility] [place], [place] - [address] [place] [address] [phone]  Filling Pharmacy Phone: [phone]  Filling Pharmacy Fax: [phone]  Start Date: 2/9/2019

## 2019-02-09 NOTE — TELEPHONE ENCOUNTER
Central Prior Authorization Team   [phone]    PA Initiation    Medication: Omeprazole 20mg  Insurance Company: Blue Plus PMAP - Phone [phone] Fax [phone]  Pharmacy Filling the Rx: [place], [place] - [address] [place] [address] [phone]  Filling Pharmacy Phone: [phone]  Filling Pharmacy Fax: [phone]  Start Date: 2/9/2019

## 2019-02-11 NOTE — TELEPHONE ENCOUNTER
PRIOR AUTHORIZATION DENIED    Medication: Lidocaine 5% ointment-DENIED    Denial Date: 2/9/2019    Denial Rational:               Appeal Information:

## 2019-02-11 NOTE — TELEPHONE ENCOUNTER
PRIOR AUTHORIZATION DENIED    Medication: Omeprazole 20mg-PA DENIED     Denial Date: 2/9/2019    Denial Rational: Criteria Not Met. Insurance states that patient is able to get up to one capsule per day for a maximum of 120 days per year. Duration limit set by patients plan for PPIs and all limit is shared across all PPIs.              Appeal Information: If provider would like to appeal please provide a letter of medical necessity stating why formulary alternatives would not be clinically appropriate for patient and route back to the PA team.

## 2019-02-13 ENCOUNTER — TELEPHONE (OUTPATIENT)
Dept: ANESTHESIOLOGY | Facility: CLINIC | Age: 39
End: 2019-02-13

## 2019-02-13 NOTE — TELEPHONE ENCOUNTER
Spoke with: Sheyla     Date Scheduled: 4/30/19     Provider: Dr. Carlisle     : yes     F/U Appointment: N/A     Patient was educated on pre-op nurse call: Yes

## 2019-02-15 DIAGNOSIS — M62.830 BACK MUSCLE SPASM: ICD-10-CM

## 2019-02-15 RX ORDER — BACLOFEN 10 MG/1
TABLET ORAL
Qty: 90 TABLET | Refills: 1 | Status: SHIPPED | OUTPATIENT
Start: 2019-02-15 | End: 2019-05-02

## 2019-02-15 NOTE — TELEPHONE ENCOUNTER
APRN was agreeable to refill pt's Baclofen per their request of   10 mg- Three times daily, as needed/ 90 dispensed.     APRN wanted to keep the original instructions- Take 0.5 to 1 tablet by mouth TID, PRN.     Refill sent to the pharmacy.    Mariel Rodriguez LPN

## 2019-03-27 ENCOUNTER — TELEPHONE (OUTPATIENT)
Dept: INTERNAL MEDICINE | Facility: CLINIC | Age: 39
End: 2019-03-27

## 2019-03-27 DIAGNOSIS — M54.12 CERVICAL RADICULOPATHY: ICD-10-CM

## 2019-03-27 DIAGNOSIS — M54.50 BILATERAL LOW BACK PAIN WITHOUT SCIATICA: Primary | ICD-10-CM

## 2019-03-27 NOTE — TELEPHONE ENCOUNTER
[facility] Prior Authorization Team Request    Medication: Omeprazole 20mg  Dosin daily  Qty: 30  Day Supply: 30  NDC (required for Medicaid members): [address]-43     Insurance   BIN: [id number]  PCN: [id number]  Grp: [id number]  ID: [id number]    CoverMyMeds Key (if applicable):     Additional documentation:   PA Required for more than #120 capsules every 365 days    Filling Pharmacy: [facility]  Phone Number: [phone]  Contact:    Pharmacy NPI (required for Medicaid members): [id number]

## 2019-03-27 NOTE — TELEPHONE ENCOUNTER
[facility] Prior Authorization Team Request    Medication: Lidocaine 5% Ointment  Dosing: As needed  Qty: 35.44gm  Day Supply: 14  NDC (required for Medicaid members): [address]-20     Insurance   BIN: [id number]  PCN: [id number]  Grp: MERCY  ID: [id number]    CoverMyMeds Key (if applicable):     Additional documentation:       Filling Pharmacy: [facility]  Phone Number: [phone]  Contact:    Pharmacy NPI (required for Medicaid members): [id number]

## 2019-03-28 NOTE — TELEPHONE ENCOUNTER
Prior Authorization Not Needed per Insurance    Medication: Lidocaine 5% Ointment-PA DENIED   Insurance Company: Blue Plus PMAP - Phone [phone] Fax [phone]  Expected CoPay:      Pharmacy Filling the Rx: [facility] [place], [place] - [address] [place] [address] [phone]  Pharmacy Notified: Yes  Patient Notified: No    PA DENIED on 2/11/2019. See encounter on 1/30/2019 for additional information. If you would like to appeal. Please supply letter medical necessity and place in patients chart.

## 2019-03-28 NOTE — TELEPHONE ENCOUNTER
Prior Authorization Not Needed per Insurance    Medication: Omeprazole 20mg-PA NOT NEEDED   Insurance Company: Blue Plus PMAP - Phone [phone] Fax [phone]  Expected CoPay:      Pharmacy Filling the Rx: [facility] [place], [place] - [address] [place] [address] [phone]  Pharmacy Notified: Yes  Patient Notified: No    PA DENIED on 2/11/2019. See encounter 1/30/2019 for additional information on Denial. If you would like to appeal. Please supply letter medical necessity and place in patients chart.

## 2019-03-29 NOTE — TELEPHONE ENCOUNTER
Pharmacy was called, and pharmacist relayed that Lidocaine 4% patches OTC may be covered by insurance.    Teresita Sahni RN on 3/29/2019 at 4:55 PM

## 2019-04-03 RX ORDER — LIDOCAINE 4 G/G
1 PATCH TOPICAL EVERY 24 HOURS
Qty: 30 PATCH | Refills: 3 | Status: SHIPPED | OUTPATIENT
Start: 2019-04-03 | End: 2020-07-08

## 2019-04-05 PROBLEM — M54.2 NECK PAIN: Status: RESOLVED | Noted: 2018-10-04 | Resolved: 2019-04-05

## 2019-04-05 NOTE — PROGRESS NOTES
Discharge Note    Progress reporting period is from initial eval to Oct 19, 2018.     Sheyla failed to return for next follow up visit and current status is unknown.  Please see information below for last relevant information on current status.  Patient seen for Rxs Used: 2 visits.    SUBJECTIVE  Subjective changes noted by patient:  Subjective: Doing about the same, had MRI and wants to discuss/review results. .  Current pain level is  .     Previous pain level was  Initial Pain level: 8/10.   Changes in function:  Yes (See Goal flowsheet attached for changes in current functional level)  Adverse reaction to treatment or activity: None    OBJECTIVE  Changes noted in objective findings: Objective: Added cervical extension over towel roll and lumbar extension to HEP.    ASSESSMENT/PLAN  Diagnosis: Back pain   DIAGP:  The encounter diagnosis was Neck pain.  Updated problem list and treatment plan:     Decreased ROM/flexibility - HEP  Decreased strength - HEP    STG/LTGs have been met or progress has been made towards goals:  Yes, please see goal flowsheet for most current information.    Assessment of Progress: current status is unknown.  Last current status:  .  Self Management Plans:  HEP    I have re-evaluated this patient and find that the nature, scope, duration and intensity of the therapy is appropriate for the medical condition of the patient.  Sheyla continues to require the following intervention to meet STG and LTG's:  HEP.    Recommendations:  Discharge with current home program.  Patient to follow up with MD as needed. Episode to be closed at this time and patient formally discharged from therapy.    Олег Clement, PT, DPT, OCS      Please refer to the daily flowsheet for treatment today, total treatment time and time spent performing 1:1 timed codes.

## 2019-04-15 DIAGNOSIS — K21.00 GASTROESOPHAGEAL REFLUX DISEASE WITH ESOPHAGITIS: ICD-10-CM

## 2019-04-15 NOTE — TELEPHONE ENCOUNTER
omeprazole (PRILOSEC) 20 MG DR capsule      Last Written Prescription Date:  1/29/19  Last Fill Quantity: 90,   # refills: 0  Last Office Visit : 3/15/18  Future Office visit:  none    Routing refill request to provider for review/approval because:  Per chart PA denied 2/9/19: quantity limit reached... See telephone note 1/130/19.  Then later entry per PA team telephone note 3/27/19 PA not needed.    OK to refill? substitute?    Appointment due- message routed to clinic coordinator.

## 2019-04-30 ENCOUNTER — ANCILLARY PROCEDURE (OUTPATIENT)
Dept: RADIOLOGY | Facility: AMBULATORY SURGERY CENTER | Age: 39
End: 2019-04-30
Payer: COMMERCIAL

## 2019-04-30 ENCOUNTER — HOSPITAL ENCOUNTER (OUTPATIENT)
Facility: AMBULATORY SURGERY CENTER | Age: 39
End: 2019-04-30
Payer: COMMERCIAL

## 2019-04-30 VITALS
HEART RATE: 93 BPM | WEIGHT: 125 LBS | RESPIRATION RATE: 16 BRPM | BODY MASS INDEX: 20.09 KG/M2 | HEIGHT: 66 IN | TEMPERATURE: 98.6 F | OXYGEN SATURATION: 99 % | DIASTOLIC BLOOD PRESSURE: 78 MMHG | SYSTOLIC BLOOD PRESSURE: 114 MMHG

## 2019-04-30 DIAGNOSIS — M54.12 CERVICAL RADICULOPATHY: ICD-10-CM

## 2019-04-30 LAB — B-HCG SERPL-ACNC: NORMAL MIU/ML

## 2019-04-30 RX ORDER — LIDOCAINE HYDROCHLORIDE 10 MG/ML
INJECTION, SOLUTION EPIDURAL; INFILTRATION; INTRACAUDAL; PERINEURAL PRN
Status: DISCONTINUED | OUTPATIENT
Start: 2019-04-30 | End: 2019-04-30 | Stop reason: HOSPADM

## 2019-04-30 RX ORDER — BETAMETHASONE SODIUM PHOSPHATE AND BETAMETHASONE ACETATE 3; 3 MG/ML; MG/ML
INJECTION, SUSPENSION INTRA-ARTICULAR; INTRALESIONAL; INTRAMUSCULAR; SOFT TISSUE PRN
Status: DISCONTINUED | OUTPATIENT
Start: 2019-04-30 | End: 2019-04-30 | Stop reason: HOSPADM

## 2019-04-30 RX ORDER — IOPAMIDOL 408 MG/ML
INJECTION, SOLUTION INTRATHECAL PRN
Status: DISCONTINUED | OUTPATIENT
Start: 2019-04-30 | End: 2019-04-30 | Stop reason: HOSPADM

## 2019-04-30 RX ORDER — ACETAMINOPHEN 500 MG
500 TABLET ORAL ONCE
Status: COMPLETED | OUTPATIENT
Start: 2019-04-30 | End: 2019-04-30

## 2019-04-30 RX ADMIN — Medication 500 MG: at 08:29

## 2019-04-30 ASSESSMENT — MIFFLIN-ST. JEOR: SCORE: 1263.75

## 2019-04-30 NOTE — DISCHARGE INSTRUCTIONS
Home Care Instructions after an Epidural Steroid Pain Injection    A lumbar epidural steroid injection delivers steroid medication directly into the area that may be causing your lower back pain and/or leg pain. A cervical or thoracic epidural steroid injection delivers steroids into the epidural space surrounding spinal nerve roots to help relieve pain in the upper spine/neck.    Activity  -Rest today  -Do not work today  -Resume normal activity tomorrow  -DO NOT shower for 24 hours  -DO NOT remove bandaid for 24 hours    Pain  -You may experience soreness at the injection site for one or two days  -You may use an ice pack for 20 minutes every 2 hours for the first 24 hours  -You may use a heating pad after the first 24 hours  -You may use Tylenol (acetaminophen) every 4 hours or other pain medicines as     directed by your physician    You may experience numbness radiating into your legs or arms (depending on the procedure location). This numbness may last several hours. Until sensation returns to normal; please use caution in walking, climbing stairs, and stepping out of your vehicle, etc.    DID YOU RECEIVE SEDATION TODAY?  Yes    Safety  Sedation medicine, if given, may remain active for many hours. It is important for the next 24 hours that you do not:  -Drive a car  -Operate machines or power tools  -Consume alcohol, including beer  -Sign any important papers or legal documents      Common side effects of steroids:  Not everyone will experience corticosteroid side effects. If side effects are experienced, they will gradually subside in the 7-10 day period following an injection. Most common side effects include:  -Flushed face and/or chest  -Feeling of warmth, particularly in the face but could be an overall feeling of warmth  -Increased blood sugar in diabetic patients  -Menstrual irregularities my occur. If taking hormone-based birth control an alternate method of birth control is recommended  -Sleep  disturbances and/or mood swings are possible  -Leg cramps    Please contact us if you have:  -Severe pain  -Fever more than 101.5 degrees Fahrenheit  -Signs of infection at the injection site (redness, swelling, or drainage)    If you have questions, please contact our office at [phone] between the hours of 7:00 am and 3:00 pm Monday through Friday. After office hours you can contact the on call provider by dialing [phone]. If you need immediate attention, we recommend that you go to a hospital emergency room or dial [phone].

## 2019-05-02 DIAGNOSIS — M47.812 SPONDYLOSIS OF CERVICAL REGION WITHOUT MYELOPATHY OR RADICULOPATHY: ICD-10-CM

## 2019-05-02 DIAGNOSIS — M62.830 BACK MUSCLE SPASM: ICD-10-CM

## 2019-05-02 DIAGNOSIS — Q79.60 EHLERS-DANLOS SYNDROME: ICD-10-CM

## 2019-05-02 RX ORDER — CELECOXIB 100 MG/1
100 CAPSULE ORAL 2 TIMES DAILY PRN
Qty: 60 CAPSULE | Refills: 0 | Status: SHIPPED | OUTPATIENT
Start: 2019-05-02 | End: 2019-05-24

## 2019-05-02 RX ORDER — BACLOFEN 10 MG/1
TABLET ORAL
Qty: 90 TABLET | Refills: 0 | Status: SHIPPED | OUTPATIENT
Start: 2019-05-02 | End: 2019-05-24

## 2019-05-03 ENCOUNTER — OFFICE VISIT (OUTPATIENT)
Dept: ANESTHESIOLOGY | Facility: CLINIC | Age: 39
End: 2019-05-03
Payer: COMMERCIAL

## 2019-05-03 VITALS — SYSTOLIC BLOOD PRESSURE: 117 MMHG | DIASTOLIC BLOOD PRESSURE: 80 MMHG | OXYGEN SATURATION: 97 % | HEART RATE: 93 BPM

## 2019-05-03 DIAGNOSIS — Q79.60 EHLERS-DANLOS SYNDROME: Primary | ICD-10-CM

## 2019-05-03 DIAGNOSIS — M79.18 MUSCULOSKELETAL PAIN: ICD-10-CM

## 2019-05-03 RX ORDER — GABAPENTIN 100 MG/1
100 CAPSULE ORAL 3 TIMES DAILY
Qty: 90 CAPSULE | Refills: 1 | Status: SHIPPED | OUTPATIENT
Start: 2019-05-03 | End: 2019-06-06

## 2019-05-03 ASSESSMENT — ANXIETY QUESTIONNAIRES
GAD7 TOTAL SCORE: 17
6. BECOMING EASILY ANNOYED OR IRRITABLE: MORE THAN HALF THE DAYS
5. BEING SO RESTLESS THAT IT IS HARD TO SIT STILL: MORE THAN HALF THE DAYS
7. FEELING AFRAID AS IF SOMETHING AWFUL MIGHT HAPPEN: SEVERAL DAYS
4. TROUBLE RELAXING: NEARLY EVERY DAY
3. WORRYING TOO MUCH ABOUT DIFFERENT THINGS: NEARLY EVERY DAY
7. FEELING AFRAID AS IF SOMETHING AWFUL MIGHT HAPPEN: SEVERAL DAYS
2. NOT BEING ABLE TO STOP OR CONTROL WORRYING: NEARLY EVERY DAY
GAD7 TOTAL SCORE: 17
GAD7 TOTAL SCORE: 17
1. FEELING NERVOUS, ANXIOUS, OR ON EDGE: NEARLY EVERY DAY

## 2019-05-03 ASSESSMENT — PATIENT HEALTH QUESTIONNAIRE - PHQ9
SUM OF ALL RESPONSES TO PHQ QUESTIONS 1-9: 18
SUM OF ALL RESPONSES TO PHQ QUESTIONS 1-9: 18

## 2019-05-03 NOTE — NURSING NOTE
LPN reviewed AVS with Pt.  Pt verbalized an understanding of information, and was asked to contact clinic with questions.    Mariel Rodriguez LPN

## 2019-05-03 NOTE — PATIENT INSTRUCTIONS
1. Start gabapentin as follows:  1 tab= 100mg    AM   PM   Bedtime  0   0   100mg (1 tab).  After 1-3 days, increase as tolerated to the next line    100mg (1 tab)  0   100 (1 tab).  After 3-4 days, increase as tolerated to the next line    100mg (1 tab)  100mg (1 tab)  100 (1 tab).  Continue at this dose.     2. Schedule your Lumbar and Pelvis X Ray-   IMAGING SERVICES HOURS:    All imaging modalities are available from 7 a.m. - 9 p.m. Monday through Friday  X-ray, CT, MRI, and General Ultrasound appointments are available from 7 a.m. -3:30 p.m. on Saturdays  X-ray, CT and MRI appointments are available from 8 a.m. - 4:30 p.m. on Sundays  Please call [phone] to schedule imaging exams    3. Trial taking Tylenol 500-1000 mg, up to 3 times a day as needed.     4. Diclofenac gel ordered- use medication as prescribed.     Follow up:  6-8 weeks in clinic.     To speak with a nurse, schedule/reschedule/cancel a clinic appointment, or request a medication refill call: [phone]     You can also reach us by [employer]: [email or web address]    For refills, please call on Monday, 1 week before your medication runs out. The doctors are not always in clinic, so this gives us time to get your prescriptions ready.  Please let us know the name of the medication you are requesting a refill of.

## 2019-05-03 NOTE — LETTER
5/3/2019       RE: Sheyla Ye  [address] [id number] [address]     Dear Colleague,    Thank you for referring your patient, Sheyla Ye, to the [facility] FOR COMPREHENSIVE PAIN MANAGEMENT at [facility]. Please see a copy of my visit note below.    Date of visit: 5/3/2019    Chief complaint:   Chief Complaint   Patient presents with     Pain Management     Return visit- Follow up appointment, DOS 4/30/19       Interval history:  Sheyla Ye is a 38 year old female last seen by me on 11/28/18 for cervical spondylosis and Munir Danlos syndrome. Cervical spine with multilevel spondylosis most prominent at C5-6 and C6-7 with resultant moderate canal and neuroforaminal stenosis.  She underwent an interlaminar C7-T1 injection three days ago, on 4/30/19. She states she knows it's early, but that Dr. Carlisle gave her the ok to come in. She has not yet appreciated any relief from the injection.   Trigger point injections were performed at last visit, however, patient is uncertain of benefit.   She expresses concern today with right shoulder blade instability, right-sided low back pain, and right hip instability.   Of note, due to a dilated spinal cord at C2 to T1, a follow up MRI in six months was recommended by radiology due to possibility of early stage syrinx.  She has had good luck with Biofreeze and asks if insurance will cover the cost of this if prescribed. She is interested in any applicable topicals that may be covered by insurance. Lidocaine was not covered.   She states she will be moving back to her hometown approximately 5-6 hours away. She would like to continue her healthcare at the [facility].     Recommendations/plan at the last visit included:  1. Continue Baclofen to 5mg tid  2.  Patient may consider Tylenol as needed with a maximum dose of 3 g per day.  3. Continue with physical therapy.   4. Trigger point injections performed  today (see note below).   5. C5-6 interlaminar epidural steroid injection ordered today after review of MRI.     Past pain treatments:      Opiates: Percocet: H, hydrocodone:H, tramadol: SE, muscle spasms and increased headaches              NSAIDS: Ibuprofen: SE, stomachache. Naproxen: SE, stomachache.              Muscle Relaxants: Clonazepam: Sedation, cyclobenzaprine: H, Robaxin: SE upset stomach, Zanaflex: H              Anti-migraine mediations: Excedrin Migraine: H              Anti-depressants: Bupropion: SE effected appetite, duloxetine: SE sedation, reduced libido, fluoxetine: Mental fog, drowsy, sertraline: H, venlafaxine: H              Sleep aids: Diazepam: H, takes for spasm, clonazepam: H for spasm, lorazepam: For anxiety, zolpidem: Took in 2009 for grief issues/not sleeping              Anti-convulsants: None: Friends took both gabapentin and pregabalin and had a bad experience so patient is not wanting to try these medications as time               Topicals: Over-the-counter gels and creams, capsaicin, lidocaine patches: H              Other medications not covered above: Tylenol: [facility], Low dose naltrexone:NH    Medications:  Current Outpatient Medications   Medication Sig Dispense Refill     Acetaminophen (TYLENOL PO) Take 500 mg by mouth every 4 hours as needed for mild pain or fever Takes 2 tablets of 500 mg       amphetamine-dextroamphetamine (ADDERALL) 20 MG per tablet TAKE UP TO 1 TABLET BY MOUTH 3 TIMES DAILY ABOUT 4 HOURS APART AS NEEDED FOR ADHD SYMPTOMS  0     baclofen (LIORESAL) 10 MG tablet Take 0.5-1 tablet by mouth, 3 times daily as needed. 90 tablet 0     celecoxib (CELEBREX) 100 MG capsule Take 1 capsule (100 mg) by mouth 2 times daily as needed for moderate pain 60 capsule 0     diazepam (VALIUM) 5 MG tablet TAKE UP TO 2 TABS DAILY BY MOUTH AS NEEDED FOR ANXIETY. USE SPARINGLY  0     Ibuprofen (ADVIL PO) Take by mouth as needed for moderate pain       LamoTRIgine (LAMICTAL PO) Take  "50 mg by mouth 2 times daily       Lidocaine (LIDOCARE) 4 % Patch Place 1 patch onto the skin every 24 hours 30 patch 3     lidocaine (XYLOCAINE) 5 % external ointment Apply topically as needed for moderate pain 50 g 1     loratadine (CLARITIN) 10 MG tablet Take 10 mg by mouth daily       omeprazole (PRILOSEC) 20 MG DR capsule Take 1 capsule (20 mg) by mouth daily 90 capsule 0       Medical History: any changes in medical history since they were last seen? No    Review of Systems:  The 14 system ROS was reviewed from the intake questionnaire; results listed at end of note.     Physical Exam:  Blood pressure 117/80, pulse 93, last menstrual period 04/09/2019, SpO2 97 %, not currently breastfeeding.  General: In no apparent distress, thin  Mental status: Normal mood and affect, anxious  Neuro: Alert, oriented. No sensory deficits.   Head: Atraumatic, normocephalic  Eyes: Extra-ocular movements grossly intact, no scleral icterus  Neck: Supple, Range of motion full  Respiratory: Non-labored breathing; No respiratory distress  Skin: No rashes or lesions noted on exposed areas of skin  Msk: She exhibits decreased range of motion, tenderness, bony tenderness, pain and spasm. She exhibits no swelling, no edema, no deformity, no laceration and normal pulse. Hyperextensible joints in upper extremities.      Assessment:   Sheyla Ye is a 38 year old female who is seen at the pain clinic for chronic diffuse pain and joint instability in the setting of Munir-Danlos Syndrome.     Plan:  1. Interventions:   - advised patient that it may take upwards of 10 days to fully appreciate efficacy of cervical NASH. I did let her know I do not have any specific \"plan B\" in regards to interventional management as the preponderance of her pain is likely myofascial/musculoskeletal 2/2 EDS.   - may consider repeat of trigger point injections.   2. Medication Management:   - start gabapentin 100 mg t.i.d. PRN  - continue Celebrex 100 mg " b.i.d. PRN  - start diclofenac 1% gel prescribed; apply q.i.d. PRN  - continue baclofen 10 mg, 0.5-1 tablet t.i.d. PRN  - OK to take acetaminophen 500 - 1000 mg t.i.d. PRN  - OK to continue to use topical Biofreeze  3. Physical Therapy:    - continue with HEP, but did advise patient she may need to re-establish with formal physical therapy periodically for continued muscular strengthening and stability.   4. Need for Clinical Health Psychologist.  - patient follows with psychiatrist; I strongly recommend pain psychology for cognitive behavioral therapy and biofeedback, however, options may be limited due to patient's geographic location.   5. Diagnostic Studies:    -XR lumbar 4 view  -XR right hip/pelvis   6. Follow up: RTC in 6-8 weeks to discuss gabapentin efficacy and possible repeat trigger point injections.     Total time spent was 40 minutes, and more than 50% of face to face time was spent in counseling and/or coordination of care regarding plan of care.    MORGAN Chaudhry, ROHIT-BC  Pain Management  Department of Interventional Pain Management and Anesthesiology   [facility]

## 2019-05-03 NOTE — PROGRESS NOTES
Date of visit: 5/3/2019    Chief complaint:   Chief Complaint   Patient presents with     Pain Management     Return visit- Follow up appointment, DOS 4/30/19       Interval history:  Sheyla Ye is a 38 year old female last seen by me on 11/28/18 for cervical spondylosis and Munir Danlos syndrome. Cervical spine with multilevel spondylosis most prominent at C5-6 and C6-7 with resultant moderate canal and neuroforaminal stenosis.  She underwent an interlaminar C7-T1 injection three days ago, on 4/30/19. She states she knows it's early, but that Dr. Carlisle gave her the ok to come in. She has not yet appreciated any relief from the injection.   Trigger point injections were performed at last visit, however, patient is uncertain of benefit.   She expresses concern today with right shoulder blade instability, right-sided low back pain, and right hip instability.   Of note, due to a dilated spinal cord at C2 to T1, a follow up MRI in six months was recommended by radiology due to possibility of early stage syrinx.  She has had good luck with Biofreeze and asks if insurance will cover the cost of this if prescribed. She is interested in any applicable topicals that may be covered by insurance. Lidocaine was not covered.   She states she will be moving back to her hometown approximately 5-6 hours away. She would like to continue her healthcare at the [facility].     Recommendations/plan at the last visit included:  1. Continue Baclofen to 5mg tid  2.  Patient may consider Tylenol as needed with a maximum dose of 3 g per day.  3. Continue with physical therapy.   4. Trigger point injections performed today (see note below).   5. C5-6 interlaminar epidural steroid injection ordered today after review of MRI.     Past pain treatments:      Opiates: Percocet: H, hydrocodone:H, tramadol: SE, muscle spasms and increased headaches              NSAIDS: Ibuprofen: SE, stomachache. Naproxen: SE, stomachache.               Muscle Relaxants: Clonazepam: Sedation, cyclobenzaprine: H, Robaxin: SE upset stomach, Zanaflex: H              Anti-migraine mediations: Excedrin Migraine: H              Anti-depressants: Bupropion: SE effected appetite, duloxetine: SE sedation, reduced libido, fluoxetine: Mental fog, drowsy, sertraline: H, venlafaxine: H              Sleep aids: Diazepam: H, takes for spasm, clonazepam: H for spasm, lorazepam: For anxiety, zolpidem: Took in 2009 for grief issues/not sleeping              Anti-convulsants: None: Friends took both gabapentin and pregabalin and had a bad experience so patient is not wanting to try these medications as time               Topicals: Over-the-counter gels and creams, capsaicin, lidocaine patches: H              Other medications not covered above: Tylenol: [facility], Low dose naltrexone:[facility]    Medications:  Current Outpatient Medications   Medication Sig Dispense Refill     Acetaminophen (TYLENOL PO) Take 500 mg by mouth every 4 hours as needed for mild pain or fever Takes 2 tablets of 500 mg       amphetamine-dextroamphetamine (ADDERALL) 20 MG per tablet TAKE UP TO 1 TABLET BY MOUTH 3 TIMES DAILY ABOUT 4 HOURS APART AS NEEDED FOR ADHD SYMPTOMS  0     baclofen (LIORESAL) 10 MG tablet Take 0.5-1 tablet by mouth, 3 times daily as needed. 90 tablet 0     celecoxib (CELEBREX) 100 MG capsule Take 1 capsule (100 mg) by mouth 2 times daily as needed for moderate pain 60 capsule 0     diazepam (VALIUM) 5 MG tablet TAKE UP TO 2 TABS DAILY BY MOUTH AS NEEDED FOR ANXIETY. USE SPARINGLY  0     Ibuprofen (ADVIL PO) Take by mouth as needed for moderate pain       LamoTRIgine (LAMICTAL PO) Take 50 mg by mouth 2 times daily       Lidocaine (LIDOCARE) 4 % Patch Place 1 patch onto the skin every 24 hours 30 patch 3     lidocaine (XYLOCAINE) 5 % external ointment Apply topically as needed for moderate pain 50 g 1     loratadine (CLARITIN) 10 MG tablet Take 10 mg by mouth daily       omeprazole (PRILOSEC) 20 MG  "DR capsule Take 1 capsule (20 mg) by mouth daily 90 capsule 0       Medical History: any changes in medical history since they were last seen? No    Review of Systems:  The 14 system ROS was reviewed from the intake questionnaire; results listed at end of note.     Physical Exam:  Blood pressure 117/80, pulse 93, last menstrual period 04/09/2019, SpO2 97 %, not currently breastfeeding.  General: In no apparent distress, thin  Mental status: Normal mood and affect, anxious  Neuro: Alert, oriented. No sensory deficits.   Head: Atraumatic, normocephalic  Eyes: Extra-ocular movements grossly intact, no scleral icterus  Neck: Supple, Range of motion full  Respiratory: Non-labored breathing; No respiratory distress  Skin: No rashes or lesions noted on exposed areas of skin  Msk: She exhibits decreased range of motion, tenderness, bony tenderness, pain and spasm. She exhibits no swelling, no edema, no deformity, no laceration and normal pulse. Hyperextensible joints in upper extremities.      Assessment:   Sheyla Ye is a 38 year old female who is seen at the pain clinic for chronic diffuse pain and joint instability in the setting of Munir-Danlos Syndrome.     Plan:  1. Interventions:   - advised patient that it may take upwards of 10 days to fully appreciate efficacy of cervical NASH. I did let her know I do not have any specific \"plan B\" in regards to interventional management as the preponderance of her pain is likely myofascial/musculoskeletal 2/2 EDS.   - may consider repeat of trigger point injections.   2. Medication Management:   - start gabapentin 100 mg t.i.d. PRN  - continue Celebrex 100 mg b.i.d. PRN  - start diclofenac 1% gel prescribed; apply q.i.d. PRN  - continue baclofen 10 mg, 0.5-1 tablet t.i.d. PRN  - OK to take acetaminophen 500 - 1000 mg t.i.d. PRN  - OK to continue to use topical Biofreeze  3. Physical Therapy:    - continue with HEP, but did advise patient she may need to re-establish with " formal physical therapy periodically for continued muscular strengthening and stability.   4. Need for Clinical Health Psychologist.  - patient follows with psychiatrist; I strongly recommend pain psychology for cognitive behavioral therapy and biofeedback, however, options may be limited due to patient's geographic location.   5. Diagnostic Studies:    -XR lumbar 4 view  -XR right hip/pelvis   6. Follow up: RTC in 6-8 weeks to discuss gabapentin efficacy and possible repeat trigger point injections.     Total time spent was 40 minutes, and more than 50% of face to face time was spent in counseling and/or coordination of care regarding plan of care.    MORGAN Chaudhry, ROHIT-BC  Pain Management  Department of Interventional Pain Management and Anesthesiology   [facility]        Answers for HPI/ROS submitted by the patient on 5/3/2019   LASHAWN 7 TOTAL SCORE: 17  PHQ9 TOTAL SCORE: 18

## 2019-05-04 ASSESSMENT — PATIENT HEALTH QUESTIONNAIRE - PHQ9: SUM OF ALL RESPONSES TO PHQ QUESTIONS 1-9: 18

## 2019-05-04 ASSESSMENT — ANXIETY QUESTIONNAIRES: GAD7 TOTAL SCORE: 17

## 2019-05-08 ENCOUNTER — ANCILLARY PROCEDURE (OUTPATIENT)
Dept: GENERAL RADIOLOGY | Facility: CLINIC | Age: 39
End: 2019-05-08
Attending: NURSE PRACTITIONER
Payer: COMMERCIAL

## 2019-05-08 DIAGNOSIS — Q79.60 EHLERS-DANLOS SYNDROME: ICD-10-CM

## 2019-05-08 NOTE — H&P
"   REASON FOR PAIN CONSULTATION:  Sheyla Ye is a 38 year old female I am seeing in clinic for a follow up regarding her neck and arm pain. Previous history is significant for Munir-Danlos syndrome. She states that most of her pain is on the right side of her neck, however today it is left sided and radiates to the fourth and fifth digit on her left hand. She has been seen in several different pain clinics including the [facility] and the [facility].   Since last seen, she had cervical and thoracic MRIs completed. Thoracic spine unremarkable. Cervical spine with multilevel spondylosis most prominent at C5-6 and C6-7 with resultant moderate canal and neuroforaminal stenosis. Due to a dilated spinal cord at C2 to T1, a follow up MRI in six months was recommended by radiology due to possibility of early stage syrinx.      PAIN HISTORY: Started in 2012  Pain ranges in intensity from 5 to 9,   and averages 8 on the zero to ten numerical rating scale  Quality of the pain is dull, aching constant  Aggravating factors include standing or sitting for long periods  Relieving factors include pressure application and sleep     IMPACT OF PAIN: Cannot work, difficulty performing self care. When pain is right sided she has difficulty witting.      DIAGNOSTIC TESTS: Cervical MRI in 2014 showed kyphotic deformity near C4, Posterior disc osteophyte complex at C5-C6 and C6-C7 with associated neural foraminal narrowing. Facet arthropathy in upper cervical C3-C4. High T2 focus within canal posterior to T1. (taken from MRI report).      PAST PAIN TREATMENT: Has been seen by several other physicians and been on multiple different medications, none of which improve the pain. Has been to PT, acupuncture and had \"nerve injections\"      CURRENT MEDICATIONS:     Current Medications and Prescriptions Ordered in Epic                Current Outpatient Prescriptions Ordered in [facility]   Medication Sig Dispense Refill     " Acetaminophen (TYLENOL PO) Take 500 mg by mouth every 4 hours as needed for mild pain or fever Takes 2 tablets of 500 mg         amphetamine-dextroamphetamine (ADDERALL) 20 MG per tablet TAKE UP TO 1 TABLET BY MOUTH 3 TIMES DAILY ABOUT 4 HOURS APART AS NEEDED FOR ADHD SYMPTOMS   0     BACLOFEN PO Take 5 mg by mouth 2 times daily         diazepam (VALIUM) 5 MG tablet TAKE UP TO 2 TABS DAILY BY MOUTH AS NEEDED FOR ANXIETY. USE SPARINGLY   0     LamoTRIgine (LAMICTAL PO) Take 50 mg by mouth 2 times daily         lidocaine (XYLOCAINE) 5 % ointment Apply topically as needed for moderate pain 50 g 0     meloxicam (MOBIC) 7.5 MG tablet Take 1 tablet (7.5 mg) by mouth daily 30 tablet 1     omeprazole (PRILOSEC) 20 MG CR capsule Take 1 capsule (20 mg) by mouth daily 90 capsule 0     fluvoxaMINE (LUVOX) 25 MG tablet TAKE 1/2 PILL BY MOUTH TWICE DAILY, INCREASING TO 1 PILL TWICE DAILY WHEN TOLERATED.   11     hydrOXYzine (ATARAX) 50 MG tablet Take 1-2 tablets ( mg) by mouth every 6 hours as needed for anxiety (Patient not taking: Reported on 3/15/2018) 30 tablet 0     Ibuprofen (ADVIL PO) Take by mouth as needed for moderate pain         Ketoprofen POWD Apply dime sized amount to affected areas up to three times daily. (Patient not taking: Reported on 3/15/2018) 100 g 3     loperamide (IMODIUM A-D) 2 MG tablet Start with 2 tabs (4 mg), then take one tab (2 mg) after each diarrheal stool.  Do not use more than  8 tabs (16 mg) per day. (Patient not taking: Reported on 8/27/2018) 60 tablet 0     loratadine (CLARITIN) 10 MG tablet Take 10 mg by mouth daily         LORazepam (ATIVAN) 0.5 MG tablet Take 1 tablet (0.5 mg) by mouth every 6 hours as needed for anxiety (Patient not taking: Reported on 3/15/2018) 10 tablet 0     norethindrone (MICRONOR) 0.35 MG per tablet Take 1 tablet (0.35 mg) by mouth daily (Patient not taking: Reported on 4/17/2018) 28 tablet 11     Prenatal Vit-Fe Fumarate-FA (PRENATAL MULTIVITAMIN  PLUS IRON)  27-0.8 MG TABS Take 1 tablet by mouth daily (Patient not taking: Reported on 8/27/2018) 30 tablet 11     ranitidine (ZANTAC) 150 MG tablet Take 1 tablet (150 mg) by mouth 2 times daily (Patient not taking: Reported on 3/15/2018) 60 tablet 11     tiZANidine (ZANAFLEX) 2 MG tablet Take 1-2 tablets (2-4 mg) by mouth 3 times daily as needed for muscle spasms (Patient not taking: Reported on 4/17/2018) 90 tablet 0      No current Epic-ordered facility-administered medications on file.                ALLERGIES:           Allergies   Allergen Reactions     Seasonal Allergies           PAST MEDICATION TRIALS:  Opiates: Percocet: H, hydrocodone:H, tramadol: SE, muscle spasms and increased headaches  NSAIDS: Ibuprofen: SE, stomachache. Naproxen: SE, stomachache.  Muscle Relaxants: Clonazepam: Sedation, cyclobenzaprine: H, Robaxin: SE upset stomach, Zanaflex: H   Anti-migraine mediations: Excedrin Migraine: H  Anti-depressants: Bupropion: SE effected appetite, duloxetine: SE sedation, reduced libido, fluoxetine: Mental fog, drowsy, sertraline: H, venlafaxine: H  Sleep aids: Diazepam: H, takes for spasm, clonazepam: H for spasm, lorazepam: For anxiety, zolpidem: Took in 2009 for grief issues/not sleeping  Anti-convulsants: None: Friends took both gabapentin and pregabalin and had a bad experience so patient is not wanting to try these medications as time.          Topicals: Over-the-counter gels and creams: H  Other medications not covered above: Tylenol: [facility], Low dose naltrexone:[facility]     PAST MEDICAL AND PSYCHIATRIC HISTORY:  has a past medical history of Anxiety; Chronic pain; DJD (degenerative joint disease), lumbar; Munir-Danlos disease; and Fibromyalgia.     PAST SURGICAL HISTORY:  has a past surgical history that includes wisdom teeth removal.     FAMILY HISTORY: @[facility]@      HEALTH & LIFESTYLE PRACTICES:       Smoking: Smokes 0.5 ppd (10 pack year history)      Alcohol: 8 standard drinks a week      Illicit drugs:  "denies     SOCIAL HISTORY: Has had difficulties recently with a family emergency. Was also recently fired from her job. Please see the separate psychosocial evaluation by the health psychologist for additional information.     REVIEW OF SYSTEMS: Please refer to the patient's health questionnaire which I reviewed in detail with her.  This review covered 13 bodily systems. Among the positives she reports muscle pain, increased stress, and loss of energy.      PHYSICAL EXAMINATION:  VITAL SIGNS:  Blood pressure 120/76, pulse 83, resp. rate 16, height 1.676 m (5' 6\"), weight 59 kg (130 lb), not currently breastfeeding.  ROS: 10 point ROS neg other than the symptoms noted above in the HPI.  Physical Exam   Constitutional: She is oriented to person, place, and time. She appears well-developed and well-nourished.   Head: Normocephalic and atraumatic.    Neck: Spinous process tenderness and muscular tenderness present. No rigidity. No edema, no erythema and normal range of motion present.    Musculoskeletal:        Cervical back: She exhibits decreased range of motion, tenderness, bony tenderness, pain and spasm. She exhibits no swelling, no edema, no deformity, no laceration and normal pulse. Hyperextensible joints in upper extremities.    Neurological: She is alert and oriented to person, place, and time. She has normal reflexes and strength bilaterally.   Skin: Skin is warm and dry.   Psychiatric: She has a normal mood and affect.   Nursing note and vitals reviewed.                          "

## 2019-05-08 NOTE — PROCEDURES
Interlaminar Epidural Steroid Injection        Diagnosis: Cervical Radiculopathy          The patient s identity, the procedure to be performed and the specific site of the procedure was verified in accordance with [facility] Protocol.      Pre-procedure Pain Score: 9/10    Procedure Note:    Informed consent was obtained.  The patient was placed comfortably into a prone position and was then prepped and draped in a sterile fashion.  There was no evidence of infection at the site of needle insertion.  The skin was anesthetized with 1% lidocaine and a tuohy needle was advanced under fluoroscopic guidance into the epidural space using a loss of resistance technique.  CSF was not aspirated, blood was not aspirated, paresthesia was not noted.  Position was confirmed with radio-opaque contrast.  The patient tolerated the procedure without complications.  The patient was observed for 30 minutes and was then released with post-procedure instructions.    The patient was given discharge instructions and verbalizes understanding, including understanding of those signs and symptoms that would require emergency care.     Level:C7/T1  Laterality: central     Needle Type:   20g touhy        Medication:  12mg celestone  2ml Normal Saline,           Post Procedure Pain Score: 9/10      Counseling: Greater than 50% of this patient visit was spent in counseling the patient regarding the treatment of their pain, coordinating their overall treatment plan and assessing their progress.

## 2019-05-23 ENCOUNTER — TELEPHONE (OUTPATIENT)
Dept: ANESTHESIOLOGY | Facility: CLINIC | Age: 39
End: 2019-05-23

## 2019-05-23 ENCOUNTER — TELEPHONE (OUTPATIENT)
Dept: INTERNAL MEDICINE | Facility: CLINIC | Age: 39
End: 2019-05-23

## 2019-05-23 NOTE — TELEPHONE ENCOUNTER
[facility] Prior Authorization Team Request    Medication: Diclofenac Sodium 1% Gel  Dosing:   NDC (required for Medicaid members):     Insurance   BIN: [id number]  PCN: [id number]   Grp: [id number] MANUEL   ID: [id number]    CoverMyMeds Key (if applicable):     Additional documentation:     Filling Pharmacy: [facility]  Phone Number: [phone]  Contact: P PHARM [place] (P [id number]) please send all responses to this pool.  Pharmacy NPI (required for Medicaid members): [id number]

## 2019-05-23 NOTE — TELEPHONE ENCOUNTER
[facility] Prior Authorization Team Request    Medication: Omeprazole 20mg CPDR  Dosing:   NDC (required for Medicaid members):     Insurance   BIN: [id number]  PCN: [id number]   Grp: [id number] MANUEL  ID: [id number]      CoverMyMeds Key (if applicable):     Additional documentation:     Filling Pharmacy: [facility]   Phone Number: [phone]  Contact: P [place] (P 87599) please send all responses to this pool.  Pharmacy NPI (required for Medicaid members): [id number]

## 2019-05-23 NOTE — TELEPHONE ENCOUNTER
Central Prior Authorization Team   Phone: [phone]      PA Initiation    Medication: Diclofenac Sodium 1% Gel-PA Pending  Insurance Company: [employer] - Phone [phone] Fax [phone]  Pharmacy Filling the Rx: [facility] [place], [place] - [address] [place] [address] [phone]  Filling Pharmacy Phone: [phone]  Filling Pharmacy Fax: [phone]  Start Date: 5/23/2019

## 2019-05-24 DIAGNOSIS — M62.830 BACK MUSCLE SPASM: ICD-10-CM

## 2019-05-24 DIAGNOSIS — M47.812 SPONDYLOSIS OF CERVICAL REGION WITHOUT MYELOPATHY OR RADICULOPATHY: ICD-10-CM

## 2019-05-24 DIAGNOSIS — Q79.60 EHLERS-DANLOS SYNDROME: ICD-10-CM

## 2019-05-24 RX ORDER — BACLOFEN 10 MG/1
TABLET ORAL
Qty: 90 TABLET | Refills: 1 | Status: SHIPPED | OUTPATIENT
Start: 2019-05-24 | End: 2019-10-11

## 2019-05-24 RX ORDER — CELECOXIB 100 MG/1
100 CAPSULE ORAL 2 TIMES DAILY PRN
Qty: 60 CAPSULE | Refills: 1 | Status: SHIPPED | OUTPATIENT
Start: 2019-05-24 | End: 2019-09-28

## 2019-05-24 NOTE — TELEPHONE ENCOUNTER
PRIOR AUTHORIZATION DENIED    Medication: Diclofenac Sodium 1% Gel-DENIED    Denial Date: 5/24/2019    Denial Rational:               Appeal Information:

## 2019-05-29 ENCOUNTER — OFFICE VISIT (OUTPATIENT)
Dept: INTERNAL MEDICINE | Facility: CLINIC | Age: 39
End: 2019-05-29
Payer: COMMERCIAL

## 2019-05-29 ENCOUNTER — TELEPHONE (OUTPATIENT)
Dept: INTERNAL MEDICINE | Facility: CLINIC | Age: 39
End: 2019-05-29

## 2019-05-29 VITALS
RESPIRATION RATE: 16 BRPM | DIASTOLIC BLOOD PRESSURE: 75 MMHG | BODY MASS INDEX: 19.48 KG/M2 | HEART RATE: 86 BPM | WEIGHT: 120.7 LBS | SYSTOLIC BLOOD PRESSURE: 115 MMHG

## 2019-05-29 DIAGNOSIS — M25.512 CHRONIC PAIN OF BOTH SHOULDERS: ICD-10-CM

## 2019-05-29 DIAGNOSIS — M25.551 HIP PAIN, RIGHT: ICD-10-CM

## 2019-05-29 DIAGNOSIS — M47.812 CERVICAL SPONDYLOSIS WITHOUT MYELOPATHY: ICD-10-CM

## 2019-05-29 DIAGNOSIS — G89.29 CHRONIC PAIN OF BOTH SHOULDERS: ICD-10-CM

## 2019-05-29 DIAGNOSIS — L91.0 KELOID SCAR: ICD-10-CM

## 2019-05-29 DIAGNOSIS — M54.12 CERVICAL RADICULOPATHY: ICD-10-CM

## 2019-05-29 DIAGNOSIS — K21.9 GASTROESOPHAGEAL REFLUX DISEASE WITHOUT ESOPHAGITIS: Primary | ICD-10-CM

## 2019-05-29 DIAGNOSIS — M25.511 CHRONIC PAIN OF BOTH SHOULDERS: ICD-10-CM

## 2019-05-29 RX ORDER — GEL BASE NO.166
GEL (GRAM) TOPICAL 4 TIMES DAILY PRN
Qty: 50 G | Refills: 1 | Status: SHIPPED | OUTPATIENT
Start: 2019-05-29 | End: 2020-07-08

## 2019-05-29 RX ORDER — PANTOPRAZOLE SODIUM 20 MG/1
20 TABLET, DELAYED RELEASE ORAL DAILY
Qty: 90 TABLET | Refills: 3 | Status: SHIPPED | OUTPATIENT
Start: 2019-05-29 | End: 2020-07-31

## 2019-05-29 ASSESSMENT — PAIN SCALES - GENERAL: PAINLEVEL: SEVERE PAIN (7)
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
## 2019-05-29 NOTE — TELEPHONE ENCOUNTER
[facility] Prior Authorization Team Request    Medication: Pantoprazole Sodium 25mg TBEC  Dosing:   NDC (required for Medicaid members):     Insurance   BIN: [id number]  PCN: [id number]   Grp: [id number]  ID: [id number]    CoverMyMeds Key (if applicable):     Additional documentation:     Filling Pharmacy: [facility]    Phone Number: [phone]  Contact: P PHARM [place] (P [id number]) please send all responses to this pool.  Pharmacy NPI (required for Medicaid members): [id number]

## 2019-05-30 ENCOUNTER — TELEPHONE (OUTPATIENT)
Dept: INTERNAL MEDICINE | Facility: CLINIC | Age: 39
End: 2019-05-30

## 2019-05-30 NOTE — TELEPHONE ENCOUNTER
Central Prior Authorization Team   [phone]    PA Initiation    Medication: Pantoprazole Sodium 25mg TBEC  Insurance Company: [employer] - Phone [phone] Fax [phone]  Pharmacy Filling the Rx: [facility] [place], [place] - [address] [place] [address] [phone]  Filling Pharmacy Phone: [phone]  Filling Pharmacy Fax: [phone]  Start Date: 5/30/2019

## 2019-05-30 NOTE — TELEPHONE ENCOUNTER
Central Prior Authorization Team   Phone: [phone]    PA Initiation    Medication: Mederma (scar gel) Gel  Insurance Company: [employer] [place] - Phone [phone] Fax [phone]  Pharmacy Filling the Rx: [facility] [place], [place] - [address] [place] [address] [phone]  Filling Pharmacy Phone: [phone]  Filling Pharmacy Fax:    Start Date: 5/30/2019

## 2019-05-30 NOTE — TELEPHONE ENCOUNTER
[facility] Prior Authorization Team Request    Medication: Mederma Gel  Dosing: apply four times daily  Qty: 30  Day Supply: 7  NDC (required for Medicaid members): [address]-34     Insurance   BIN: [id number]  PCN: [id number]  Grp: [id number]  ID: [id number]    CoverMyMeds Key (if applicable):     Additional documentation:   Patient wants us to try for a prior auth    Filling Pharmacy: [facility]  Phone Number: [phone]  Contact:    Pharmacy NPI (required for Medicaid members): [id number]

## 2019-05-31 ENCOUNTER — OFFICE VISIT (OUTPATIENT)
Dept: DERMATOLOGY | Facility: CLINIC | Age: 39
End: 2019-05-31
Attending: NURSE PRACTITIONER
Payer: COMMERCIAL

## 2019-05-31 ENCOUNTER — ANCILLARY PROCEDURE (OUTPATIENT)
Dept: MRI IMAGING | Facility: CLINIC | Age: 39
End: 2019-05-31
Attending: NURSE PRACTITIONER
Payer: COMMERCIAL

## 2019-05-31 DIAGNOSIS — L30.9 CHRONIC DERMATITIS OF HANDS: ICD-10-CM

## 2019-05-31 DIAGNOSIS — L91.0 KELOID: Primary | ICD-10-CM

## 2019-05-31 DIAGNOSIS — M54.12 CERVICAL RADICULOPATHY: ICD-10-CM

## 2019-05-31 RX ORDER — GADOBUTROL 604.72 MG/ML
7.5 INJECTION INTRAVENOUS ONCE
Status: COMPLETED | OUTPATIENT
Start: 2019-05-31 | End: 2019-05-31

## 2019-05-31 RX ORDER — UREA 200 MG/G
CREAM TOPICAL PRN
Qty: 75 G | Refills: 1 | Status: SHIPPED | OUTPATIENT
Start: 2019-05-31 | End: 2019-07-30

## 2019-05-31 RX ADMIN — GADOBUTROL 5.5 ML: 604.72 INJECTION INTRAVENOUS at 18:22

## 2019-05-31 ASSESSMENT — PAIN SCALES - GENERAL: PAINLEVEL: NO PAIN (0)

## 2019-05-31 NOTE — NURSING NOTE
Dermatology Rooming Note    Sheyla Ye's goals for this visit include:   Chief Complaint   Patient presents with     Derm Problem     Sheyla is here regarding a spot on her hand.      Ann Orta LPN

## 2019-05-31 NOTE — DISCHARGE INSTRUCTIONS
MRI Contrast Discharge Instructions    The IV contrast you received today will pass out of your body in your  urine. This will happen in the next 24 hours. You will not feel this process.  Your urine will not change color.    Drink at least 4 extra glasses of water or juice today (unless your doctor  has restricted your fluids). This reduces the stress on your kidneys.  You may take your regular medicines.    If you are on dialysis: It is best to have dialysis today.    If you have a reaction: Most reactions happen right away. If you have  any new symptoms after leaving the hospital (such as hives or swelling),  call your hospital at the correct number below. Or call your family doctor.  If you have breathing distress or wheezing, call 911.    Special instructions: ***    I have read and understand the above information.    Signature:______________________________________ Date:___________    Staff:__________________________________________ Date:___________     Time:__________    [facility] Departments:    ___[place]: [phone]  ___[facility]: [phone]  ___[facility]: [phone] ___[facility]: [phone]  ___[facility]: [phone]  ___[facility]: [phone]  ___[phone]  ___[facility]: [phone]  ___[phone]

## 2019-05-31 NOTE — TELEPHONE ENCOUNTER
PRIOR AUTHORIZATION DENIED    Medication: Mederma (scar gel)    Denial Date: 5/30/2019    Denial Rational:         Appeal Information:

## 2019-05-31 NOTE — TELEPHONE ENCOUNTER
Prior Authorization Not Needed per Insurance    Medication: Pantoprazole Sodium 25mg TBEC-PA NOT NEEDED   Insurance Company: [employer] - Phone [phone] Fax [phone]  Expected CoPay: $1    Pharmacy Filling the Rx: [facility] [place], [place] - [address] [place] [address] 1-028  Pharmacy Notified: Yes- **Instructed pharmacy to notify patient when script is ready to /ship.**  Patient Notified: No    Called insurance and insurance stated that PA is Not Needed and medication is covered. Called pharmacy and pharmacy stated that they have a paid claim for medication. Pharmacy will notify patient on medication.

## 2019-05-31 NOTE — LETTER
5/31/2019       RE: Sheyla Ye  [address] [address]     Dear Colleague,    Thank you for referring your patient, Sheyla Ye, to the [facility] DERMATOLOGY at [facility]. Please see a copy of my visit note below.    [facility] Dermatology Note    Dermatology Clinic  [facility]  Clinics and Surgery Center  [address] [place] [id number]    Dermatology Problem List:  1. Munir-Danlos Syndrome  2. 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 Chronic dermatitis of hands >> Urea 20% cream daily    Encounter Date: May 31, 2019    CC:  Chief Complaint   Patient presents with     Derm Problem     Sheyla is here regarding a spot on her hand.      History of Present Illness:  Ms. Sheyla Ye is a 38 year old female who presents as a referral from ZAK Ferguson, for a keloidal scar on her hand. This is her first visit with was at our dermatology clinic. Today the pt reports having problems healing from her Munir-Danlos syndrome, which has lead to her current scar. She cut her knuckle open last summer, which left the bump on her right hand. The pt has concerns regarding the healing process post surgery, and possible issues with lidocaine. She reports having severe anxiety, especially during surgical procedures. Pt is currently on Valium. In addition, pt reports having a bump in the left inguinal fold, which has been present for years. No other concerns at this time.    Past Medical History:   Patient Active Problem List   Diagnosis     Cervical kyphosis     Cervical spondylosis without myelopathy     Cervical stenosis of spinal canal     Cervical radiculopathy     Pain management contract agreement     Anxiety     ADHD (attention deficit hyperactivity disorder), combined type     Joint hyperextensibility of multiple sites     [facility]     Other chronic pain     Tobacco use disorder     Chronic pain     Long term (current) use of opiate analgesic     Bilateral low back pain without sciatica     Depression, unspecified depression type     Munir-Danlos disease     Past Medical History:   Diagnosis Date     Anxiety      Chronic pain      DJD (degenerative joint disease), lumbar      Munir-Danlos disease      Fibromyalgia      Past Surgical History:   Procedure Laterality Date     INJECT EPIDURAL CERVICAL / THORACIC SINGLE N/A 4/30/2019    Procedure: Cervical Epidural Steroid Injection;  Surgeon: Swapnil Carlisle MD;  [facility]: [facility]     wisdom teeth removal         Social History:  Patient reports that she has been smoking cigarettes.  She has a 10.00 pack-year smoking history. She has never used smokeless tobacco. She reports that she drinks about 4.0 oz of alcohol per week. She reports that she does not use drugs.    Family History:  Family History   Problem Relation Age of Onset     Aneurysm Brother         20's, autopsy showed RP bleed     Thyroid Disease Mother      Aneurysm Other         brain aneurysm, 30's       Medications:  Current Outpatient Medications   Medication Sig Dispense Refill     Acetaminophen (TYLENOL PO) Take 500 mg by mouth every 4 hours as needed for mild pain or fever Takes 2 tablets of 500 mg       amphetamine-dextroamphetamine (ADDERALL) 20 MG per tablet TAKE UP TO 1 TABLET BY MOUTH 3 TIMES DAILY ABOUT 4 HOURS APART AS NEEDED FOR ADHD SYMPTOMS  0     baclofen (LIORESAL) 10 MG tablet Take 0.5-1 tablet by mouth, 3 times daily as needed. 90 tablet 1     celecoxib (CELEBREX) 100 MG capsule Take 1 capsule (100 mg) by mouth 2 times daily as needed for moderate pain 60 capsule 1     diazepam (VALIUM) 5 MG tablet TAKE UP TO 2 TABS DAILY BY MOUTH AS NEEDED FOR ANXIETY. USE SPARINGLY  0     diclofenac (VOLTAREN) 1 % topical gel Place 2-4 g onto the skin 4 times daily as needed for moderate pain 100  g 1     gabapentin (NEURONTIN) 100 MG capsule Take 1 capsule (100 mg) by mouth 3 times daily 90 capsule 1     Ibuprofen (ADVIL PO) Take by mouth as needed for moderate pain       LamoTRIgine (LAMICTAL PO) Take 50 mg by mouth 2 times daily       Lidocaine (LIDOCARE) 4 % Patch Place 1 patch onto the skin every 24 hours 30 patch 3     lidocaine (XYLOCAINE) 5 % external ointment Apply topically as needed for moderate pain 50 g 1     loratadine (CLARITIN) 10 MG tablet Take 10 mg by mouth daily       Menthol, Topical Analgesic, 4 % GEL Externally apply topically 4 times daily as needed (apply to neck, shoulders and hips for pain) 118 mL 3     pantoprazole (PROTONIX) 20 MG EC tablet Take 1 tablet (20 mg) by mouth daily 90 tablet 3     Scar GEL Externally apply topically 4 times daily as needed (to scar on right hand) 50 g 1       Allergies   Allergen Reactions     Seasonal Allergies        Review of Systems:  -As per HPI  -Otherwise nml state of health. No other skin concerns.     Physical exam:  Vitals: There were no vitals taken for this visit.  GEN: This is a well developed, well-nourished female in no acute distress, in a pleasant mood.    SKIN: Focused examination of the right hand was performed.  - 6x4mm keloid, atop the R hand dig 5 MCP  - 1cm very soft subcutaneous cystic nodule, L inguinal   - Slight erythromelalgia of the hands   - No other lesions of concern on areas examined.     Impression/Plan:  1. Keloidal scar atop the R hands dig 5 MCP    Referral provided for Derm Surg consult    2. Chronic dermatitis of hands    Prescribed 20% Urea cream. Pt to apply to hands daily.      3. Cyst in the L inguinal fold    Benign nature reassured.    Pt to discussion possible removal during Derm Surg consult    4. Discrete palmar/plantar erythema     Benign nature reassured.      CC Danielle Ferguson, APRN CNP  [address] [place] [address] on close of this encounter.    Follow-up with Derm Surg.    Staff  Involved:    Scribe Disclosure  I, Peewee Fraga, am serving as a scribe to document services personally performed by Dr. Rashard Coe, based on data collection and the provider's statements to me.     Start Time: 3:00 PM   End Time: 3:19 PM    I spent a total of 19 min face to face with Sheyla Ye during today's office visit. About 50% of the time was spent counseling the patient and/or coordinating care regarding their allergy.        Again, thank you for allowing me to participate in the care of your patient.      Sincerely,    Rashard Coe MD

## 2019-05-31 NOTE — PROGRESS NOTES
[facility] Dermatology Note    Dermatology Clinic  [facility]  Clinics and Surgery Center  [address] [place] [id number]    Dermatology Problem List:  1. Munir-Danlos Syndrome  2. 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 Chronic dermatitis of hands >> Urea 20% cream daily    Encounter Date: May 31, 2019    CC:  Chief Complaint   Patient presents with     Derm Problem     Sheyla is here regarding a spot on her hand.      History of Present Illness:  Ms. Sheyla Ye is a 38 year old female who presents as a referral from ZAK Ferguson, for a keloidal scar on her hand. This is her first visit with was at our dermatology clinic. Today the pt reports having problems healing from her Munir-Danlos syndrome, which has lead to her current scar. She cut her knuckle open last summer, which left the bump on her right hand. The pt has concerns regarding the healing process post surgery, and possible issues with lidocaine. She reports having severe anxiety, especially during surgical procedures. Pt is currently on Valium. In addition, pt reports having a bump in the left inguinal fold, which has been present for years. No other concerns at this time.    Past Medical History:   Patient Active Problem List   Diagnosis     Cervical kyphosis     Cervical spondylosis without myelopathy     Cervical stenosis of spinal canal     Cervical radiculopathy     Pain management contract agreement     Anxiety     ADHD (attention deficit hyperactivity disorder), combined type     Joint hyperextensibility of multiple sites     Followed by [facility]     Other chronic pain     Tobacco use disorder     Chronic pain     Long term (current) use of opiate analgesic     Bilateral low back pain without sciatica     Depression, unspecified depression type     Munir-Danlos disease     Past Medical History:   Diagnosis  Date     Anxiety      Chronic pain      DJD (degenerative joint disease), lumbar      Munir-Danlos disease      Fibromyalgia      Past Surgical History:   Procedure Laterality Date     INJECT EPIDURAL CERVICAL / THORACIC SINGLE N/A 4/30/2019    Procedure: Cervical Epidural Steroid Injection;  Surgeon: Swapnil Carlisle MD;  Location: [facility] OR     wisdom teeth removal         Social History:  Patient reports that she has been smoking cigarettes.  She has a 10.00 pack-year smoking history. She has never used smokeless tobacco. She reports that she drinks about 4.0 oz of alcohol per week. She reports that she does not use drugs.    Family History:  Family History   Problem Relation Age of Onset     Aneurysm Brother         20's, autopsy showed RP bleed     Thyroid Disease Mother      Aneurysm Other         brain aneurysm, 30's       Medications:  Current Outpatient Medications   Medication Sig Dispense Refill     Acetaminophen (TYLENOL PO) Take 500 mg by mouth every 4 hours as needed for mild pain or fever Takes 2 tablets of 500 mg       amphetamine-dextroamphetamine (ADDERALL) 20 MG per tablet TAKE UP TO 1 TABLET BY MOUTH 3 TIMES DAILY ABOUT 4 HOURS APART AS NEEDED FOR ADHD SYMPTOMS  0     baclofen (LIORESAL) 10 MG tablet Take 0.5-1 tablet by mouth, 3 times daily as needed. 90 tablet 1     celecoxib (CELEBREX) 100 MG capsule Take 1 capsule (100 mg) by mouth 2 times daily as needed for moderate pain 60 capsule 1     diazepam (VALIUM) 5 MG tablet TAKE UP TO 2 TABS DAILY BY MOUTH AS NEEDED FOR ANXIETY. USE SPARINGLY  0     diclofenac (VOLTAREN) 1 % topical gel Place 2-4 g onto the skin 4 times daily as needed for moderate pain 100 g 1     gabapentin (NEURONTIN) 100 MG capsule Take 1 capsule (100 mg) by mouth 3 times daily 90 capsule 1     Ibuprofen (ADVIL PO) Take by mouth as needed for moderate pain       LamoTRIgine (LAMICTAL PO) Take 50 mg by mouth 2 times daily       Lidocaine (LIDOCARE) 4 % Patch Place 1 patch onto  the skin every 24 hours 30 patch 3     lidocaine (XYLOCAINE) 5 % external ointment Apply topically as needed for moderate pain 50 g 1     loratadine (CLARITIN) 10 MG tablet Take 10 mg by mouth daily       Menthol, Topical Analgesic, 4 % GEL Externally apply topically 4 times daily as needed (apply to neck, shoulders and hips for pain) 118 mL 3     pantoprazole (PROTONIX) 20 MG EC tablet Take 1 tablet (20 mg) by mouth daily 90 tablet 3     Scar GEL Externally apply topically 4 times daily as needed (to scar on right hand) 50 g 1       Allergies   Allergen Reactions     Seasonal Allergies        Review of Systems:  -As per HPI  -Otherwise nml state of health. No other skin concerns.     Physical exam:  Vitals: There were no vitals taken for this visit.  GEN: This is a well developed, well-nourished female in no acute distress, in a pleasant mood.    SKIN: Focused examination of the right hand was performed.  - 6x4mm keloid, atop the R hand dig 5 MCP  - 1cm very soft subcutaneous cystic nodule, L inguinal   - Slight erythromelalgia of the hands   - No other lesions of concern on areas examined.     Impression/Plan:  1. Keloidal scar atop the R hands dig 5 MCP    Referral provided for Derm Surg consult    2. Chronic dermatitis of hands    Prescribed 20% Urea cream. Pt to apply to hands daily.      3. Cyst in the L inguinal fold    Benign nature reassured.    Pt to discussion possible removal during Derm Surg consult    4. Discrete palmar/plantar erythema     Benign nature reassured.      CC Danielle Ferguson, APRN CNP  [address] [place] [address] on close of this encounter.    Follow-up with Derm Surg.    Staff Involved:    Scribe Disclosure  INES, Peewee Fraga, am serving as a scribe to document services personally performed by Dr. Rashard Coe, based on data collection and the provider's statements to me.     Start Time: 3:00 PM   End Time: 3:19 PM    I spent a total of 19 min face to face  with Sheyla Ye during today's office visit. About 50% of the time was spent counseling the patient and/or coordinating care regarding their allergy.

## 2019-06-03 DIAGNOSIS — M47.812 CERVICAL SPONDYLOSIS WITHOUT MYELOPATHY: ICD-10-CM

## 2019-06-03 DIAGNOSIS — M54.12 CERVICAL RADICULOPATHY: Primary | ICD-10-CM

## 2019-06-04 NOTE — TELEPHONE ENCOUNTER
RECORDS RECEIVED FROM: Cervical radiculopathy // per pt // Danielle Ferguson referring // recds internal and imaging in system   DATE RECEIVED: Jun 11, 2019    NOTES STATUS DETAILS   OFFICE NOTE from referring provider Louise Ferguson 5/29/19   OFFICE NOTE from other specialist Internal Dr. Hardwick 5/7/15  Dr. Petty 2/13/15   DISCHARGE SUMMARY from hospital N/A    DISCHARGE REPORT from the ER N/A    OPERATIVE REPORT N/A    MEDICATION LIST Internal    IMPLANT RECORD/STICKER N/A    LABS     CBC/DIFF N/A    CULTURES N/A    INJECTIONS DONE IN RADIOLOGY Internal 5/27/15   MRI Internal 5/31/19   CT SCAN Internal 5/7/15   XRAYS (IMAGES & REPORTS) Internal 5/7/15   TUMOR     PATHOLOGY  Slides & report N/A

## 2019-06-05 DIAGNOSIS — M54.12 CERVICAL RADICULOPATHY: Primary | ICD-10-CM

## 2019-06-06 ENCOUNTER — OFFICE VISIT (OUTPATIENT)
Dept: ANESTHESIOLOGY | Facility: CLINIC | Age: 39
End: 2019-06-06
Payer: COMMERCIAL

## 2019-06-06 VITALS
BODY MASS INDEX: 19.37 KG/M2 | SYSTOLIC BLOOD PRESSURE: 111 MMHG | DIASTOLIC BLOOD PRESSURE: 76 MMHG | WEIGHT: 120 LBS | HEART RATE: 95 BPM | OXYGEN SATURATION: 98 %

## 2019-06-06 DIAGNOSIS — M79.18 MYOFASCIAL PAIN: ICD-10-CM

## 2019-06-06 DIAGNOSIS — Q79.60 EHLERS-DANLOS SYNDROME: Primary | ICD-10-CM

## 2019-06-06 DIAGNOSIS — M48.02 FORAMINAL STENOSIS OF CERVICAL REGION: ICD-10-CM

## 2019-06-06 RX ORDER — LIDOCAINE 50 MG/G
OINTMENT TOPICAL 3 TIMES DAILY PRN
Qty: 50 G | Refills: 1 | Status: SHIPPED | OUTPATIENT
Start: 2019-06-06 | End: 2020-07-08

## 2019-06-06 RX ORDER — GABAPENTIN 100 MG/1
300 CAPSULE ORAL 3 TIMES DAILY
Qty: 270 CAPSULE | Refills: 1 | Status: SHIPPED | OUTPATIENT
Start: 2019-06-06 | End: 2020-07-08

## 2019-06-06 RX ORDER — LIDOCAINE/PRILOCAINE 2.5 %-2.5%
CREAM (GRAM) TOPICAL DAILY PRN
Qty: 30 G | Refills: 1 | Status: SHIPPED | OUTPATIENT
Start: 2019-06-06 | End: 2020-07-08

## 2019-06-06 ASSESSMENT — PAIN SCALES - GENERAL: PAINLEVEL: EXTREME PAIN (8)

## 2019-06-06 NOTE — PATIENT INSTRUCTIONS
1. Increase Gabapentin as directed.     Start gabapentin as follows:  1 tab= 100mg    AM   PM   Bedtime  100mg (1 tab)  100mg (1 tab)  200 (2 tabs). After 3-4 days, increase as tolerated to the next line  200 (2 tabs)  100mg (1 tab)  200 (2 tabs). After 3-4 days, increase as tolerated to the next line  200 (2 tabs)  200 (2 tabs)  200 (2 tabs).  After 3-4 days, increase as tolerated to the next line  200 (2 tabs)  200 (2 tabs)  300 (3 tabs).  After 3-4 days, increase as tolerated to the next line  300 (3 tabs)  200 (2 tabs)  300 (3 tabs).  After 3-4 days, increase as tolerated to the next line  300 (3 tabs)  300 (3 tabs)  300 (3 tabs).  Call with any problems or when you are at this dose.    Caution for sedation.    Do not drive until you know how the medication affects you.   You can go slower if you need to or increasing only one dose at a time.  Do not stop abruptly once at higher doses.  This medication must be tapered off.      2. We will try to send in Lidocaine 5% ointment       Follow up: 6-8 weeks with Soledad Nice        To speak with a nurse, schedule/reschedule/cancel a clinic appointment, or request a medication refill call: [phone]     You can also reach us by [employer]: [email or web address]    For refills, please call on Monday, 1 week before your medication runs out. The doctors are not always in clinic, so this gives us time to get your prescriptions ready.  Please let us know the name of the medication you are requesting a refill of.

## 2019-06-06 NOTE — PROGRESS NOTES
Date of visit: 6/6/19     Chief complaint:        Chief Complaint   Patient presents with     Pain Management       Return visit- Follow up appointment, DOS 4/30/19         Interval history:  Sheyla Ye is a 38 year old female last seen by me on 5.3.19  for cervical spondylosis and Munir Danlos syndrome. Cervical spine with multilevel spondylosis most prominent at C5-6 and C6-7 with resultant moderate canal and neuroforaminal stenosis, most notably on left, however, patient's predominance of pain is right-sided with upper extremity involvement.  She underwent an interlaminar C7-T1 injection on 4/30/19. She did not appreciate any relief from the injection.   Trigger point injections were performed at previous visit, however, patient is uncertain of benefit.   She continues to express concern today with right shoulder blade instability, right-sided low back pain, right upper extremity pain and right hip instability. At last visit, we obtained lumbar flexion/extension and hip/pelvis XR; images were grossly unremarkable with only mild right femoral sclerosis and L5-S1 disc degeneration.   Of note, due to a dilated spinal cord at C2 to T1, a follow up MRI in six months was recommended by radiology due to possibility of early stage syrinx. Her PCP has since ordered the MRI which did not show any significant change. She has a consult scheduled with Dr. Shin on 6/11.  She has had good luck with Biofreeze, but can no longer afford cost. We prescribed topical Voltaren which PA was denied.  We initiated gabapentin 100 mg tid; patient reports she has successfully titrated her dose, but feels groggy. Her psychiatrist has encouraged her to remain on this medication and continue to titrate, as directed by our clinic, in hopes that medication will also assist with her anxiety.   She states she will be moving back to her hometown approximately 5-6 hours away. She would like to continue her healthcare at the [facility],  but is uncertain when she will be able to return.      Recommendations/plan at the last visit included:  1. Continue Baclofen to 5mg tid  2.  Patient may consider Tylenol as needed with a maximum dose of 3 g per day.  3. Continue with physical therapy.   4. Trigger point injections performed today (see note below).   5. C5-6 interlaminar epidural steroid injection ordered today after review of MRI.      Past pain treatments:      Opiates: Percocet: H, hydrocodone:H, tramadol: SE, muscle spasms and increased headaches              NSAIDS: Ibuprofen: SE, stomachache. Naproxen: SE, stomachache.              Muscle Relaxants: Clonazepam: Sedation, cyclobenzaprine: H, Robaxin: SE upset stomach, Zanaflex: H              Anti-migraine mediations: Excedrin Migraine: H              Anti-depressants: Bupropion: SE effected appetite, duloxetine: SE sedation, reduced libido, fluoxetine: Mental fog, drowsy, sertraline: H, venlafaxine: H              Sleep aids: Diazepam: H, takes for spasm, clonazepam: H for spasm, lorazepam: For anxiety, zolpidem: Took in 2009 for grief issues/not sleeping              Anti-convulsants: None: Friends took both gabapentin and pregabalin and had a bad experience so patient is not wanting to try these medications as time               Topicals: Over-the-counter gels and creams, capsaicin, lidocaine patches: H              Other medications not covered above: Tylenol: S[facility], Low dose naltrexone:NH     Medications:  Current Outpatient Prescriptions          Current Outpatient Medications   Medication Sig Dispense Refill     Acetaminophen (TYLENOL PO) Take 500 mg by mouth every 4 hours as needed for mild pain or fever Takes 2 tablets of 500 mg         amphetamine-dextroamphetamine (ADDERALL) 20 MG per tablet TAKE UP TO 1 TABLET BY MOUTH 3 TIMES DAILY ABOUT 4 HOURS APART AS NEEDED FOR ADHD SYMPTOMS   0     baclofen (LIORESAL) 10 MG tablet Take 0.5-1 tablet by mouth, 3 times daily as needed. 90 tablet 0      celecoxib (CELEBREX) 100 MG capsule Take 1 capsule (100 mg) by mouth 2 times daily as needed for moderate pain 60 capsule 0     diazepam (VALIUM) 5 MG tablet TAKE UP TO 2 TABS DAILY BY MOUTH AS NEEDED FOR ANXIETY. USE SPARINGLY   0     Ibuprofen (ADVIL PO) Take by mouth as needed for moderate pain         LamoTRIgine (LAMICTAL PO) Take 50 mg by mouth 2 times daily         Lidocaine (LIDOCARE) 4 % Patch Place 1 patch onto the skin every 24 hours 30 patch 3     lidocaine (XYLOCAINE) 5 % external ointment Apply topically as needed for moderate pain 50 g 1     loratadine (CLARITIN) 10 MG tablet Take 10 mg by mouth daily         omeprazole (PRILOSEC) 20 MG DR capsule Take 1 capsule (20 mg) by mouth daily 90 capsule 0            Medical History: any changes in medical history since they were last seen? No     Review of Systems:  The 14 system ROS was reviewed from the intake questionnaire; results listed at end of note.      Physical Exam:  Blood pressure 117/80, pulse 93, last menstrual period 04/09/2019, SpO2 97 %, not currently breastfeeding.  General: In no apparent distress, thin  Mental status: Normal mood and affect, anxious  Neuro: Alert, oriented. No sensory deficits.   Head: Atraumatic, normocephalic  Eyes: Extra-ocular movements grossly intact, no scleral icterus  Neck: Supple, Range of motion full  Respiratory: Non-labored breathing; No respiratory distress  Skin: No rashes or lesions noted on exposed areas of skin  Msk: She exhibits decreased range of motion, tenderness, bony tenderness, pain and spasm. She exhibits no swelling, no edema, no deformity, no laceration and normal pulse. Hyperextensible joints in upper extremities.       Assessment:   Sheyla Ye is a 38 year old female who is seen at the pain clinic for chronic diffuse pain and joint instability in the setting of Munir-Danlos Syndrome.      Plan:  1. Interventions:   - patient had 0% relief following C7-T1 NASH; cervical MRI with noted  severe foraminal stenosis left C5-6, C6-7. Consult scheduled with Dr. Shin 6/11/19.   - may consider repeat of trigger point injections.   2. Medication Management:   - titrate gabapentin to 300 mg t.i.d. As tolerated  *may consider Lyrica or Cymbalta if side effects with gabapentin are intolerable  - continue Celebrex 100 mg b.i.d. PRN  - start lidocaine 5% ointment prescribed; apply t.i.d. PRN; diclofenac gel not approved. Patient states she cannot afford OTC topical therapies.   - continue baclofen 10 mg, 0.5-1 tablet t.i.d. PRN  - OK to take acetaminophen 500 - 1000 mg t.i.d. PRN  - OK to continue to use topical Biofreeze  3. Physical Therapy:    - continue with HEP, but did advise patient she may need to re-establish with formal physical therapy periodically for continued muscular strengthening and stability.   4. Need for Clinical Health Psychologist.  - patient follows with psychiatrist; I strongly recommend pain psychology for cognitive behavioral therapy and biofeedback, however, options may be limited due to patient's geographic location.   5.   Follow up: RTC in 6-8 weeks to discuss gabapentin efficacy and possible repeat trigger point injections.      Total time spent was 25 minutes, and more than 50% of face to face time was spent in counseling and/or coordination of care regarding plan of care.     MORGAN Chaudhry, ROHIT-BC  Pain Management  Department of Interventional Pain Management and Anesthesiology   [facility]

## 2019-06-06 NOTE — LETTER
6/6/2019       RE: Sheyla Ye  [address] [address]     Dear Colleague,    Thank you for referring your patient, Sheyla Ye, to the [facility] at [facility]. Please see a copy of my visit note below.    Date of visit: 6/6/19     Chief complaint:        Chief Complaint   Patient presents with     Pain Management       Return visit- Follow up appointment, DOS 4/30/19         Interval history:  Sheyla Ye is a 38 year old female last seen by me on  5.3.19  for cervical spondylosis and Munir Danlos syndrome. Cervical spine with multilevel spondylosis most prominent at C5-6 and C6-7 with resultant moderate canal and neuroforaminal stenosis, most notably on left, however, patient's predominance of pain is right-sided with upper extremity involvement.  She underwent an interlaminar C7-T1 injection on 4/30/19. She did not appreciate any relief from the injection.   Trigger point injections were performed at previous visit, however, patient is uncertain of benefit.   She continues to express concern today with right shoulder blade instability, right-sided low back pain, right upper extremity pain and right hip instability. At last visit, we obtained lumbar flexion/extension and hip/pelvis XR; images were grossly unremarkable with only mild right femoral sclerosis and L5-S1 disc degeneration.   Of note, due to a dilated spinal cord at C2 to T1, a follow up MRI in six months was recommended by radiology due to possibility of early stage syrinx. Her PCP has since ordered the MRI which did not show any significant change. She has a consult scheduled with Dr. Shin on 6/11.  She has had good luck with Biofreeze, but can no longer afford cost. We prescribed topical Voltaren which PA was denied.  We initiated gabapentin 100 mg tid; patient reports she has successfully titrated her dose, but feels groggy. Her  psychiatrist has encouraged her to remain on this medication and continue to titrate, as directed by our clinic, in hopes that medication will also assist with her anxiety.   She states she will be moving back to her hometown approximately 5-6 hours away. She would like to continue her healthcare at the [facility], but is uncertain when she will be able to return.      Recommendations/plan at the last visit included:  1. Continue Baclofen to 5mg tid  2.  Patient may consider Tylenol as needed with a maximum dose of 3 g per day.  3. Continue with physical therapy.   4. Trigger point injections performed today (see note below).   5. C5-6 interlaminar epidural steroid injection ordered today after review of MRI.      Past pain treatments:      Opiates: Percocet: H, hydrocodone:H, tramadol: SE, muscle spasms and increased headaches              NSAIDS: Ibuprofen: SE, stomachache. Naproxen: SE, stomachache.              Muscle Relaxants: Clonazepam: Sedation, cyclobenzaprine: H, Robaxin: SE upset stomach, Zanaflex: H              Anti-migraine mediations: Excedrin Migraine: H              Anti-depressants: Bupropion: SE effected appetite, duloxetine: SE sedation, reduced libido, fluoxetine: Mental fog, drowsy, sertraline: H, venlafaxine: H              Sleep aids: Diazepam: H, takes for spasm, clonazepam: H for spasm, lorazepam: For anxiety, zolpidem: Took in 2009 for grief issues/not sleeping              Anti-convulsants: None: Friends took both gabapentin and pregabalin and had a bad experience so patient is not wanting to try these medications as time               Topicals: Over-the-counter gels and creams, capsaicin, lidocaine patches: H              Other medications not covered above: Tylenol: S[facility], Low dose naltrexone:NH     Medications:  Current Outpatient Prescriptions          Current Outpatient Medications   Medication Sig Dispense Refill     Acetaminophen (TYLENOL PO) Take 500 mg by mouth every 4 hours  as needed for mild pain or fever Takes 2 tablets of 500 mg         amphetamine-dextroamphetamine (ADDERALL) 20 MG per tablet TAKE UP TO 1 TABLET BY MOUTH 3 TIMES DAILY ABOUT 4 HOURS APART AS NEEDED FOR ADHD SYMPTOMS   0     baclofen (LIORESAL) 10 MG tablet Take 0.5-1 tablet by mouth, 3 times daily as needed. 90 tablet 0     celecoxib (CELEBREX) 100 MG capsule Take 1 capsule (100 mg) by mouth 2 times daily as needed for moderate pain 60 capsule 0     diazepam (VALIUM) 5 MG tablet TAKE UP TO 2 TABS DAILY BY MOUTH AS NEEDED FOR ANXIETY. USE SPARINGLY   0     Ibuprofen (ADVIL PO) Take by mouth as needed for moderate pain         LamoTRIgine (LAMICTAL PO) Take 50 mg by mouth 2 times daily         Lidocaine (LIDOCARE) 4 % Patch Place 1 patch onto the skin every 24 hours 30 patch 3     lidocaine (XYLOCAINE) 5 % external ointment Apply topically as needed for moderate pain 50 g 1     loratadine (CLARITIN) 10 MG tablet Take 10 mg by mouth daily         omeprazole (PRILOSEC) 20 MG DR capsule Take 1 capsule (20 mg) by mouth daily 90 capsule 0            Medical History: any changes in medical history since they were last seen? No     Review of Systems:  The 14 system ROS was reviewed from the intake questionnaire; results listed at end of note.      Physical Exam:  Blood pressure 117/80, pulse 93, last menstrual period 04/09/2019, SpO2 97 %, not currently breastfeeding.  General: In no apparent distress, thin  Mental status: Normal mood and affect, anxious  Neuro: Alert, oriented. No sensory deficits.   Head: Atraumatic, normocephalic  Eyes: Extra-ocular movements grossly intact, no scleral icterus  Neck: Supple, Range of motion full  Respiratory: Non-labored breathing; No respiratory distress  Skin: No rashes or lesions noted on exposed areas of skin  Msk: She exhibits decreased range of motion, tenderness, bony tenderness, pain and spasm. She exhibits no swelling, no edema, no deformity, no laceration and normal pulse.  Hyperextensible joints in upper extremities.       Assessment:   Sheyla Ye is a 38 year old female who is seen at the pain clinic for chronic diffuse pain and joint instability in the setting of Munir-Danlos Syndrome.      Plan:  1. Interventions:   - patient had 0% relief following C7-T1 NASH; cervical MRI with noted severe foraminal stenosis left C5-6, C6-7. Consult scheduled with Dr. Shin 6/11/19.   - may consider repeat of trigger point injections.   2. Medication Management:   - titrate gabapentin to 300 mg t.i.d. As tolerated  *may consider Lyrica or Cymbalta if side effects with gabapentin are intolerable  - continue Celebrex 100 mg b.i.d. PRN  - start lidocaine 5% ointment prescribed; apply t.i.d. PRN; diclofenac gel not approved. Patient states she cannot afford OTC topical therapies.   - continue baclofen 10 mg, 0.5-1 tablet t.i.d. PRN  - OK to take acetaminophen 500 - 1000 mg t.i.d. PRN  - OK to continue to use topical Biofreeze  3. Physical Therapy:    - continue with HEP, but did advise patient she may need to re-establish with formal physical therapy periodically for continued muscular strengthening and stability.   4. Need for Clinical Health Psychologist.  - patient follows with psychiatrist; I strongly recommend pain psychology for cognitive behavioral therapy and biofeedback, however, options may be limited due to patient's geographic location.   5.   Follow up: RTC in 6-8 weeks to discuss gabapentin efficacy and possible repeat trigger point injections.      Total time spent was  25 minutes, and more than 50% of face to face time was spent in counseling and/or coordination of care regarding plan of care.     MORGAN Chaudhry, ROHIT-BC  Pain Management  Department of Interventional Pain Management and Anesthesiology   [facility]

## 2019-06-06 NOTE — TELEPHONE ENCOUNTER
FUTURE VISIT INFORMATION      FUTURE VISIT INFORMATION:    Date: 6.11    Time: 315    Location: Derm Surg  REFERRAL INFORMATION:    Referring provider:  Dr. Coe    Referring providers clinic:  Derm    Reason for visit/diagnosis  Keloid    RECORDS REQUESTED FROM:       Clinic name Comments Records Status Imaging Status   [facility] 5.31.19 Clinic note, referral Epic

## 2019-06-11 ENCOUNTER — ANCILLARY PROCEDURE (OUTPATIENT)
Dept: CT IMAGING | Facility: CLINIC | Age: 39
End: 2019-06-11
Attending: ORTHOPAEDIC SURGERY
Payer: COMMERCIAL

## 2019-06-11 ENCOUNTER — DOCUMENTATION ONLY (OUTPATIENT)
Dept: ORTHOPEDICS | Facility: CLINIC | Age: 39
End: 2019-06-11

## 2019-06-11 ENCOUNTER — PRE VISIT (OUTPATIENT)
Dept: ORTHOPEDICS | Facility: CLINIC | Age: 39
End: 2019-06-11

## 2019-06-11 ENCOUNTER — OFFICE VISIT (OUTPATIENT)
Dept: DERMATOLOGY | Facility: CLINIC | Age: 39
End: 2019-06-11
Payer: COMMERCIAL

## 2019-06-11 ENCOUNTER — PRE VISIT (OUTPATIENT)
Dept: DERMATOLOGY | Facility: CLINIC | Age: 39
End: 2019-06-11

## 2019-06-11 ENCOUNTER — ANCILLARY PROCEDURE (OUTPATIENT)
Dept: GENERAL RADIOLOGY | Facility: CLINIC | Age: 39
End: 2019-06-11
Attending: ORTHOPAEDIC SURGERY
Payer: COMMERCIAL

## 2019-06-11 ENCOUNTER — OFFICE VISIT (OUTPATIENT)
Dept: ORTHOPEDICS | Facility: CLINIC | Age: 39
End: 2019-06-11
Payer: COMMERCIAL

## 2019-06-11 ENCOUNTER — HOSPITAL ENCOUNTER (INPATIENT)
Facility: CLINIC | Age: 39
Setting detail: SURGERY ADMIT
End: 2019-06-11
Attending: ORTHOPAEDIC SURGERY | Admitting: ORTHOPAEDIC SURGERY
Payer: COMMERCIAL

## 2019-06-11 VITALS — BODY MASS INDEX: 20.83 KG/M2 | WEIGHT: 129.6 LBS | HEIGHT: 66 IN

## 2019-06-11 DIAGNOSIS — L91.0 KELOID: Primary | ICD-10-CM

## 2019-06-11 DIAGNOSIS — F17.200 TOBACCO DEPENDENCE: ICD-10-CM

## 2019-06-11 DIAGNOSIS — R19.09 MASS OF LEFT INGUINAL REGION: ICD-10-CM

## 2019-06-11 DIAGNOSIS — M54.12 CERVICAL RADICULOPATHY: ICD-10-CM

## 2019-06-11 DIAGNOSIS — M54.12 CERVICAL RADICULOPATHY: Primary | ICD-10-CM

## 2019-06-11 DIAGNOSIS — D48.5 NEOPLASM OF UNCERTAIN BEHAVIOR OF SKIN: ICD-10-CM

## 2019-06-11 LAB
MRSA DNA SPEC QL NAA+PROBE: NEGATIVE
SPECIMEN SOURCE: NORMAL

## 2019-06-11 PROCEDURE — 88304 TISSUE EXAM BY PATHOLOGIST: CPT | Mod: TC | Performed by: DERMATOLOGY

## 2019-06-11 PROCEDURE — 87641 MR-STAPH DNA AMP PROBE: CPT | Performed by: ORTHOPAEDIC SURGERY

## 2019-06-11 PROCEDURE — 87640 STAPH A DNA AMP PROBE: CPT | Performed by: ORTHOPAEDIC SURGERY

## 2019-06-11 RX ORDER — DIAZEPAM 5 MG
5 TABLET ORAL ONCE
Status: DISCONTINUED | OUTPATIENT
Start: 2019-06-11 | End: 2019-06-27 | Stop reason: CLARIF

## 2019-06-11 ASSESSMENT — ENCOUNTER SYMPTOMS
MEMORY LOSS: 1
LOSS OF CONSCIOUSNESS: 0
EXERCISE INTOLERANCE: 1
POSTURAL DYSPNEA: 0
PALPITATIONS: 1
WEIGHT LOSS: 0
DISTURBANCES IN COORDINATION: 1
SNORES LOUDLY: 0
DEPRESSION: 1
INCREASED ENERGY: 1
DECREASED APPETITE: 0
FEVER: 0
SEIZURES: 0
TREMORS: 0
HALLUCINATIONS: 0
CHILLS: 0
WEAKNESS: 1
NIGHT SWEATS: 0
NECK PAIN: 1
STIFFNESS: 1
WHEEZING: 0
JOINT SWELLING: 1
BACK PAIN: 1
POLYPHAGIA: 0
ORTHOPNEA: 0
HYPOTENSION: 0
ARTHRALGIAS: 1
HYPERTENSION: 0
WEIGHT GAIN: 0
COUGH: 0
SHORTNESS OF BREATH: 1
MUSCLE CRAMPS: 1
SPEECH CHANGE: 0
HEMOPTYSIS: 0
MUSCLE WEAKNESS: 1
SPUTUM PRODUCTION: 0
FATIGUE: 1
POOR WOUND HEALING: 0
HEADACHES: 1
MYALGIAS: 1
ALTERED TEMPERATURE REGULATION: 1
TINGLING: 1
DECREASED CONCENTRATION: 1
SKIN CHANGES: 0
COUGH DISTURBING SLEEP: 0
BRUISES/BLEEDS EASILY: 1
LIGHT-HEADEDNESS: 1
LEG PAIN: 1
INSOMNIA: 1
NERVOUS/ANXIOUS: 1
PANIC: 1
SYNCOPE: 0
SWOLLEN GLANDS: 0
NAIL CHANGES: 0
DIZZINESS: 1
POLYDIPSIA: 0
DYSPNEA ON EXERTION: 1
NUMBNESS: 1
SLEEP DISTURBANCES DUE TO BREATHING: 0

## 2019-06-11 ASSESSMENT — PATIENT HEALTH QUESTIONNAIRE - PHQ9: SUM OF ALL RESPONSES TO PHQ QUESTIONS 1-9: 0

## 2019-06-11 ASSESSMENT — PAIN SCALES - GENERAL: PAINLEVEL: NO PAIN (0)

## 2019-06-11 ASSESSMENT — MIFFLIN-ST. JEOR: SCORE: 1284.61

## 2019-06-11 NOTE — PROGRESS NOTES
Spine Surgery Consultation    REFERRING PHYSICIAN: Arsen Chi   PRIMARY CARE PHYSICIAN: Myrna Watson           Chief Complaint:   Consult (Pt. states that she is here today for Cervical Spine Pain Referring:  ARSEN CHI )      History of Present Illness:  Symptom Profile Including: location of symptoms, onset, severity, exacerbating/alleviating factors, previous treatments:        Shelya Ye is a 38 year old female w/ PMH of Munir-Danlos syndrome, depression, anxiety, fibromyalgia, current smoker, who presents to spine clinic with chief complaint of worsening 80% neck pain and 20% bilateral arm pain since 2012.    Patient's neck pain is located approximately in the mnidline neck superior to the C7 process, and her upper extremity symptoms radiate within the shoulders, and in the hands along the alteral border in a C7 distribution.  She describes numbness, tingling, weakness of the hands which goes back and forth between her right and left on day-to-day basis.  She has noticed that her handwriting is becoming sloppier.  Of note also complains of headaches.    Her symptoms are worse with rest and better with stretching and ice.  Her symptoms are quite limiting and debilitating to her life socially, professionally, with in her relationships.    Previous treatments for this condition have included medications including baclofen, Celebrex, gabapentin, tramadol, Percocet.  Is also undergone physical therapy, injections, and TENS unit.  None of these have provided relief.         Past Medical History:     Past Medical History:   Diagnosis Date     Anxiety      Chronic osteoarthritis      Chronic pain      Depressive disorder 1995     DJD (degenerative joint disease), lumbar      Munir-Danlos disease      Fibromyalgia      Learning disability             Past Surgical History:     Past Surgical History:   Procedure Laterality Date     INJECT EPIDURAL CERVICAL / THORACIC SINGLE N/A 4/30/2019     Procedure: Cervical Epidural Steroid Injection;  Surgeon: Swapnil Carlisle MD;  Location: [facility] OR     wisdom teeth removal              Social History:     Social History     Tobacco Use     Smoking status: Current Every Day Smoker     Packs/day: 0.50     Years: 15.00     Pack years: 7.50     Types: Cigarettes     Start date: 9/1/1999     Smokeless tobacco: Former User   Substance Use Topics     Alcohol use: Yes     Alcohol/week: 4.0 oz     Comment: Moderate             Family History:     Family History   Problem Relation Age of Onset     Aneurysm Brother         20's, autopsy showed RP bleed     Thyroid Disease Mother      Anxiety Disorder Mother      Mental Illness Mother      Depression Mother      Genetic Disorder Mother      Migraines Mother      Aneurysm Other         brain aneurysm, 30's     Colon Cancer Other      Anxiety Disorder Sister      Mental Illness Sister      Cancer Paternal Grandmother      Hypertension Paternal Grandmother      Colon Cancer Paternal Grandmother      Cancer Paternal Grandfather      Hypertension Paternal Grandfather      Colon Cancer Paternal Grandfather      Depression Sister      Genetic Disorder Brother      Hypertension Father      Hyperlipidemia Father             Allergies:     Allergies   Allergen Reactions     Seasonal Allergies             Medications:     Current Outpatient Medications   Medication     Acetaminophen (TYLENOL PO)     amphetamine-dextroamphetamine (ADDERALL) 20 MG per tablet     baclofen (LIORESAL) 10 MG tablet     celecoxib (CELEBREX) 100 MG capsule     diazepam (VALIUM) 5 MG tablet     diclofenac (VOLTAREN) 1 % topical gel     gabapentin (NEURONTIN) 100 MG capsule     Ibuprofen (ADVIL PO)     LamoTRIgine (LAMICTAL PO)     Lidocaine (LIDOCARE) 4 % Patch     lidocaine (XYLOCAINE) 5 % external ointment     lidocaine (XYLOCAINE) 5 % external ointment     lidocaine-prilocaine (EMLA) 2.5-2.5 % external cream     loratadine (CLARITIN) 10 MG tablet      "Menthol, Topical Analgesic, 4 % GEL     pantoprazole (PROTONIX) 20 MG EC tablet     Scar GEL     urea (GORMEL) 20 % external cream     No current facility-administered medications for this visit.              Review of Systems:     A 10 point ROS was performed and reviewed. Specific responses to these questions are noted at the end of the document.         Physical Exam:   Vitals: Ht 1.676 m (5' 6\")   Wt 58.8 kg (129 lb 9.6 oz)   BMI 20.92 kg/m    Constitutional: awake, alert, cooperative, no apparent distress, appears stated age.    Eyes: The sclera are white.  Ears, Nose, Throat: The trachea is midline.  Psychiatric: The patient has a normal affect.  Respiratory: breathing non-labored  Cardiovascular: The extremities are warm and perfused.  Skin: no obvious rashes or lesions.  Musculoskeletal, Neurologic, and Spine:   Cervical spine:    Appearance -no gross step-offs, kyphosis.    Motor -     C5: Deltoids R 5/5 and L 5/5 strength    C6: Biceps R 5/5 and L 5/5 strength     C7: Triceps R 4/5 and L 5/5 strength     C8:  R 4/5 and L 5/5 strength     T1: Dorsal interossei R 4/5 and L 5/5 strength        Sensation: intact to light touch in C5-T1, diminished right greater than left C7 and C*      Special Tests -      Spurling's Test - Produces neck pain, and worsening of right arm symptoms     Garcia's Test - Negative       Lumbar Spine:    Appearance - No gross stepoffs or deformities    Motor -     L2-3: Hip flexion 5/5 R and 5/5 L strength          L3/4:  Knee extension R 5/5 and L 5/5 strength         L4/5:  Foot dorsiflexion R 5/5 L 5/5 and       EHL dorsiflexion R 4/5 L 4/5 strength         S1:  Plantarflexion/Peroneal Muscles  R 5/5 and L 5/5 strength    Sensation: intact to light touch L3-S1 distribution BLE      Neurologic:      REFLEXES Left Right   Biceps 0+ 0+      Triceps 0+    0+      Brachioradialis 0+    0+      Patella 0+    0+      Ankle jerk 0+    0+      Clonus 0 beats 0 beats     Hip Exam:  No " pain with hip log roll and no tenderness over the greater trochanters.    Alignment:  Patient stands with a neutral standing sagittal balance.           Imaging:   We ordered and independently reviewed new radiographs at this clinic visit. The results were discussed with the patient.  Findings include:    May 31, 2019 cervical MRI study multilevel cervical spondylosis worst at C5-6 and C6-7 where disc osteophyte complexes efface the CSF flow and cause cord shape change with left greater than right foraminal stenosis I did compare this against the previous October 8, 2018 MRI study and the findings are essentially unchanged again worse at C5-6 and C6-7     June 11, 2019 AP lateral flexion-extension cervical radiographs show severe spondylosis C5-6 and C6-7 with retrolisthesis at each level, kyphosis between C5 and C7 measuring 3 degrees with reduction to normal alignment in extension.             Assessment and Plan:   Assessment:  38 year old female with severe spondylosis C5-6 and C6-7 with foraminal stenosis and radicular complaints.     Plan:  1. If she did wish to proceed with surgery would recommend a C5-7 anterior cervical decompression and fusion.  We discussed both autograft and allograft options and I recommended the autograft option.  2. Risks of this surgery include risk of infection, risk of dural tear resulting in CSF leak which might result in headaches, or possible need for lumbar drain, or possible revision surgery in the setting of a persistent leak. Possible nerve root injury resulting in numbness weakness or paralysis into the arms or legs. Possible radiculitis which could result in similar symptoms or could result in significant neurogenic type pain. Risk of incomplete decompression which might require revision surgery in the future.  Risk of adjacent segment problems requiring surgery in the future. Risk of incomplete relief of symptoms possibly requiring revision surgery in the future.  Furthermore, although rare, there are risks of major vessel injury such as to the vertebral artery or major organ injury such as to the esophagus or trachea from the surgery.  There are risks of dysphagia or dysphonia which can make it hard to swallow or talk. I explained that in fact most patients will have some period of swallowing difficulty following the surgery.  In some cases these things occur as a result of laryngeal nerve injury, and we discussed this possibility as well. There is a risk of hematoma formation requiring urgent return to the OR for airway compromise.  There is a risk of blood clots in the legs or the lungs.  Lastly, although rare, there are certainly risks of the anesthetic including stroke heart attack and death.  We talked about the postoperative recovery period, the need for activity avoidance, and the need for cervical collar wear.  3. We also discussed a possible cervical disc replacement, but given the severity of her spondylosis I recommended against disc replacement in her case.  4. She understands and she would like to proceed, we will try to get things arranged for her.  I told her she would need to be nicotine free for surgery.    Sacha Crawford MD  Orthopaedic Surgery, PGY-1  Pager: [phone]    Attending MD (Dr. Peewee Shin) :  I reviewed and verified the history and physical exam of the patient and discussed the patient's management with the other clinical providers involved in this patient's care including any involved residents or physicians assistants. I reviewed the above note and agree with the documented findings and plan of care, which were communicated to the patient.      Peewee Shin MD      Respectfully,  Peewee Shin MD  Spine Surgery  [facility]      Answers for HPI/ROS submitted by the patient on 6/11/2019   General Symptoms: Yes  Skin Symptoms: Yes  HENT Symptoms: No  EYE SYMPTOMS: No  HEART SYMPTOMS: Yes  LUNG SYMPTOMS:  Yes  INTESTINAL SYMPTOMS: No  URINARY SYMPTOMS: No  GYNECOLOGIC SYMPTOMS: No  BREAST SYMPTOMS: No  SKELETAL SYMPTOMS: Yes  BLOOD SYMPTOMS: Yes  NERVOUS SYSTEM SYMPTOMS: Yes  MENTAL HEALTH SYMPTOMS: Yes  Fever: No  Loss of appetite: No  Weight loss: No  Weight gain: No  Fatigue: Yes  Night sweats: No  Chills: No  Increased stress: Yes  Excessive hunger: No  Excessive thirst: No  Feeling hot or cold when others believe the temperature is normal: Yes  Loss of height: No  Post-operative complications: No  Surgical site pain: No  Hallucinations: No  Change in or Loss of Energy: Yes  Hyperactivity: No  Confusion: Yes  Changes in hair: Yes  Changes in moles/birth marks: No  Itching: Yes  Rashes: No  Changes in nails: No  Acne: Yes  Hair in places you don't want it: Yes  Change in facial hair: Yes  Warts: No  Non-healing sores: No  Scarring: Yes  Flaking of skin: No  Color changes of hands/feet in cold : No  Sun sensitivity: Yes  Skin thickening: No  Cough: No  Sputum or phlegm: No  Coughing up blood: No  Difficulty breating or shortness of breath: Yes  Snoring: No  Wheezing: No  Difficulty breathing on exertion: Yes  Nighttime Cough: No  Difficulty breathing when lying flat: No  Chest pain or pressure: Yes  Fast or irregular heartbeat: Yes  Pain in legs with walking: Yes  Trouble breathing while lying down: No  Fingers or toes appear blue: No  High blood pressure: No  Low blood pressure: No  Fainting: No  Murmurs: No  Pacemaker: No  Varicose veins: No  Edema or swelling: No  Wake up at night with shortness of breath: No  Light-headedness: Yes  Exercise intolerance: Yes  Back pain: Yes  Muscle aches: Yes  Neck pain: Yes  Swollen joints: Yes  Joint pain: Yes  Bone pain: Yes  Muscle cramps: Yes  Muscle weakness: Yes  Joint stiffness: Yes  Bone fracture: No  Anemia: Yes  Swollen glands: No  Easy bleeding or bruising: Yes  Trouble with coordination: Yes  Dizziness or trouble with balance: Yes  Fainting or black-out spells:  No  Memory loss: Yes  Headache: Yes  Seizures: No  Speech problems: No  Tingling: Yes  Tremor: No  Weakness: Yes  Difficulty walking: Yes  Numbness: Yes  Nervous or Anxious: Yes  Depression: Yes  Trouble sleeping: Yes  Trouble thinking or concentrating: Yes  Mood changes: Yes  Panic attacks: Yes

## 2019-06-11 NOTE — NURSING NOTE
Teaching Flowsheet   Relevant Diagnosis: Cervical 5-7 Anterior Cervical Discectomy and Fusion.    Teaching Topic: Iliac Crest Bone Graft Material and Medtronic Interbodt Device Use Of Fluoroscopy With Medtronic Plate and Screw System.    General Anesthesia.  Surgeon: Dr Peewee Shin.     MD,RN and surgery scheduler discussed need for smoking cessation and the need for a negative nicotine test prior to surgical procedure for insurance reasons, but also to allow for improved healing outcome.   Advised no nicotine for 2 weeks prior to the test and 2 weeks prior to surgery.  Patient seemed agreeable and thinks that she is able to quit.     Patient requests 1 night observation in hospital due to anxiety issues, states that her brother  during a surgical procedure and she is generally highly anxious.  She will also request  this again with the pre-op H&P doctor and will talk with anesthesia as well. Nurse Yovana discussed re: insurance limitations for this, they do go by the diagnosis and that usually drives the hospital stay approval.  Patient understands but she will continue to request because of her severe anxiety.    Patient diagnosed with Munir-Danlos disease and states Dr Shin is aware she has this condition. She states that this has caused some wounds to heal slower than normal.    CT scan ordered for today on the 1st floor [facility] building.  Surgery date and PAC appointment were set up with  [facility].     MRSA nasal swab test ordered, collected and sent to lab.     Person(s) involved in teaching:   Patient     Motivation Level:  Asks Questions: Yes  Eager to Learn: Yes  Cooperative: Yes  Receptive (willing/able to accept information): Yes  Any cultural factors/Evangelical beliefs that may influence understanding or compliance? No       Patient demonstrates understanding of the following:  Reason for the appointment, diagnosis and treatment plan: Yes  Knowledge of proper use of medications and  conditions for which they are ordered (with special attention to potential side effects or drug interactions): Yes  Which situations necessitate calling provider and whom to contact: Yes       Teaching Concerns Addressed:        Proper use and care of need for cervical collar after surgery.     Nutritional needs and diet plan: Yes  Pain management techniques: Yes  Wound Care: Yes  How and/when to access community resources: Yes     Instructional Materials Used/Given: Pre-operative teaching packet and antibacterial soap.     Time spent with patient: 30 minutes.  Madelyn Leiva RN, under supervision of Yovana MCCULLOUGH RN.

## 2019-06-11 NOTE — PATIENT INSTRUCTIONS
Excision Wound Care Instructions  I will experience scar, altered skin color, bleeding, swelling, pain, crusting and redness. I may experience altered sensation. Risks are excessive bleeding, infection, muscle weakness, thick (hypertrophic or keloidal) scar, and recurrence,. A second procedure may be recommended to obtain the best cosmetic or functional result.  Possible complications of any surgical procedure are bleeding, infection, scarring, alteration in skin color and sensation, muscle weakness in the area, wound dehiscence or seperation, or recurrence of the lesion or disease. On occasion, after healing, a secondary procedure or revision may be recommended in order to obtain the best cosmetic or functional result.   After your surgery, a pressure bandage will be placed over the area that has sutures. This will help prevent bleeding. Please follow these instructions until you come back to clinic for suture removal on , as they will help you to prevent complications as your wound heals.  For the First 48 hours After Surgery:  1. Leave the pressure bandage on and keep it dry. If it should come loose, you may retape it, but do not take it off.  2. Relax and take it easy. Do not do any vigorous exercise, heavy lifting, or bending forward. This could cause the wound to bleed.  3. Post-operative pain is usually mild. You may take plain or extra strength Tylenol every 4 hours as needed (do not take more than 4,000mg in one day). Do not take any medicine that contains aspirin, ibuprofen or motrin unless you have been recommended these by a doctor.  Avoid alcohol and vitamin E as these may increase your tendency to bleed.  4. You may put an ice pack around the bandaged area for 20 minutes every 2-3 hours. This may help reduce swelling, bruising, and pain. Make sure the ice pack is waterproof so that the pressure bandage does not get wet.   5. You may see a small amount of drainage or blood on your pressure bandage. This  is normal. However, if drainage or bleeding continues or saturates the bandage, you will need to apply firm pressure over the bandage with a washcloth for 15 minutes. If bleeding continues after applying pressure for 15 minutes then go to the nearest emergency room.  48 Hours After Surgery  Carefully remove the bandage and start daily wound care and dressing changes. You may also now shower and get the wound wet. Wash wound with a mild soap and water.  Use caution when washing the wound. Be gentle and do not let the forceful shower stream hit the wound directly.  PAT dry.  Daily Wound Care:  1. Wash wound with a mild soap and water.  Use caution when washing the wound, be gentle and do not let the forceful shower stream hit the wound directly.  2. PAT DRY.  3. Apply Vaseline (from a new container or tube) over the suture line with a Q-tip. It is very important to keep the wound continuously moist, as wounds heal best in a moist environment.  4.  Keep the site covered until sutures are removed, you can cover it with a Telfa (non-stick) dressing and tape or a band-aid.    5. If you are unable to keep wound covered, you must apply Vaseline every 2 - 3 hours (while awake) to ensure it is being kept moist for optimal healing. A dressing overnight is recommended to keep the area moist.   Call Us If:  1. You have pain that is not controlled with Tylenol.  2. You have signs or symptoms of an infection, such as: fever over 100 degrees F, redness, warmth, or foul-smelling or yellow/creamy drainage from the wound.  Who should I call with questions?    [facility]: [phone]     [facility]: [phone]    For urgent needs outside of business hours call the [facility] at [phone] and ask for the dermatology resident on call  Sutured Wound Care  Caring for your skin after surgery:    After your surgery, a pressure bandage will be placed over the area  that has stitches. This will prevent bleeding. Please follow these instructions over the next 1 to 2 weeks. Following this regimen will help to prevent complications as your wound heals.      No strenuous activity for 48 hours. Resume moderate activity in 48 hours. No heavy exercising until you are seen for follow up.    Take Tylenol 1000 mh one hour after surgery. Take Tylenol every 4 hours as needed and do not take any aspirin products for pain (continue taking aspirin if prescribed by your doctor to prevent heart attacks and strokes).    Do not drink alcoholic beverages for 48 hours.    No dietary restrictions.    Keep the pressure bandage in place for 48 hours. If the bandage becomes blood tinged or loose, reinforce it with gauze and tape.     YES There is a waterproof film around the bandage to  keep the bandage dry.     Keep the bandage dry. Wash around it carefully    If the tape becomes soiled or starts to come off, reinforce it with additional paper tape.    Do not smoke for 3 weeks; smoking is detrimental to wound healing.    It is normal to have swelling and bruising around the surgical site. The bruising will fade in approximately 10-14 days. Elevate the area to reduce swelling.    Numbness, itchiness and sensitivity to temperature changes can occur after surgery and may take up to 18 months to normalize.      Bleeding:  You may see a small amount of drainage or blood on your pressure dressing. This is normal and the following instructions should be followed if the wound is bleeding saturates the dressing.    1. Leave the bandage in place.  2. Use tightly rolled up gauze or a cloth to apply direct pressure over the bandage for 20 minutes.  3. Reapply pressure for an additional 20 minutes if necessary.  4. Call the office or go to the nearest emergency room if pressure fails to stop the bleeding.  5. Use additional gauze and tape to maintain pressure once the bleeding has stopped.    Pain:  1. Post  operative pain should slowly get better, never worse.  2. A severe increase in pain may indicate a problem. Call the office if this occurs.  3. You may use ice directly over the dressing, but make sure the dressing does not get wet.    Wound care instructions for  10-14 DAYS AFTER SURGERY    1. Leave the bandage on for 10-14 days after today's bandage change.  2. In 10-14 days you may resume your regular skin care when you remove the dressing. This includes washing with mild soap and water, applying moisturizer, make-up and sunscreen.  3. If the wound is open or bleeding in any part of the incision/graft site, you should cover the area with a bandage daily as directed below:    1. Clean and dry area with plain water using a Q-tip or sterile gauze pad.  2. Apply vaseline, aquaphor, polysporin, or bacitracin ointment to the wound  3. Cover the wound with a band-aid or a sterile non-stick gauze pad and micropore paper tape.      Repeat the instructions above until wound is completely healed    If you notice that the scar is red and firm (after you remove the dressing) this is normal and will fade over time. It may take up to 6-12 months for this to occur.    Massaging the area helps the scar fade and soften quicker. Begin to massage the area one month after the dressings have been removed. Apply pressure directly and firmly over the scar with the fingertips and move in circular motions. You may massage up to 10 times daily, each time for 30 seconds.    It is normal for there to be a tender, pimple-like bump along the scar about 6-8 weeks after surgery. This sometimes happens as the scar matures and the stitches beneath begin to dissolve. Do not pick or squeeze. It will resolve in time. If an area of the wound does open and there appears to be drainage, you may apply one of the ointments listed in the above directions a few times every day until the wound is completely healed.    Numbness around the surgical site is  normal. It can take up to 12-18 months for the feeling to return to normal. Itchiness, tingling, and occasional sharp pains might be present during this portion of the healing. All of these feelings will subside once the nerves have completely healed.      Phone numbers:  During business hours (M-F 8:00-4:30 p.m.)  [facility]-  [phone] Option 1 appt. desk  [phone]  Option 3 nurse triage line  ---------------------------------------------------------  Evenings/Weekends/Holidays  Hospital - [phone]   TTY for hearing [phone]  *Ask  to page dermatologist on-call  Emergency [facility]-[phone]  TTY for hearing impaired- [phone]

## 2019-06-11 NOTE — NURSING NOTE
"Reason For Visit:   Chief Complaint   Patient presents with     Consult     Pt. states that she is here today for Cervical Spine Pain Referring:  ARSEN CHI MD: Myrna Watson  Ref. MD: Dr. Chi    ?  No  Occupation N/a.  Currently working? No.  Work status?  N/a.  Date of injury: N/a  Type of injury: N/a.  Date of surgery: N/a  Type of surgery: None.  Smoker: No  Request smoking cessation information: No    Ht 1.676 m (5' 6\")   Wt 58.8 kg (129 lb 9.6 oz)   BMI 20.92 kg/m           Oswestry (MAGNUS) Questionnaire    OSWESTRY DISABILITY INDEX 6/11/2019   Count 10   Sum 27   Oswestry Score (%) 54                  Visual Analog Pain Scale  Back Pain Scale 0-10: 0  Right leg pain: 0  Left leg pain: 0  Neck Pain Scale 0-10: 9.5  Right arm pain: 8  Left arm pain: 8.5    Promis 10 Assessment    PROMIS 10 6/11/2019   In general, would you say your health is: Fair   In general, would you say your quality of life is: Poor   In general, how would you rate your physical health? Fair   In general, how would you rate your mental health, including your mood and your ability to think? Poor   In general, how would you rate your satisfaction with your social activities and relationships? Poor   In general, please rate how well you carry out your usual social activities and roles Poor   To what extent are you able to carry out your everyday physical activities such as walking, climbing stairs, carrying groceries, or moving a chair? A little   How often have you been bothered by emotional problems such as feeling anxious, depressed or irritable? Always   How would you rate your fatigue on average? Severe   How would you rate your pain on average?   0 = No Pain  to  10 = Worst Imaginable Pain 8   In general, would you say your health is: 2   In general, would you say your quality of life is: 1   In general, how would you rate your physical health? 2   In general, how would you rate your mental " health, including your mood and your ability to think? 1   In general, how would you rate your satisfaction with your social activities and relationships? 1   In general, please rate how well you carry out your usual social activities and roles. (This includes activities at home, at work and in your community, and responsibilities as a parent, child, spouse, employee, friend, etc.) 1   To what extent are you able to carry out your everyday physical activities such as walking, climbing stairs, carrying groceries, or moving a chair? 2   In the past 7 days, how often have you been bothered by emotional problems such as feeling anxious, depressed, or irritable? 5   In the past 7 days, how would you rate your fatigue on average? 4   In the past 7 days, how would you rate your pain on average, where 0 means no pain, and 10 means worst imaginable pain? 8   Global Mental Health Score 4   Global Physical Health Score 8   PROMIS TOTAL - SUBSCORES 12   Some recent data might be hidden                Gail Link CMA

## 2019-06-11 NOTE — LETTER
6/11/2019       RE: Sheyla Ye  [address] [address]     Dear Colleague,    Thank you for referring your patient, Sheyla Ye, to [facility]. Please see a copy of my visit note below.    Spine Surgery Consultation    REFERRING PHYSICIAN: Arsen Chi   PRIMARY CARE PHYSICIAN: Myrna Watson           Chief Complaint:   Consult (Pt. states that she is here today for Cervical Spine Pain Referring:  ARSEN CHI )      History of Present Illness:  Symptom Profile Including: location of symptoms, onset, severity, exacerbating/alleviating factors, previous treatments:        Sheyla Ye is a 38 year old female w/ PMH of Munir-Danlos syndrome, depression, anxiety, fibromyalgia, current smoker, who presents to spine clinic with chief complaint of worsening 80% neck pain and 20% bilateral arm pain since 2012.    Patient's neck pain is located approximately in the mnidline neck superior to the C7 process, and her upper extremity symptoms radiate within the shoulders, and in the hands along the alteral border in a C7 distribution.  She describes numbness, tingling, weakness of the hands which goes back and forth between her right and left on day-to-day basis.  She has noticed that her handwriting is becoming sloppier.  Of note also complains of headaches.    Her symptoms are worse with rest and better with stretching and ice.  Her symptoms are quite limiting and debilitating to her life socially, professionally, with in her relationships.    Previous treatments for this condition have included medications including baclofen, Celebrex, gabapentin, tramadol, Percocet.  Is also undergone physical therapy, injections, and TENS unit.  None of these have provided relief.         Past Medical History:     Past Medical History:   Diagnosis Date     Anxiety      Chronic osteoarthritis      Chronic pain       Depressive disorder 1995     DJD (degenerative joint disease), lumbar      Munir-Danlos disease      Fibromyalgia      Learning disability             Past Surgical History:     Past Surgical History:   Procedure Laterality Date     INJECT EPIDURAL CERVICAL / THORACIC SINGLE N/A 4/30/2019    Procedure: Cervical Epidural Steroid Injection;  Surgeon: Swapnil Carlisle MD;  Location: [facility] OR     wisdom teeth removal              Social History:     Social History     Tobacco Use     Smoking status: Current Every Day Smoker     Packs/day: 0.50     Years: 15.00     Pack years: 7.50     Types: Cigarettes     Start date: 9/1/1999     Smokeless tobacco: Former User   Substance Use Topics     Alcohol use: Yes     Alcohol/week: 4.0 oz     Comment: Moderate             Family History:     Family History   Problem Relation Age of Onset     Aneurysm Brother         20's, autopsy showed RP bleed     Thyroid Disease Mother      Anxiety Disorder Mother      Mental Illness Mother      Depression Mother      Genetic Disorder Mother      Migraines Mother      Aneurysm Other         brain aneurysm, 30's     Colon Cancer Other      Anxiety Disorder Sister      Mental Illness Sister      Cancer Paternal Grandmother      Hypertension Paternal Grandmother      Colon Cancer Paternal Grandmother      Cancer Paternal Grandfather      Hypertension Paternal Grandfather      Colon Cancer Paternal Grandfather      Depression Sister      Genetic Disorder Brother      Hypertension Father      Hyperlipidemia Father             Allergies:     Allergies   Allergen Reactions     Seasonal Allergies             Medications:     Current Outpatient Medications   Medication     Acetaminophen (TYLENOL PO)     amphetamine-dextroamphetamine (ADDERALL) 20 MG per tablet     baclofen (LIORESAL) 10 MG tablet     celecoxib (CELEBREX) 100 MG capsule     diazepam (VALIUM) 5 MG tablet     diclofenac (VOLTAREN) 1 % topical gel     gabapentin (NEURONTIN) 100 MG  "capsule     Ibuprofen (ADVIL PO)     LamoTRIgine (LAMICTAL PO)     Lidocaine (LIDOCARE) 4 % Patch     lidocaine (XYLOCAINE) 5 % external ointment     lidocaine (XYLOCAINE) 5 % external ointment     lidocaine-prilocaine (EMLA) 2.5-2.5 % external cream     loratadine (CLARITIN) 10 MG tablet     Menthol, Topical Analgesic, 4 % GEL     pantoprazole (PROTONIX) 20 MG EC tablet     Scar GEL     urea (GORMEL) 20 % external cream     No current facility-administered medications for this visit.              Review of Systems:     A 10 point ROS was performed and reviewed. Specific responses to these questions are noted at the end of the document.         Physical Exam:   Vitals: Ht 1.676 m (5' 6\")   Wt 58.8 kg (129 lb 9.6 oz)   BMI 20.92 kg/m     Constitutional: awake, alert, cooperative, no apparent distress, appears stated age.    Eyes: The sclera are white.  Ears, Nose, Throat: The trachea is midline.  Psychiatric: The patient has a normal affect.  Respiratory: breathing non-labored  Cardiovascular: The extremities are warm and perfused.  Skin: no obvious rashes or lesions.  Musculoskeletal, Neurologic, and Spine:   Cervical spine:    Appearance -no gross step-offs, kyphosis.    Motor -     C5: Deltoids R 5/5 and L 5/5 strength    C6: Biceps R 5/5 and L 5/5 strength     C7: Triceps R 4/5 and L 5/5 strength     C8:  R 4/5 and L 5/5 strength     T1: Dorsal interossei R 4/5 and L 5/5 strength        Sensation: intact to light touch in C5-T1, diminished right greater than left C7 and C*      Special Tests -      Spurling's Test - Produces neck pain, and worsening of right arm symptoms     Garcia's Test - Negative       Lumbar Spine:    Appearance - No gross stepoffs or deformities    Motor -     L2-3: Hip flexion 5/5 R and 5/5 L strength          L3/4:  Knee extension R 5/5 and L 5/5 strength         L4/5:  Foot dorsiflexion R 5/5 L 5/5 and       EHL dorsiflexion R 4/5 L 4/5 strength         S1:  Plantarflexion/Peroneal " Muscles  R 5/5 and L 5/5 strength    Sensation: intact to light touch L3-S1 distribution BLE      Neurologic:      REFLEXES Left Right   Biceps 0+ 0+      Triceps 0+    0+      Brachioradialis 0+    0+      Patella 0+    0+      Ankle jerk 0+    0+      Clonus 0 beats 0 beats     Hip Exam:  No pain with hip log roll and no tenderness over the greater trochanters.    Alignment:  Patient stands with a neutral standing sagittal balance.         Imaging:   We ordered and independently reviewed new radiographs at this clinic visit. The results were discussed with the patient.  Findings include:    May 31, 2019 cervical MRI study multilevel cervical spondylosis worst at C5-6 and C6-7 where disc osteophyte complexes efface the CSF flow and cause cord shape change with left greater than right foraminal stenosis I did compare this against the previous October 8, 2018 MRI study and the findings are essentially unchanged again worse at C5-6 and C6-7     June 11, 2019 AP lateral flexion-extension cervical radiographs show severe spondylosis C5-6 and C6-7 with retrolisthesis at each level, kyphosis between C5 and C7 measuring 3 degrees with reduction to normal alignment in extension.            Assessment and Plan:   Assessment:  38 year old female with severe spondylosis C5-6 and C6-7 with foraminal stenosis and radicular complaints.     Plan:  1. If she did wish to proceed with surgery would recommend a C5-7 anterior cervical decompression and fusion.  We discussed both autograft and allograft options and I recommended the autograft option.  2. Risks of this surgery include risk of infection, risk of dural tear resulting in CSF leak which might result in headaches, or possible need for lumbar drain, or possible revision surgery in the setting of a persistent leak. Possible nerve root injury resulting in numbness weakness or paralysis into the arms or legs. Possible radiculitis which could result in similar symptoms or could  result in significant neurogenic type pain. Risk of incomplete decompression which might require revision surgery in the future.  Risk of adjacent segment problems requiring surgery in the future. Risk of incomplete relief of symptoms possibly requiring revision surgery in the future. Furthermore, although rare, there are risks of major vessel injury such as to the vertebral artery or major organ injury such as to the esophagus or trachea from the surgery.  There are risks of dysphagia or dysphonia which can make it hard to swallow or talk. I explained that in fact most patients will have some period of swallowing difficulty following the surgery.  In some cases these things occur as a result of laryngeal nerve injury, and we discussed this possibility as well. There is a risk of hematoma formation requiring urgent return to the OR for airway compromise.  There is a risk of blood clots in the legs or the lungs.  Lastly, although rare, there are certainly risks of the anesthetic including stroke heart attack and death.  We talked about the postoperative recovery period, the need for activity avoidance, and the need for cervical collar wear.  3. We also discussed a possible cervical disc replacement, but given the severity of her spondylosis I recommended against disc replacement in her case.  4. She understands and she would like to proceed, we will try to get things arranged for her.  I told her she would need to be nicotine free for surgery.    Sacha Crawford MD  Orthopaedic Surgery, PGY-1  Pager: [phone]    Attending MD (Dr. Peewee Shin) :  I reviewed and verified the history and physical exam of the patient and discussed the patient's management with the other clinical providers involved in this patient's care including any involved residents or physicians assistants. I reviewed the above note and agree with the documented findings and plan of care, which were communicated to the patient.       Peewee Shin MD

## 2019-06-11 NOTE — PROGRESS NOTES
DERMATOLOGY EXCISION PROCEDURE NOTE    Dermatology Problem List:  1.Keloidal scar atop the R hands dig 5 MCP  - Excision performed 6/11/19    NAME OF PROCEDURE: Excision intermediate layered linear closure  Staff surgeon: Yannick Muniz DO  Resident: Rupert Slaughter MD  Scrub Nurse: DASHAWN Shay    PRE-OPERATIVE DIAGNOSIS:  Dysplastic nevus, keloid  POST-OPERATIVE DIAGNOSIS: same   FINAL EXCISION SIZE(DEFECT SIZE): 0.7 by 0.7 cm  FINAL REPAIR LENGTH: 1.3 cm     INDICATIONS: This patient presented with a  0.7 by 0.7 cm keloid scar of the right hand, digit 5 MCP. Excision was indicated. We discussed the principles of treatment and most likely complications including scarring, bleeding, infection, incomplete excision, wound dehiscence, pain, nerve damage, and recurrence. Informed consent was obtained and the patient underwent the procedure as follows:    PROCEDURE: The patient was taken to the operative suite. Time-out was performed.  The treatment area was anesthetized with 1% lidocaine and epinephrine (1:100,000). The area was prepped with Chlorhexidine and rinsed with sterile saline and draped with sterile towels. The lesion was delineated and excised down to subcutaneous fat in a elliptical manner. Hemostasis was obtained by electrocoagulation.     REPAIR: An intermediate layered linear closure was selected as the procedure which would maximally preserve both function and cosmesis.    After the excision of the tumor, the area was carefully undermined. Hemostasis was obtained with electrocoagulation.  Closure was oriented so that the wound was in the patient's natural skin tension lines. The subcutaneous and dermal layers were then closed with 5-0 Monocryl sutures. The epidermis was then carefully approximated along the length of the wound using 5-0 Prolene sutures.     The final wound length was 1.3 cm. A total of 6 ml of anesthesia was administered for all surgical sites. Estimated blood loss was less than 10 ml  for all surgical sites. A sterile pressure dressing was applied and wound care instructions, with a written handout, were given. The patient was [facility] alert and ambulatory.    Follow-up as needed for suture removal.    Anatomic Pathology Results: pending    Staff Physician Comments:   I saw and evaluated the patient with the resident (Dr. Rupert Slaughter) and I agree with the assessment and plan. I was present for the entire procedure and examination..    Yannick Muniz DO    Department of Dermatology  [facility]: Phone: [phone], Fax:[phone]  [facility]: Phone: [phone], Fax: [phone]    Dermatology Surgery Clinic   [facility]  [address] [place] [address]

## 2019-06-11 NOTE — PROGRESS NOTES
[facility] Dermatology Note      Dermatology Problem List:  1.Keloidal scar atop the R hands dig 5 MCP  - Excision performed 6/11/19      Encounter Date: Jun 11, 2019    CC:  Chief Complaint   Patient presents with     Derm Problem     keloid consult hand, denies pain or irritation         History of Present Illness:  Ms. Sheyla Ye is a 38 year old female who presents for evaluation of a scar on the right hand. She notes that she scraped her hand a while ago and didn't treat it, but did not like the way it healed. She has Munir-Danlos Syndrome, and is not sure how this will impact her keloid treatment. She notes that she has had a lot of neck and back issues and has received steroid injections in those areas and it did not help. She is interested in surgery to treat this keloid, but is open to other options. She is also worried because the last time she used a local anesthetic, specifically lidocaine, she didn't respond and was able to feel the entire procedure. She notes that she has anxiety and she is worried about potential pain during the procedure, which elevates her anxiety.     She also has a small bump on the left side of her inguinal crease she was worried about, which she was told was a cyst.       Past Medical History:   Patient Active Problem List   Diagnosis     Cervical kyphosis     Cervical spondylosis without myelopathy     Cervical stenosis of spinal canal     Cervical radiculopathy     Pain management contract agreement     Anxiety     ADHD (attention deficit hyperactivity disorder), combined type     Joint hyperextensibility of multiple sites     [facility]     Other chronic pain     Tobacco use disorder     Chronic pain     Long term (current) use of opiate analgesic     Bilateral low back pain without sciatica     Depression, unspecified depression type     Munir-Danlos disease     Past Medical History:    Diagnosis Date     Anxiety      Chronic osteoarthritis      Chronic pain      Depressive disorder 1995     DJD (degenerative joint disease), lumbar      Munir-Danlos disease      Fibromyalgia      Learning disability      Past Surgical History:   Procedure Laterality Date     INJECT EPIDURAL CERVICAL / THORACIC SINGLE N/A 4/30/2019    Procedure: Cervical Epidural Steroid Injection;  Surgeon: Swapnil Carlisle MD;  Location: [facility] OR     wisdom teeth removal         Social History:  Patient reports that she has been smoking cigarettes.  She started smoking about 19 years ago. She has a 7.50 pack-year smoking history. She has quit using smokeless tobacco. She reports that she drinks about 4.0 oz of alcohol per week. She reports that she does not use drugs.    Family History:  Family History   Problem Relation Age of Onset     Aneurysm Brother         20's, autopsy showed RP bleed     Thyroid Disease Mother      Anxiety Disorder Mother      Mental Illness Mother      Depression Mother      Genetic Disorder Mother      Migraines Mother      Aneurysm Other         brain aneurysm, 30's     Colon Cancer Other      Anxiety Disorder Sister      Mental Illness Sister      Cancer Paternal Grandmother      Hypertension Paternal Grandmother      Colon Cancer Paternal Grandmother      Cancer Paternal Grandfather      Hypertension Paternal Grandfather      Colon Cancer Paternal Grandfather      Depression Sister      Genetic Disorder Brother      Hypertension Father      Hyperlipidemia Father        Medications:  Current Outpatient Medications   Medication Sig Dispense Refill     Acetaminophen (TYLENOL PO) Take 500 mg by mouth every 4 hours as needed for mild pain or fever Takes 2 tablets of 500 mg       amphetamine-dextroamphetamine (ADDERALL) 20 MG per tablet TAKE UP TO 1 TABLET BY MOUTH 3 TIMES DAILY ABOUT 4 HOURS APART AS NEEDED FOR ADHD SYMPTOMS  0     baclofen (LIORESAL) 10 MG tablet Take 0.5-1 tablet by mouth, 3 times  daily as needed. 90 tablet 1     celecoxib (CELEBREX) 100 MG capsule Take 1 capsule (100 mg) by mouth 2 times daily as needed for moderate pain 60 capsule 1     diazepam (VALIUM) 5 MG tablet TAKE UP TO 2 TABS DAILY BY MOUTH AS NEEDED FOR ANXIETY. USE SPARINGLY  0     diclofenac (VOLTAREN) 1 % topical gel Place 2-4 g onto the skin 4 times daily as needed for moderate pain 100 g 1     gabapentin (NEURONTIN) 100 MG capsule Take 3 capsules (300 mg) by mouth 3 times daily Titrate to this dose per clinic directions 270 capsule 1     Ibuprofen (ADVIL PO) Take by mouth as needed for moderate pain       LamoTRIgine (LAMICTAL PO) Take 50 mg by mouth 2 times daily       Lidocaine (LIDOCARE) 4 % Patch Place 1 patch onto the skin every 24 hours 30 patch 3     lidocaine (XYLOCAINE) 5 % external ointment Apply topically 3 times daily as needed for moderate pain 50 g 1     lidocaine (XYLOCAINE) 5 % external ointment Apply topically as needed for moderate pain 50 g 1     lidocaine-prilocaine (EMLA) 2.5-2.5 % external cream Apply topically daily as needed for moderate pain 30 g 1     loratadine (CLARITIN) 10 MG tablet Take 10 mg by mouth daily       Menthol, Topical Analgesic, 4 % GEL Externally apply topically 4 times daily as needed (apply to neck, shoulders and hips for pain) 118 mL 3     pantoprazole (PROTONIX) 20 MG EC tablet Take 1 tablet (20 mg) by mouth daily 90 tablet 3     Scar GEL Externally apply topically 4 times daily as needed (to scar on right hand) 50 g 1     urea (GORMEL) 20 % external cream Apply topically as needed (use on cracks on fingers 1-2 times daily) 75 g 1       Allergies   Allergen Reactions     Seasonal Allergies        Review of Systems:  -Skin Establ Pt: The patient denies any new rash, pruritus, or lesions that are symptomatic, changing or bleeding, except as per HPI.  -Constitutional: The patient is feeling generally well.  -Constitutional: The patient denies fatigue, fevers, chills, unintended  weight loss, and night sweats.  -HEENT: Patient denies nonhealing oral sores.    Physical exam:  Vitals: There were no vitals taken for this visit.  GEN: This is a well developed, well-nourished female in no acute distress, in a pleasant mood.    SKIN: Focused examination of the hand was performed.  - On the right knuckle there is a small papule roughly 0.7cm   - No other lesions of concern on areas examined.       Impression/Plan:  1. Keloidal scar atop the R hands dig 5 MCP  Discussed treatment options.   Offered Intralesional steroid injection to soft scar tissue. The patient will be leaving town for several months and would prefer a more effective treatment today.   Excision offered: Due to the location and symptoms of lesion on right hand, the patient will have a surgery today. She will take 5 mg tablet of Valium prior to surgery. She is leaving town in 4 days, and will follow up at a different clinic for suture removal.    2. Neurofibroma or hernia, left inguinal crease.   Get ultrasound to determine ddx. If it is a hernia, it can be treated by general surgeon.     Follow-up as needed for suture removal.     Scribe Disclosure:  I, Ankita Yeh, am serving as a scribe to document services personally performed by Dr. Yannick Muniz DO, based on data collection and the provider's statements to me.       Staff Physician Comments:   I saw and evaluated the patient with the resident (Dr. Rupert Slaughter) and I agree with the assessment and plan. I was present for the examination and procedures as above.    Yannick Muniz DO    Department of Dermatology  [facility]: Phone: [phone], Fax:[phone]  [facility]: Phone: [phone], Fax: [phone]

## 2019-06-11 NOTE — PROGRESS NOTES
Patient is scheduled for surgery with Dr. Shin     Spoke or left message with: patient in clinic     Date of Surgery: 8/15/19     Location: [place]     Informed patient they will need an adult  yes    Post-ops Made: 6 wks with provider     Pre-op with surgeon (if applicable): yes 8/13/19     H&P: Scheduled with PAC 8/13/19     Additional imaging/appointments: n/a     Surgery packet: given in clinic     Additional comments: n/a

## 2019-06-11 NOTE — Clinical Note
Skinny Cortez won't let me close this encounter. It says there is an incomplete smart list in one of your notes. I couldn't find it. May I have you take a look at it for me? Thank you.

## 2019-06-11 NOTE — NURSING NOTE
Chief Complaint   Patient presents with     Derm Problem     keloid consult hand, denies pain or irritation     Sheryl Fish, EMT

## 2019-06-11 NOTE — LETTER
6/11/2019       RE: Sheyla Ye  [address] [address]     Dear Colleague,    Thank you for referring your patient, Sheyla Ye, to the [facility] DERMATOLOGIC SURGERY at [facility]. Please see a copy of my visit note below.    [facility] Dermatology Note      Dermatology Problem List:  1.Keloidal scar atop the R hands dig 5 MCP  - Excision performed 6/11/19      Encounter Date: Jun 11, 2019    CC:  Chief Complaint   Patient presents with     Derm Problem     keloid consult hand, denies pain or irritation         History of Present Illness:  Ms. Sheyla Ye is a 38 year old female who presents for evaluation of a scar on the right hand. She notes that she scraped her hand a while ago and didn't treat it, but did not like the way it healed. She has Munir-Danlos Syndrome, and is not sure how this will impact her keloid treatment. She notes that she has had a lot of neck and back issues and has received steroid injections in those areas and it did not help. She is interested in surgery to treat this keloid, but is open to other options. She is also worried because the last time she used a local anesthetic, specifically lidocaine, she didn't respond and was able to feel the entire procedure. She notes that she has anxiety and she is worried about potential pain during the procedure, which elevates her anxiety.     She also has a small bump on the left side of her inguinal crease she was worried about, which she was told was a cyst.       Past Medical History:   Patient Active Problem List   Diagnosis     Cervical kyphosis     Cervical spondylosis without myelopathy     Cervical stenosis of spinal canal     Cervical radiculopathy     Pain management contract agreement     Anxiety     ADHD (attention deficit hyperactivity disorder), combined type     Joint hyperextensibility of multiple sites     [facility]     Other chronic pain     Tobacco use disorder     Chronic pain     Long term (current) use of opiate analgesic     Bilateral low back pain without sciatica     Depression, unspecified depression type     Munir-Danlos disease     Past Medical History:   Diagnosis Date     Anxiety      Chronic osteoarthritis      Chronic pain      Depressive disorder 1995     DJD (degenerative joint disease), lumbar      Munir-Danlos disease      Fibromyalgia      Learning disability      Past Surgical History:   Procedure Laterality Date     INJECT EPIDURAL CERVICAL / THORACIC SINGLE N/A 4/30/2019    Procedure: Cervical Epidural Steroid Injection;  Surgeon: Swapnil Carlisle MD;  [facility]: [facility] OR     wisdom teeth removal         Social History:  Patient reports that she has been smoking cigarettes.  She started smoking about 19 years ago. She has a 7.50 pack-year smoking history. She has quit using smokeless tobacco. She reports that she drinks about 4.0 oz of alcohol per week. She reports that she does not use drugs.    Family History:  Family History   Problem Relation Age of Onset     Aneurysm Brother         20's, autopsy showed RP bleed     Thyroid Disease Mother      Anxiety Disorder Mother      Mental Illness Mother      Depression Mother      Genetic Disorder Mother      Migraines Mother      Aneurysm Other         brain aneurysm, 30's     Colon Cancer Other      Anxiety Disorder Sister      Mental Illness Sister      Cancer Paternal Grandmother      Hypertension Paternal Grandmother      Colon Cancer Paternal Grandmother      Cancer Paternal Grandfather      Hypertension Paternal Grandfather      Colon Cancer Paternal Grandfather      Depression Sister      Genetic Disorder Brother      Hypertension Father      Hyperlipidemia Father        Medications:  Current Outpatient Medications   Medication Sig Dispense Refill     Acetaminophen (TYLENOL PO) Take 500 mg by mouth every 4 hours as  needed for mild pain or fever Takes 2 tablets of 500 mg       amphetamine-dextroamphetamine (ADDERALL) 20 MG per tablet TAKE UP TO 1 TABLET BY MOUTH 3 TIMES DAILY ABOUT 4 HOURS APART AS NEEDED FOR ADHD SYMPTOMS  0     baclofen (LIORESAL) 10 MG tablet Take 0.5-1 tablet by mouth, 3 times daily as needed. 90 tablet 1     celecoxib (CELEBREX) 100 MG capsule Take 1 capsule (100 mg) by mouth 2 times daily as needed for moderate pain 60 capsule 1     diazepam (VALIUM) 5 MG tablet TAKE UP TO 2 TABS DAILY BY MOUTH AS NEEDED FOR ANXIETY. USE SPARINGLY  0     diclofenac (VOLTAREN) 1 % topical gel Place 2-4 g onto the skin 4 times daily as needed for moderate pain 100 g 1     gabapentin (NEURONTIN) 100 MG capsule Take 3 capsules (300 mg) by mouth 3 times daily Titrate to this dose per clinic directions 270 capsule 1     Ibuprofen (ADVIL PO) Take by mouth as needed for moderate pain       LamoTRIgine (LAMICTAL PO) Take 50 mg by mouth 2 times daily       Lidocaine (LIDOCARE) 4 % Patch Place 1 patch onto the skin every 24 hours 30 patch 3     lidocaine (XYLOCAINE) 5 % external ointment Apply topically 3 times daily as needed for moderate pain 50 g 1     lidocaine (XYLOCAINE) 5 % external ointment Apply topically as needed for moderate pain 50 g 1     lidocaine-prilocaine (EMLA) 2.5-2.5 % external cream Apply topically daily as needed for moderate pain 30 g 1     loratadine (CLARITIN) 10 MG tablet Take 10 mg by mouth daily       Menthol, Topical Analgesic, 4 % GEL Externally apply topically 4 times daily as needed (apply to neck, shoulders and hips for pain) 118 mL 3     pantoprazole (PROTONIX) 20 MG EC tablet Take 1 tablet (20 mg) by mouth daily 90 tablet 3     Scar GEL Externally apply topically 4 times daily as needed (to scar on right hand) 50 g 1     urea (GORMEL) 20 % external cream Apply topically as needed (use on cracks on fingers 1-2 times daily) 75 g 1       Allergies   Allergen Reactions     Seasonal Allergies         Review of Systems:  -Skin Establ Pt: The patient denies any new rash, pruritus, or lesions that are symptomatic, changing or bleeding, except as per HPI.  -Constitutional: The patient is feeling generally well.  -Constitutional: The patient denies fatigue, fevers, chills, unintended weight loss, and night sweats.  -HEENT: Patient denies nonhealing oral sores.    Physical exam:  Vitals: There were no vitals taken for this visit.  GEN: This is a well developed, well-nourished female in no acute distress, in a pleasant mood.    SKIN: Focused examination of the hand was performed.  - On the right knuckle there is a small papule roughly 0.7cm   - No other lesions of concern on areas examined.       Impression/Plan:  1. Keloidal scar atop the R hands dig 5 MCP  Discussed treatment options.   Offered Intralesional steroid injection to soft scar tissue. The patient will be leaving town for several months and would prefer a more effective treatment today.   Excision offered: Due to the location and symptoms of lesion on right hand, the patient will have a surgery today. She will take 5 mg tablet of Valium prior to surgery. She is leaving town in 4 days, and will follow up at a different clinic for suture removal.    2. Neurofibroma or hernia, left inguinal crease.   Get ultrasound to determine ddx. If it is a hernia, it can be treated by general surgeon.     Follow-up as needed for suture removal.     Scribe Disclosure:  I, Ankita Yeh, am serving as a scribe to document services personally performed by Dr. Yannick Muniz DO, based on data collection and the provider's statements to me.       Staff Physician Comments:   I saw and evaluated the patient with the resident (Dr. Rupert Slaughter) and I agree with the assessment and plan. I was present for the examination and procedures as above.    Yannick Muniz DO    Department of Dermatology  [facility]  Clinics: Phone: [phone], Fax:[phone]  [facility]: Phone: [phone], Fax: [phone]    DERMATOLOGY EXCISION PROCEDURE NOTE    Dermatology Problem List:  1.Keloidal scar atop the R hands dig 5 MCP  - Excision performed 6/11/19    NAME OF PROCEDURE: Excision intermediate layered linear closure  Staff surgeon: Yannick Muniz DO  Resident: Rupert Slaughter MD  Scrub Nurse: DASHAWN Shay    PRE-OPERATIVE DIAGNOSIS:  Dysplastic nevus, keloid  POST-OPERATIVE DIAGNOSIS: same   FINAL EXCISION SIZE(DEFECT SIZE): 0.7 by 0.7 cm  FINAL REPAIR LENGTH: 1.3 cm     INDICATIONS: This patient presented with a  0.7 by 0.7 cm keloid scar of the right hand, digit 5 MCP. Excision was indicated. We discussed the principles of treatment and most likely complications including scarring, bleeding, infection, incomplete excision, wound dehiscence, pain, nerve damage, and recurrence. Informed consent was obtained and the patient underwent the procedure as follows:    PROCEDURE: The patient was taken to the operative suite. Time-out was performed.  The treatment area was anesthetized with 1% lidocaine and epinephrine (1:100,000). The area was prepped with Chlorhexidine and rinsed with sterile saline and draped with sterile towels. The lesion was delineated and excised down to subcutaneous fat in a elliptical manner. Hemostasis was obtained by electrocoagulation.     REPAIR: An intermediate layered linear closure was selected as the procedure which would maximally preserve both function and cosmesis.    After the excision of the tumor, the area was carefully undermined. Hemostasis was obtained with electrocoagulation.  Closure was oriented so that the wound was in the patient's natural skin tension lines. The subcutaneous and dermal layers were then closed with 5-0 Monocryl sutures. The epidermis was then carefully approximated along the length of the wound using 5-0 Prolene sutures.      The final wound length was 1.3 cm. A total of 6 ml of anesthesia was administered for all surgical sites. Estimated blood loss was less than 10 ml for all surgical sites. A sterile pressure dressing was applied and wound care instructions, with a written handout, were given. The patient was discharged from the [facility] alert and ambulatory.    Follow-up as needed for suture removal.    Anatomic Pathology Results: pending    Staff Physician Comments:   I saw and evaluated the patient with the resident (Dr. Rupert Slaughter) and I agree with the assessment and plan. I was present for the entire procedure and examination..    Yannick Muniz DO    Department of Dermatology  [facility]: Phone: [phone], Fax:[phone]  [facility]: Phone: [phone], Fax: [phone]    Dermatology Surgery Clinic   [facility]  [address] [place], [place] [address]

## 2019-06-12 ENCOUNTER — TELEPHONE (OUTPATIENT)
Dept: DERMATOLOGY | Facility: CLINIC | Age: 39
End: 2019-06-12

## 2019-06-12 NOTE — TELEPHONE ENCOUNTER
Follow up call attempted following procedure with Dr. Muniz. I left a message that I was calling to see how the patient was doing and to please call [phone] option 3 if you have any additional questions.    Ting Man CMA

## 2019-06-14 LAB — COPATH REPORT: NORMAL

## 2019-06-20 ENCOUNTER — OFFICE VISIT (OUTPATIENT)
Dept: DERMATOLOGY | Facility: CLINIC | Age: 39
End: 2019-06-20
Payer: COMMERCIAL

## 2019-06-20 DIAGNOSIS — Z09 POSTOP CHECK: Primary | ICD-10-CM

## 2019-06-20 ASSESSMENT — PAIN SCALES - GENERAL: PAINLEVEL: EXTREME PAIN (8)

## 2019-06-20 NOTE — PROGRESS NOTES
[facility] Dermatology Note      Dermatology Problem List:  1. Keloidal scar atop the R hand 5th MCP  - Excision performed 6/11/19    CC:   Chief Complaint   Patient presents with     RECHECK     Sheyla is here to have her surgical site looked at. She states that her pain is bad, but it is because she has hit it many times.          Encounter Date: Jun 20, 2019    History of Present Illness:  Ms. Sheyla Ye is a 38 year old female who presents for evaluation of a postoperative site.    Patient reports some throbbing pain at surgical site (surgery 6/11/19) - no swelling, no erythema, no discharge   - concern that popped a suture   - bumped surgical site while packing   - pain relieved by pressure application just proximal to the wound   - not sure when to get sutures removed - concerned given history of Munir Danlos that wound healing will be prolonged   - going out of town for a few months - leaving next week   - applying neosporin; taking acetaminophen    Past Medical History:   Past Medical History:   Diagnosis Date     Anxiety      Chronic osteoarthritis      Chronic pain      Depressive disorder 1995     DJD (degenerative joint disease), lumbar      Munir-Danlos disease      Fibromyalgia      Learning disability      Past Surgical History:   Procedure Laterality Date     INJECT EPIDURAL CERVICAL / THORACIC SINGLE N/A 4/30/2019    Procedure: Cervical Epidural Steroid Injection;  Surgeon: Swapnil Carlisle MD;  [facility]: [facility] OR     wisdom teeth removal         Social History:   reports that she has been smoking cigarettes.  She started smoking about 19 years ago. She has a 7.50 pack-year smoking history. She has quit using smokeless tobacco. She reports that she drinks about 4.0 oz of alcohol per week. She reports that she does not use drugs.    Family History:  Family History   Problem Relation Age of Onset     Aneurysm Brother         20's, autopsy showed RP bleed     Thyroid Disease  Mother      Anxiety Disorder Mother      Mental Illness Mother      Depression Mother      Genetic Disorder Mother      Migraines Mother      Aneurysm Other         brain aneurysm, 30's     Colon Cancer Other      Anxiety Disorder Sister      Mental Illness Sister      Cancer Paternal Grandmother      Hypertension Paternal Grandmother      Colon Cancer Paternal Grandmother      Cancer Paternal Grandfather      Hypertension Paternal Grandfather      Colon Cancer Paternal Grandfather      Depression Sister      Genetic Disorder Brother      Hypertension Father      Hyperlipidemia Father        Medications:  Current Outpatient Medications   Medication Sig Dispense Refill     Acetaminophen (TYLENOL PO) Take 500 mg by mouth every 4 hours as needed for mild pain or fever Takes 2 tablets of 500 mg       amphetamine-dextroamphetamine (ADDERALL) 20 MG per tablet TAKE UP TO 1 TABLET BY MOUTH 3 TIMES DAILY ABOUT 4 HOURS APART AS NEEDED FOR ADHD SYMPTOMS  0     baclofen (LIORESAL) 10 MG tablet Take 0.5-1 tablet by mouth, 3 times daily as needed. 90 tablet 1     celecoxib (CELEBREX) 100 MG capsule Take 1 capsule (100 mg) by mouth 2 times daily as needed for moderate pain 60 capsule 1     diazepam (VALIUM) 5 MG tablet TAKE UP TO 2 TABS DAILY BY MOUTH AS NEEDED FOR ANXIETY. USE SPARINGLY  0     diclofenac (VOLTAREN) 1 % topical gel Place 2-4 g onto the skin 4 times daily as needed for moderate pain 100 g 1     gabapentin (NEURONTIN) 100 MG capsule Take 3 capsules (300 mg) by mouth 3 times daily Titrate to this dose per clinic directions 270 capsule 1     Ibuprofen (ADVIL PO) Take by mouth as needed for moderate pain       LamoTRIgine (LAMICTAL PO) Take 50 mg by mouth 2 times daily       Lidocaine (LIDOCARE) 4 % Patch Place 1 patch onto the skin every 24 hours 30 patch 3     lidocaine (XYLOCAINE) 5 % external ointment Apply topically 3 times daily as needed for moderate pain 50 g 1     lidocaine (XYLOCAINE) 5 % external ointment  Apply topically as needed for moderate pain 50 g 1     lidocaine-prilocaine (EMLA) 2.5-2.5 % external cream Apply topically daily as needed for moderate pain 30 g 1     loratadine (CLARITIN) 10 MG tablet Take 10 mg by mouth daily       Menthol, Topical Analgesic, 4 % GEL Externally apply topically 4 times daily as needed (apply to neck, shoulders and hips for pain) 118 mL 3     pantoprazole (PROTONIX) 20 MG EC tablet Take 1 tablet (20 mg) by mouth daily 90 tablet 3     Scar GEL Externally apply topically 4 times daily as needed (to scar on right hand) 50 g 1     urea (GORMEL) 20 % external cream Apply topically as needed (use on cracks on fingers 1-2 times daily) 75 g 1     Allergies   Allergen Reactions     Seasonal Allergies          Review of Systems:  - As per HPI  - Constitutional: The patient denies fatigue, fevers, chills, unintended weight loss, and night sweats.  - Skin: As above in HPI. No additional skin concerns.    Physical exam:  Vitals: There were no vitals taken for this visit.  GEN: This is a well developed, well-nourished female in no acute distress, in a pleasant mood.    SKIN: Escoto phototype II  - Focused examination of the right hand was performed.  - Surgical site on the right 5th MCP with no peripheral erythema, no drainage, and minimal swelling. Running suture in place.  - No other lesions of concern on areas examined.     Impression/Plan:  1. Postoperative site concerns: site appears to be healing quite well. No evidence of infection. Recommend application of petrolatum ointment instead of neosporin given risk of contact sensitization. Recommend limiting daily dose of acetaminophen. Follow up for suture removal at POD #10-14.    CC Dr. Yannick Muniz on close of this encounter.  Follow-up in 4-5 days, earlier for new or changing lesions.       Staff Involved:  Staff Only    Jaya House MD   of Dermatology  Department of Dermatology  [facility]  [employer]

## 2019-06-20 NOTE — LETTER
6/20/2019       RE: Sheyla Ye  [address] [address]     Dear Colleague,    Thank you for referring your patient, Sheyla Ye, to the [facility] DERMATOLOGY at [facility]. Please see a copy of my visit note below.        Picture placed in chart for future reference.       [facility] Dermatology Note      Dermatology Problem List:  1. Keloidal scar atop the R hand 5th MCP  - Excision performed 6/11/19    CC:   Chief Complaint   Patient presents with     RECHECK     Sheyla is here to have her surgical site looked at. She states that her pain is bad, but it is because she has hit it many times.          Encounter Date: Jun 20, 2019    History of Present Illness:  Ms. Sheyla Ye is a 38 year old female who presents for evaluation of a postoperative site.    Patient reports some throbbing pain at surgical site (surgery 6/11/19) - no swelling, no erythema, no discharge   - concern that popped a suture   - bumped surgical site while packing   - pain relieved by pressure application just proximal to the wound   - not sure when to get sutures removed - concerned given history of Munir Danlos that wound healing will be prolonged   - going out of town for a few months - leaving next week   - applying neosporin; taking acetaminophen    Past Medical History:   Past Medical History:   Diagnosis Date     Anxiety      Chronic osteoarthritis      Chronic pain      Depressive disorder 1995     DJD (degenerative joint disease), lumbar      Munir-Danlos disease      Fibromyalgia      Learning disability      Past Surgical History:   Procedure Laterality Date     INJECT EPIDURAL CERVICAL / THORACIC SINGLE N/A 4/30/2019    Procedure: Cervical Epidural Steroid Injection;  Surgeon: Swapnil Carlisle MD;  Location: [facility] OR     wisdom teeth removal         Social History:   reports that she has been smoking cigarettes.  She started smoking about  19 years ago. She has a 7.50 pack-year smoking history. She has quit using smokeless tobacco. She reports that she drinks about 4.0 oz of alcohol per week. She reports that she does not use drugs.    Family History:  Family History   Problem Relation Age of Onset     Aneurysm Brother         20's, autopsy showed RP bleed     Thyroid Disease Mother      Anxiety Disorder Mother      Mental Illness Mother      Depression Mother      Genetic Disorder Mother      Migraines Mother      Aneurysm Other         brain aneurysm, 30's     Colon Cancer Other      Anxiety Disorder Sister      Mental Illness Sister      Cancer Paternal Grandmother      Hypertension Paternal Grandmother      Colon Cancer Paternal Grandmother      Cancer Paternal Grandfather      Hypertension Paternal Grandfather      Colon Cancer Paternal Grandfather      Depression Sister      Genetic Disorder Brother      Hypertension Father      Hyperlipidemia Father        Medications:  Current Outpatient Medications   Medication Sig Dispense Refill     Acetaminophen (TYLENOL PO) Take 500 mg by mouth every 4 hours as needed for mild pain or fever Takes 2 tablets of 500 mg       amphetamine-dextroamphetamine (ADDERALL) 20 MG per tablet TAKE UP TO 1 TABLET BY MOUTH 3 TIMES DAILY ABOUT 4 HOURS APART AS NEEDED FOR ADHD SYMPTOMS  0     baclofen (LIORESAL) 10 MG tablet Take 0.5-1 tablet by mouth, 3 times daily as needed. 90 tablet 1     celecoxib (CELEBREX) 100 MG capsule Take 1 capsule (100 mg) by mouth 2 times daily as needed for moderate pain 60 capsule 1     diazepam (VALIUM) 5 MG tablet TAKE UP TO 2 TABS DAILY BY MOUTH AS NEEDED FOR ANXIETY. USE SPARINGLY  0     diclofenac (VOLTAREN) 1 % topical gel Place 2-4 g onto the skin 4 times daily as needed for moderate pain 100 g 1     gabapentin (NEURONTIN) 100 MG capsule Take 3 capsules (300 mg) by mouth 3 times daily Titrate to this dose per clinic directions 270 capsule 1     Ibuprofen (ADVIL PO) Take by mouth as  needed for moderate pain       LamoTRIgine (LAMICTAL PO) Take 50 mg by mouth 2 times daily       Lidocaine (LIDOCARE) 4 % Patch Place 1 patch onto the skin every 24 hours 30 patch 3     lidocaine (XYLOCAINE) 5 % external ointment Apply topically 3 times daily as needed for moderate pain 50 g 1     lidocaine (XYLOCAINE) 5 % external ointment Apply topically as needed for moderate pain 50 g 1     lidocaine-prilocaine (EMLA) 2.5-2.5 % external cream Apply topically daily as needed for moderate pain 30 g 1     loratadine (CLARITIN) 10 MG tablet Take 10 mg by mouth daily       Menthol, Topical Analgesic, 4 % GEL Externally apply topically 4 times daily as needed (apply to neck, shoulders and hips for pain) 118 mL 3     pantoprazole (PROTONIX) 20 MG EC tablet Take 1 tablet (20 mg) by mouth daily 90 tablet 3     Scar GEL Externally apply topically 4 times daily as needed (to scar on right hand) 50 g 1     urea (GORMEL) 20 % external cream Apply topically as needed (use on cracks on fingers 1-2 times daily) 75 g 1     Allergies   Allergen Reactions     Seasonal Allergies          Review of Systems:  - As per HPI  - Constitutional: The patient denies fatigue, fevers, chills, unintended weight loss, and night sweats.  - Skin: As above in HPI. No additional skin concerns.    Physical exam:  Vitals: There were no vitals taken for this visit.  GEN: This is a well developed, well-nourished female in no acute distress, in a pleasant mood.    SKIN: Escoto phototype II  - Focused examination of the right hand was performed.  - Surgical site on the right 5th MCP with no peripheral erythema, no drainage, and minimal swelling. Running suture in place.  - No other lesions of concern on areas examined.     Impression/Plan:  1. Postoperative site concerns: site appears to be healing quite well. No evidence of infection. Recommend application of petrolatum ointment instead of neosporin given risk of contact sensitization. Recommend  limiting daily dose of acetaminophen. Follow up for suture removal at POD #10-14.    CC Dr. Yannick Muniz on close of this encounter.  Follow-up in 4-5 days, earlier for new or changing lesions.       Staff Involved:  Staff Only    Jaya House MD   of Dermatology  Department of Dermatology  [facility]

## 2019-06-20 NOTE — NURSING NOTE
Chief Complaint   Patient presents with     RECHECK     Sheyla is here to have her surgical site looked at. She states that her pain is bad, but it is because she has hit it many times.      Eda Coley, CMA

## 2019-06-25 ENCOUNTER — ALLIED HEALTH/NURSE VISIT (OUTPATIENT)
Dept: DERMATOLOGY | Facility: CLINIC | Age: 39
End: 2019-06-25
Payer: COMMERCIAL

## 2019-06-25 ENCOUNTER — OFFICE VISIT (OUTPATIENT)
Dept: ORTHOPEDICS | Facility: CLINIC | Age: 39
End: 2019-06-25
Payer: COMMERCIAL

## 2019-06-25 VITALS — BODY MASS INDEX: 20.73 KG/M2 | WEIGHT: 129 LBS | RESPIRATION RATE: 16 BRPM | HEIGHT: 66 IN

## 2019-06-25 DIAGNOSIS — Z48.02 VISIT FOR SUTURE REMOVAL: Primary | ICD-10-CM

## 2019-06-25 DIAGNOSIS — R11.0 NAUSEOUS: Primary | ICD-10-CM

## 2019-06-25 DIAGNOSIS — S06.0X0A CONCUSSION WITHOUT LOSS OF CONSCIOUSNESS, INITIAL ENCOUNTER: ICD-10-CM

## 2019-06-25 RX ORDER — ONDANSETRON 4 MG/1
4 TABLET, FILM COATED ORAL EVERY 8 HOURS PRN
Qty: 10 TABLET | Refills: 0 | Status: SHIPPED | OUTPATIENT
Start: 2019-06-25 | End: 2020-07-08

## 2019-06-25 ASSESSMENT — MIFFLIN-ST. JEOR: SCORE: 1281.89

## 2019-06-25 NOTE — PATIENT INSTRUCTIONS
"GENERAL ADVICE  ~ Avoid activities that trigger your symptoms.  Stop your activity as soon as you feel the symptoms coming on.  ~ Rest (eyes closed, dark room)  as frequently as needed to help your symptoms go away.  ~ Do not try to push through symptoms or they may get worse or take longer to go away.  ~ Allow yourself more time for activities.  ~ Write things down.  At home, at work, whenever there is something that you should remember, even it is simple.     SCREENS  ~ Change settings on your phone and computer using the \"Blue Light Filter\"  ~ The goal is making screens more yellow and less blue.    ~ If this is not an option you can download this program: Ideal Me, to adjust your screen resolution.  ~ Turn screen brightness down  ~ Increase font size  ~ Limit time on screen activities including computer, TV, e-readers and phones.                ~Take breaks from these activities often- try starting with no more than 20 minutes at a time.  ~ Avoid reading if it increases your symptoms.    ~ Audiobooks are a great option, often available at your public library.             ~ Downloading podcasts to listen to is also an option     HEADACHE  ~ Take tylenol (1000 mg) three times a day as needed  ~ Take ibuprofen (600 mg) three times a day as needed (take with food)     LIGHT SENSITIVITY   ~ Avoid florescent lighting when possible  ~ Always wear sunglasses outside and even wear them indoors if helpful.  ~ No night driving, or in bad weather  - Yellow or quinten-tinted lenses may help reduce computer or nighttime driving glare.  - Amazon has a $10 option: [employer] Yellow Night Vision     NOISE SENSITIVITY  ~ Avoid crowded areas  ~ Avoid multiple conversations at the same time around you  ~ Avoid loud music or loud sounds. Gyms, concerts, stadiums may be hard.  ~ Try high-fidelity ear plugs, designed for musicians. One example is Etymotic ETY-Plugs, can be found on Amazon.com for ~$13  ~ Try listening to calming sounds " "such as those found in the Calm brodie to help shift your focus off of more irritating sounds.     NECK PAIN  ~ Ice or Heat are good~  ~ Massage is ok if it doesn't trigger more symptoms~  ~ Gentle stretches and range-of-motion are good     DIZZINESS  ~ No driving if dizzy.   ~ Keep a chair at the side of the bed to help get steady prior to getting up and going to the bathroom  ~ No biking, climbing heights or using ladders or stepstools.     FATIGUE  ~ Daily exercise is strongly encouraged.  Start with a 10 min walk and increase the time as tolerated until you are walking 30 minutes per day.       ANXIETY OR MOOD SWINGS:  ~ If you are irritable or anxious, take a break in a quiet room.  ~ Try using the free Calm brodie (see [employer] Brodie store or Google Play Store) for guided breathing and mindfulness/meditation.  ~ Explore SureGene (https://www.headspace.com) for free and easy-to-use meditation guidance.      Diet:  - In principle incorporate more protein, lots of veggies, some fruit, whole grains.   - Little to no sweets, dairy, and processed carbs.  - Mediterranean Diet is an easy-to-follow example.  ~ Drink plenty of water throughout the day (8-10 glasses per day)  ~ Avoid alcohol and caffeine     Helpful Supplements (usually with the vitamins at any pharmacy):  - Tumeric 500mg twice daily  - B-Complex vitamin once daily  - Vitamin D, 2000 IU once daily  - Omega 3 twice daily  - particularly with higher DHA.  - one brand is Omega Jym- 1 capsule twice daily.                       Sleep Hygiene   What is it?     \"Sleep hygiene\" means having good sleep habits. Follow the tips below to sleep better at night.       Get on a schedule. Go to bed and get up at about the same time every day.    Listen to your body. Only try to sleep when you actually feel tired or sleepy.    Be patient. If you haven't been able to get to sleep after about 20 minutes or more, get up and do something calming or boring until you feel sleepy. Then, " "return to bed and try again.      Avoid caffeine (coffee, tea, cola drinks, chocolate and some medicines) for at least 4 to 6 hours before going to bed. We also suggest you don't use alcohol or nicotine (cigarettes) during this time. Both can make it harder for you to fall asleep and stay asleep.    Use your bed for sleeping only. That means no TV, computer or homework in bed!    Don't nap during the day. If you do nap, make sure it is for less than an hour and before 3 p.m.    Create sleep rituals that remind your body that it is time to sleep. Examples include breathing exercises, stretching, or reading a book.     Try a bath or shower before bed. Having a hot bath 1 to 2 hours before bedtime can help you feel sleepy.    Don't watch the clock. Checking the clock during the night can wake you up. It can also lead to negative thoughts such as \"I will never fall asleep.\"    Use a sleep diary. Track your sleep schedule to know your sleep patterns and to see where you can improve.    Get regular exercise. But try not to do heavy exercise in the 4 hours before bedtime.      Eat a healthy, balanced diet. Try eating a light, healthy snack before bed, but avoid eating a heavy meal.    Create the right sleeping area. A cool, dark, quiet room is best. If needed, try earplugs, fans and blackout curtains.      Keep your daytime routine the same even if you have a bad night sleep. Avoiding activities the next day can make it harder to sleep.            "
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
## 2019-06-25 NOTE — PROGRESS NOTES
Sheyla Ye comes into clinic today at the request of Dr Muniz Ordering Provider for suture removal of R hand. No s/s of infection noted. Reports tenderness. Area cleansed with iodine. Sutures removed without complication. Vaseline and bandaid applied. No questions.    This service provided today was under the supervising provider of the day Dr Mackenzie, who was available if needed.    Lina Barnard RN

## 2019-07-23 ENCOUNTER — PRE VISIT (OUTPATIENT)
Dept: SURGERY | Facility: CLINIC | Age: 39
End: 2019-07-23

## 2019-07-23 NOTE — TELEPHONE ENCOUNTER
FUTURE VISIT INFORMATION      SURGERY INFORMATION:    Date: 8/15/19    Location: [facility] OR    Surgeon:  Peewee Shin    Anesthesia Type:  General    RECORDS REQUESTED FROM:       Primary Care Provider: Dainelle Bray makeda    Most recent ECHO: 10/12/18

## 2019-08-13 ENCOUNTER — TELEPHONE (OUTPATIENT)
Dept: ORTHOPEDICS | Facility: CLINIC | Age: 39
End: 2019-08-13

## 2019-08-13 ENCOUNTER — OFFICE VISIT (OUTPATIENT)
Dept: ORTHOPEDICS | Facility: CLINIC | Age: 39
End: 2019-08-13
Payer: COMMERCIAL

## 2019-08-13 DIAGNOSIS — M54.12 CERVICAL RADICULOPATHY: Primary | ICD-10-CM

## 2019-08-13 PROBLEM — R51.9 HEADACHE: Status: ACTIVE | Noted: 2017-10-20

## 2019-08-13 RX ORDER — CEFAZOLIN SODIUM 1 G/3ML
1 INJECTION, POWDER, FOR SOLUTION INTRAMUSCULAR; INTRAVENOUS SEE ADMIN INSTRUCTIONS
Status: CANCELLED | OUTPATIENT
Start: 2019-08-13

## 2019-08-13 RX ORDER — CEFAZOLIN SODIUM 2 G/100ML
2 INJECTION, SOLUTION INTRAVENOUS
Status: CANCELLED | OUTPATIENT
Start: 2019-08-13

## 2019-08-13 RX ORDER — ACETAMINOPHEN 325 MG/1
975 TABLET ORAL ONCE
Status: CANCELLED | OUTPATIENT
Start: 2019-08-13 | End: 2019-08-13

## 2019-08-13 ASSESSMENT — ENCOUNTER SYMPTOMS
NUMBNESS: 1
MYALGIAS: 1
ARTHRALGIAS: 1
MUSCLE WEAKNESS: 1
HEADACHES: 1
MEMORY LOSS: 1
DECREASED CONCENTRATION: 1
WEAKNESS: 1
SORE THROAT: 0
LOSS OF CONSCIOUSNESS: 0
INSOMNIA: 0
SPEECH CHANGE: 0
SWOLLEN GLANDS: 0
SMELL DISTURBANCE: 0
NERVOUS/ANXIOUS: 1
MUSCLE CRAMPS: 1
PANIC: 1
DISTURBANCES IN COORDINATION: 1
DEPRESSION: 1
SINUS PAIN: 0
BRUISES/BLEEDS EASILY: 1
HOARSE VOICE: 0
SINUS CONGESTION: 0
TASTE DISTURBANCE: 0
BACK PAIN: 1
STIFFNESS: 1
NECK MASS: 0
TREMORS: 0
SEIZURES: 0
TROUBLE SWALLOWING: 1
NECK PAIN: 1
DIZZINESS: 1
TINGLING: 1
JOINT SWELLING: 1
PARALYSIS: 0

## 2019-08-13 NOTE — TELEPHONE ENCOUNTER
Left patient a detailed VM requesting that the patient call me back by the end of the day today on if patient would like to cancel surgery with Dr. Shin on 8/15/19.     Patient was seen in clinic today and was on the fence on moving forward with her surgery, and no-showed her PAC appointment today.     Requested that the patient call me by the end of the day today on her decision.
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
## 2019-08-14 NOTE — TELEPHONE ENCOUNTER
Left 2nd detailed VM for patient stating that she needs to call us by 12:00pm today, 8/14, to confirm her surgery with Dr. Shin, or else we are going to cancel it. Okay per Dr. Shin.

## 2019-08-14 NOTE — TELEPHONE ENCOUNTER
Patient called back stating that she would not like to move forward with surgery with Dr. Shin on 8/15.     She will call us back when she is ready to get things rescheduled. She has our contact info

## 2019-08-22 ENCOUNTER — MYC MEDICAL ADVICE (OUTPATIENT)
Dept: INTERNAL MEDICINE | Facility: CLINIC | Age: 39
End: 2019-08-22

## 2019-08-22 ENCOUNTER — TELEPHONE (OUTPATIENT)
Dept: ANESTHESIOLOGY | Facility: CLINIC | Age: 39
End: 2019-08-22

## 2019-08-22 NOTE — TELEPHONE ENCOUNTER
[facility] Call Center    Phone Message    May a detailed message be left on voicemail: yes    Reason for Call:  Sheyla called in wanting to let the care team know that she has also sent a [employer] message, she also stated the dentist wanted her to reach out to the care team and just wanted to make sure that was added to her messages as well. Please contact Sheyla.      Action Taken: Message routed to:  [facility] ([facility] Pain

## 2019-08-22 NOTE — TELEPHONE ENCOUNTER
[facility] Call Center    Phone Message    May a detailed message be left on voicemail: yes    Reason for Call: Other: Sheyla was seen at the dental office for tooth pain, the dentist office is not comfortable prescribing her pain medication. Sheyla would like a call back to be seen for pain medication as there is no template for pain clinic.     Action Taken: Message routed to:  [facility] Pain

## 2019-08-23 NOTE — TELEPHONE ENCOUNTER
I called and left a voice message for the patient informing her that since she was seen at the Dentist office either them or your PCP can prescribe you something.    If you have any questions or concerns you can call us at [phone].    Bee Arita, [facility]

## 2019-09-06 ENCOUNTER — TELEPHONE (OUTPATIENT)
Dept: PHYSICAL MEDICINE AND REHAB | Facility: CLINIC | Age: 39
End: 2019-09-06

## 2019-09-06 ENCOUNTER — OFFICE VISIT (OUTPATIENT)
Dept: PHYSICAL MEDICINE AND REHAB | Facility: CLINIC | Age: 39
End: 2019-09-06
Payer: COMMERCIAL

## 2019-09-06 VITALS
OXYGEN SATURATION: 98 % | BODY MASS INDEX: 20.09 KG/M2 | HEIGHT: 66 IN | DIASTOLIC BLOOD PRESSURE: 63 MMHG | HEART RATE: 63 BPM | SYSTOLIC BLOOD PRESSURE: 101 MMHG | WEIGHT: 125 LBS

## 2019-09-06 DIAGNOSIS — G44.309 POST-CONCUSSION HEADACHE: Primary | ICD-10-CM

## 2019-09-06 RX ORDER — RIBOFLAVIN (VITAMIN B2) 400 MG
400 TABLET ORAL DAILY
Qty: 30 TABLET | Refills: 3 | Status: SHIPPED | OUTPATIENT
Start: 2019-09-06 | End: 2020-07-08

## 2019-09-06 RX ORDER — NORTRIPTYLINE HCL 10 MG
10 CAPSULE ORAL AT BEDTIME
Qty: 30 CAPSULE | Refills: 3 | Status: SHIPPED | OUTPATIENT
Start: 2019-09-06 | End: 2020-05-15

## 2019-09-06 ASSESSMENT — ANXIETY QUESTIONNAIRES
2. NOT BEING ABLE TO STOP OR CONTROL WORRYING: NEARLY EVERY DAY
3. WORRYING TOO MUCH ABOUT DIFFERENT THINGS: NEARLY EVERY DAY
5. BEING SO RESTLESS THAT IT IS HARD TO SIT STILL: SEVERAL DAYS
GAD7 TOTAL SCORE: 16
GAD7 TOTAL SCORE: 16
4. TROUBLE RELAXING: NEARLY EVERY DAY
1. FEELING NERVOUS, ANXIOUS, OR ON EDGE: NEARLY EVERY DAY
6. BECOMING EASILY ANNOYED OR IRRITABLE: MORE THAN HALF THE DAYS
7. FEELING AFRAID AS IF SOMETHING AWFUL MIGHT HAPPEN: SEVERAL DAYS
GAD7 TOTAL SCORE: 16
7. FEELING AFRAID AS IF SOMETHING AWFUL MIGHT HAPPEN: SEVERAL DAYS

## 2019-09-06 ASSESSMENT — PAIN SCALES - GENERAL: PAINLEVEL: MODERATE PAIN (4)

## 2019-09-06 ASSESSMENT — PATIENT HEALTH QUESTIONNAIRE - PHQ9
SUM OF ALL RESPONSES TO PHQ QUESTIONS 1-9: 21
SUM OF ALL RESPONSES TO PHQ QUESTIONS 1-9: 21

## 2019-09-06 ASSESSMENT — MIFFLIN-ST. JEOR: SCORE: 1258.75
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
## 2019-09-06 NOTE — NURSING NOTE
"Chief Complaint   Patient presents with     Head Injury     Concussion w/o loc 6/24/2019 - Door shut and pinned head between door and door jam.       Vitals:    09/06/19 0819   BP: 101/63   Pulse: 63   SpO2: 98%   Weight: 56.7 kg (125 lb)   Height: 1.676 m (5' 6\")       Body mass index is 20.18 kg/m .      LASHAWN-7 SCORE 8/27/2018 5/3/2019 9/6/2019   Total Score 16 (severe anxiety) 17 (severe anxiety) 16 (severe anxiety)   Total Score 16 17 16     PHQ-9 SCORE 5/3/2019 6/11/2019 9/6/2019   PHQ-9 Total Score - - -   PHQ-9 Total Score MyChart 18 (Moderately severe depression) - 21 (Severe depression)   PHQ-9 Total Score 18 0 21   Some encounter information is confidential and restricted. Go to Review Flowsheets activity to see all data.        CONCUSSION SYMPTOMS ASSESSMENT 9/6/2019   Headache or Pressure In Head 4 - moderate to severe   Upset Stomach or Throwing Up 0 - none   Problems with Balance 1 - mild   Feeling Dizzy 2 - mild to moderate   Sensitivity to Light 2 - mild to moderate   Sensitivity to Noise 2 - mild to moderate   Mood Changes 2 - mild to moderate   Feeling sluggish, hazy, or foggy 2 - mild to moderate   Trouble Concentrating, Lack of Focus 2 - mild to moderate   Motion Sickness 0 - none   Vision Changes 1 - mild   Memory Problems 3 - moderate   Feeling Confused 3 - moderate   Neck Pain 6 - excruciating   Trouble Sleeping 0 - none   Total Number of Symptoms 12   Symptom Severity Score 30                      "
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
## 2019-09-06 NOTE — PATIENT INSTRUCTIONS
"GENERAL ADVICE:  ~ Gradually ease back into your usual activities.   ~ Rest as needed to help your symptoms go away.  - Consider pairing 50 minutes of activity with 10 minutes of rest.  ~ Allow yourself more time for activities.  ~ Write things down.  At home, at work, whenever there is something that you should remember, even it is simple.  SCREENS:  ~ Change settings on your phone and computer using the \"Blue Light Filter\" (Night Shift on all  Apple products)  ~ The goal is making screens more yellow and less blue.    ~ If this is not an option you can download this program, [employer], to adjust your screen resolution.  ~ One On One Ads for various filter and font apps  ~ Turn screen brightness down  ~ Increase font size  ~ Limit screen activities including computer, TV and phones.  NECK PAIN:  ~ Ice or Heat are good~  ~ Massage is ok if it doesn't trigger more symptoms~  ~ Gentle stretches and range-of-motion are helpful.  DIZZINESS:  ~ No driving when dizzy.  ~ No biking, climbing heights or ladders if dizzy.  FATIGUE:  ~ Daily exercise is strongly encouraged.  Start with a 10 min walk and increase the time as tolerated until you are walking 30 minutes per day.    ~ Focus on Good sleep hygiene instead of napping . Your goal should be 8 hours of sleep every night.  ~ Try Melatonin 1 hour before bed  ANXIETY OR MOOD SWINGS:  ~ If you are irritable or anxious, take a break in a quiet room.  ~ Try using the free Calm brodie for guided breathing and mindfulness/meditation.  ~ Explore Ideagen (https://www.headspace.com) for free and easy-to-use meditation guidance.  LIGHT SENSITIVITIES:  ~ Avoid florescent lighting when possible.  ~Yellow or quinten tinted lenses may help reduce computer or night-time road glare.             ~ Amazon has a $10.00 option: [employer] yellow Night Vision.  NOISE SENSITIVITIES:  ~ Try listening to calming sounds such as the \"Calm Brodie\" to help shift your focus off of more irritating " sounds.  ~ Avoid crowded areas at first then slowly introduce yourself to small increments of crowded, noisy areas.  ~ Try High fidelity earplugs used by Musicians. Etymotic ETY-Plugs, can be found on Amazon for $13.00.  DIET:  - In principle incorporate more protein, lots of veggies, some fruit, whole grains.    - Less sweets and saturated fat.   - Mediterranean Diet is an easy-to-follow example.  ~ Drink plenty of water throughout the day (8-10 glasses per day)    [facility]   Nurse Line # [phone]   Fax # [phone]  Scheduling; # [phone]  E-MAIL - [email or web address]    Thank you for allowing us to be a part of your care.

## 2019-09-07 ASSESSMENT — PATIENT HEALTH QUESTIONNAIRE - PHQ9: SUM OF ALL RESPONSES TO PHQ QUESTIONS 1-9: 21

## 2019-09-07 ASSESSMENT — ANXIETY QUESTIONNAIRES: GAD7 TOTAL SCORE: 16

## 2019-09-13 NOTE — TELEPHONE ENCOUNTER
PRIOR AUTHORIZATION DENIED    Medication: Coenzyme Q-10 100MG - P/A DENIED    Denial Date: 9/13/2019    Denial Rational:         Appeal Information:

## 2019-09-13 NOTE — TELEPHONE ENCOUNTER
Central Prior Authorization Team   Phone: [phone]    PA Initiation    Medication: Coenzyme Q-10 100MG  Insurance Company: [employer] - Phone [phone] Fax [phone]  Pharmacy Filling the Rx: [facility] [place], [place] - [address] [place] [address] [id number]  Filling Pharmacy Phone: [phone]  Filling Pharmacy Fax:    Start Date: 9/13/2019

## 2019-09-26 DIAGNOSIS — M47.812 SPONDYLOSIS OF CERVICAL REGION WITHOUT MYELOPATHY OR RADICULOPATHY: ICD-10-CM

## 2019-09-26 DIAGNOSIS — Q79.60 EHLERS-DANLOS SYNDROME: ICD-10-CM

## 2019-09-28 RX ORDER — CELECOXIB 100 MG/1
100 CAPSULE ORAL 2 TIMES DAILY PRN
Qty: 60 CAPSULE | Refills: 1 | Status: SHIPPED | OUTPATIENT
Start: 2019-09-28 | End: 2020-01-13

## 2019-09-28 NOTE — TELEPHONE ENCOUNTER
celecoxib (CELEBREX) 100 MG capsule  Last Written Prescription Date:  5/24/19  Last Fill Quantity: 60   # refills: 1  Last Office Visit : 5/29/19  Future Office visit: none     #60  1 rf  PRN to pharmacy    Routing refill request to provider for review/approval because:  Alt,  ast, creat, cbc  Past due

## 2019-10-02 DIAGNOSIS — M62.830 BACK MUSCLE SPASM: ICD-10-CM

## 2019-10-11 RX ORDER — BACLOFEN 10 MG/1
TABLET ORAL
Qty: 90 TABLET | Refills: 0 | Status: SHIPPED | OUTPATIENT
Start: 2019-10-11 | End: 2020-07-31

## 2019-10-31 ENCOUNTER — TELEPHONE (OUTPATIENT)
Dept: SURGERY | Facility: CLINIC | Age: 39
End: 2019-10-31

## 2020-01-12 DIAGNOSIS — Q79.60 EHLERS-DANLOS SYNDROME: ICD-10-CM

## 2020-01-12 DIAGNOSIS — M47.812 SPONDYLOSIS OF CERVICAL REGION WITHOUT MYELOPATHY OR RADICULOPATHY: ICD-10-CM

## 2020-01-13 RX ORDER — CELECOXIB 100 MG/1
100 CAPSULE ORAL 2 TIMES DAILY PRN
Qty: 60 CAPSULE | Refills: 2 | Status: SHIPPED | OUTPATIENT
Start: 2020-01-13 | End: 2020-07-31

## 2020-01-13 NOTE — TELEPHONE ENCOUNTER
Last Clinic Visit: 5/29/2019  [facility]    Lab(s) past due-ALT, AST, CBC, Creatinine.  Ita refill 90 days and FYI to [facility].

## 2020-01-23 DIAGNOSIS — M62.830 BACK MUSCLE SPASM: ICD-10-CM

## 2020-01-23 RX ORDER — BACLOFEN 10 MG/1
TABLET ORAL
Qty: 90 TABLET | Refills: 0 | OUTPATIENT
Start: 2020-01-23

## 2020-03-10 ENCOUNTER — HEALTH MAINTENANCE LETTER (OUTPATIENT)
Age: 40
End: 2020-03-10

## 2020-05-12 DIAGNOSIS — K21.00 GASTROESOPHAGEAL REFLUX DISEASE WITH ESOPHAGITIS: ICD-10-CM

## 2020-05-13 DIAGNOSIS — G44.309 POST-CONCUSSION HEADACHE: ICD-10-CM

## 2020-05-14 NOTE — TELEPHONE ENCOUNTER
omeprazole (PRILOSEC) 20 MG DR capsule   Med end date 5/29/19  Cost/Formulary change     Danielle Ferguson APRN C*   5/29/2019  1:01 PM   RYAN baeza

## 2020-05-15 RX ORDER — NORTRIPTYLINE HCL 10 MG
10 CAPSULE ORAL AT BEDTIME
Qty: 30 CAPSULE | Refills: 3 | Status: SHIPPED | OUTPATIENT
Start: 2020-05-15 | End: 2020-07-08

## 2020-07-07 ENCOUNTER — TELEPHONE (OUTPATIENT)
Dept: ANESTHESIOLOGY | Facility: CLINIC | Age: 40
End: 2020-07-07

## 2020-07-08 ENCOUNTER — OFFICE VISIT (OUTPATIENT)
Dept: ANESTHESIOLOGY | Facility: CLINIC | Age: 40
End: 2020-07-08
Payer: COMMERCIAL

## 2020-07-08 VITALS
WEIGHT: 115 LBS | TEMPERATURE: 98 F | HEART RATE: 88 BPM | SYSTOLIC BLOOD PRESSURE: 108 MMHG | BODY MASS INDEX: 18.48 KG/M2 | HEIGHT: 66 IN | OXYGEN SATURATION: 98 % | RESPIRATION RATE: 16 BRPM | DIASTOLIC BLOOD PRESSURE: 72 MMHG

## 2020-07-08 DIAGNOSIS — M47.812 CERVICAL SPONDYLOSIS: Primary | ICD-10-CM

## 2020-07-08 RX ORDER — MELOXICAM 7.5 MG/1
7.5 TABLET ORAL DAILY
Qty: 14 TABLET | Refills: 0 | Status: SHIPPED | OUTPATIENT
Start: 2020-07-08 | End: 2020-07-24

## 2020-07-08 RX ORDER — METHOCARBAMOL 500 MG/1
500 TABLET, FILM COATED ORAL 4 TIMES DAILY PRN
Qty: 60 TABLET | Refills: 1 | Status: SHIPPED | OUTPATIENT
Start: 2020-07-08 | End: 2020-09-24

## 2020-07-08 ASSESSMENT — PAIN SCALES - GENERAL: PAINLEVEL: SEVERE PAIN (6)

## 2020-07-08 ASSESSMENT — MIFFLIN-ST. JEOR: SCORE: 1213.39

## 2020-07-08 NOTE — PATIENT INSTRUCTIONS
Diclofenac gel   Methocarbamol 500 mg QID  Mobic 7.5 14 days   Cervical MBB  Trigger Point Injections.    Medications:    Diclofenac Gel-  Place 2 g onto the skin 4 times daily.    meloxicam (MOBIC) 7.5 MG tablet- Take 1 tablet (7.5 mg) by mouth daily.    methocarbamol (ROBAXIN) 500 MG tablet- Take 1 tablet (500 mg) by mouth 4 times daily as needed for muscle spasms.      Please provide the clinic with a minium of 1 week notice, on all prescription refills.     Procedures:    Please call to schedule your procedure: [phone]    Cervical 5 and cervical 6 medical branch nerve block- left    We have included your Pre-Procedure Instructions below. Please review and contact the clinic if you have questions.     Treatment planning:    With Dr. Rojas's Next available appointment for Trigger Point Injections.       Recommended Follow up:  Next available for Trigger Points, As indicated after the procedure.          Please call [phone] to schedule this appointment if you don't already have an appointment made.       Procedure Information related to COVID-19    You will need to be tested for COVID-19 within 72 hours before your procedure.   Our Facility has a testing site located across the street from the [facility]  (Located at:  [address] [place] [address])   Please note: You will only be contacted for positive and pending results.   Please be aware that the turn around time for the test is approximately 24-48 hours, and there is a chance that results will not be back in time for you to have your procedure. Should your results still be pending the day of your procedure, you will be notified as soon as possible, as your appointment will be cancelled and you will need to be rescheduled.     Patients will need to be dropped off at the surgery entrance (on the corner of [address]   Drivers/visitors will not be allowed in the building-NO EXCEPTIONS. Patients will need to provide contact  information and make/model of 's car when checking in for procedure.     Please contact the clinic if you have further questions about this information.       Information related to Scheduling and Pre-Procedure Instructions:    Please call [phone] to schedule, reschedule, or cancel your procedure appointment.   Phones are answered Monday - Friday from 08:00 - 4:30pm.  Leave a voicemail with your name, birth date, and phone number if no one is available to take your call.     Your procedure: Cervical 5 and cervical 6 medical branch nerve block- left      If you are having a Diagnostic procedure, you will be given a Pain Diary to document your pain relief.   Please fax the completed form to our office.   fax number is [phone]    If you must reschedule your procedure more than two times, you must follow up in clinic before rescheduling again.        Preparing for your procedure    CAUTION - FAILURE TO FOLLOW THESE PRE-PROCEDURE INSTRUCTIONS WILL RESULT IN YOUR PROCEDURE BEING RESCHEDULED.      Pregnancy  If you are pregnant, or think you may be pregnant, please notify our staff. This may or may not affect the ability to perform the procedure.       You must have a  take you home after your procedure. Transportation by taxi or para-transit is okay as long as you have a responsible adult accompany you. You must provide your 's full name and contact number at time of check in.     Fasting Protocol You may have NOTHING SOLID TO EAT 8 HOURS prior to arrival at the procedure area.     You may have CLEAR LIQUIDS UP TO 2 HOURS prior to arrival.    Broth and candy are considered solid food and require an eight hour fast.     Clear liquids include water, clear fruit juice (no pulp), carbonated beverages, ice, black coffee, black tea, clear jello. No alcohol containing beverages.   Medications If you take any medications, DO NOT STOP. Take your medications as usual the day of your procedure with  a sip of water AT LEAST 2 HOURS PRIOR TO ARRIVAL.    Antibiotics If you are currently taking antibiotics, you must complete the entire dose 7 days prior to your scheduled procedure. You must be clear of any signs or symptoms of infection. If you begin antibiotics, please contact our clinic for instructions.     Fever, Chills, or Rash If you experience a fever of higher than 100 degrees, chills, rash, or open wounds during the one week before your procedure, please call the clinic to see if you may proceed with your procedure.      Medication Hold List  **Patients under Cardiology/Neurology care should consult their provider prior to the pain procedure to verify pre-procedure medication instructions. The information below contains general guidelines.**    Blood Thinners If you are taking daily ASPIRIN, PLAVIX, OR OTHER BLOOD THINNERS SUCH AS COUMADIN/WARFARIN, we will need your prescribing doctor to sign a release permitting you to stop these medications. Once approved by your prescribing doctor - STOP ALL BLOOD THINNERS BASED ON THE TIME TABLE BELOW PRIOR TO YOUR PROCEDURE. If you have been instructed to stop WARFARIN(COUMADIN), you must have an INR lab drawn the day before your procedure. . Your INR must be within normal limits before we can perform your injection. MEDICATIONS CAN BE RESTARTED AFTER YOUR PROCEDURE.    14 DAY HOLD  Ticlid (ticlopidine)    10 DAY HOLD  Effient (Prasugel)    3 DAY HOLD  Xarelto (rivaroxaban) 7 DAY HOLD  Anacin, Bufferin, Ecotrin, Excedrin, Aggrenox (Aspirin)  Brilinta (ticagrelor)  Coumadin (Warfarin)  Pradexa (Dabigatran)  Elmiron (Pentosan)  Plavix (Clopidogrel Bisulfate)  Pletal (Cilostazol)    24 HOUR HOLD  Lovenox (enoxaparin)  Agrylin (Anagrelide)        Non-steroidal Anti-inflammatories (NSAIDs) DO NOT TAKE any non-steroidal anti-inflammatory medications (NSAIDs) listed on the table below. MEDICATIONS CAN BE RESTARTED AFTER YOUR PROCEDURE. Celebrex is OK to take and does not  need to be discontinued.     Medications to stop:  3 DAY HOLD  Advil, Motrin (Ibuprofen)  Arthrotec (diciofenac sodium/misoprostol)  Clinoril (Sulindac)  Indocin (Indomethacin)  Lodine (Etodolac)  Toradol (Ketorolac)  Vicoprofen (Hydrocodone and Ibuprofen)  Voltaren (Diclotenac)    14 DAY HOLD  Daypro (Oxaprozin)  Feldene (Piroxicam)   7 DAY HOLD  Aleve (Naproxen sodium)  Darvon compound (contains aspirin)  Naprosyn (Naproxen)  Norgesic Forte (contains aspirin)  Mobic (Meloxicam)  Oruvall (Ketoprofen)  Percodan (contains aspirin)  Relafen (Nabumetone)  Salsalate  Trilisate  Vitamin E (more than 400 mg per day)  Any medication containing aspirin                To speak with a nurse, schedule/reschedule/cancel a clinic appointment, or request a medication refill call: [phone]     You can also reach us by [place]: [email or web address]

## 2020-07-08 NOTE — PROGRESS NOTES
LPN read through the instructions with pt for the recommended procedure: Cervical 5 and cervical 6 medical branch nerve block- left  Pt verbalized understanding to holding appropriate medication per protocol, and was agreeable to NPO policy and needing a .    Dr. Rojas was agreeable to the pt continuing NSAID's if indicated before their procedure.     Pt was directed to call and schedule procedure.     Pt will follow up on 8/13/20 for Trigger Point injections in clinic with Dr. Rojas.     Pt was asking about if sedation would be appropriate for their procedure, LPN informed the pt of the sedation policy surrounding diagnostic blocks, but was encouraged to discuss their concerns with Dr. Rojas on the day of the procedure- so that they can be the determining factor if Sedation is indicated.        Mariel Rodriguez, MAYTEN

## 2020-07-08 NOTE — PROGRESS NOTES
"[facility] : Progress Note    Date of visit: 7/8/2020    Interval history:  Patient is a 39-year-old female with past medical history significant for Munir-Danlos syndrome, depression, anxiety, fibromyalgia, smoker, chronic neck pain, and bilateral upper extremity pain for 8 years.  Her neck pain is predominantly on the midline neck which occasionally radiates to her shoulder and upper extremity along the C7 distribution.  She occasionally describes numbness tingling and headache.  Symptoms are worse with activities.  She states that daily symptoms decreases her sleep quality, limits her ability to perform socially and professionally and causing a decreased quality of life.  Patient had tried multiple medications in the past including baclofen, Celebrex, gabapentin, tramadol, and Percocet.  She also had physical therapy, cervical epidural steroid injection, trigger point injection, and TENS unit with limited benefit.  Patient then saw Dr. Shin and scheduled for surgery.  However surgery was canceled.    Her cervical and thoracic MRI and CT scan are reviewed.  Patient has multilevel cervical spondylosis, which is most significant at the C6-C7.  She has moderate bilateral neuroforaminal stenosis on the left C5-C6 and C6-C7 and mild spinal canal stenosis.  These findings do not correlate with her symptoms which is bilateral.    [place] Prescription Monitoring Program:   Reviewed. No concerns     Review of Systems:  The 14 system ROS was reviewed and was negative except what is documented above and as follows.  Any bowel or bladder problems: none  Mood: okay    Physical Exam:  Vitals:    07/08/20 0803   BP: 108/72   Pulse: 88   Resp: 16   Temp: 98  F (36.7  C)   TempSrc: Oral   SpO2: 98%   Weight: 52.2 kg (115 lb)   Height: 1.676 m (5' 6\")       General: Awake in no apparent distress.   Eyes: Sclerae are anicteric. PERRLA, EOMI   Neck: supple, no " masses.   Lungs: unlabored.   Heart: regular rate and rhythm   Abdomen: soft non tender.  Extremities: Pulses are well palpable, no peripheral edema.   Musculoskeletal: 5 out of 5 muscle strength bilateral upper extremity  Neurologic exam:Sensation intact throughout all dermatomes bilateral upper extremities and lower extremities  Psychiatric; Normal affect.   Skin: Warm and Dry.    Medications:  Current Outpatient Medications   Medication Sig Dispense Refill     Acetaminophen (TYLENOL PO) Take 500 mg by mouth every 4 hours as needed for mild pain or fever Takes 2 tablets of 500 mg       amphetamine-dextroamphetamine (ADDERALL) 20 MG per tablet TAKE UP TO 1 TABLET BY MOUTH 3 TIMES DAILY ABOUT 4 HOURS APART AS NEEDED FOR ADHD SYMPTOMS  0     Coenzyme Q10 (COQ-10) 100 MG CAPS Take 100 mg by mouth 3 times daily 3 capsule 3     diazepam (VALIUM) 5 MG tablet TAKE UP TO 2 TABS DAILY BY MOUTH AS NEEDED FOR ANXIETY. USE SPARINGLY  0     LamoTRIgine (LAMICTAL PO) Take 50 mg by mouth 2 times daily       baclofen (LIORESAL) 10 MG tablet Take 0.5-1 tablet by mouth, 3 times daily as needed. (Patient not taking: Reported on 7/8/2020) 90 tablet 0     celecoxib (CELEBREX) 100 MG capsule Take 1 capsule (100 mg) by mouth 2 times daily as needed for moderate pain (Patient not taking: Reported on 7/8/2020) 60 capsule 2     Ibuprofen (ADVIL PO) Take by mouth as needed for moderate pain       loratadine (CLARITIN) 10 MG tablet Take 10 mg by mouth daily       pantoprazole (PROTONIX) 20 MG EC tablet Take 1 tablet (20 mg) by mouth daily (Patient not taking: Reported on 7/8/2020) 90 tablet 3       Analgesic Medications:   Medications related to Pain Management (From now, onward)    None             LABORATORY VALUES:   Recent Labs   Lab Test 03/15/18  1712 06/18/15  1659    136   POTASSIUM 4.0 3.5   CHLORIDE 107 101   CO2 25 26   ANIONGAP 5 8   GLC 83 81   BUN 13 12   CR 0.75 0.71   GONZALO 8.5 9.0       CBC RESULTS:   Recent Labs   Lab  Test 03/15/18  1712   WBC 10.2   RBC 3.81   HGB 12.5   HCT 36.8   MCV 97   MCH 32.8   MCHC 34.0   RDW 12.2              ASSESSMENT:    Cervical spondylosis  Neuroforaminal stenosis at C5-C6  Persistent neck pain    PLAN:    1. Medications.     Methocarbamol 500 mg qid  meloxicam 7.5 mg every day   Apply diclofenac gel every 6 hours as needed    2. Interventional procedures:  Left C5 and C6 medial branch nerve block  Upper back and neck trigger point injection    3. Labs and imaging: None needed for pain management.     4. Rehab:  Advised to perform home exercise using [employer] videos  [email or web address]://www.[email or web address]/videos.  The patient is also encouraged to stay active as tolerated.    Discussed about the physical therapy.  Patient would like to hold of the therapy due to COVID.    5. Psychology: No current needs.     6. Integrated medicine:   We will refer the patient for acupuncture therapy    7. Disposition:  The patient will follow up in our outpatient clinic in 8 weeks or earlier if clinically indicated.       Assessment will be ongoing with changes in treatment as indicated.  Benefits/risks/alternatives to treatment have been reviewed and the patient has been instructed to contact this office if they have any questions or concerns.  This plan of care has been discussed with the patient and the patient is in agreement.     Karen Rojas MD, PHD

## 2020-07-08 NOTE — LETTER
7/8/2020     RE: Sheyla Ye  [address] [address]     Dear Colleague,    Thank you for referring your patient, Sheyla Ye, to the [facility] COMPREHENS[facility] PA[facility] MANAGEMENT at [facility]. Please see a copy of my visit note below.    [facility] : Progress Note    Date of visit: 7/8/2020    Interval history:  Patient is a 39-year-old female with past medical history significant for Munir-Danlos syndrome, depression, anxiety, fibromyalgia, smoker, chronic neck pain, and bilateral upper extremity pain for 8 years.  Her neck pain is predominantly on the midline neck which occasionally radiates to her shoulder and upper extremity along the C7 distribution.  She occasionally describes numbness tingling and headache.  Symptoms are worse with activities.  She states that daily symptoms decreases her sleep quality, limits her ability to perform socially and professionally and causing a decreased quality of life.  Patient had tried multiple medications in the past including baclofen, Celebrex, gabapentin, tramadol, and Percocet.  She also had physical therapy, cervical epidural steroid injection, trigger point injection, and TENS unit with limited benefit.  Patient then saw Dr. Shin and scheduled for surgery.  However surgery was canceled.    Her cervical and thoracic MRI and CT scan are reviewed.  Patient has multilevel cervical spondylosis, which is most significant at the C6-C7.  She has moderate bilateral neuroforaminal stenosis on the left C5-C6 and C6-C7 and mild spinal canal stenosis.  These findings do not correlate with her symptoms which is bilateral.    [place] Prescription Monitoring Program:   Reviewed. No concerns     Review of Systems:  The 14 system ROS was reviewed and was negative except what is documented above and as follows.  Any bowel or bladder  "problems: none  Mood: okay    Physical Exam:  Vitals:    07/08/20 0803   BP: 108/72   Pulse: 88   Resp: 16   Temp: 98  F (36.7  C)   TempSrc: Oral   SpO2: 98%   Weight: 52.2 kg (115 lb)   Height: 1.676 m (5' 6\")       General: Awake in no apparent distress.   Eyes: Sclerae are anicteric. PERRLA, EOMI   Neck: supple, no masses.   Lungs: unlabored.   Heart: regular rate and rhythm   Abdomen: soft non tender.  Extremities: Pulses are well palpable, no peripheral edema.   Musculoskeletal: 5 out of 5 muscle strength bilateral upper extremity  Neurologic exam:Sensation intact throughout all dermatomes bilateral upper extremities and lower extremities  Psychiatric; Normal affect.   Skin: Warm and Dry.    Medications:  Current Outpatient Medications   Medication Sig Dispense Refill     Acetaminophen (TYLENOL PO) Take 500 mg by mouth every 4 hours as needed for mild pain or fever Takes 2 tablets of 500 mg       amphetamine-dextroamphetamine (ADDERALL) 20 MG per tablet TAKE UP TO 1 TABLET BY MOUTH 3 TIMES DAILY ABOUT 4 HOURS APART AS NEEDED FOR ADHD SYMPTOMS  0     Coenzyme Q10 (COQ-10) 100 MG CAPS Take 100 mg by mouth 3 times daily 3 capsule 3     diazepam (VALIUM) 5 MG tablet TAKE UP TO 2 TABS DAILY BY MOUTH AS NEEDED FOR ANXIETY. USE SPARINGLY  0     LamoTRIgine (LAMICTAL PO) Take 50 mg by mouth 2 times daily       baclofen (LIORESAL) 10 MG tablet Take 0.5-1 tablet by mouth, 3 times daily as needed. (Patient not taking: Reported on 7/8/2020) 90 tablet 0     celecoxib (CELEBREX) 100 MG capsule Take 1 capsule (100 mg) by mouth 2 times daily as needed for moderate pain (Patient not taking: Reported on 7/8/2020) 60 capsule 2     Ibuprofen (ADVIL PO) Take by mouth as needed for moderate pain       loratadine (CLARITIN) 10 MG tablet Take 10 mg by mouth daily       pantoprazole (PROTONIX) 20 MG EC tablet Take 1 tablet (20 mg) by mouth daily (Patient not taking: Reported on 7/8/2020) 90 tablet 3       Analgesic Medications: "   Medications related to Pain Management (From now, onward)    None             LABORATORY VALUES:   Recent Labs   Lab Test 03/15/18  1712 06/18/15  1659    136   POTASSIUM 4.0 3.5   CHLORIDE 107 101   CO2 25 26   ANIONGAP 5 8   GLC 83 81   BUN 13 12   CR 0.75 0.71   GONZALO 8.5 9.0       CBC RESULTS:   Recent Labs   Lab Test 03/15/18  1712   WBC 10.2   RBC 3.81   HGB 12.5   HCT 36.8   MCV 97   MCH 32.8   MCHC 34.0   RDW 12.2              ASSESSMENT:    Cervical spondylosis  Neuroforaminal stenosis at C5-C6  Persistent neck pain    PLAN:    1. Medications.     Methocarbamol 500 mg qid  meloxicam 7.5 mg every day   Apply diclofenac gel every 6 hours as needed    2. Interventional procedures:  Left C5 and C6 medial branch nerve block  Upper back and neck trigger point injection    3. Labs and imaging: None needed for pain management.     4. Rehab:  Advised to perform home exercise using [employer] videos  Https://www."[employer]/videos.  The patient is also encouraged to stay active as tolerated.    Discussed about the physical therapy.  Patient would like to hold of the therapy due to COVID.    5. Psychology: No current needs.     6. Integrated medicine:   We will refer the patient for acupuncture therapy    7. Disposition:  The patient will follow up in our outpatient clinic in 8 weeks or earlier if clinically indicated.       Assessment will be ongoing with changes in treatment as indicated.  Benefits/risks/alternatives to treatment have been reviewed and the patient has been instructed to contact this office if they have any questions or concerns.  This plan of care has been discussed with the patient and the patient is in agreement.     Karen Rojas MD, PHD    LPN read through the instructions with pt for the recommended procedure: Cervical 5 and cervical 6 medical branch nerve block- left  Pt verbalized understanding to holding appropriate medication per protocol, and was agreeable to NPO  policy and needing a .    Dr. Rojas was agreeable to the pt continuing NSAID's if indicated before their procedure.     Pt was directed to call and schedule procedure.     Pt will follow up on 8/13/20 for Trigger Point injections in clinic with Dr. Rojas.     Pt was asking about if sedation would be appropriate for their procedure, LPN informed the pt of the sedation policy surrounding diagnostic blocks, but was encouraged to discuss their concerns with Dr. Rojas on the day of the procedure- so that they can be the determining factor if Sedation is indicated. Mariel Rodriguez, MAYTEN

## 2020-07-31 ENCOUNTER — OFFICE VISIT (OUTPATIENT)
Dept: INTERNAL MEDICINE | Facility: CLINIC | Age: 40
End: 2020-07-31
Payer: COMMERCIAL

## 2020-07-31 VITALS
OXYGEN SATURATION: 98 % | WEIGHT: 114.3 LBS | DIASTOLIC BLOOD PRESSURE: 77 MMHG | BODY MASS INDEX: 18.45 KG/M2 | HEART RATE: 89 BPM | SYSTOLIC BLOOD PRESSURE: 113 MMHG

## 2020-07-31 DIAGNOSIS — M54.50 CHRONIC BILATERAL LOW BACK PAIN WITHOUT SCIATICA: ICD-10-CM

## 2020-07-31 DIAGNOSIS — R63.4 WEIGHT LOSS: ICD-10-CM

## 2020-07-31 DIAGNOSIS — Z11.4 SCREENING FOR HIV (HUMAN IMMUNODEFICIENCY VIRUS): ICD-10-CM

## 2020-07-31 DIAGNOSIS — E04.9 GOITER: ICD-10-CM

## 2020-07-31 DIAGNOSIS — G89.29 CHRONIC BILATERAL LOW BACK PAIN WITHOUT SCIATICA: ICD-10-CM

## 2020-07-31 DIAGNOSIS — Z51.81 ENCOUNTER FOR MONITORING CHRONIC NSAID THERAPY: ICD-10-CM

## 2020-07-31 DIAGNOSIS — R19.00 PELVIC MASS: ICD-10-CM

## 2020-07-31 DIAGNOSIS — Z79.1 ENCOUNTER FOR MONITORING CHRONIC NSAID THERAPY: ICD-10-CM

## 2020-07-31 DIAGNOSIS — R63.4 WEIGHT LOSS: Primary | ICD-10-CM

## 2020-07-31 DIAGNOSIS — Q79.60 EHLERS-DANLOS SYNDROME: ICD-10-CM

## 2020-07-31 LAB
ANION GAP SERPL CALCULATED.3IONS-SCNC: 5 MMOL/L (ref 3–14)
BUN SERPL-MCNC: 5 MG/DL (ref 7–30)
CALCIUM SERPL-MCNC: 8.1 MG/DL (ref 8.5–10.1)
CHLORIDE SERPL-SCNC: 105 MMOL/L (ref 94–109)
CO2 SERPL-SCNC: 26 MMOL/L (ref 20–32)
CREAT SERPL-MCNC: 0.79 MG/DL (ref 0.52–1.04)
GFR SERPL CREATININE-BSD FRML MDRD: >90 ML/MIN/{1.73_M2}
GLUCOSE SERPL-MCNC: 90 MG/DL (ref 70–99)
POTASSIUM SERPL-SCNC: 3.8 MMOL/L (ref 3.4–5.3)
SODIUM SERPL-SCNC: 137 MMOL/L (ref 133–144)
TSH SERPL DL<=0.005 MIU/L-ACNC: 0.94 MU/L (ref 0.4–4)

## 2020-07-31 PROCEDURE — 87389 HIV-1 AG W/HIV-1&-2 AB AG IA: CPT | Performed by: INTERNAL MEDICINE

## 2020-07-31 ASSESSMENT — PATIENT HEALTH QUESTIONNAIRE - PHQ9
SUM OF ALL RESPONSES TO PHQ QUESTIONS 1-9: 17
5. POOR APPETITE OR OVEREATING: NEARLY EVERY DAY

## 2020-07-31 ASSESSMENT — ENCOUNTER SYMPTOMS
PALPITATIONS: 0
ABDOMINAL PAIN: 0
FEVER: 0
ALTERED TEMPERATURE REGULATION: 0
COUGH: 0
SHORTNESS OF BREATH: 0

## 2020-07-31 ASSESSMENT — ANXIETY QUESTIONNAIRES
GAD7 TOTAL SCORE: 15
7. FEELING AFRAID AS IF SOMETHING AWFUL MIGHT HAPPEN: NOT AT ALL
6. BECOMING EASILY ANNOYED OR IRRITABLE: NEARLY EVERY DAY
1. FEELING NERVOUS, ANXIOUS, OR ON EDGE: MORE THAN HALF THE DAYS
3. WORRYING TOO MUCH ABOUT DIFFERENT THINGS: NEARLY EVERY DAY
2. NOT BEING ABLE TO STOP OR CONTROL WORRYING: NEARLY EVERY DAY
5. BEING SO RESTLESS THAT IT IS HARD TO SIT STILL: SEVERAL DAYS

## 2020-07-31 ASSESSMENT — PAIN SCALES - GENERAL: PAINLEVEL: NO PAIN (0)

## 2020-07-31 NOTE — PROGRESS NOTES
CC:   Chief Complaint   Patient presents with     Physical     Pt comes in for physical exam        History of Present Illness      Sheyla Ye is here for a routine physical.      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    Established care with Dr. Rojas.  She had been following with Ginger Nice, who recently left.  He is going to try a nerve block.  She is in the process of scheduling.  Also trigger point injections, which historically have only helped briefly.    She is considering surgery for her ongoing neck pain.  Things have not been stable enough with her home life for her to reschedule.    Low back has also been worse than normal.  Pain can go into the hips as well.  Lumbar MRI last done in 2014 showed:   IMPRESSION:    1.  Mild degenerative changes throughout the lumbar spine, as above.   2.  Posterior annular fissure with mild diffuse disc bulge at L5-S1.   3.  The right extraforaminal L5 nerve root comes in close   approximation to the extraforaminal diffuse disc bulge. There is no   obvious nerve root displacement or obvious nerve root impingement as   a result.    Weight has been dropping.  She changed to eating vegetarian.  No longer eating [employer].  Mother has hx of Graves.    Gyne:  [facility]  Has hx of pelvic mass.  [facility]  She thinks it may be slightly increased in size    Depression screen:  PHQ-2 Score:     PHQ-2 ( 1999 Pf[employer]) 7/8/2020 9/6/2019   Q1: Little interest or pleasure in doing things 1 3   Q2: Feeling down, depressed or hopeless 1 3   PHQ-2 Score 2 6   Q1: Little interest or pleasure in doing things - Nearly every day   Q2: Feeling down, depressed or hopeless - Nearly every day   PHQ-2 Score - 6       Tobacco screen:  History   Smoking Status     Current Every Day Smoker     Packs/day:  0.50     Years: 15.00     Types: Cigarettes     Start date: 9/1/1999   Smokeless Tobacco     Former User       Obesity screen:  Body mass index is 18.45 kg/m .      Review of Systems   Review of Systems     Constitutional:  Negative for fever.   Eyes:  Negative for decreased vision.   Respiratory:   Negative for cough and shortness of breath.    Cardiovascular:  Negative for chest pain and palpitations.   Gastrointestinal:  Negative for abdominal pain.   Skin:  Negative for rash.   Neurological:  Negative for difficulty walking.   Psychiatric/Behavioral:  Negative for mood swings.    Endocrine:  Negative for altered temperature regulation.      Medications   Medications and allergies were reviewed and updated in the chart    Family History   Family history was reviewed and updated as needed in the chart    Past Medical History   Past medical history was reviewed and updated  Patient Active Problem List   Diagnosis     Cervical kyphosis     Cervical spondylosis without myelopathy     Cervical stenosis of spinal canal     Cervical radiculopathy     Pain management contract agreement     Anxiety     ADD (attention deficit disorder)     Joint hyperextensibility of multiple sites     [facility]     Other chronic pain     Tobacco use disorder     Chronic pain     Long term (current) use of opiate analgesic     Bilateral low back pain without sciatica     Depression, unspecified depression type     Munir-Danlos disease     Altered mental status     Headache       Physical Exam   /77 (BP Location: Right arm, Patient Position: Sitting, Cuff Size: Adult Regular)   Pulse 89   Wt 51.8 kg (114 lb 4.8 oz)   LMP 07/27/2020 (Approximate)   SpO2 98%   Breastfeeding No   BMI 18.45 kg/m      GENERAL APPEARANCE: healthy, alert and no distress     EYES: EOMI,      HENT: ear canals and TM's normal      NECK: slight thryoid enlargement, no masses noted     RESP: lungs  clear to auscultation - no rales, rhonchi or wheezes     CV: regular rates and rhythm, normal S1 S2, no S3 or S4 and no murmur, click or rub -     ABDOMEN:  soft, nontender, no HSM or masses and bowel sounds normal     MS: extremities normal- no gross deformities noted,    SKIN: no suspicious lesions or rashes     NEURO:  mentation intact and speech normal     PSYCH: mentation appears normal. and affect normal/bright     LYMPHATICS: No  cervical, or supraclavicular nodes.  She has a rubbery, compressible mass in her L groin      Assessment & Plan   # Preventive Care Visit:     Weight loss  Goiter  Mother has hx of Graves.  Thyroid ?slightly large on exam  - TSH with free T4 reflex  - US Thyroid    Munir-Danlos syndrome  Cont f/u with pain clinic    Chronic bilateral low back pain without sciatica  Given abnormal findings on previous imaging and worsening of symptoms, will repeat imaging  - MRI Lumbar spine w/o contrast    Encounter for monitoring chronic NSAID therapy  - omeprazole (PRILOSEC) 20 MG DR capsule  Dispense: 90 capsule; Refill: 3  - Basic metabolic panel    Screening for HIV (human immunodeficiency virus)  - HIV Antigen Antibody Combo    Pelvic mass in L groin  She reports this has been stable for years, although maybe now slightly larger.  ?LN versus hernia versus lipoma  - US Pelvic Limited      RTC: No follow-ups on file.      Myrna Watson MD

## 2020-07-31 NOTE — NURSING NOTE
Chief Complaint   Patient presents with     Physical     Pt comes in for physical exam      DASHAWN Lyn at 11:09 AM sign on 7/31/2020

## 2020-07-31 NOTE — PATIENT INSTRUCTIONS
[facility] Medication Refill Request Information:  * Please contact your pharmacy regarding ANY request for medication refills.  ** [facility] Prescription Fax = [phone]  * Please allow 3 business days for routine medication refills.  * Please allow 5 business days for controlled substance medication refills.     [facility] Test Result notification information:  *You will be notified with in 7-10 days of your appointment day regarding the results of your test.  If you are on MyChart you will be notified as soon as the provider has reviewed the results and signed off on them.    [facility]: [phone]     Radiology (Imaging Center) [phone] ([facility], 1st Floor S)

## 2020-08-01 ASSESSMENT — ANXIETY QUESTIONNAIRES: GAD7 TOTAL SCORE: 15

## 2020-08-03 LAB — HIV 1+2 AB+HIV1 P24 AG SERPL QL IA: NONREACTIVE

## 2020-08-20 DIAGNOSIS — Z11.59 ENCOUNTER FOR SCREENING FOR OTHER VIRAL DISEASES: Primary | ICD-10-CM

## 2020-08-20 PROBLEM — M47.812 CERVICAL SPONDYLOSIS: Status: ACTIVE | Noted: 2020-08-20

## 2020-08-27 ENCOUNTER — OFFICE VISIT (OUTPATIENT)
Dept: ANESTHESIOLOGY | Facility: CLINIC | Age: 40
End: 2020-08-27
Payer: COMMERCIAL

## 2020-08-27 ENCOUNTER — HOSPITAL ENCOUNTER (OUTPATIENT)
Facility: AMBULATORY SURGERY CENTER | Age: 40
End: 2020-08-27
Attending: ANESTHESIOLOGY
Payer: COMMERCIAL

## 2020-08-27 ENCOUNTER — TELEPHONE (OUTPATIENT)
Dept: ANESTHESIOLOGY | Facility: CLINIC | Age: 40
End: 2020-08-27

## 2020-08-27 VITALS — SYSTOLIC BLOOD PRESSURE: 103 MMHG | DIASTOLIC BLOOD PRESSURE: 73 MMHG | OXYGEN SATURATION: 99 % | HEART RATE: 106 BPM

## 2020-08-27 DIAGNOSIS — M47.812 CERVICAL SPONDYLOSIS: ICD-10-CM

## 2020-08-27 RX ORDER — DEXTROAMPHETAMINE SACCHARATE, AMPHETAMINE ASPARTATE MONOHYDRATE, DEXTROAMPHETAMINE SULFATE AND AMPHETAMINE SULFATE 7.5; 7.5; 7.5; 7.5 MG/1; MG/1; MG/1; MG/1
CAPSULE, EXTENDED RELEASE ORAL
Status: ON HOLD | COMMUNITY
Start: 2020-08-26 | End: 2021-01-01

## 2020-08-27 RX ORDER — LAMOTRIGINE 200 MG/1
200 TABLET ORAL DAILY
COMMUNITY
Start: 2020-08-26 | End: 2020-11-11

## 2020-08-27 RX ORDER — DEXTROAMPHETAMINE SACCHARATE, AMPHETAMINE ASPARTATE MONOHYDRATE, DEXTROAMPHETAMINE SULFATE AND AMPHETAMINE SULFATE 2.5; 2.5; 2.5; 2.5 MG/1; MG/1; MG/1; MG/1
10 CAPSULE, EXTENDED RELEASE ORAL DAILY
Status: ON HOLD | COMMUNITY
Start: 2020-08-26 | End: 2021-01-01

## 2020-08-27 RX ORDER — MELOXICAM 7.5 MG/1
7.5 TABLET ORAL DAILY
Qty: 14 TABLET | Refills: 0 | Status: SHIPPED | OUTPATIENT
Start: 2020-08-27 | End: 2020-09-04

## 2020-08-27 ASSESSMENT — PAIN SCALES - GENERAL: PAINLEVEL: SEVERE PAIN (6)

## 2020-08-27 NOTE — TELEPHONE ENCOUNTER
[facility] Call Center    Phone Message    May a detailed message be left on voicemail: yes     Reason for Call: Other: Pt calling about the injection appt at 1230 to if spot is still open today. Please call to discuss ASAP  Thank you.     Action Taken: Message routed to:  [facility] ([facility]): Pain  Travel Screening: Not Applicable

## 2020-08-27 NOTE — LETTER
8/27/2020     RE: Sheyla Ye  [address] [address]     Dear Colleague,    Thank you for referring your patient, Sheyla Ye, to the [facility] FOR COMPREHENSIVE PAIN MANAGEMENT at [facility]. Please see a copy of my visit note below.    TRIGGER POINT INJECTION PROCEDURE NOTE   INDICATION FOR PROCEDURE:   Sheyla Ye  is a 40 year old old patient with myofascial pain resulting in difficulty with activities of daily living and reduced quality of life.   Her baseline symptoms have been recalcitrant to oral & transdermal medications and conservative therapy. She is here today for trigger point injections.   GOAL OF PROCEDURE:   The goal of this procedure is to increase active range of motion, improve volitional motor control and enhance functional independence associated with dystonic movements.   TOTAL DOSE ADMINISTERED:   Medication used:  Bupivacaine 0.5%  Total Volume of Diluent Used: 10 ml     EQUIPMENT USED:   10 ml syringe, 1.5 inch 27 gauge needle   SKIN PREPARATION:   Skin preparation was performed using chloroprep.     PROCEDURE DETAILS:   The patient was met in the exam  room, where the patient was identified by name, medical record number and date of birth.  All of the patient s last minute questions were answered. Written informed consent was obtained and saved in the electronic medical record, after the risks, benefits, and alternatives were discussed with the patient.     A formal time-out procedure was performed, as per protocol, including patient name, title of procedure, and site of procedure, and all in the room concurred.       We  identified 8 trigger points  on the posterior neck, upper back, and lower back region by manual palpation. Solution containing 9 mL of 0.5 percent bupivacaine was injected into these trigger points 0.5- 1 mL each.     MUSCLE INJECTED  - Trapezius- bilateral  -  Rhomboid major and minor-  bilateral   -  Latissimus dorsi- bilateral   - Longus coli - bilateral   -  Longus capitis- bilateral   - Lumbar paraspinals-bilateral     RESPONSE TO PROCEDURE: Sheyla tolerated the procedure well and there were no immediate complications. She was allowed to recover for an appropriate period of time and was discharged home in stable condition.   .  FOLLOW UP:   Sheyla was asked to follow up by phone in 7-14 days  to report her response to this injection.    LPN reviewed AVS with Pt.  Pt verbalized an understanding of information, and was asked to contact clinic with questions.    Follow up recommended by provider: As indicated after the next injection.     Mariel Rodriguez LPN

## 2020-08-27 NOTE — PROGRESS NOTES
LPN reviewed AVS with Pt.  Pt verbalized an understanding of information, and was asked to contact clinic with questions.    Follow up recommended by provider: As indicated after the next injection.     Mariel Rodriguez LPN

## 2020-08-27 NOTE — PROGRESS NOTES
TRIGGER POINT INJECTION PROCEDURE NOTE     INDICATION FOR PROCEDURE:   Sheyla Ye  is a 40 year old old patient with myofascial pain resulting in difficulty with activities of daily living and reduced quality of life.   Her baseline symptoms have been recalcitrant to oral & transdermal medications and conservative therapy. She is here today for trigger point injections.   GOAL OF PROCEDURE:   The goal of this procedure is to increase active range of motion, improve volitional motor control and enhance functional independence associated with dystonic movements.   TOTAL DOSE ADMINISTERED:   Medication used:  Bupivacaine 0.5%  Total Volume of Diluent Used: 10 ml     EQUIPMENT USED:   10 ml syringe, 1.5 inch 27 gauge needle   SKIN PREPARATION:   Skin preparation was performed using chloroprep.     PROCEDURE DETAILS:   The patient was met in the exam  room, where the patient was identified by name, medical record number and date of birth.  All of the patient s last minute questions were answered. Written informed consent was obtained and saved in the electronic medical record, after the risks, benefits, and alternatives were discussed with the patient.     A formal time-out procedure was performed, as per protocol, including patient name, title of procedure, and site of procedure, and all in the room concurred.       We  identified 8 trigger points  on the posterior neck, upper back, and lower back region by manual palpation. Solution containing 9 mL of 0.5 percent bupivacaine was injected into these trigger points 0.5- 1 mL each.     MUSCLE INJECTED  - Trapezius- bilateral  -  Rhomboid major and minor- bilateral   -  Latissimus dorsi- bilateral   - Longus coli - bilateral   -  Longus capitis- bilateral   - Lumbar paraspinals-bilateral     RESPONSE TO PROCEDURE: Sheyla tolerated the procedure well and there were no immediate complications. She was allowed to recover for an appropriate period of time and was  discharged home in stable condition.   .  FOLLOW UP:   Sheyla was asked to follow up by phone in 7-14 days  to report her response to this injection.

## 2020-08-27 NOTE — PATIENT INSTRUCTIONS
Medications:    meloxicam (MOBIC) 7.5 MG tablet- Take 1 tablet (7.5 mg) by mouth daily    Please provide the clinic with a minium of 1 week notice, on all prescription refills.     Treatment planning:    Trigger Point Injections completed today-   Call us with any concerns for infection: redness and drainage at the injection site, fever, chills, sweats      Recommended Follow up:      Please call to reschedule your Cervical Medial Branch Blocks.     [phone]      Procedure Information related to COVID-19    Please call [phone] to schedule, reschedule, or cancel your procedure appointment.   Phones are answered Monday - Friday from 08:00 - 4:30pm.  Leave a voicemail with your name, birth date, and phone number if no one is available to take your call.     You will need to be tested for COVID-19 within 4 days (96 hours) of your procedure.  Our Facility has a testing site located across the street from the Clinics and [facility]  (Located at:  [address] [place] [address])   Please note: You will only be contacted for positive and pending results.   Please be aware that the turn around time for the test is approximately 24-48 hours, and there is a chance that results will not be back in time for you to have your procedure. Should your results still be pending the day of your procedure, you will be notified as soon as possible, as your appointment will be cancelled and you will need to be rescheduled.       Drivers/visitors will not be allowed in the building at this time. Please inform the clinic or procedure staff if this is going to be an issue.   The procedure center staff will call you several days before the procedure to review important information that you will need to know for the day of the procedure.   Please contact the clinic if you have further questions about this information.       Information related to Scheduling and Pre-Procedure Instructions:      If you are having a Diagnostic  procedure, you will be given a Pain Diary to document your pain relief.   Please fax the completed form to our office.   fax number is [phone]    If you must reschedule your procedure more than two times, you must follow up in clinic before rescheduling again.        Preparing for your procedure    CAUTION - FAILURE TO FOLLOW THESE PRE-PROCEDURE INSTRUCTIONS WILL RESULT IN YOUR PROCEDURE BEING RESCHEDULED.      Your procedure: Cervical Medial Branch Blocks      Pregnancy  If you are pregnant, or think you may be pregnant, please notify our staff. This may or may not affect the ability to perform the procedure.       You must have a  take you home after your procedure. Transportation by taxi or para-transit is okay as long as you have a responsible adult accompany you. You must provide your 's full name and contact number at time of check in.     Fasting Protocol Please have nothing to eat and drink for 2 hours before the procedure.      Medications If you take any medications, DO NOT STOP. Take your medications as usual the day of your procedure with a sip of water AT LEAST 2 HOURS PRIOR TO ARRIVAL.    Antibiotics If you are currently taking antibiotics, you must complete the entire dose 7 days prior to your scheduled procedure. You must be clear of any signs or symptoms of infection. If you begin antibiotics, please contact our clinic for instructions.     Fever, Chills, or Rash If you experience a fever of higher than 100 degrees, chills, rash, or open wounds during the one week before your procedure, please call the clinic to see if you may proceed with your procedure.      Medication Hold List  **Patients under Cardiology/Neurology care should consult their provider prior to the pain procedure to verify pre-procedure medication instructions. The information below contains general guidelines.**    Blood Thinners If you are taking daily ASPIRIN, PLAVIX, OR OTHER BLOOD THINNERS SUCH AS  COUMADIN/WARFARIN, we will need your prescribing doctor to sign a release permitting you to stop these medications. Once approved by your prescribing doctor - STOP ALL BLOOD THINNERS BASED ON THE TIME TABLE BELOW PRIOR TO YOUR PROCEDURE. If you have been instructed to stop WARFARIN(COUMADIN), you must have an INR lab drawn the day before your procedure. . Your INR must be within normal limits before we can perform your injection. MEDICATIONS CAN BE RESTARTED AFTER YOUR PROCEDURE.    14 DAY HOLD  Ticlid (ticlopidine)    10 DAY HOLD  Effient (Prasugel)    3 DAY HOLD  Xarelto (rivaroxaban) 7 DAY HOLD  Anacin, Bufferin, Ecotrin, Excedrin, Aggrenox (Aspirin)  Brilinta (ticagrelor)  Coumadin (Warfarin)  Pradexa (Dabigatran)  Elmiron (Pentosan)  Plavix (Clopidogrel Bisulfate)  Pletal (Cilostazol)    24 HOUR HOLD  Lovenox (enoxaparin)  Agrylin (Anagrelide)        Non-steroidal Anti-inflammatories (NSAIDs) DO NOT TAKE any non-steroidal anti-inflammatory medications (NSAIDs) listed on the table below. MEDICATIONS CAN BE RESTARTED AFTER YOUR PROCEDURE. Celebrex is OK to take and does not need to be discontinued.     Medications to stop:  3 DAY HOLD  Advil, Motrin (Ibuprofen)  Arthrotec (diciofenac sodium/misoprostol)  Clinoril (Sulindac)  Indocin (Indomethacin)  Lodine (Etodolac)  Toradol (Ketorolac)  Vicoprofen (Hydrocodone and Ibuprofen)  Voltaren (Diclotenac)    14 DAY HOLD  Daypro (Oxaprozin)  Feldene (Piroxicam)   7 DAY HOLD  Aleve (Naproxen sodium)  Darvon compound (contains aspirin)  Naprosyn (Naproxen)  Norgesic Forte (contains aspirin)  Mobic (Meloxicam)  Oruvall (Ketoprofen)  Percodan (contains aspirin)  Relafen (Nabumetone)  Salsalate  Trilisate  Vitamin E (more than 400 mg per day)  Any medication containing aspirin                To speak with a nurse, schedule/reschedule/cancel a clinic appointment, or request a medication refill call: [phone]     You can also reach us by MyChart:  [email or web address]

## 2020-08-27 NOTE — TELEPHONE ENCOUNTER
RN[facility] called to confirm pt's COVID test still pending. [facility] stated results will not be back today. RNCC updated pt. She requested come into clinic for TPI in lieu of procedure today. Dr. Rojas agreeable. Pt scheduled for 1445 on 8/27/20.    Matilde Cavanaugh, RONNIEN, RN

## 2020-09-04 DIAGNOSIS — M47.812 CERVICAL SPONDYLOSIS: ICD-10-CM

## 2020-09-04 RX ORDER — MELOXICAM 7.5 MG/1
7.5 TABLET ORAL DAILY
Qty: 30 TABLET | Refills: 5 | Status: SHIPPED | OUTPATIENT
Start: 2020-09-04 | End: 2021-05-21

## 2020-09-08 ENCOUNTER — ANCILLARY PROCEDURE (OUTPATIENT)
Dept: ULTRASOUND IMAGING | Facility: CLINIC | Age: 40
End: 2020-09-08
Attending: INTERNAL MEDICINE
Payer: COMMERCIAL

## 2020-09-08 ENCOUNTER — ANCILLARY PROCEDURE (OUTPATIENT)
Dept: MRI IMAGING | Facility: CLINIC | Age: 40
End: 2020-09-08
Attending: INTERNAL MEDICINE
Payer: COMMERCIAL

## 2020-09-08 DIAGNOSIS — E04.9 GOITER: ICD-10-CM

## 2020-09-08 DIAGNOSIS — G89.29 CHRONIC BILATERAL LOW BACK PAIN WITHOUT SCIATICA: ICD-10-CM

## 2020-09-08 DIAGNOSIS — R19.00 PELVIC MASS: ICD-10-CM

## 2020-09-08 DIAGNOSIS — M54.50 CHRONIC BILATERAL LOW BACK PAIN WITHOUT SCIATICA: ICD-10-CM

## 2020-09-08 DIAGNOSIS — R63.4 WEIGHT LOSS: ICD-10-CM

## 2020-09-09 ENCOUNTER — TELEPHONE (OUTPATIENT)
Dept: INTERNAL MEDICINE | Facility: CLINIC | Age: 40
End: 2020-09-09

## 2020-09-09 NOTE — LETTER
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  She is able to effectively control her pain with NSAIDs, as has been recommended by the pain clinic; however, she requires long term daily ppi use due to her chronic NSAID use.  Please reconsider coverage of the omeprazole.    Sincerely,      Myrna Watson MD

## 2020-09-09 NOTE — TELEPHONE ENCOUNTER
[facility] Prior Authorization Team Request    Medication: OMEPRAZOLE 20MG CPDR  Dosing:   NDC (required for Medicaid members):     Insurance   BIN: [id number]  PCN: [id number]  Grp: [id number] MANUEL  ID: [id number]    CoverMyMeds Key (if applicable):     Additional documentation:     Filling Pharmacy: [facility]  Phone Number: [phone]  Contact: [facility] [employer] [employer] [facility] ([id number]) please send all responses to this pool.  Pharmacy NPI (required for Medicaid members): [id number]

## 2020-09-09 NOTE — TELEPHONE ENCOUNTER
Central Prior Authorization Team   Phone: [phone]      PA Initiation    Medication: OMEPRAZOLE 20MG CPDR  Insurance Company: [employer] - Phone [phone] Fax [phone]  Pharmacy Filling the Rx: [facility] [place], [place] - [address] [place] [address] [phone]  Filling Pharmacy Phone: [phone]  Filling Pharmacy Fax:    Start Date: 9/9/2020

## 2020-09-10 NOTE — TELEPHONE ENCOUNTER
Quantity Limit is DENIED. Patient is able to get up to 30 caps per 30 days, for a maximum of 120 days within 365 days.    Medication: OMEPRAZOLE 20MG CPDR    Denial Date: 9/10/2020    Denial Rational:         Appeal Information:

## 2020-09-15 NOTE — TELEPHONE ENCOUNTER
Medication Appeal Initiation    We have initiated an appeal for the requested medication:  Medication: OMEPRAZOLE 20MG CPDR   Appeal Start Date:  9/15/2020  Insurance Company: [employer] - Phone [phone] Fax [phone]  Comments:  Appeal has been initiated.  I have faxed original denial letter along with Letter of Medical Necessity (letters tab) to [employer], fax# [phone]. For follow up call [phone]. Marked for urgent review.

## 2020-09-16 NOTE — TELEPHONE ENCOUNTER
MEDICATION APPEAL APPROVED. Called and spoke to a male at the pharmacy who was able to get rx processed while on the phone with me. They will let patient know when rx is ready.    Medication: OMEPRAZOLE 20MG CPDR   Authorization Effective Date:  06/16/2020  Authorization Expiration Date:  09/16/2021  Approved Dose/Quantity:   Reference #: [id number]   Insurance Company: [employer] - Phone [phone] Fax [phone]  Which Pharmacy is filling the prescription (Not needed for infusion/clinic administered): [facility] [place], [place] - [address] [place] [address] [phone]

## 2020-09-24 DIAGNOSIS — M47.812 CERVICAL SPONDYLOSIS: ICD-10-CM

## 2020-09-24 RX ORDER — METHOCARBAMOL 500 MG/1
500 TABLET, FILM COATED ORAL 4 TIMES DAILY PRN
Qty: 60 TABLET | Refills: 3 | Status: ON HOLD | OUTPATIENT
Start: 2020-09-24 | End: 2021-06-20

## 2020-09-29 ENCOUNTER — VIRTUAL VISIT (OUTPATIENT)
Dept: PHARMACY | Facility: CLINIC | Age: 40
End: 2020-09-29
Payer: COMMERCIAL

## 2020-09-29 DIAGNOSIS — Z72.0 TOBACCO ABUSE: ICD-10-CM

## 2020-09-29 DIAGNOSIS — K21.00 GASTROESOPHAGEAL REFLUX DISEASE WITH ESOPHAGITIS: ICD-10-CM

## 2020-09-29 DIAGNOSIS — F32.A DEPRESSION, UNSPECIFIED DEPRESSION TYPE: ICD-10-CM

## 2020-09-29 DIAGNOSIS — F17.200 TOBACCO USE DISORDER: Primary | ICD-10-CM

## 2020-09-29 DIAGNOSIS — M47.812 CERVICAL SPONDYLOSIS: ICD-10-CM

## 2020-09-29 DIAGNOSIS — G44.309 POST-TRAUMATIC HEADACHE, NOT INTRACTABLE, UNSPECIFIED CHRONICITY PATTERN: ICD-10-CM

## 2020-09-29 PROCEDURE — 99605 MTMS BY PHARM NP 15 MIN: CPT | Mod: TEL | Performed by: PHARMACIST

## 2020-09-29 PROCEDURE — 99607 MTMS BY PHARM ADDL 15 MIN: CPT | Mod: TEL | Performed by: PHARMACIST

## 2020-09-29 RX ORDER — VARENICLINE TARTRATE 1 MG/1
1 TABLET, FILM COATED ORAL 2 TIMES DAILY
Qty: 60 TABLET | Refills: 3 | Status: ON HOLD | OUTPATIENT
Start: 2020-09-29 | End: 2021-01-04

## 2020-09-29 RX ORDER — DEXTROAMPHETAMINE SACCHARATE, AMPHETAMINE ASPARTATE, DEXTROAMPHETAMINE SULFATE AND AMPHETAMINE SULFATE 5; 5; 5; 5 MG/1; MG/1; MG/1; MG/1
20 TABLET ORAL DAILY
Status: ON HOLD | COMMUNITY
Start: 2020-09-28 | End: 2021-01-01

## 2020-09-29 RX ORDER — NORTRIPTYLINE HCL 10 MG
10 CAPSULE ORAL AT BEDTIME
COMMUNITY
End: 2020-11-12

## 2020-09-29 NOTE — PROGRESS NOTES
MTM ENCOUNTER  SUBJECTIVE/OBJECTIVE:                           Sheyla Ye is a 40 year old female called for an initial visit. She was referred to me from Myrna Watson.      Patient consented to a telehealth visit: yes  Telemedicine Visit Details  Type of service:  Telephone visit  Start Time: 12:31 PM  End Time: 1:05 PM  Originating Location (pt. Location): Home  Distant Location (provider location):  [facility] MEDICATION THERAPY MANAGEMENT  Mode of Communication:  Telephone    Chief Complaint: smoking cessation.    Allergies/ADRs: Reviewed in chart  Tobacco: She reports that she has been smoking cigarettes. She started smoking about 21 years ago. She has a 7.50 pack-year smoking history. She has quit using smokeless tobacco.Tobacco Cessation Action Plan:   Medication Therapy Management  (Referral to MTM)    Alcohol: rare use  Caffeine: 2 cups/day of coffee  Activity: walking and hiking  PMH: Reviewed in chart    Medication Adherence/Access: no issues reported    Chronic Back Pain/Cervical Spondylosis: Current medications include APAP 500 mg every 4 hours as needed (2-3 g daily on average), voltaren 1% gel apply 2 g to affected area four times daily, ibuprofen as needed (hurts her stomach, not using), meloxicam 7.5 mg daily, methocarbamol 500 mg four times daily as needed (not terribly helpful, uses occasionally), bupivicaine injections. Also uses kratom (attempting to reduce use). She notices that it makes her feel sick and is dehydrating. She doesn't find her pain regimen overly helpful but denies side effects outside of what is documented above.     Last Comprehensive Metabolic Panel:  Sodium   Date Value Ref Range Status   07/31/2020 137 133 - 144 mmol/L Final     Potassium   Date Value Ref Range Status   07/31/2020 3.8 3.4 - 5.3 mmol/L Final     Chloride   Date Value Ref Range Status   07/31/2020 105 94 - 109 mmol/L Final     Carbon Dioxide   Date Value Ref Range Status   07/31/2020 26 20 - 32 mmol/L  Final     Anion Gap   Date Value Ref Range Status   07/31/2020 5 3 - 14 mmol/L Final     Glucose   Date Value Ref Range Status   07/31/2020 90 70 - 99 mg/dL Final     Urea Nitrogen   Date Value Ref Range Status   07/31/2020 5 (L) 7 - 30 mg/dL Final     Creatinine   Date Value Ref Range Status   07/31/2020 0.79 0.52 - 1.04 mg/dL Final     GFR Estimate   Date Value Ref Range Status   07/31/2020 >90 >60 mL/min/[1.73_m2] Final     Comment:     Non  GFR Calc  Starting 12/18/2018, serum creatinine based estimated GFR (eGFR) will be   calculated using the Chronic Kidney Disease Epidemiology Collaboration   (CKD-EPI) equation.       Calcium   Date Value Ref Range Status   07/31/2020 8.1 (L) 8.5 - 10.1 mg/dL Final     ADD: Current medications include Adderall XR 40 mg in the morning and Adderall 20 mg later in the afternoon. She has no questions or concerns and does not endorse side effects today.     Headaches/Post-concussion Syndrome: Has previously used nortriptyline 10 mg daily at bedtime. She wonders if this would be helpful for pain; she was using it for headaches related to a concussion but does not use routinely. She wants to discuss restarting because she read it can be helpful for nerve pain. She wonders if she has nerve pain.     Depression/Anxiety:  Current medications include: lamotrigine 200 mg daily and diazepam 5 mg three times daily as needed. Pt reports that depression symptoms are well managed with current psychiatrist. She has no concerns and does not endorse side effects.   PHQ-9 SCORE 6/11/2019 9/6/2019 7/31/2020   PHQ-9 Total Score - - -   PHQ-9 Total Score MyChart - 21 (Severe depression) -   PHQ-9 Total Score 0 21 17   Some encounter information is confidential and restricted. Go to Review Flowsheets activity to see all data.     GERD: Current medications include: Prilosec (omeprazole) 20 mg once daily (not currently taking, she felt it was weird that these were ready for pickup even  "though she did not request).      Smoking Cessation:  How much does she smoke:  10 CPD  Why does she smoke: habit  Triggers for smoking include: stress related to work, life, her health  How long has she been smoking:  On and off most of her life  What is the most she ever smoked:  Not assessed  What is the least she ever smoked:  She has quit before  Tried quitting in the past: Yes.  How many times:  Multiple times.  For how lon week.  What worked/didn t work in the past: She has used the nicotine inhaler, gum (found these to be too strong, gave her a buzz or makes her mouth tingle, nose hurt in the past). She isn't sure whether she has used patches before.  She has used bupropion in the past which caused brain fog and daytime fatigue, maybe headaches.   Why does she want to quit (motivators for quitting): her health    Is patient currently pregnant: No  History of seizures/bipolar disease: No  Occupation: PCA    Today's Vitals: There were no vitals taken for this visit. - telemed    BP Readings from Last 1 Encounters:   20 103/73     Pulse Readings from Last 1 Encounters:   20 106     Wt Readings from Last 1 Encounters:   20 114 lb 4.8 oz (51.8 kg)     Ht Readings from Last 1 Encounters:   20 5' 6\" (1.676 m)     Estimated body mass index is 18.45 kg/m  as calculated from the following:    Height as of 20: 5' 6\" (1.676 m).    Weight as of 20: 114 lb 4.8 oz (51.8 kg).    Temp Readings from Last 1 Encounters:   20 98  F (36.7  C) (Oral)     ASSESSMENT:                              Medication Adherence: No issues identified    Chronic Back Pain/Cervical Spondylosis: Plan in place with pain medicine. Neuropathy is not documented as a source of pain in her problem list. Defer to pain medicine to determine whether TCA would be a useful agent for her pain.     ADD: Plan in place with psychiatry.    Headaches/Post-concussion Syndrome: Needs further evaluation by PCP to determine " whether ongoing headaches require treatment.      Depression/Anxiety: Plan in place with psychiatry.    GERD: Would benefit from starting PPI therapy to improve pain associated with chronic NSAID use.     Smoking cessation: Patient would benefit from starting Chantix. Safe to use in context of other meds and medical history.      PLAN:                            1. Start omeprazole 20 mg daily as directed.   2. Start Chantix as directed on package.     I spent 34 minutes with this patient today. All changes were made via collaborative practice agreement with Myrna Watson. A copy of the visit note was provided to the patient's primary care provider.    [facility].    The patient declined a summary of these recommendations.     Claudia Ann, Pharm.D., BCACP  Medication Therapy Management Pharmacist  Page/VM:  [phone]

## 2020-10-31 ENCOUNTER — HOSPITAL ENCOUNTER (EMERGENCY)
Facility: CLINIC | Age: 40
Discharge: HOME | End: 2020-10-31
Attending: EMERGENCY MEDICINE | Admitting: EMERGENCY MEDICINE
Payer: COMMERCIAL

## 2020-10-31 VITALS
WEIGHT: 109 LBS | TEMPERATURE: 98.7 F | OXYGEN SATURATION: 99 % | HEART RATE: 87 BPM | HEIGHT: 67 IN | DIASTOLIC BLOOD PRESSURE: 74 MMHG | RESPIRATION RATE: 20 BRPM | SYSTOLIC BLOOD PRESSURE: 114 MMHG | BODY MASS INDEX: 17.11 KG/M2

## 2020-10-31 DIAGNOSIS — T50.904A DRUG POISONING OF UNDETERMINED INTENT, INITIAL ENCOUNTER: ICD-10-CM

## 2020-10-31 LAB
ALBUMIN SERPL-MCNC: 4.2 G/DL (ref 3.4–5)
ALP SERPL-CCNC: 66 U/L (ref 40–150)
ALT SERPL W P-5'-P-CCNC: 22 U/L (ref 0–50)
ANION GAP SERPL CALCULATED.3IONS-SCNC: 6 MMOL/L (ref 3–14)
APAP SERPL-MCNC: <2 MG/L (ref 10–20)
AST SERPL W P-5'-P-CCNC: 20 U/L (ref 0–45)
BASOPHILS # BLD AUTO: 0 10E9/L (ref 0–0.2)
BASOPHILS NFR BLD AUTO: 0.5 %
BILIRUB SERPL-MCNC: 1.5 MG/DL (ref 0.2–1.3)
BUN SERPL-MCNC: 5 MG/DL (ref 7–30)
CALCIUM SERPL-MCNC: 10.2 MG/DL (ref 8.5–10.1)
CHLORIDE SERPL-SCNC: 103 MMOL/L (ref 94–109)
CO2 SERPL-SCNC: 27 MMOL/L (ref 20–32)
CREAT SERPL-MCNC: 0.69 MG/DL (ref 0.52–1.04)
DIFFERENTIAL METHOD BLD: NORMAL
EOSINOPHIL # BLD AUTO: 0 10E9/L (ref 0–0.7)
EOSINOPHIL NFR BLD AUTO: 0.4 %
ERYTHROCYTE [DISTWIDTH] IN BLOOD BY AUTOMATED COUNT: 12.4 % (ref 10–15)
GFR SERPL CREATININE-BSD FRML MDRD: >90 ML/MIN/{1.73_M2}
GLUCOSE SERPL-MCNC: 100 MG/DL (ref 70–99)
HCG SERPL QL: NEGATIVE
HCT VFR BLD AUTO: 36.5 % (ref 35–47)
HGB BLD-MCNC: 12.3 G/DL (ref 11.7–15.7)
IMM GRANULOCYTES # BLD: 0 10E9/L (ref 0–0.4)
IMM GRANULOCYTES NFR BLD: 0.2 %
INTERPRETATION ECG - MUSE: NORMAL
LYMPHOCYTES # BLD AUTO: 3.2 10E9/L (ref 0.8–5.3)
LYMPHOCYTES NFR BLD AUTO: 37.8 %
MCH RBC QN AUTO: 31.6 PG (ref 26.5–33)
MCHC RBC AUTO-ENTMCNC: 33.7 G/DL (ref 31.5–36.5)
MCV RBC AUTO: 94 FL (ref 78–100)
MONOCYTES # BLD AUTO: 0.5 10E9/L (ref 0–1.3)
MONOCYTES NFR BLD AUTO: 5.4 %
NEUTROPHILS # BLD AUTO: 4.7 10E9/L (ref 1.6–8.3)
NEUTROPHILS NFR BLD AUTO: 55.7 %
NRBC # BLD AUTO: 0 10*3/UL
NRBC BLD AUTO-RTO: 0 /100
PLATELET # BLD AUTO: 355 10E9/L (ref 150–450)
POTASSIUM SERPL-SCNC: 4 MMOL/L (ref 3.4–5.3)
PROT SERPL-MCNC: 7.1 G/DL (ref 6.8–8.8)
RBC # BLD AUTO: 3.89 10E12/L (ref 3.8–5.2)
SALICYLATES SERPL-MCNC: <2 MG/DL
SODIUM SERPL-SCNC: 137 MMOL/L (ref 133–144)
WBC # BLD AUTO: 8.4 10E9/L (ref 4–11)

## 2020-10-31 PROCEDURE — 93010 ELECTROCARDIOGRAM REPORT: CPT | Performed by: EMERGENCY MEDICINE

## 2020-10-31 PROCEDURE — 80329 ANALGESICS NON-OPIOID 1 OR 2: CPT | Performed by: EMERGENCY MEDICINE

## 2020-10-31 PROCEDURE — 85025 COMPLETE CBC W/AUTO DIFF WBC: CPT | Performed by: EMERGENCY MEDICINE

## 2020-10-31 PROCEDURE — 84703 CHORIONIC GONADOTROPIN ASSAY: CPT | Performed by: EMERGENCY MEDICINE

## 2020-10-31 PROCEDURE — 80329 ANALGESICS NON-OPIOID 1 OR 2: CPT | Mod: 59 | Performed by: EMERGENCY MEDICINE

## 2020-10-31 PROCEDURE — 99285 EMERGENCY DEPT VISIT HI MDM: CPT | Mod: 25

## 2020-10-31 PROCEDURE — 99284 EMERGENCY DEPT VISIT MOD MDM: CPT | Mod: 25 | Performed by: EMERGENCY MEDICINE

## 2020-10-31 PROCEDURE — 90791 PSYCH DIAGNOSTIC EVALUATION: CPT

## 2020-10-31 PROCEDURE — 80053 COMPREHEN METABOLIC PANEL: CPT | Performed by: EMERGENCY MEDICINE

## 2020-10-31 PROCEDURE — 93005 ELECTROCARDIOGRAM TRACING: CPT | Performed by: EMERGENCY MEDICINE

## 2020-10-31 ASSESSMENT — ENCOUNTER SYMPTOMS
SHORTNESS OF BREATH: 0
EYE REDNESS: 0
CONFUSION: 0
NECK STIFFNESS: 0
ABDOMINAL PAIN: 0
ARTHRALGIAS: 0
HEADACHES: 0
COLOR CHANGE: 0
DIFFICULTY URINATING: 0
FEVER: 0

## 2020-10-31 ASSESSMENT — MIFFLIN-ST. JEOR: SCORE: 1197.05

## 2020-10-31 NOTE — ED NOTES
"Pt again is found in hallway and states \"I want to go now\". Pt again reminded about test results and appears confused. MD notified and instructs to initiate  hold.   "

## 2020-10-31 NOTE — ED NOTES
hold initiated by MD. Security contacted and in place. Pt wanded and searched for safety, no contraband found.

## 2020-10-31 NOTE — ED NOTES
Pt appears to be looking around the room as if she is seeing things that are not there. Her friend reports she has a psych hx of delusions. MD notified.

## 2020-10-31 NOTE — ED NOTES
Patient in hallway asking where lobby is and is asking to leave. Pt appears confused. Pt is asked to stay in room until her lab results come back and the MD approves discharge and patient goes back to room cooperatively.

## 2020-10-31 NOTE — ED NOTES
Pt wanting to leave. Security outside room and redirected. Significant other assisting with redirecting patient to await for DEC assessment. Pt calm and agreeable. DEC recalled and they stated it would be about 15 minutes until available.

## 2020-10-31 NOTE — ED TRIAGE NOTES
Pt brought in by friend from home for possible overdose of CBD tablets. Friend concerned due to erratic behaviors such as unable to communicate, pt staring off, slow to respond.

## 2020-10-31 NOTE — ED NOTES
"Pt is seen walking towards ambulance bay and asks \"where is the lobby, I would like to go now\". Pt is reminded we are awaiting test results. Pt states \"Okay\" and goes back into room.   "

## 2020-10-31 NOTE — ED PROVIDER NOTES
ED Provider Note  [facility]      History     Chief Complaint   Patient presents with     Ingestion     CBD      HPI  Sheyla Ye is a 40 year old female who presents to the emergency department for evaluation of altered mental status and labored breathing after taking 675 mg of CBD this evening.  The patient's friend who accompanies her found her to be unable to speak and having the appearance of labored breathing at home.  She did admit to taking essentially a full bottle of CBD this evening.  She states she did it because of pain.  She is delayed in her responses and is unable to provide much detail.  She denies it was an attempt at self-harm.  The patient is currently denying any chest pain or dyspnea.  No abdominal pain.  No nausea or vomiting.  She denies any diarrhea or constipation.  She denies any fall or injury.    Past Medical History  Past Medical History:   Diagnosis Date     Anxiety      Chronic osteoarthritis      Chronic pain      Depressive disorder 1995     DJD (degenerative joint disease), lumbar      Munir-Danlos disease      Fibromyalgia      Learning disability      Past Surgical History:   Procedure Laterality Date     INJECT EPIDURAL CERVICAL / THORACIC SINGLE N/A 4/30/2019    Procedure: Cervical Epidural Steroid Injection;  Surgeon: Swapnil Carlisle MD;  Location: [facility] OR     wisdom teeth removal            Acetaminophen (TYLENOL PO)       amphetamine-dextroamphetamine (ADDERALL XR) 10 MG 24 hr capsule       amphetamine-dextroamphetamine (ADDERALL XR) 30 MG 24 hr capsule       amphetamine-dextroamphetamine (ADDERALL) 20 MG tablet       diazepam (VALIUM) 5 MG tablet       diclofenac (VOLTAREN) 1 % topical gel       lamoTRIgine (LAMICTAL) 200 MG tablet       meloxicam (MOBIC) 7.5 MG tablet       methocarbamol (ROBAXIN) 500 MG tablet       nortriptyline (PAMELOR) 10 MG capsule       omeprazole (PRILOSEC) 20 MG DR capsule       varenicline (CHANTIX CONTINUING  MONTH DYLAN) 1 MG tablet       varenicline (CHANTIX STARTING MONTH DYLAN) 0.5 MG X 11 & 1 MG X 42 tablet      Allergies   Allergen Reactions     Seasonal Allergies      Family History  Family History   Problem Relation Age of Onset     Aneurysm Brother         20's, autopsy showed RP bleed     Thyroid Disease Mother      Anxiety Disorder Mother      Mental Illness Mother      Depression Mother      Genetic Disorder Mother      Migraines Mother      Aneurysm Other         brain aneurysm, 30's     Colon Cancer Other      Anxiety Disorder Sister      Mental Illness Sister      Cancer Paternal Grandmother      Hypertension Paternal Grandmother      Colon Cancer Paternal Grandmother      Cancer Paternal Grandfather      Hypertension Paternal Grandfather      Colon Cancer Paternal Grandfather      Depression Sister      Genetic Disorder Brother      Hypertension Father      Hyperlipidemia Father      Social History   Social History     Tobacco Use     Smoking status: Current Every Day Smoker     Packs/day: 0.50     Years: 15.00     Pack years: 7.50     Types: Cigarettes     Start date: 9/1/1999     Smokeless tobacco: Former User   Substance Use Topics     Alcohol use: Yes     Alcohol/week: 6.7 standard drinks     Comment: Moderate      Drug use: No      Past medical history, past surgical history, medications, allergies, family history, and social history were reviewed with the patient. No additional pertinent items.       Review of Systems   Constitutional: Negative for fever.   HENT: Negative for congestion.    Eyes: Negative for redness.   Respiratory: Negative for shortness of breath.    Cardiovascular: Negative for chest pain.   Gastrointestinal: Negative for abdominal pain.   Genitourinary: Negative for difficulty urinating.   Musculoskeletal: Negative for arthralgias and neck stiffness.   Skin: Negative for color change.   Neurological: Negative for headaches.   Psychiatric/Behavioral: Negative for confusion.     A  "complete review of systems was performed with pertinent positives and negatives noted in the HPI, and all other systems negative.    Physical Exam   BP: 118/74  Pulse: 93  Temp: 98.7  F (37.1  C)  Resp: 20  Height: 170.2 cm (5' 7\")  Weight: 49.4 kg (109 lb)  SpO2: 94 %  Physical Exam  Vitals signs and nursing note reviewed.   Constitutional:       General: She is not in acute distress.     Appearance: Normal appearance. She is not diaphoretic.   HENT:      Head: Normocephalic and atraumatic.      Mouth/Throat:      Pharynx: No oropharyngeal exudate.   Eyes:      General: No scleral icterus.     Pupils: Pupils are equal, round, and reactive to light.   Cardiovascular:      Rate and Rhythm: Normal rate and regular rhythm.      Pulses: Normal pulses.      Heart sounds: Normal heart sounds.   Pulmonary:      Effort: Pulmonary effort is normal. No respiratory distress.      Breath sounds: Normal breath sounds.   Abdominal:      General: Bowel sounds are normal.      Palpations: Abdomen is soft.      Tenderness: There is no abdominal tenderness.   Musculoskeletal:         General: No tenderness.   Skin:     General: Skin is warm.      Capillary Refill: Capillary refill takes less than 2 seconds.      Findings: No rash.   Neurological:      General: No focal deficit present.      Mental Status: She is alert.      GCS: GCS eye subscore is 4. GCS verbal subscore is 5. GCS motor subscore is 6.      Comments: Responses delayed but largely accurate.  Thought the month was November.  Knew the year was 2020.  Knows she is at [place].   Psychiatric:         Mood and Affect: Affect is inappropriate.         Speech: Speech is delayed.         ED Course      Procedures             EKG Interpretation:      Interpreted by SERENITY SIMTH MD, MD  Time reviewed: 0545  Symptoms at time of EKG: ingestion   Rhythm: normal sinus with short LA  Rate: 64  Axis: Normal  Ectopy: none  Conduction:  ms  ST Segments/ T Waves: T wave flattening " Global  Q Waves: none  Comparison to prior: No old EKG available    Clinical Impression: non-specific EKG    Results for orders placed or performed during the hospital encounter of 10/31/20   CBC with platelets differential     Status: None   Result Value Ref Range    WBC 8.4 4.0 - 11.0 10e9/L    RBC Count 3.89 3.8 - 5.2 10e12/L    Hemoglobin 12.3 11.7 - 15.7 g/dL    Hematocrit 36.5 35.0 - 47.0 %    MCV 94 78 - 100 fl    MCH 31.6 26.5 - 33.0 pg    MCHC 33.7 31.5 - 36.5 g/dL    RDW 12.4 10.0 - 15.0 %    Platelet Count 355 150 - 450 10e9/L    Diff Method Automated Method     % Neutrophils 55.7 %    % Lymphocytes 37.8 %    % Monocytes 5.4 %    % Eosinophils 0.4 %    % Basophils 0.5 %    % Immature Granulocytes 0.2 %    Nucleated RBCs 0 0 /100    Absolute Neutrophil 4.7 1.6 - 8.3 10e9/L    Absolute Lymphocytes 3.2 0.8 - 5.3 10e9/L    Absolute Monocytes 0.5 0.0 - 1.3 10e9/L    Absolute Eosinophils 0.0 0.0 - 0.7 10e9/L    Absolute Basophils 0.0 0.0 - 0.2 10e9/L    Abs Immature Granulocytes 0.0 0 - 0.4 10e9/L    Absolute Nucleated RBC 0.0    Comprehensive metabolic panel     Status: Abnormal   Result Value Ref Range    Sodium 137 133 - 144 mmol/L    Potassium 4.0 3.4 - 5.3 mmol/L    Chloride 103 94 - 109 mmol/L    Carbon Dioxide 27 20 - 32 mmol/L    Anion Gap 6 3 - 14 mmol/L    Glucose 100 (H) 70 - 99 mg/dL    Urea Nitrogen 5 (L) 7 - 30 mg/dL    Creatinine 0.69 0.52 - 1.04 mg/dL    GFR Estimate >90 >60 mL/min/[1.73_m2]    GFR Estimate If Black >90 >60 mL/min/[1.73_m2]    Calcium 10.2 (H) 8.5 - 10.1 mg/dL    Bilirubin Total 1.5 (H) 0.2 - 1.3 mg/dL    Albumin 4.2 3.4 - 5.0 g/dL    Protein Total 7.1 6.8 - 8.8 g/dL    Alkaline Phosphatase 66 40 - 150 U/L    ALT 22 0 - 50 U/L    AST 20 0 - 45 U/L   Acetaminophen level     Status: None   Result Value Ref Range    Acetaminophen Level <2 mg/L   Salicylate level     Status: None   Result Value Ref Range    Salicylate Level <2 mg/dL   HCG qualitative Blood     Status: None   Result  Value Ref Range    HCG Qualitative Serum Negative NEG^Negative      7:27 AM patient now awake, alert, and more conversant.  She states that she does not really recall what happened last night.  She still seems somewhat evasive and is looking at her friend often.  It appears that she would benefit from a psychiatric assessment.  She will undergo DC assessment.       Results for orders placed or performed during the hospital encounter of 10/31/20   CBC with platelets differential     Status: None   Result Value Ref Range    WBC 8.4 4.0 - 11.0 10e9/L    RBC Count 3.89 3.8 - 5.2 10e12/L    Hemoglobin 12.3 11.7 - 15.7 g/dL    Hematocrit 36.5 35.0 - 47.0 %    MCV 94 78 - 100 fl    MCH 31.6 26.5 - 33.0 pg    MCHC 33.7 31.5 - 36.5 g/dL    RDW 12.4 10.0 - 15.0 %    Platelet Count 355 150 - 450 10e9/L    Diff Method Automated Method     % Neutrophils 55.7 %    % Lymphocytes 37.8 %    % Monocytes 5.4 %    % Eosinophils 0.4 %    % Basophils 0.5 %    % Immature Granulocytes 0.2 %    Nucleated RBCs 0 0 /100    Absolute Neutrophil 4.7 1.6 - 8.3 10e9/L    Absolute Lymphocytes 3.2 0.8 - 5.3 10e9/L    Absolute Monocytes 0.5 0.0 - 1.3 10e9/L    Absolute Eosinophils 0.0 0.0 - 0.7 10e9/L    Absolute Basophils 0.0 0.0 - 0.2 10e9/L    Abs Immature Granulocytes 0.0 0 - 0.4 10e9/L    Absolute Nucleated RBC 0.0    Comprehensive metabolic panel     Status: Abnormal   Result Value Ref Range    Sodium 137 133 - 144 mmol/L    Potassium 4.0 3.4 - 5.3 mmol/L    Chloride 103 94 - 109 mmol/L    Carbon Dioxide 27 20 - 32 mmol/L    Anion Gap 6 3 - 14 mmol/L    Glucose 100 (H) 70 - 99 mg/dL    Urea Nitrogen 5 (L) 7 - 30 mg/dL    Creatinine 0.69 0.52 - 1.04 mg/dL    GFR Estimate >90 >60 mL/min/[1.73_m2]    GFR Estimate If Black >90 >60 mL/min/[1.73_m2]    Calcium 10.2 (H) 8.5 - 10.1 mg/dL    Bilirubin Total 1.5 (H) 0.2 - 1.3 mg/dL    Albumin 4.2 3.4 - 5.0 g/dL    Protein Total 7.1 6.8 - 8.8 g/dL    Alkaline Phosphatase 66 40 - 150 U/L    ALT 22 0 - 50  U/L    AST 20 0 - 45 U/L   Acetaminophen level     Status: None   Result Value Ref Range    Acetaminophen Level <2 mg/L   Salicylate level     Status: None   Result Value Ref Range    Salicylate Level <2 mg/dL   HCG qualitative Blood     Status: None   Result Value Ref Range    HCG Qualitative Serum Negative NEG^Negative     Medications   0.9% sodium chloride BOLUS (1,000 mLs Intravenous Not Given 10/31/20 0637)        Assessments & Plan (with Medical Decision Making)   40 year old female to the emergency department after ingestion of CBD oil.  She was initially somewhat sedate and confused.  She was observed for several hours and cleared.  She is still evasive and states she does not recall what happened.  Ingestion at this point is of undetermined intent.  Her friend that accompanies her has some concerns about her safety and mental health.  Her EKG did not reveal any significant abnormality.  Her labs are essentially normal with negative acetaminophen levels and normal liver enzymes.  The patient now appears awake and alert and oriented appropriate for a DEC assessment.  Disposition pending that assessment.    I have reviewed the nursing notes. I have reviewed the findings, diagnosis, plan and need for follow up with the patient.    New Prescriptions    No medications on file       Final diagnoses:   Drug poisoning of undetermined intent, initial encounter     Chart documentation was completed with Dragon voice-recognition software. Even though reviewed, this chart may still contain some grammatical, spelling, and word errors.     --  Rashard Madden Md  [facility] EMERGENCY DEPARTMENT  10/31/2020     Rashard Madden MD  10/31/20 0781

## 2020-10-31 NOTE — ED AVS SNAPSHOT
[facility] Emergency Department  [address] [address]  Phone: [phone]                                    Sheyla Ye   MRN: [id number]    Department: [facility] Emergency Department   Date of Visit: 10/31/2020           After Visit Summary Signature Page    I have received my discharge instructions, and my questions have been answered. I have discussed any challenges I see with this plan with the nurse or doctor.    ..........................................................................................................................................  Patient/Patient Representative Signature      ..........................................................................................................................................  Patient Representative Print Name and Relationship to Patient    ..................................................               ................................................  Date                                   Time    ..........................................................................................................................................  Reviewed by Signature/Title    ...................................................              ..............................................  Date                                               Time          22EPIC Rev 08/18

## 2020-10-31 NOTE — ED NOTES
Sign out note: Sheyla Ye is a 40 year old female was signed out to me by Dr. Cherry at 0800 am.  Please see initial dictation for full details and history.  Patient presented with an overdose of CBD pills and labored breathing.  She was evaluated during the night shift.  Laboratory studies are unremarkable.  She has repeatedly denied self-harm.  Plan at time of sign out is to have the patient undergo a DEC assessment in the ED..       Course in the ED:  DEC was consulted and the patient was evaluated by  Fausto at approximately 9:30 AM.  Please see his note for details.  The patient spent much of the morning coming out of her room and asking to be discharged.  She is here with her boyfriend.    Briefly, the patient appears somewhat disorganized and delayed in her responses to Fausto of the DEC.  She denies suicidal ideation or intent to hurt herself.  She is quite anxious to be discharged home.  She has no interest in drug abuse counseling.  She has cleared  from her ingestion and is answering questions appropriately..  She will be discharged from the ED.  She was counseled not to use illicit substances.    Clinical impression: Drug overdose of undetermined intent.    This note was created in part by the use of Dragon voice recognition dictation system. Inadvertent grammatical errors and typographical errors may still exist.  MD Alondra Yost Alda L, MD  10/31/20 1121

## 2020-11-06 ENCOUNTER — HOSPITAL ENCOUNTER (EMERGENCY)
Facility: CLINIC | Age: 40
Discharge: HOME | End: 2020-11-06
Attending: INTERNAL MEDICINE | Admitting: INTERNAL MEDICINE
Payer: COMMERCIAL

## 2020-11-06 ENCOUNTER — NURSE TRIAGE (OUTPATIENT)
Dept: NURSING | Facility: CLINIC | Age: 40
End: 2020-11-06

## 2020-11-06 VITALS
HEART RATE: 73 BPM | RESPIRATION RATE: 16 BRPM | SYSTOLIC BLOOD PRESSURE: 105 MMHG | OXYGEN SATURATION: 98 % | DIASTOLIC BLOOD PRESSURE: 64 MMHG | BODY MASS INDEX: 17.07 KG/M2 | TEMPERATURE: 98.2 F | WEIGHT: 109 LBS

## 2020-11-06 DIAGNOSIS — G89.4 CHRONIC PAIN SYNDROME: ICD-10-CM

## 2020-11-06 DIAGNOSIS — N39.0 URINARY TRACT INFECTION WITHOUT HEMATURIA, SITE UNSPECIFIED: ICD-10-CM

## 2020-11-06 LAB
ALBUMIN SERPL-MCNC: 4 G/DL (ref 3.4–5)
ALBUMIN UR-MCNC: NEGATIVE MG/DL
ALP SERPL-CCNC: 53 U/L (ref 40–150)
ALT SERPL W P-5'-P-CCNC: 22 U/L (ref 0–50)
ANION GAP SERPL CALCULATED.3IONS-SCNC: 6 MMOL/L (ref 3–14)
APPEARANCE UR: ABNORMAL
AST SERPL W P-5'-P-CCNC: 9 U/L (ref 0–45)
BACTERIA #/AREA URNS HPF: ABNORMAL /HPF
BASOPHILS # BLD AUTO: 0.1 10E9/L (ref 0–0.2)
BASOPHILS NFR BLD AUTO: 0.6 %
BILIRUB SERPL-MCNC: 1 MG/DL (ref 0.2–1.3)
BILIRUB UR QL STRIP: NEGATIVE
BUN SERPL-MCNC: 7 MG/DL (ref 7–30)
CALCIUM SERPL-MCNC: 9 MG/DL (ref 8.5–10.1)
CHLORIDE SERPL-SCNC: 109 MMOL/L (ref 94–109)
CO2 SERPL-SCNC: 24 MMOL/L (ref 20–32)
COLOR UR AUTO: ABNORMAL
CREAT SERPL-MCNC: 0.76 MG/DL (ref 0.52–1.04)
CRP SERPL-MCNC: <2.9 MG/L (ref 0–8)
DIFFERENTIAL METHOD BLD: NORMAL
EOSINOPHIL # BLD AUTO: 0.1 10E9/L (ref 0–0.7)
EOSINOPHIL NFR BLD AUTO: 1 %
ERYTHROCYTE [DISTWIDTH] IN BLOOD BY AUTOMATED COUNT: 12.8 % (ref 10–15)
GFR SERPL CREATININE-BSD FRML MDRD: >90 ML/MIN/{1.73_M2}
GLUCOSE SERPL-MCNC: 105 MG/DL (ref 70–99)
GLUCOSE UR STRIP-MCNC: NEGATIVE MG/DL
HCG SERPL QL: NEGATIVE
HCT VFR BLD AUTO: 36.9 % (ref 35–47)
HGB BLD-MCNC: 12.1 G/DL (ref 11.7–15.7)
HGB UR QL STRIP: NEGATIVE
IMM GRANULOCYTES # BLD: 0 10E9/L (ref 0–0.4)
IMM GRANULOCYTES NFR BLD: 0.1 %
INR PPP: 0.99 (ref 0.86–1.14)
KETONES UR STRIP-MCNC: NEGATIVE MG/DL
LEUKOCYTE ESTERASE UR QL STRIP: ABNORMAL
LYMPHOCYTES # BLD AUTO: 3.8 10E9/L (ref 0.8–5.3)
LYMPHOCYTES NFR BLD AUTO: 43 %
MCH RBC QN AUTO: 31.4 PG (ref 26.5–33)
MCHC RBC AUTO-ENTMCNC: 32.8 G/DL (ref 31.5–36.5)
MCV RBC AUTO: 96 FL (ref 78–100)
MONOCYTES # BLD AUTO: 0.6 10E9/L (ref 0–1.3)
MONOCYTES NFR BLD AUTO: 6.5 %
MUCOUS THREADS #/AREA URNS LPF: PRESENT /LPF
NEUTROPHILS # BLD AUTO: 4.3 10E9/L (ref 1.6–8.3)
NEUTROPHILS NFR BLD AUTO: 48.8 %
NITRATE UR QL: POSITIVE
NRBC # BLD AUTO: 0 10*3/UL
NRBC BLD AUTO-RTO: 0 /100
PH UR STRIP: 5.5 PH (ref 5–7)
PLATELET # BLD AUTO: 356 10E9/L (ref 150–450)
POTASSIUM SERPL-SCNC: 3.9 MMOL/L (ref 3.4–5.3)
PROT SERPL-MCNC: 6.7 G/DL (ref 6.8–8.8)
RBC # BLD AUTO: 3.85 10E12/L (ref 3.8–5.2)
RBC #/AREA URNS AUTO: 1 /HPF (ref 0–2)
SODIUM SERPL-SCNC: 139 MMOL/L (ref 133–144)
SOURCE: ABNORMAL
SP GR UR STRIP: 1.01 (ref 1–1.03)
SQUAMOUS #/AREA URNS AUTO: 1 /HPF (ref 0–1)
TRANS CELLS #/AREA URNS HPF: <1 /HPF (ref 0–1)
UROBILINOGEN UR STRIP-MCNC: NORMAL MG/DL (ref 0–2)
WBC # BLD AUTO: 8.7 10E9/L (ref 4–11)
WBC #/AREA URNS AUTO: 4 /HPF (ref 0–5)

## 2020-11-06 PROCEDURE — 96361 HYDRATE IV INFUSION ADD-ON: CPT | Performed by: INTERNAL MEDICINE

## 2020-11-06 PROCEDURE — 81001 URINALYSIS AUTO W/SCOPE: CPT | Performed by: INTERNAL MEDICINE

## 2020-11-06 PROCEDURE — 96376 TX/PRO/DX INJ SAME DRUG ADON: CPT | Performed by: INTERNAL MEDICINE

## 2020-11-06 PROCEDURE — 87186 SC STD MICRODIL/AGAR DIL: CPT | Performed by: INTERNAL MEDICINE

## 2020-11-06 PROCEDURE — 87086 URINE CULTURE/COLONY COUNT: CPT | Performed by: INTERNAL MEDICINE

## 2020-11-06 PROCEDURE — 99284 EMERGENCY DEPT VISIT MOD MDM: CPT | Performed by: INTERNAL MEDICINE

## 2020-11-06 PROCEDURE — 86140 C-REACTIVE PROTEIN: CPT | Performed by: INTERNAL MEDICINE

## 2020-11-06 PROCEDURE — 85652 RBC SED RATE AUTOMATED: CPT | Performed by: INTERNAL MEDICINE

## 2020-11-06 PROCEDURE — 250N000011 HC RX IP 250 OP 636: Performed by: INTERNAL MEDICINE

## 2020-11-06 PROCEDURE — 99284 EMERGENCY DEPT VISIT MOD MDM: CPT | Mod: 25 | Performed by: INTERNAL MEDICINE

## 2020-11-06 PROCEDURE — 96365 THER/PROPH/DIAG IV INF INIT: CPT | Performed by: INTERNAL MEDICINE

## 2020-11-06 PROCEDURE — 85025 COMPLETE CBC W/AUTO DIFF WBC: CPT | Performed by: INTERNAL MEDICINE

## 2020-11-06 PROCEDURE — 258N000003 HC RX IP 258 OP 636: Performed by: INTERNAL MEDICINE

## 2020-11-06 PROCEDURE — 85610 PROTHROMBIN TIME: CPT | Performed by: INTERNAL MEDICINE

## 2020-11-06 PROCEDURE — 84703 CHORIONIC GONADOTROPIN ASSAY: CPT | Performed by: INTERNAL MEDICINE

## 2020-11-06 PROCEDURE — 80053 COMPREHEN METABOLIC PANEL: CPT | Performed by: INTERNAL MEDICINE

## 2020-11-06 PROCEDURE — 96375 TX/PRO/DX INJ NEW DRUG ADDON: CPT | Performed by: INTERNAL MEDICINE

## 2020-11-06 PROCEDURE — 87088 URINE BACTERIA CULTURE: CPT | Performed by: INTERNAL MEDICINE

## 2020-11-06 RX ORDER — METOCLOPRAMIDE HYDROCHLORIDE 5 MG/ML
10 INJECTION INTRAMUSCULAR; INTRAVENOUS ONCE
Status: COMPLETED | OUTPATIENT
Start: 2020-11-06 | End: 2020-11-06

## 2020-11-06 RX ORDER — KETOROLAC TROMETHAMINE 15 MG/ML
15 INJECTION, SOLUTION INTRAMUSCULAR; INTRAVENOUS ONCE
Status: COMPLETED | OUTPATIENT
Start: 2020-11-06 | End: 2020-11-06

## 2020-11-06 RX ORDER — NITROFURANTOIN 25; 75 MG/1; MG/1
100 CAPSULE ORAL 2 TIMES DAILY
Qty: 14 CAPSULE | Refills: 0 | Status: SHIPPED | OUTPATIENT
Start: 2020-11-06 | End: 2020-11-12

## 2020-11-06 RX ADMIN — METOCLOPRAMIDE 10 MG: 5 INJECTION, SOLUTION INTRAMUSCULAR; INTRAVENOUS at 18:20

## 2020-11-06 RX ADMIN — FAMOTIDINE 20 MG: 20 INJECTION, SOLUTION INTRAVENOUS at 18:19

## 2020-11-06 RX ADMIN — KETOROLAC TROMETHAMINE 15 MG: 15 INJECTION, SOLUTION INTRAMUSCULAR; INTRAVENOUS at 18:21

## 2020-11-06 RX ADMIN — SODIUM CHLORIDE 1000 ML: 9 INJECTION, SOLUTION INTRAVENOUS at 18:18

## 2020-11-06 ASSESSMENT — ENCOUNTER SYMPTOMS
NAUSEA: 0
MYALGIAS: 1
BACK PAIN: 1
FEVER: 0
HEADACHES: 1
COUGH: 0
VOMITING: 0
DIARRHEA: 0

## 2020-11-06 NOTE — TELEPHONE ENCOUNTER
Sheyla call in with overall sharp pains she states.  She has an appointment with her provider but not for another month.She states she is not able to control her pain.  Per protocol she will go to ER or urgent care. She agrees to this plan.     Additional Information    Negative: Passed out (i.e., fainted, collapsed and was not responding)    Negative: Shock suspected (e.g., cold/pale/clammy skin, too weak to stand, low BP, rapid pulse)    Negative: Sounds like a life-threatening emergency to the triager    Negative: Major injury to the back (e.g., MVA, fall > 10 feet or 3 meters, penetrating injury, etc.)    Negative: Pain in the upper back over the ribs (rib cage) that radiates (travels) into the chest    Negative: Pain in the upper back over the ribs (rib cage) and worsened by coughing (or clearly increases with breathing)    Negative: SEVERE back pain of sudden onset and age > 60    Negative: SEVERE abdominal pain (e.g., excruciating)    Negative: Abdominal pain and age > 60    Negative: Unable to urinate (or only a few drops) and bladder feels very full    Negative: Loss of bladder or bowel control (urine or bowel incontinence; wetting self, leaking stool) of new onset    Negative: Numbness (loss of sensation) in groin or rectal area    Patient sounds very sick or weak to the triager    Protocols used: BACK PAIN-A-OH

## 2020-11-06 NOTE — ED TRIAGE NOTES
Patient reports generalized body aches that are chronic because of her connective tissue disease which she deals with the pain opn a daily at about a 6-7 on scale but has been sitting at a 10 for the last 3 weeks. Patient taking tylenol and CBD oil is what is being  Used with some relief. All over pain but the headache is the worst right now.

## 2020-11-06 NOTE — ED AVS SNAPSHOT
[facility] Emergency Department  [address] [address]  Phone: [phone]                                    Sheyla Ye   MRN: [id number]    Department: [facility] Emergency Department   Date of Visit: 11/6/2020           After Visit Summary Signature Page    I have received my discharge instructions, and my questions have been answered. I have discussed any challenges I see with this plan with the nurse or doctor.    ..........................................................................................................................................  Patient/Patient Representative Signature      ..........................................................................................................................................  Patient Representative Print Name and Relationship to Patient    ..................................................               ................................................  Date                                   Time    ..........................................................................................................................................  Reviewed by Signature/Title    ...................................................              ..............................................  Date                                               Time          22EPIC Rev 08/18

## 2020-11-06 NOTE — ED PROVIDER NOTES
ED Provider Note  [facility]      History     Chief Complaint   Patient presents with     Generalized Body Aches     The history is provided by the patient and medical records.     Sheyla Ye is a 40 year old female with a past medical history significant for Munir-Danlos disease, cervical stenosis, lumbar DJD, chronic osteoarthritis, chronic pain syndrome, fibromyalgia, depression and anxiety who presents to the ED for evaluation of worsening chronic pain, headache and generalized body aches.  Patient states that she has chronic back pain that is usually at a 6-7/10 at baseline, but has been around 10/10 over the last 3 weeks.  She reports that she has had difficulty sleeping for the last 3 weeks secondary to her pain.  She also reports that recently she has been having headaches and generalized body aches as well.  The patient states that she takes Tylenol and CBD oil for symptomatic relief.  The patient reports that family and friends became concerned and wanted her to come into the hospital to be evaluated.  Patient reports that she does see a pain clinic and has a PCP.  The patient reports that she is also on nortriptyline 10 mg at bedtime.  She reports that she is supposed to be taking Mobic, omeprazole and methocarbamol as well, but she has not been taking these medications recently.  She reports that she has had somewhat of an upset stomach as she has not been eating a lot due to her pain.  Otherwise, patient denies any fevers, cough, nausea, vomiting or diarrhea.    Past Medical History  Past Medical History:   Diagnosis Date     Anxiety      Chronic osteoarthritis      Chronic pain      Depressive disorder 1995     DJD (degenerative joint disease), lumbar      Munir-Danlos disease      Fibromyalgia      Learning disability      Past Surgical History:   Procedure Laterality Date     INJECT EPIDURAL CERVICAL / THORACIC SINGLE N/A 4/30/2019    Procedure: Cervical Epidural  Steroid Injection;  Surgeon: Swapnil Carlisle MD;  Location: [facility] OR     wisdom teeth removal            nitroFURantoin macrocrystal-monohydrate (MACROBID) 100 MG capsule       Acetaminophen (TYLENOL PO)       amphetamine-dextroamphetamine (ADDERALL XR) 10 MG 24 hr capsule       amphetamine-dextroamphetamine (ADDERALL XR) 30 MG 24 hr capsule       amphetamine-dextroamphetamine (ADDERALL) 20 MG tablet       diazepam (VALIUM) 5 MG tablet       diclofenac (VOLTAREN) 1 % topical gel       lamoTRIgine (LAMICTAL) 200 MG tablet       meloxicam (MOBIC) 7.5 MG tablet       methocarbamol (ROBAXIN) 500 MG tablet       nortriptyline (PAMELOR) 10 MG capsule       omeprazole (PRILOSEC) 20 MG DR capsule       varenicline (CHANTIX CONTINUING MONTH DYLAN) 1 MG tablet       varenicline (CHANTIX STARTING MONTH DYLAN) 0.5 MG X 11 & 1 MG X 42 tablet      Allergies   Allergen Reactions     Seasonal Allergies      Family History  Family History   Problem Relation Age of Onset     Aneurysm Brother         20's, autopsy showed RP bleed     Thyroid Disease Mother      Anxiety Disorder Mother      Mental Illness Mother      Depression Mother      Genetic Disorder Mother      Migraines Mother      Aneurysm Other         brain aneurysm, 30's     Colon Cancer Other      Anxiety Disorder Sister      Mental Illness Sister      Cancer Paternal Grandmother      Hypertension Paternal Grandmother      Colon Cancer Paternal Grandmother      Cancer Paternal Grandfather      Hypertension Paternal Grandfather      Colon Cancer Paternal Grandfather      Depression Sister      Genetic Disorder Brother      Hypertension Father      Hyperlipidemia Father      Social History   Social History     Tobacco Use     Smoking status: Current Every Day Smoker     Packs/day: 0.50     Years: 15.00     Pack years: 7.50     Types: Cigarettes     Start date: 9/1/1999     Smokeless tobacco: Former User   Substance Use Topics     Alcohol use: Yes     Alcohol/week: 6.7 standard  drinks     Comment: Moderate      Drug use: No      Past medical history, past surgical history, medications, allergies, family history, and social history were reviewed with the patient. No additional pertinent items.       Review of Systems   Constitutional: Negative for fever.   HENT: Negative for congestion.    Eyes: Negative for redness.   Respiratory: Negative for cough and shortness of breath.    Cardiovascular: Negative for chest pain.   Gastrointestinal: Negative for abdominal pain, diarrhea, nausea and vomiting.   Genitourinary: Negative for difficulty urinating.   Musculoskeletal: Positive for back pain (chronic, worse) and myalgias. Negative for arthralgias and neck stiffness.   Skin: Negative for color change.   Neurological: Positive for headaches.   Psychiatric/Behavioral: Negative for confusion.   All other systems reviewed and are negative.      Physical Exam   BP: 109/71  Pulse: 77  Temp: 98.2  F (36.8  C)  Resp: 16  Weight: 49.4 kg (109 lb)  SpO2: 98 %  Physical Exam  Constitutional:       General: She is not in acute distress.     Appearance: She is not diaphoretic.   HENT:      Head: Atraumatic.      Mouth/Throat:      Pharynx: No oropharyngeal exudate.   Eyes:      General: No scleral icterus.     Pupils: Pupils are equal, round, and reactive to light.   Neck:      Musculoskeletal: Neck supple.   Cardiovascular:      Rate and Rhythm: Normal rate and regular rhythm.      Heart sounds: Normal heart sounds. No murmur. No friction rub. No gallop.    Pulmonary:      Effort: Pulmonary effort is normal. No respiratory distress.      Breath sounds: Normal breath sounds. No stridor. No wheezing, rhonchi or rales.   Chest:      Chest wall: No tenderness.   Abdominal:      General: Abdomen is flat. Bowel sounds are normal. There is no distension.      Palpations: Abdomen is soft.      Tenderness: There is no abdominal tenderness. There is no right CVA tenderness, left CVA tenderness, guarding or rebound.       Hernia: No hernia is present.   Musculoskeletal:         General: No tenderness.   Skin:     General: Skin is warm.      Findings: No rash.   Neurological:      General: No focal deficit present.      Cranial Nerves: No cranial nerve deficit.      Motor: No weakness.         ED Course      Procedures     5:53 PM  The patient was seen and examined by Sudhakar Lange MD in Room ED07.                Results for orders placed or performed during the hospital encounter of 11/06/20   CBC with platelets differential     Status: None   Result Value Ref Range    WBC 8.7 4.0 - 11.0 10e9/L    RBC Count 3.85 3.8 - 5.2 10e12/L    Hemoglobin 12.1 11.7 - 15.7 g/dL    Hematocrit 36.9 35.0 - 47.0 %    MCV 96 78 - 100 fl    MCH 31.4 26.5 - 33.0 pg    MCHC 32.8 31.5 - 36.5 g/dL    RDW 12.8 10.0 - 15.0 %    Platelet Count 356 150 - 450 10e9/L    Diff Method Automated Method     % Neutrophils 48.8 %    % Lymphocytes 43.0 %    % Monocytes 6.5 %    % Eosinophils 1.0 %    % Basophils 0.6 %    % Immature Granulocytes 0.1 %    Nucleated RBCs 0 0 /100    Absolute Neutrophil 4.3 1.6 - 8.3 10e9/L    Absolute Lymphocytes 3.8 0.8 - 5.3 10e9/L    Absolute Monocytes 0.6 0.0 - 1.3 10e9/L    Absolute Eosinophils 0.1 0.0 - 0.7 10e9/L    Absolute Basophils 0.1 0.0 - 0.2 10e9/L    Abs Immature Granulocytes 0.0 0 - 0.4 10e9/L    Absolute Nucleated RBC 0.0    INR     Status: None   Result Value Ref Range    INR 0.99 0.86 - 1.14   Comprehensive metabolic panel     Status: Abnormal   Result Value Ref Range    Sodium 139 133 - 144 mmol/L    Potassium 3.9 3.4 - 5.3 mmol/L    Chloride 109 94 - 109 mmol/L    Carbon Dioxide 24 20 - 32 mmol/L    Anion Gap 6 3 - 14 mmol/L    Glucose 105 (H) 70 - 99 mg/dL    Urea Nitrogen 7 7 - 30 mg/dL    Creatinine 0.76 0.52 - 1.04 mg/dL    GFR Estimate >90 >60 mL/min/[1.73_m2]    GFR Estimate If Black >90 >60 mL/min/[1.73_m2]    Calcium 9.0 8.5 - 10.1 mg/dL    Bilirubin Total 1.0 0.2 - 1.3 mg/dL    Albumin 4.0 3.4 - 5.0 g/dL     Protein Total 6.7 (L) 6.8 - 8.8 g/dL    Alkaline Phosphatase 53 40 - 150 U/L    ALT 22 0 - 50 U/L    AST 9 0 - 45 U/L   CRP inflammation     Status: None   Result Value Ref Range    CRP Inflammation <2.9 0.0 - 8.0 mg/L   UA reflex to Microscopic and Culture     Status: Abnormal    Specimen: Urine Midstream; Midstream Urine   Result Value Ref Range    Color Urine Light Yellow     Appearance Urine Slightly Cloudy     Glucose Urine Negative NEG^Negative mg/dL    Bilirubin Urine Negative NEG^Negative    Ketones Urine Negative NEG^Negative mg/dL    Specific Gravity Urine 1.008 1.003 - 1.035    Blood Urine Negative NEG^Negative    pH Urine 5.5 5.0 - 7.0 pH    Protein Albumin Urine Negative NEG^Negative mg/dL    Urobilinogen mg/dL Normal 0.0 - 2.0 mg/dL    Nitrite Urine Positive (A) NEG^Negative    Leukocyte Esterase Urine Moderate (A) NEG^Negative    Source Midstream Urine     RBC Urine 1 0 - 2 /HPF    WBC Urine 4 0 - 5 /HPF    Bacteria Urine Few (A) NEG^Negative /HPF    Squamous Epithelial /HPF Urine 1 0 - 1 /HPF    Transitional Epi <1 0 - 1 /HPF    Mucous Urine Present (A) NEG^Negative /LPF   HCG qualitative     Status: None   Result Value Ref Range    HCG Qualitative Serum Negative NEG^Negative   Erythrocyte sedimentation rate auto     Status: None   Result Value Ref Range    Sed Rate 6 0 - 20 mm/h     Medications   0.9% sodium chloride BOLUS (0 mLs Intravenous Stopped 11/6/20 2101)   ketorolac (TORADOL) injection 15 mg (15 mg Intravenous Given 11/6/20 1821)   metoclopramide (REGLAN) injection 10 mg (10 mg Intravenous Given 11/6/20 1820)   famotidine (PEPCID) infusion 20 mg (0 mg Intravenous Stopped 11/6/20 1856)        Assessments & Plan (with Medical Decision Making)  Acute on chronic pain, improving with toradol, reglan and pepcid, slept here and had po well, possibly precipitated by UTI-will start macrobid po, follow up with her PMD and Pain MD. Other labs ok.       I have reviewed the nursing notes. I have  reviewed the findings, diagnosis, plan and need for follow up with the patient.    Discharge Medication List as of 11/6/2020 10:59 PM      START taking these medications    Details   nitroFURantoin macrocrystal-monohydrate (MACROBID) 100 MG capsule Take 1 capsule (100 mg) by mouth 2 times daily, Disp-14 capsule, R-0, Local Print             Final diagnoses:   Urinary tract infection without hematuria, site unspecified   Chronic pain syndrome       --  IWes, am serving as a trained medical scribe to document services personally performed by Sudhakar Lange MD, based on the provider's statements to me.     ISudhakar MD, was physically present and have reviewed and verified the accuracy of this note documented by Wes Ruffin.    Sudhakar Lange MD  [facility] EMERGENCY DEPARTMENT  11/6/2020     Sudhakar Lange MD  11/07/20 0038

## 2020-11-07 LAB — ERYTHROCYTE [SEDIMENTATION RATE] IN BLOOD BY WESTERGREN METHOD: 6 MM/H (ref 0–20)

## 2020-11-07 ASSESSMENT — ENCOUNTER SYMPTOMS
COLOR CHANGE: 0
NECK STIFFNESS: 0
CONFUSION: 0
ARTHRALGIAS: 0
DIFFICULTY URINATING: 0
SHORTNESS OF BREATH: 0
ABDOMINAL PAIN: 0
EYE REDNESS: 0

## 2020-11-07 NOTE — RESULT ENCOUNTER NOTE
Emergency Dept/Urgent Care discharge antibiotic (if prescribed): Nitrofurantoin Macrocrystal-Monohydrate (Macrobid) 100 mg PO capsule, 1 capsule (100 mg) by mouth 2 times daily for 7 days  Date of Rx (if applicable):  11/6/20  No changes in treatment per Urine culture protocol.

## 2020-11-07 NOTE — DISCHARGE INSTRUCTIONS
Please make an appointment to follow up with Your Pain Doctor and Your Primary Care Provider in 3-7 days even if entirely better.

## 2020-11-08 LAB
BACTERIA SPEC CULT: ABNORMAL
Lab: ABNORMAL
SPECIMEN SOURCE: ABNORMAL

## 2020-11-09 NOTE — RESULT ENCOUNTER NOTE
Final Urine Culture Report on 11/8/20  Emergency Dept/Urgent Care discharge antibiotic prescribed: Nitrofurantoin Macrocrystal-Monohydrate (Macrobid) 100 mg PO capsule, 1 capsule (100 mg) by mouth 2 times daily for 7 days  #1. Bacteria, >100,000 colonies/mL Escherichia coli, is SUSCEPTIBLE to Antibiotic.    As per [facility] ED Lab Result protocol, no change in antibiotic therapy.

## 2020-11-11 ENCOUNTER — APPOINTMENT (OUTPATIENT)
Dept: CT IMAGING | Facility: CLINIC | Age: 40
End: 2020-11-11
Attending: EMERGENCY MEDICINE
Payer: COMMERCIAL

## 2020-11-11 ENCOUNTER — HOSPITAL ENCOUNTER (EMERGENCY)
Facility: CLINIC | Age: 40
Discharge: HOME | End: 2020-11-12
Attending: EMERGENCY MEDICINE
Payer: COMMERCIAL

## 2020-11-11 ENCOUNTER — HOSPITAL ENCOUNTER (EMERGENCY)
Facility: CLINIC | Age: 40
Discharge: HOME | End: 2020-11-11
Attending: EMERGENCY MEDICINE | Admitting: EMERGENCY MEDICINE
Payer: COMMERCIAL

## 2020-11-11 VITALS
SYSTOLIC BLOOD PRESSURE: 124 MMHG | HEIGHT: 66 IN | BODY MASS INDEX: 17.44 KG/M2 | RESPIRATION RATE: 16 BRPM | TEMPERATURE: 98.1 F | DIASTOLIC BLOOD PRESSURE: 72 MMHG | WEIGHT: 108.5 LBS | OXYGEN SATURATION: 100 % | HEART RATE: 81 BPM

## 2020-11-11 DIAGNOSIS — E86.0 DEHYDRATION: ICD-10-CM

## 2020-11-11 DIAGNOSIS — R56.9 SEIZURE-LIKE ACTIVITY (H): ICD-10-CM

## 2020-11-11 DIAGNOSIS — F41.9 ANXIETY: ICD-10-CM

## 2020-11-11 DIAGNOSIS — F07.81 POSTCONCUSSION SYNDROME: ICD-10-CM

## 2020-11-11 LAB
ALBUMIN SERPL-MCNC: 3.5 G/DL (ref 3.4–5)
ALBUMIN SERPL-MCNC: 3.7 G/DL (ref 3.4–5)
ALBUMIN UR-MCNC: NEGATIVE MG/DL
ALP SERPL-CCNC: 12 U/L (ref 40–150)
ALP SERPL-CCNC: 56 U/L (ref 40–150)
ALT SERPL W P-5'-P-CCNC: 22 U/L (ref 0–50)
ALT SERPL W P-5'-P-CCNC: 24 U/L (ref 0–50)
ANION GAP SERPL CALCULATED.3IONS-SCNC: 5 MMOL/L (ref 3–14)
ANION GAP SERPL CALCULATED.3IONS-SCNC: 6 MMOL/L (ref 3–14)
APPEARANCE UR: CLEAR
AST SERPL W P-5'-P-CCNC: 10 U/L (ref 0–45)
AST SERPL W P-5'-P-CCNC: 11 U/L (ref 0–45)
BACTERIA #/AREA URNS HPF: ABNORMAL /HPF
BASOPHILS # BLD AUTO: 0 10E9/L (ref 0–0.2)
BASOPHILS # BLD AUTO: 0 10E9/L (ref 0–0.2)
BASOPHILS NFR BLD AUTO: 0.4 %
BASOPHILS NFR BLD AUTO: 0.5 %
BILIRUB SERPL-MCNC: 0.7 MG/DL (ref 0.2–1.3)
BILIRUB SERPL-MCNC: 0.7 MG/DL (ref 0.2–1.3)
BILIRUB UR QL STRIP: NEGATIVE
BUN SERPL-MCNC: 5 MG/DL (ref 7–30)
BUN SERPL-MCNC: 8 MG/DL (ref 7–30)
CALCIUM SERPL-MCNC: 8.5 MG/DL (ref 8.5–10.1)
CALCIUM SERPL-MCNC: 8.9 MG/DL (ref 8.5–10.1)
CHLORIDE SERPL-SCNC: 108 MMOL/L (ref 94–109)
CHLORIDE SERPL-SCNC: 109 MMOL/L (ref 94–109)
CO2 SERPL-SCNC: 25 MMOL/L (ref 20–32)
CO2 SERPL-SCNC: 26 MMOL/L (ref 20–32)
COLOR UR AUTO: ABNORMAL
CREAT SERPL-MCNC: 0.69 MG/DL (ref 0.52–1.04)
CREAT SERPL-MCNC: 0.74 MG/DL (ref 0.52–1.04)
D DIMER PPP FEU-MCNC: <0.3 UG/ML FEU (ref 0–0.5)
DIFFERENTIAL METHOD BLD: ABNORMAL
DIFFERENTIAL METHOD BLD: ABNORMAL
EOSINOPHIL # BLD AUTO: 0 10E9/L (ref 0–0.7)
EOSINOPHIL # BLD AUTO: 0 10E9/L (ref 0–0.7)
EOSINOPHIL NFR BLD AUTO: 0.5 %
EOSINOPHIL NFR BLD AUTO: 0.5 %
ERYTHROCYTE [DISTWIDTH] IN BLOOD BY AUTOMATED COUNT: 12.7 % (ref 10–15)
ERYTHROCYTE [DISTWIDTH] IN BLOOD BY AUTOMATED COUNT: 12.7 % (ref 10–15)
GFR SERPL CREATININE-BSD FRML MDRD: >90 ML/MIN/{1.73_M2}
GFR SERPL CREATININE-BSD FRML MDRD: >90 ML/MIN/{1.73_M2}
GLUCOSE BLDC GLUCOMTR-MCNC: 113 MG/DL (ref 70–99)
GLUCOSE SERPL-MCNC: 113 MG/DL (ref 70–99)
GLUCOSE SERPL-MCNC: 96 MG/DL (ref 70–99)
GLUCOSE UR STRIP-MCNC: NEGATIVE MG/DL
HCG UR QL: NEGATIVE
HCT VFR BLD AUTO: 33.9 % (ref 35–47)
HCT VFR BLD AUTO: 35.8 % (ref 35–47)
HGB BLD-MCNC: 11 G/DL (ref 11.7–15.7)
HGB BLD-MCNC: 12 G/DL (ref 11.7–15.7)
HGB UR QL STRIP: NEGATIVE
IMM GRANULOCYTES # BLD: 0 10E9/L (ref 0–0.4)
IMM GRANULOCYTES # BLD: 0 10E9/L (ref 0–0.4)
IMM GRANULOCYTES NFR BLD: 0.1 %
IMM GRANULOCYTES NFR BLD: 0.4 %
INTERPRETATION ECG - MUSE: NORMAL
KETONES UR STRIP-MCNC: NEGATIVE MG/DL
LEUKOCYTE ESTERASE UR QL STRIP: ABNORMAL
LIPASE SERPL-CCNC: 99 U/L (ref 73–393)
LYMPHOCYTES # BLD AUTO: 2.3 10E9/L (ref 0.8–5.3)
LYMPHOCYTES # BLD AUTO: 3.2 10E9/L (ref 0.8–5.3)
LYMPHOCYTES NFR BLD AUTO: 30.4 %
LYMPHOCYTES NFR BLD AUTO: 39.6 %
MAGNESIUM SERPL-MCNC: 2.2 MG/DL (ref 1.6–2.3)
MCH RBC QN AUTO: 31.4 PG (ref 26.5–33)
MCH RBC QN AUTO: 32.3 PG (ref 26.5–33)
MCHC RBC AUTO-ENTMCNC: 32.4 G/DL (ref 31.5–36.5)
MCHC RBC AUTO-ENTMCNC: 33.5 G/DL (ref 31.5–36.5)
MCV RBC AUTO: 96 FL (ref 78–100)
MCV RBC AUTO: 97 FL (ref 78–100)
MONOCYTES # BLD AUTO: 0.5 10E9/L (ref 0–1.3)
MONOCYTES # BLD AUTO: 0.5 10E9/L (ref 0–1.3)
MONOCYTES NFR BLD AUTO: 6 %
MONOCYTES NFR BLD AUTO: 6.6 %
NEUTROPHILS # BLD AUTO: 4.3 10E9/L (ref 1.6–8.3)
NEUTROPHILS # BLD AUTO: 4.7 10E9/L (ref 1.6–8.3)
NEUTROPHILS NFR BLD AUTO: 52.7 %
NEUTROPHILS NFR BLD AUTO: 62.3 %
NITRATE UR QL: NEGATIVE
NRBC # BLD AUTO: 0 10*3/UL
NRBC # BLD AUTO: 0 10*3/UL
NRBC BLD AUTO-RTO: 0 /100
NRBC BLD AUTO-RTO: 0 /100
PH UR STRIP: 6 PH (ref 5–7)
PLATELET # BLD AUTO: 287 10E9/L (ref 150–450)
PLATELET # BLD AUTO: 291 10E9/L (ref 150–450)
POTASSIUM SERPL-SCNC: 3.7 MMOL/L (ref 3.4–5.3)
POTASSIUM SERPL-SCNC: 3.9 MMOL/L (ref 3.4–5.3)
PROT SERPL-MCNC: 6 G/DL (ref 6.8–8.8)
PROT SERPL-MCNC: 6.6 G/DL (ref 6.8–8.8)
RBC # BLD AUTO: 3.5 10E12/L (ref 3.8–5.2)
RBC # BLD AUTO: 3.72 10E12/L (ref 3.8–5.2)
RBC #/AREA URNS AUTO: 3 /HPF (ref 0–2)
SODIUM SERPL-SCNC: 140 MMOL/L (ref 133–144)
SODIUM SERPL-SCNC: 140 MMOL/L (ref 133–144)
SOURCE: ABNORMAL
SP GR UR STRIP: 1 (ref 1–1.03)
SQUAMOUS #/AREA URNS AUTO: 1 /HPF (ref 0–1)
TRANS CELLS #/AREA URNS HPF: <1 /HPF (ref 0–1)
TROPONIN I SERPL-MCNC: <0.015 UG/L (ref 0–0.04)
TSH SERPL DL<=0.005 MIU/L-ACNC: 1.29 MU/L (ref 0.4–4)
UROBILINOGEN UR STRIP-MCNC: NORMAL MG/DL (ref 0–2)
WBC # BLD AUTO: 7.5 10E9/L (ref 4–11)
WBC # BLD AUTO: 8.2 10E9/L (ref 4–11)
WBC #/AREA URNS AUTO: 2 /HPF (ref 0–5)

## 2020-11-11 PROCEDURE — 250N000011 HC RX IP 250 OP 636: Performed by: EMERGENCY MEDICINE

## 2020-11-11 PROCEDURE — 85025 COMPLETE CBC W/AUTO DIFF WBC: CPT | Mod: 91 | Performed by: EMERGENCY MEDICINE

## 2020-11-11 PROCEDURE — U0003 INFECTIOUS AGENT DETECTION BY NUCLEIC ACID (DNA OR RNA); SEVERE ACUTE RESPIRATORY SYNDROME CORONAVIRUS 2 (SARS-COV-2) (CORONAVIRUS DISEASE [COVID-19]), AMPLIFIED PROBE TECHNIQUE, MAKING USE OF HIGH THROUGHPUT TECHNOLOGIES AS DESCRIBED BY CMS-2020-01-R: HCPCS | Performed by: EMERGENCY MEDICINE

## 2020-11-11 PROCEDURE — 80053 COMPREHEN METABOLIC PANEL: CPT | Performed by: EMERGENCY MEDICINE

## 2020-11-11 PROCEDURE — 84443 ASSAY THYROID STIM HORMONE: CPT | Performed by: EMERGENCY MEDICINE

## 2020-11-11 PROCEDURE — 83690 ASSAY OF LIPASE: CPT | Performed by: EMERGENCY MEDICINE

## 2020-11-11 PROCEDURE — 99284 EMERGENCY DEPT VISIT MOD MDM: CPT | Mod: 25,27 | Performed by: EMERGENCY MEDICINE

## 2020-11-11 PROCEDURE — 93005 ELECTROCARDIOGRAM TRACING: CPT | Performed by: EMERGENCY MEDICINE

## 2020-11-11 PROCEDURE — 85379 FIBRIN DEGRADATION QUANT: CPT | Performed by: EMERGENCY MEDICINE

## 2020-11-11 PROCEDURE — 70450 CT HEAD/BRAIN W/O DYE: CPT | Mod: 26 | Performed by: RADIOLOGY

## 2020-11-11 PROCEDURE — 83735 ASSAY OF MAGNESIUM: CPT | Performed by: EMERGENCY MEDICINE

## 2020-11-11 PROCEDURE — 70450 CT HEAD/BRAIN W/O DYE: CPT

## 2020-11-11 PROCEDURE — 87106 FUNGI IDENTIFICATION YEAST: CPT | Performed by: EMERGENCY MEDICINE

## 2020-11-11 PROCEDURE — 70498 CT ANGIOGRAPHY NECK: CPT | Mod: 26 | Performed by: RADIOLOGY

## 2020-11-11 PROCEDURE — 70496 CT ANGIOGRAPHY HEAD: CPT | Mod: 26 | Performed by: RADIOLOGY

## 2020-11-11 PROCEDURE — C9803 HOPD COVID-19 SPEC COLLECT: HCPCS | Performed by: EMERGENCY MEDICINE

## 2020-11-11 PROCEDURE — 250N000013 HC RX MED GY IP 250 OP 250 PS 637: Performed by: EMERGENCY MEDICINE

## 2020-11-11 PROCEDURE — 250N000009 HC RX 250: Performed by: EMERGENCY MEDICINE

## 2020-11-11 PROCEDURE — 99284 EMERGENCY DEPT VISIT MOD MDM: CPT | Mod: 27 | Performed by: EMERGENCY MEDICINE

## 2020-11-11 PROCEDURE — 81025 URINE PREGNANCY TEST: CPT | Performed by: EMERGENCY MEDICINE

## 2020-11-11 PROCEDURE — 81001 URINALYSIS AUTO W/SCOPE: CPT | Performed by: EMERGENCY MEDICINE

## 2020-11-11 PROCEDURE — 258N000003 HC RX IP 258 OP 636: Performed by: EMERGENCY MEDICINE

## 2020-11-11 PROCEDURE — 84484 ASSAY OF TROPONIN QUANT: CPT | Performed by: EMERGENCY MEDICINE

## 2020-11-11 PROCEDURE — 85025 COMPLETE CBC W/AUTO DIFF WBC: CPT | Performed by: EMERGENCY MEDICINE

## 2020-11-11 PROCEDURE — 99285 EMERGENCY DEPT VISIT HI MDM: CPT | Mod: 25 | Performed by: EMERGENCY MEDICINE

## 2020-11-11 PROCEDURE — 96360 HYDRATION IV INFUSION INIT: CPT | Mod: 59 | Performed by: EMERGENCY MEDICINE

## 2020-11-11 PROCEDURE — 96374 THER/PROPH/DIAG INJ IV PUSH: CPT | Performed by: EMERGENCY MEDICINE

## 2020-11-11 PROCEDURE — 999N001017 HC STATISTIC GLUCOSE BY METER IP

## 2020-11-11 PROCEDURE — 70496 CT ANGIOGRAPHY HEAD: CPT

## 2020-11-11 PROCEDURE — 87086 URINE CULTURE/COLONY COUNT: CPT | Performed by: EMERGENCY MEDICINE

## 2020-11-11 PROCEDURE — 93010 ELECTROCARDIOGRAM REPORT: CPT | Performed by: EMERGENCY MEDICINE

## 2020-11-11 PROCEDURE — 96361 HYDRATE IV INFUSION ADD-ON: CPT | Performed by: EMERGENCY MEDICINE

## 2020-11-11 RX ORDER — LAMOTRIGINE 200 MG/1
200 TABLET ORAL DAILY
Qty: 7 TABLET | Refills: 0 | Status: SHIPPED | OUTPATIENT
Start: 2020-11-11

## 2020-11-11 RX ORDER — IOPAMIDOL 755 MG/ML
75 INJECTION, SOLUTION INTRAVASCULAR ONCE
Status: COMPLETED | OUTPATIENT
Start: 2020-11-11 | End: 2020-11-11

## 2020-11-11 RX ORDER — SODIUM CHLORIDE 9 MG/ML
INJECTION, SOLUTION INTRAVENOUS CONTINUOUS
Status: DISCONTINUED | OUTPATIENT
Start: 2020-11-12 | End: 2020-11-12 | Stop reason: HOSPADM

## 2020-11-11 RX ORDER — SODIUM CHLORIDE 9 MG/ML
INJECTION, SOLUTION INTRAVENOUS CONTINUOUS
Status: DISCONTINUED | OUTPATIENT
Start: 2020-11-11 | End: 2020-11-11 | Stop reason: HOSPADM

## 2020-11-11 RX ORDER — HYDROCODONE BITARTRATE AND ACETAMINOPHEN 5; 325 MG/1; MG/1
1 TABLET ORAL ONCE
Status: COMPLETED | OUTPATIENT
Start: 2020-11-11 | End: 2020-11-11

## 2020-11-11 RX ORDER — NITROFURANTOIN 25; 75 MG/1; MG/1
100 CAPSULE ORAL ONCE
Status: COMPLETED | OUTPATIENT
Start: 2020-11-11 | End: 2020-11-11

## 2020-11-11 RX ORDER — LORAZEPAM 1 MG/1
1 TABLET ORAL EVERY 8 HOURS PRN
Qty: 9 TABLET | Refills: 0 | Status: SHIPPED | OUTPATIENT
Start: 2020-11-11 | End: 2020-12-18

## 2020-11-11 RX ORDER — DIAZEPAM 5 MG
5 TABLET ORAL ONCE
Status: COMPLETED | OUTPATIENT
Start: 2020-11-11 | End: 2020-11-11

## 2020-11-11 RX ORDER — KETOROLAC TROMETHAMINE 15 MG/ML
15 INJECTION, SOLUTION INTRAMUSCULAR; INTRAVENOUS ONCE
Status: COMPLETED | OUTPATIENT
Start: 2020-11-11 | End: 2020-11-11

## 2020-11-11 RX ADMIN — SODIUM CHLORIDE 1000 ML: 900 INJECTION, SOLUTION INTRAVENOUS at 20:50

## 2020-11-11 RX ADMIN — SODIUM CHLORIDE 1000 ML: 9 INJECTION, SOLUTION INTRAVENOUS at 23:47

## 2020-11-11 RX ADMIN — DIAZEPAM 5 MG: 5 TABLET ORAL at 12:05

## 2020-11-11 RX ADMIN — KETOROLAC TROMETHAMINE 15 MG: 15 INJECTION, SOLUTION INTRAMUSCULAR; INTRAVENOUS at 23:44

## 2020-11-11 RX ADMIN — SODIUM CHLORIDE, PRESERVATIVE FREE 90 ML: 5 INJECTION INTRAVENOUS at 12:55

## 2020-11-11 RX ADMIN — IOPAMIDOL 75 ML: 755 INJECTION, SOLUTION INTRAVENOUS at 12:54

## 2020-11-11 RX ADMIN — LIDOCAINE HYDROCHLORIDE 30 ML: 20 SOLUTION ORAL; TOPICAL at 22:46

## 2020-11-11 RX ADMIN — HYDROCODONE BITARTRATE AND ACETAMINOPHEN 1 TABLET: 5; 325 TABLET ORAL at 22:06

## 2020-11-11 RX ADMIN — SODIUM CHLORIDE 1000 ML: 9 INJECTION, SOLUTION INTRAVENOUS at 12:05

## 2020-11-11 RX ADMIN — NITROFURANTOIN (MONOHYDRATE/MACROCRYSTALS) 100 MG: 75; 25 CAPSULE ORAL at 22:14

## 2020-11-11 ASSESSMENT — MIFFLIN-ST. JEOR
SCORE: 1194.78
SCORE: 1178.9

## 2020-11-11 ASSESSMENT — ENCOUNTER SYMPTOMS
PHOTOPHOBIA: 1
FEVER: 0
LIGHT-HEADEDNESS: 1
VOMITING: 0
APPETITE CHANGE: 0
DIZZINESS: 1
NAUSEA: 1
NERVOUS/ANXIOUS: 1
TREMORS: 1
DIARRHEA: 0

## 2020-11-11 NOTE — ED TRIAGE NOTES
"Pt comes in with tremor episodes, \"seizure like activity\" that has been ongoing for about a month but worse in the last week. Pt fell a few days ago and hit the back of her head/spine. Pt has been having memory issues. Pt currently is doing a \"med vacation\" and last took medications a week ago but inconsistent over past month.  "

## 2020-11-11 NOTE — DISCHARGE INSTRUCTIONS
Please make an appointment to follow up with your psychiatrist as soon as possible to discuss restarting medications versus tapering your medications.  It is important that you take your medications as prescribed and slowly taper off to prevent withdrawal symptoms or seizure activity as well as worsening anxiety.      If you have any worsening symptoms, seizure activity, thoughts of self-harm or other concerns return to the emergency department for reevaluation.

## 2020-11-11 NOTE — ED PROVIDER NOTES
"ED Provider Note  [facility]      History     Chief Complaint   Patient presents with     Neurologic Problem     The history is provided by the patient and medical records.     Sheyla Ye is a 40 year old female with a past medical history significant for Munir-Danlos disease, cervical stenosis, lumbar DJD, chronic osteoarthritis, chronic pain syndrome, fibromyalgia, depression and anxiety who presents to the ED for evaluation of a neurologic problem. She reports that for the past 2 weeks she has been feeling more tremulous. She reports an episode 2 weeks ago in which she was convulsing, but still coherent. She then reports a second episode a few days ago where she walked into her kitchen to get some food and began feeling lightheaded and then fell and lost consciousness. She fell and hit her head. She states that a friend who was with her reported that she was shaking and her eyes were rolling back in her head. She states this lasted for up to 15 minutes during which time she was intermittently aware of what was going on, but does not remember it all.  She reports when she did come to she was aware of where she was and what had happened.  She denies loss of bowel or bladder or biting her tongue. She reports that she is not on seizure medications and has never experienced anything like this before. She reports that she went on a \"holisitic health kick\" the last week of October and threw all of her prescriptions away and replaced them with CBD oil. She reports refilling all of her prescriptions except nortriptyline on 11/4 and taking them for 2 days before throwing them out again. She reports that she has been feeling increasingly anxious, shaky, stressed, and generally achy.  Her medications included diazepam 5 mg 3 times daily, Lamictal 200 mg daily, nortriptyline 10 mg daily and Adderall. She reports that she had a bad concussion last summer and has been experiencing memory " problems since. She denies other head injuries and family history of seizures. She also denies fever, vomiting, and diarrhea. She has been eating and drinking normally. Her LMP was last month. She is not on any contraceptives and gets regular periods.  She denies any chest pain, shortness of breath or cough.  She reports that she does have a regular PCP.    Past Medical History  Past Medical History:   Diagnosis Date     Anxiety      Chronic osteoarthritis      Chronic pain      Depressive disorder 1995     DJD (degenerative joint disease), lumbar      Munir-Danlos disease      Fibromyalgia      Learning disability      Past Surgical History:   Procedure Laterality Date     INJECT EPIDURAL CERVICAL / THORACIC SINGLE N/A 4/30/2019    Procedure: Cervical Epidural Steroid Injection;  Surgeon: Swapnil Carlisle MD;  Location: [facility] OR     wisdom teeth removal            Acetaminophen (TYLENOL PO)       amphetamine-dextroamphetamine (ADDERALL XR) 10 MG 24 hr capsule       amphetamine-dextroamphetamine (ADDERALL XR) 30 MG 24 hr capsule       amphetamine-dextroamphetamine (ADDERALL) 20 MG tablet       diazepam (VALIUM) 5 MG tablet       diclofenac (VOLTAREN) 1 % topical gel       lamoTRIgine (LAMICTAL) 200 MG tablet       meloxicam (MOBIC) 7.5 MG tablet       methocarbamol (ROBAXIN) 500 MG tablet       nitroFURantoin macrocrystal-monohydrate (MACROBID) 100 MG capsule       nortriptyline (PAMELOR) 10 MG capsule       omeprazole (PRILOSEC) 20 MG DR capsule       varenicline (CHANTIX CONTINUING MONTH DYLAN) 1 MG tablet       varenicline (CHANTIX STARTING MONTH DYLAN) 0.5 MG X 11 & 1 MG X 42 tablet      Allergies   Allergen Reactions     Seasonal Allergies      Family History  Family History   Problem Relation Age of Onset     Aneurysm Brother         20's, autopsy showed RP bleed     Thyroid Disease Mother      Anxiety Disorder Mother      Mental Illness Mother      Depression Mother      Genetic Disorder Mother      Migraines  Mother      Aneurysm Other         brain aneurysm, 30's     Colon Cancer Other      Anxiety Disorder Sister      Mental Illness Sister      Cancer Paternal Grandmother      Hypertension Paternal Grandmother      Colon Cancer Paternal Grandmother      Cancer Paternal Grandfather      Hypertension Paternal Grandfather      Colon Cancer Paternal Grandfather      Depression Sister      Genetic Disorder Brother      Hypertension Father      Hyperlipidemia Father      Social History   Social History     Tobacco Use     Smoking status: Current Every Day Smoker     Packs/day: 0.50     Years: 15.00     Pack years: 7.50     Types: Cigarettes     Start date: 9/1/1999     Smokeless tobacco: Former User   Substance Use Topics     Alcohol use: Not Currently     Alcohol/week: 6.7 standard drinks     Comment: Moderate      Drug use: No      Past medical history, past surgical history, medications, allergies, family history, and social history were reviewed with the patient. No additional pertinent items.       Past Medical History:   Diagnosis Date     Anxiety      Chronic osteoarthritis      Chronic pain      Depressive disorder 1995     DJD (degenerative joint disease), lumbar      Munir-Danlos disease      Fibromyalgia      Learning disability        Past Surgical History:   Procedure Laterality Date     INJECT EPIDURAL CERVICAL / THORACIC SINGLE N/A 4/30/2019    Procedure: Cervical Epidural Steroid Injection;  Surgeon: Swapnil Carlisle MD;  Location: [facility] OR     wisdom teeth removal         Family History   Problem Relation Age of Onset     Aneurysm Brother         20's, autopsy showed RP bleed     Thyroid Disease Mother      Anxiety Disorder Mother      Mental Illness Mother      Depression Mother      Genetic Disorder Mother      Migraines Mother      Aneurysm Other         brain aneurysm, 30's     Colon Cancer Other      Anxiety Disorder Sister      Mental Illness Sister      Cancer Paternal Grandmother      Hypertension  Paternal Grandmother      Colon Cancer Paternal Grandmother      Cancer Paternal Grandfather      Hypertension Paternal Grandfather      Colon Cancer Paternal Grandfather      Depression Sister      Genetic Disorder Brother      Hypertension Father      Hyperlipidemia Father        Social History     Tobacco Use     Smoking status: Current Every Day Smoker     Packs/day: 0.50     Years: 15.00     Pack years: 7.50     Types: Cigarettes     Start date: 9/1/1999     Smokeless tobacco: Former User   Substance Use Topics     Alcohol use: Not Currently     Alcohol/week: 6.7 standard drinks     Comment: Moderate      Current Facility-Administered Medications   Medication     0.9% sodium chloride BOLUS    Followed by     sodium chloride 0.9% infusion     diazepam (VALIUM) tablet 5 mg     Current Outpatient Medications   Medication     Acetaminophen (TYLENOL PO)     amphetamine-dextroamphetamine (ADDERALL XR) 10 MG 24 hr capsule     amphetamine-dextroamphetamine (ADDERALL XR) 30 MG 24 hr capsule     amphetamine-dextroamphetamine (ADDERALL) 20 MG tablet     diazepam (VALIUM) 5 MG tablet     diclofenac (VOLTAREN) 1 % topical gel     lamoTRIgine (LAMICTAL) 200 MG tablet     meloxicam (MOBIC) 7.5 MG tablet     methocarbamol (ROBAXIN) 500 MG tablet     nitroFURantoin macrocrystal-monohydrate (MACROBID) 100 MG capsule     nortriptyline (PAMELOR) 10 MG capsule     omeprazole (PRILOSEC) 20 MG DR capsule     varenicline (CHANTIX CONTINUING MONTH PAK) 1 MG tablet     varenicline (CHANTIX STARTING MONTH PAK) 0.5 MG X 11 & 1 MG X 42 tablet          Allergies   Allergen Reactions     Seasonal Allergies        Review of Systems   Constitutional: Negative for appetite change and fever.   Eyes: Positive for photophobia.   Gastrointestinal: Positive for nausea. Negative for diarrhea and vomiting.   Neurological: Positive for dizziness, tremors and light-headedness.   Psychiatric/Behavioral: The patient is nervous/anxious.    All other  "systems reviewed and are negative.    A complete review of systems was performed with pertinent positives and negatives noted in the HPI, and all other systems negative.    Physical Exam       /71   Pulse 78   Temp 98.1  F (36.7  C) (Oral)   Resp 20   Ht 1.676 m (5' 6\")   Wt 49.2 kg (108 lb 8 oz)   LMP 10/05/2020   SpO2 100%   BMI 17.51 kg/m      Physical Exam  General: patient is alert and oriented and in no acute distress, mildly tremulous  Head: atraumatic and normocephalic   EENT: moist mucus membranes without tonsillar erythema or exudates, pupils 3 mm, equal round and reactive, extraocular movements intact, no nystagmus  Neck: supple with full range of motion, no midline tenderness to palpation  Cardiovascular: regular rate and rhythm, extremities warm and well perfused, no lower extremity edema, 2+ radial and PT pulses bilaterally  Pulmonary: lungs clear to auscultation bilaterally   Abdomen: soft, non-tender   Musculoskeletal: normal range of motion   Neurological: alert and oriented, moving all extremities symmetrically, CN II-XII intact, strength 5/5 and symmetric in , elbow flexion/extension, hip flexion/extension, knee flexion/extension and ankle plantar/dorsiflexion, sensation to light touch in distal upper and lower extremities intact, normal finger to nose bilaterally  Skin: warm, dry     ED Course      Procedures             EKG Interpretation:      Interpreted by Luh Bernard MD  Time reviewed: 1200  Symptoms at time of EKG: None   Rhythm: normal sinus   Rate: Normal  Axis: Normal  Ectopy: none  Conduction: Short MN  ST Segments/ T Waves: No acute ischemic changes  Q Waves: none  Comparison to prior: No old EKG available    Clinical Impression: non-specific EKG             No results found for any visits on 11/11/20.  Medications - No data to display     Assessments & Plan (with Medical Decision Making)   Ms. Ye is a 40 year old female with a past medical history " significant for Munir-Danlos disease, cervical stenosis, lumbar DJD, chronic osteoarthritis, chronic pain syndrome, fibromyalgia, depression and anxiety who presents to the ED for evaluation of a neurologic problem.  She is hemodynamically stable, afebrile and in no respiratory distress.  Differential diagnosis is quite broad and includes but is not limited to syncopal episode, seizure activity, medication withdrawal seizure, nonepileptic seizure, electrolyte derangement, cardiac dysrhythmia.  She is PERC negative and low suspicion for PE.  Baseline labs are obtained which demonstrate no leukocytosis, normal hemoglobin, negative D-dimer, no significant electrolyte abnormalities, normal TSH.  Urine pregnancy is negative.  UA shows a large amount of leukocyte esterase however is otherwise negative.  This was sent for culture and will await culture results before starting any antibiotics if needed.  She did have a CT of the head as well as a CTA of the head and neck given her history of Munir-Danlos syndrome and ongoing headache to rule out evidence of aneurysm.  Imaging is unremarkable.  Her ECG does have a short MN interval however is otherwise unremarkable.  I did discuss with cardiology and does not appear consistent with Ranulfo-Parkinson-White.  She otherwise has no evidence of QT prolongation, high degree AV block or ischemic changes.  Troponin is negative.      Given her abrupt withdrawal of diazepam, Lamictal and nortriptyline her symptoms certainly may be due to withdrawal.  She was given oral diazepam in the emergency department.  I did discuss the results with the patient and she reports she is feeling anxious.  I offered a teledec assessment however she declined this.  I did recommend that she restart her medications including benzodiazepines and follow-up with her primary care provider to discuss tapering these medications if she would like to get off of them.  However stopping them abruptly can certainly  cause her episodes.  I did discuss with neurology as well and did not recommend any additional antiepileptics at this time but does need to restart her home medications.  She is unable to refill her diazepam as insurance only allows us once a month.  She was given a short supply of lorazepam to prevent any further withdrawal symptoms and instructed to follow-up with primary care.  She was also given a refill of her Lamictal.  I have encouraged her to follow-up with her psychiatrist to discuss continuing versus tapering her meds.  If she should have any worsening symptoms or other concerns she was instructed to return to the emergency department for reevaluation.    I have reviewed the nursing notes. I have reviewed the findings, diagnosis, plan and need for follow up with the patient.    New Prescriptions    LAMOTRIGINE (LAMICTAL) 200 MG TABLET    Take 1 tablet (200 mg) by mouth daily    LORAZEPAM (ATIVAN) 1 MG TABLET    Take 1 tablet (1 mg) by mouth every 8 hours as needed for anxiety       Final diagnoses:   Seizure-like activity (H)   I, Abimbola Baptiste, am serving as a trained medical scribe to document services personally performed by Luh Watkins MD, based on the provider's statements to me.     I, Luh Watkins MD, was physically present and have reviewed and verified the accuracy of this note documented by Abimbola Baptiste.      --  Luh Watkins MD  [facility] EMERGENCY DEPARTMENT  11/11/2020     Luh Watkins MD  11/11/20 1545       Luh Watkins MD  11/11/20 1542

## 2020-11-11 NOTE — ED AVS SNAPSHOT
[facility] Emergency Department  [address] [address]  Phone: [phone]                                    Sheyla Ye   MRN: [id number]    Department: [facility] Emergency Department   Date of Visit: 11/11/2020           After Visit Summary Signature Page    I have received my discharge instructions, and my questions have been answered. I have discussed any challenges I see with this plan with the nurse or doctor.    ..........................................................................................................................................  Patient/Patient Representative Signature      ..........................................................................................................................................  Patient Representative Print Name and Relationship to Patient    ..................................................               ................................................  Date                                   Time    ..........................................................................................................................................  Reviewed by Signature/Title    ...................................................              ..............................................  Date                                               Time          22EPIC Rev 08/18

## 2020-11-11 NOTE — ED AVS SNAPSHOT
[facility] Emergency Department  [address] [address]  Phone: [phone]                                    Sheyla Ye   MRN: [id number]    Department: [facility] Emergency Department   Date of Visit: 11/11/2020           After Visit Summary Signature Page    I have received my discharge instructions, and my questions have been answered. I have discussed any challenges I see with this plan with the nurse or doctor.    ..........................................................................................................................................  Patient/Patient Representative Signature      ..........................................................................................................................................  Patient Representative Print Name and Relationship to Patient    ..................................................               ................................................  Date                                   Time    ..........................................................................................................................................  Reviewed by Signature/Title    ...................................................              ..............................................  Date                                               Time          22EPIC Rev 08/18

## 2020-11-12 VITALS
TEMPERATURE: 98.4 F | WEIGHT: 112 LBS | HEIGHT: 66 IN | RESPIRATION RATE: 16 BRPM | SYSTOLIC BLOOD PRESSURE: 98 MMHG | OXYGEN SATURATION: 100 % | HEART RATE: 67 BPM | DIASTOLIC BLOOD PRESSURE: 57 MMHG | BODY MASS INDEX: 18 KG/M2

## 2020-11-12 LAB
ALBUMIN UR-MCNC: NEGATIVE MG/DL
ALBUMIN UR-MCNC: NORMAL MG/DL
APPEARANCE UR: CLEAR
APPEARANCE UR: NORMAL
BILIRUB UR QL STRIP: NEGATIVE
BILIRUB UR QL STRIP: NORMAL
COLOR UR AUTO: ABNORMAL
COLOR UR AUTO: NORMAL
GLUCOSE UR STRIP-MCNC: NEGATIVE MG/DL
GLUCOSE UR STRIP-MCNC: NORMAL MG/DL
HGB UR QL STRIP: NEGATIVE
HGB UR QL STRIP: NORMAL
KETONES UR STRIP-MCNC: NEGATIVE MG/DL
KETONES UR STRIP-MCNC: NORMAL MG/DL
LEUKOCYTE ESTERASE UR QL STRIP: ABNORMAL
LEUKOCYTE ESTERASE UR QL STRIP: NORMAL
NITRATE UR QL: NEGATIVE
NITRATE UR QL: NORMAL
PH UR STRIP: 6 PH (ref 5–7)
PH UR STRIP: NORMAL PH (ref 5–7)
RBC #/AREA URNS AUTO: 3 /HPF (ref 0–2)
RBC #/AREA URNS AUTO: NORMAL /HPF (ref 0–2)
SARS-COV-2 RNA SPEC QL NAA+PROBE: NOT DETECTED
SOURCE: ABNORMAL
SOURCE: NORMAL
SP GR UR STRIP: 1.02 (ref 1–1.03)
SP GR UR STRIP: NORMAL (ref 1–1.03)
SPECIMEN SOURCE: NORMAL
SQUAMOUS #/AREA URNS AUTO: 1 /HPF (ref 0–1)
UROBILINOGEN UR STRIP-MCNC: NORMAL MG/DL (ref 0–2)
UROBILINOGEN UR STRIP-MCNC: NORMAL MG/DL (ref 0–2)
WBC #/AREA URNS AUTO: 5 /HPF (ref 0–5)
WBC #/AREA URNS AUTO: NORMAL /HPF

## 2020-11-12 PROCEDURE — 90791 PSYCH DIAGNOSTIC EVALUATION: CPT

## 2020-11-12 PROCEDURE — 250N000013 HC RX MED GY IP 250 OP 250 PS 637: Performed by: EMERGENCY MEDICINE

## 2020-11-12 RX ORDER — NITROFURANTOIN 25; 75 MG/1; MG/1
100 CAPSULE ORAL 2 TIMES DAILY
Qty: 4 CAPSULE | Refills: 0 | Status: SHIPPED | OUTPATIENT
Start: 2020-11-12 | End: 2020-11-14

## 2020-11-12 RX ORDER — AMITRIPTYLINE HYDROCHLORIDE 10 MG/1
10 TABLET ORAL AT BEDTIME
Qty: 14 TABLET | Refills: 0 | Status: SHIPPED | OUTPATIENT
Start: 2020-11-12 | End: 2020-11-12

## 2020-11-12 RX ORDER — ONDANSETRON 4 MG/1
4 TABLET, ORALLY DISINTEGRATING ORAL EVERY 8 HOURS PRN
Qty: 10 TABLET | Refills: 0 | Status: SHIPPED | OUTPATIENT
Start: 2020-11-12 | End: 2020-11-15

## 2020-11-12 RX ORDER — ACETAMINOPHEN 500 MG
1000 TABLET ORAL ONCE
Status: COMPLETED | OUTPATIENT
Start: 2020-11-12 | End: 2020-11-12

## 2020-11-12 RX ADMIN — ACETAMINOPHEN 1000 MG: 500 TABLET, FILM COATED ORAL at 03:27

## 2020-11-12 NOTE — ED PROVIDER NOTES
"    [place] EMERGENCY DEPARTMENT ([facility])  November 11, 2020  History     Chief Complaint   Patient presents with     Abdominal Pain     Generalized Weakness     The history is provided by the patient and medical records.     Sheyla Ye is a 40 year old female with history of chronic pain, Munir-Danlos, fibromyalgia who presents with abdominal pain and generalized weakness.  She was just seen in the emergency department this morning complaining of increased tremors in setting of abrupt withdrawal of diazepam, Lamictal and nortriptyline.  She was seen by Dr. Bernard who performed work-up with labs, EKG, CT of the head and CTA of the head and neck with contrast.  Her symptoms were felt to be possibly due to withdrawal.  She was treated with some diazepam.  She was given a short supply of lorazepam to prevent any further withdrawal symptoms and instructed to follow-up with primary care.  She returns to the ER now with abdominal pain,  generalized weakness, and diffuse myalgias.       She presents back to the ED today with multiple symptoms including feelings of weakness, lethargy, left lower quadrant abdominal pain, as well as a temporal headache.  She states she left her ED visit earlier today, and went to  her prescription and reports one episode of vomiting.  Upon evaluation, the patient also endorses diffuse myalgias, and a \"pressure in my head\".  She also reports difficulty with her concentration, and states that she has been \"out of sorts\".  She also notes anxiety.  She states that her headache is located in the bilateral temples.  Patient's friend states that she fell backwards during her possible seizure and struck her head.  No other symptoms noted.            PAST MEDICAL HISTORY:   Past Medical History:   Diagnosis Date     Anxiety      Chronic osteoarthritis      Chronic pain      Depressive disorder 1995     DJD (degenerative joint disease), lumbar      Munir-Danlos disease      " Fibromyalgia      Learning disability        PAST SURGICAL HISTORY:   Past Surgical History:   Procedure Laterality Date     INJECT EPIDURAL CERVICAL / THORACIC SINGLE N/A 4/30/2019    Procedure: Cervical Epidural Steroid Injection;  Surgeon: Swapnil Carlisle MD;  Location: [facility] OR     wisdom teeth removal         Past medical history, past surgical history, medications, and allergies were reviewed with the patient. Additional pertinent items: None    FAMILY HISTORY:   Family History   Problem Relation Age of Onset     Aneurysm Brother         20's, autopsy showed RP bleed     Thyroid Disease Mother      Anxiety Disorder Mother      Mental Illness Mother      Depression Mother      Genetic Disorder Mother      Migraines Mother      Aneurysm Other         brain aneurysm, 30's     Colon Cancer Other      Anxiety Disorder Sister      Mental Illness Sister      Cancer Paternal Grandmother      Hypertension Paternal Grandmother      Colon Cancer Paternal Grandmother      Cancer Paternal Grandfather      Hypertension Paternal Grandfather      Colon Cancer Paternal Grandfather      Depression Sister      Genetic Disorder Brother      Hypertension Father      Hyperlipidemia Father        SOCIAL HISTORY:   Social History     Tobacco Use     Smoking status: Current Every Day Smoker     Packs/day: 0.50     Years: 15.00     Pack years: 7.50     Types: Cigarettes     Start date: 9/1/1999     Smokeless tobacco: Former User   Substance Use Topics     Alcohol use: Not Currently     Alcohol/week: 6.7 standard drinks     Comment: Moderate      Social history was reviewed with the patient. Additional pertinent items: None      Patient's Medications   New Prescriptions    No medications on file   Previous Medications    ACETAMINOPHEN (TYLENOL PO)    Take 500 mg by mouth every 4 hours as needed for mild pain or fever Takes 2 tablets of 500 mg    AMPHETAMINE-DEXTROAMPHETAMINE (ADDERALL XR) 10 MG 24 HR CAPSULE         "AMPHETAMINE-DEXTROAMPHETAMINE (ADDERALL XR) 30 MG 24 HR CAPSULE        AMPHETAMINE-DEXTROAMPHETAMINE (ADDERALL) 20 MG TABLET    Take 20 mg by mouth daily Take in afternoon    DIAZEPAM (VALIUM) 5 MG TABLET    Uses up to 3 daily.    DICLOFENAC (VOLTAREN) 1 % TOPICAL GEL    Place 2 g onto the skin 4 times daily    LAMOTRIGINE (LAMICTAL) 200 MG TABLET    Take 1 tablet (200 mg) by mouth daily    LORAZEPAM (ATIVAN) 1 MG TABLET    Take 1 tablet (1 mg) by mouth every 8 hours as needed for anxiety    MELOXICAM (MOBIC) 7.5 MG TABLET    Take 1 tablet (7.5 mg) by mouth daily    METHOCARBAMOL (ROBAXIN) 500 MG TABLET    Take 1 tablet (500 mg) by mouth 4 times daily as needed for muscle spasms    NITROFURANTOIN MACROCRYSTAL-MONOHYDRATE (MACROBID) 100 MG CAPSULE    Take 1 capsule (100 mg) by mouth 2 times daily    NORTRIPTYLINE (PAMELOR) 10 MG CAPSULE    Take 10 mg by mouth At Bedtime    OMEPRAZOLE (PRILOSEC) 20 MG DR CAPSULE    Take 1 capsule (20 mg) by mouth daily    VARENICLINE (CHANTIX CONTINUING MONTH DYLAN) 1 MG TABLET    Take 1 tablet (1 mg) by mouth 2 times daily Take 1 tablet by mouth twice daily.    VARENICLINE (CHANTIX STARTING MONTH DYLAN) 0.5 MG X 11 & 1 MG X 42 TABLET    Take as directed per package instructions.   Modified Medications    No medications on file   Discontinued Medications    No medications on file          Allergies   Allergen Reactions     Seasonal Allergies         Review of Systems   ROS: 14 point ROS neg other than the symptoms noted above in the HPI.    Physical Exam   BP: 102/57  Pulse: 87  Temp: 98.4  F (36.9  C)  Resp: 16  Height: 167.6 cm (5' 6\")  Weight: 50.8 kg (112 lb)  SpO2: 100 %      Physical Exam  Vitals signs and nursing note reviewed.   Constitutional:       General: She is not in acute distress.     Appearance: She is well-developed. She is not diaphoretic.   HENT:      Head: Normocephalic and atraumatic.      Mouth/Throat:      Pharynx: No oropharyngeal exudate.   Eyes:      General: No " scleral icterus.        Right eye: No discharge.         Left eye: No discharge.      Pupils: Pupils are equal, round, and reactive to light.   Neck:      Musculoskeletal: Normal range of motion and neck supple.   Cardiovascular:      Rate and Rhythm: Normal rate and regular rhythm.      Heart sounds: Normal heart sounds. No murmur. No friction rub. No gallop.    Pulmonary:      Effort: Pulmonary effort is normal. No respiratory distress.      Breath sounds: Normal breath sounds. No wheezing.   Chest:      Chest wall: No tenderness.   Abdominal:      General: Bowel sounds are normal. There is no distension.      Palpations: Abdomen is soft.      Tenderness: There is no abdominal tenderness.   Musculoskeletal: Normal range of motion.         General: No tenderness or deformity.   Skin:     General: Skin is warm and dry.      Coloration: Skin is not pale.      Findings: No erythema or rash.   Neurological:      Mental Status: She is alert and oriented to person, place, and time.      Cranial Nerves: No cranial nerve deficit.   Psychiatric:         Mood and Affect: Mood is anxious. Affect is tearful.         ED Course   9:23 PM  The patient was seen and examined by Star Davies DO in Room ED36.        Procedures                           Results for orders placed or performed during the hospital encounter of 11/11/20 (from the past 24 hour(s))   CBC with platelets differential   Result Value Ref Range    WBC 7.5 4.0 - 11.0 10e9/L    RBC Count 3.72 (L) 3.8 - 5.2 10e12/L    Hemoglobin 12.0 11.7 - 15.7 g/dL    Hematocrit 35.8 35.0 - 47.0 %    MCV 96 78 - 100 fl    MCH 32.3 26.5 - 33.0 pg    MCHC 33.5 31.5 - 36.5 g/dL    RDW 12.7 10.0 - 15.0 %    Platelet Count 291 150 - 450 10e9/L    Diff Method Automated Method     % Neutrophils 62.3 %    % Lymphocytes 30.4 %    % Monocytes 6.0 %    % Eosinophils 0.5 %    % Basophils 0.4 %    % Immature Granulocytes 0.4 %    Nucleated RBCs 0 0 /100    Absolute Neutrophil 4.7 1.6 - 8.3  10e9/L    Absolute Lymphocytes 2.3 0.8 - 5.3 10e9/L    Absolute Monocytes 0.5 0.0 - 1.3 10e9/L    Absolute Eosinophils 0.0 0.0 - 0.7 10e9/L    Absolute Basophils 0.0 0.0 - 0.2 10e9/L    Abs Immature Granulocytes 0.0 0 - 0.4 10e9/L    Absolute Nucleated RBC 0.0    D dimer quantitative   Result Value Ref Range    D Dimer <0.3 0.0 - 0.50 ug/ml FEU   Comprehensive metabolic panel   Result Value Ref Range    Sodium 140 133 - 144 mmol/L    Potassium 3.9 3.4 - 5.3 mmol/L    Chloride 108 94 - 109 mmol/L    Carbon Dioxide 26 20 - 32 mmol/L    Anion Gap 6 3 - 14 mmol/L    Glucose 96 70 - 99 mg/dL    Urea Nitrogen 5 (L) 7 - 30 mg/dL    Creatinine 0.74 0.52 - 1.04 mg/dL    GFR Estimate >90 >60 mL/min/[1.73_m2]    GFR Estimate If Black >90 >60 mL/min/[1.73_m2]    Calcium 8.9 8.5 - 10.1 mg/dL    Bilirubin Total 0.7 0.2 - 1.3 mg/dL    Albumin 3.7 3.4 - 5.0 g/dL    Protein Total 6.6 (L) 6.8 - 8.8 g/dL    Alkaline Phosphatase 56 40 - 150 U/L    ALT 24 0 - 50 U/L    AST 11 0 - 45 U/L   Troponin I   Result Value Ref Range    Troponin I ES <0.015 0.000 - 0.045 ug/L   TSH with free T4 reflex   Result Value Ref Range    TSH 1.29 0.40 - 4.00 mU/L   Magnesium   Result Value Ref Range    Magnesium 2.2 1.6 - 2.3 mg/dL   EKG 12-lead, tracing only   Result Value Ref Range    Interpretation ECG Click View Image link to view waveform and result    UA with Microscopic   Result Value Ref Range    Color Urine Light Yellow     Appearance Urine Clear     Glucose Urine Negative NEG^Negative mg/dL    Bilirubin Urine Negative NEG^Negative    Ketones Urine Negative NEG^Negative mg/dL    Specific Gravity Urine 1.003 1.003 - 1.035    Blood Urine Negative NEG^Negative    pH Urine 6.0 5.0 - 7.0 pH    Protein Albumin Urine Negative NEG^Negative mg/dL    Urobilinogen mg/dL Normal 0.0 - 2.0 mg/dL    Nitrite Urine Negative NEG^Negative    Leukocyte Esterase Urine Large (A) NEG^Negative    Source Clean catch urine     WBC Urine 2 0 - 5 /HPF    RBC Urine 3 (H) 0 -  2 /HPF    Bacteria Urine Few (A) NEG^Negative /HPF    Squamous Epithelial /HPF Urine 1 0 - 1 /HPF    Transitional Epi <1 0 - 1 /HPF   HCG qualitative urine (UPT)   Result Value Ref Range    HCG Qual Urine Negative NEG^Negative   Urine Culture    Specimen: Catheterized Urine   Result Value Ref Range    Specimen Description Catheterized Urine     Special Requests Specimen received in preservative     Culture Micro PENDING    CT Head w/o Contrast    Narrative    CT HEAD W/O CONTRAST 11/11/2020 1:19 PM    History: S/p fall, syncope.    Per EPIC: Past medical history significant for Munir-Danlos  disease,?cervical stenosis,?lumbar DJD, chronic osteoarthritis,  chronic pain syndrome, fibromyalgia,?depression and anxiety  who?presents to the ED for evaluation of?a neurologic problem.?She  reports that for the past 2 weeks she has been feeling more tremulous.  She reports an episode 2 weeks ago in which she was convulsing, but  still coherent. She then reports a second episode a few days ago where  she walked into her kitchen to get some food and began feeling  lightheaded and then fell and lost consciousness. She fell and hit her  head. She states that?a friend?who was with her reported that she was  shaking and her eyes were rolling back in her head.    Comparison: Head CT from 10/8/2018.    Technique: Using multidetector thin collimation helical acquisition  technique, axial, coronal and sagittal CT images from the skull base  to the vertex were obtained without intravenous contrast.    Findings: There is no intracranial hemorrhage, mass effect, or midline  shift. Gray/white matter differentiation in both cerebral hemispheres  is preserved. Ventricles are proportionate to the cerebral sulci. The  basal cisterns are clear.    The bony calvaria and the bones of the skull base are normal. The  visualized portions of the paranasal sinuses and mastoid air cells are  clear.       Impression    Impression: No acute  intracranial pathology.     I have personally reviewed the examination and initial interpretation  and I agree with the findings.    GINA BAIRD MD   CTA Head Neck with Contrast    Narrative    CTA  HEAD NECK WITH CONTRAST 11/11/2020 1:23 PM    CT angiogram of the neck   CT angiogram of the base of the brain with contrast  Reconstruction by the Radiologist on the 3D workstation    Provided History:  Syncope, possible seizure like activity, h/o Munir  Danlos    Comparison:  Head CT from 10/8/2018..      Technique:  HEAD and NECK CTA: During rapid bolus intravenous injection of  nonionic contrast material, axial images were obtained using thin  collimation multidetector helical technique from the base of the upper  aortic arch through the [place] of [place]. This CT angiogram data was  reconstructed at thin intervals with mild overlap. Images were sent to  the [employer] workstation, and 3D reconstructions were obtained. The  axial source images, multiplanar reformations, 3D reconstructions in  both maximum intensity projection display and volume rendered models  were reviewed, with reconstructions performed by the technologist and  the radiologist.    Contrast: iopamidol (ISOVUE-370) solution 75 mL    Findings:    Head CTA demonstrates no aneurysm or stenosis of the major  intracranial arteries. 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 Anterior communicating artery is patent. Bilateral posterior  communicating arteries are not seen.    Neck CTA demonstrates no stenosis of the major cervical arteries. The  origins of the great vessels from the aortic arch are patent. The  normal distal right internal carotid artery measures 4.4 mm. The  normal distal left internal carotid artery measures 4.2 mm.     No mass within the visualized portions of the cervical soft tissues or  lung apices.       Impression    Impression:    1. Head CTA demonstrates no aneurysm or stenosis of the major  intracranial arteries.   2.  Neck CTA demonstrates no stenosis of the major cervical arteries.    I have personally reviewed the examination and initial interpretation  and I agree with the findings.    GINA BAIRD MD     Medications - No data to display          Assessments & Plan (with Medical Decision Making)   This is a 40-year-old female who presents with headache, body ache, generalized weakness, and not feeling well.  Patient was seen earlier today and symptoms were thought to be due to discontinuing her medications.  Patient also had a possible seizure and a fall earlier.  Exam patient is tearful and anxious.  There is no other acute abnormalities.  Blood pressure was initially low in the high 80s systolic.  Lab work shows no acute abnormalities.  Patient had head CT done earlier today that showed no acute abnormalities.  In addition to possibility of symptoms being due to ceasing medications this could also be due to concussion as patient did strike her head recently.  Patient describes symptoms that could be postconcussive in nature.  In addition to original plan we will also have patient follow-up with concussion clinic.  As there is some evidence for amitriptyline for postconcussive headache as well as other postconcussive symptoms I discussed that we can start patient on a course of this.  Patient was previously taking nortriptyline but she states that she is no longer taking this medication.  I discussed that we could trial amitriptyline instead of nortriptyline for her postconcussive symptoms.  We will also provide a prescription for ondansetron if she has continued nausea.  Discussed that if this is due to concussion symptoms will take time to resolve.  Patient initially declined DEC assessment during her first visit as well as this visit for her anxiety.  Patient later agreed to this.  This is pending at this time.  Patient was signed out to incoming physician pending DEC assessment.    I have reviewed the nursing  notes.    I have reviewed the findings, diagnosis, plan and need for follow up with the patient.    New Prescriptions    No medications on file       Final diagnoses:   None   I, Pee Gonzales, am serving as a trained medical scribe to document services personally performed by Star Davies DO, based on the provider's statements to me.      IStar DO, was physically present and have reviewed and verified the accuracy of this note documented by Pee Gonzales.    11/11/2020   [facility] EMERGENCY DEPARTMENT     Star Davies DO  11/12/20 0302

## 2020-11-12 NOTE — DISCHARGE INSTRUCTIONS
Please make an appointment to follow up with Sports Medicine (phone: [phone]) in 5-7 days as needed.    Return to the emergency department if there are any new symptoms or any cause for concern.

## 2020-11-12 NOTE — ED NOTES
Patient received in signout from Dr. Davies.  Originally presented with a variety of complaints and was ultimately medically cleared.  Plan on signout is to follow-up with DEC assessment for worsening anxiety.    Case discussed with the mental health assessment team.  They are not recommending admission at this time.  I agree with this assessment.  They will set up outpatient therapy/counseling, which the patient is agreeable to.    Additionally, she states that she lost her Macrobid that was prescribed to her for UTI.  She still has 2 days left in her course.  I have refilled a 2-day prescription of Macrobid and provided to the patient.     Erick Larry, DO  11/12/20 0339

## 2020-11-12 NOTE — ED TRIAGE NOTES
Pt presents ambulatory through triage with c/o continued N/V, abd pain, generalized weakness, heart burn. Pt stated had fall a few days ago 2/2 seizure, was seen here earlier today and had negative CT, was D/c'd home but still having sx. Pt also endorsing trouble concentrating and some memory loss.

## 2020-11-13 LAB
BACTERIA SPEC CULT: ABNORMAL
BACTERIA SPEC CULT: ABNORMAL
Lab: ABNORMAL
SPECIMEN SOURCE: ABNORMAL

## 2020-11-13 NOTE — RESULT ENCOUNTER NOTE
Final urine culture report is NEGATIVE per [facility] Result protocol.    If NEGATIVE result, no change in treatment, per [facility] Result protocol.

## 2020-12-17 DIAGNOSIS — M47.812 CERVICAL SPONDYLOSIS WITHOUT MYELOPATHY: Primary | ICD-10-CM

## 2020-12-18 ENCOUNTER — VIRTUAL VISIT (OUTPATIENT)
Dept: INTERNAL MEDICINE | Facility: CLINIC | Age: 40
End: 2020-12-18
Payer: COMMERCIAL

## 2020-12-18 DIAGNOSIS — F07.81 POST CONCUSSION SYNDROME: Primary | ICD-10-CM

## 2020-12-18 DIAGNOSIS — G44.219 EPISODIC TENSION-TYPE HEADACHE, NOT INTRACTABLE: ICD-10-CM

## 2020-12-18 DIAGNOSIS — R41.3 MEMORY LOSS: ICD-10-CM

## 2020-12-18 PROCEDURE — 99213 OFFICE O/P EST LOW 20 MIN: CPT | Mod: 95 | Performed by: INTERNAL MEDICINE

## 2020-12-18 NOTE — PROGRESS NOTES
"PHONE VISIT    Assessment & Plan   Post concussion syndrome  Memory loss  Episodic tension-type headache, not intractable  Symptoms may all be attributed to recent concussion.  Reviewed CT imaging which was normal.  We also discussed her CBD oil use as a possible contributor to memory loss.  She does not feel she has increased her use, but will be more mindful  - CONCUSSION  REFERRAL    ADD  Depression:  Multiple recent DEC assessments.  Saw her psychiatrist earlier this month and has resumed her meds.  Reports her mood is stable    HM:  encoruaged flu shot.  She will get at pharmacy    RTC: No follow-ups on file.  Myrna Watson MD  _________________________________________________________________________________________    Chief Complaint   Sheyla Ye is a 40 year old female presents for   Chief Complaint   Patient presents with     Recheck Medication     pt would like to follow upon imaging        SUBJECTIVE  Has had a number of ED visits since our last visit.    First ED visit was for \"tremors\".   2nd ED visit had bladder infection  3rd ED visit for a fall and possible withdrawal as she had stopped her meds.  She was using CBD oil and natural remedies.    Saw her psychiatrist (kar Vo) earlier this month and resumed meds.  She has not noticed any improvement in symptoms since restarting.    Since that time, seems like her brain is \"on repeat\".  Also memory loss.  She is hoping to schedule an appt with concussion clinic.  She is worried some of her symptoms may be related to a concussion after a fall.  She landed hard on the back of her head.    She does not think she is currently taking additional CBD and does not use it daily.    Medications and allergies were reviewed by me today.     Social History   History   Smoking Status     Current Every Day Smoker     Packs/day: 0.50     Years: 15.00     Types: Cigarettes     Start date: 9/1/1999   Smokeless Tobacco     Former User    "   PHQ-2 ( 1999 Pf[employer]) 7/8/2020 9/6/2019   Q1: Little interest or pleasure in doing things 1 3   Q2: Feeling down, depressed or hopeless 1 3   PHQ-2 Score 2 6   Q1: Little interest or pleasure in doing things - Nearly every day   Q2: Feeling down, depressed or hopeless - Nearly every day   PHQ-2 Score - 6     PHQ-9 SCORE 6/11/2019 9/6/2019 7/31/2020   PHQ-9 Total Score - - -   PHQ-9 Total Score MyChart - 21 (Severe depression) -   PHQ-9 Total Score 0 21 17     Past Medical History   Patient Active Problem List   Diagnosis     Cervical kyphosis     Cervical spondylosis without myelopathy     Cervical stenosis of spinal canal     Cervical radiculopathy     Pain management contract agreement     Anxiety     ADD (attention deficit disorder)     Joint hyperextensibility of multiple sites     [facility]     Other chronic pain     Tobacco use disorder     Chronic pain     Long term (current) use of opiate analgesic     Bilateral low back pain without sciatica     Depression, unspecified depression type     Munir-Danlos disease     Altered mental status     Headache     Cervical spondylosis       Review of Systems   5 point ROS completed and negative except noted above, including Gen, CV, Resp, GI, MS    Physical Exam   Phone Visit, n/a    Visit/Communication Style   Sheyla agreed to a phone visit due to the COVID pandemic  Duration of Visit: 15 min

## 2020-12-18 NOTE — NURSING NOTE
Chief Complaint   Patient presents with     Recheck Medication     pt would like to follow upon imaging       Ricardo Paul CMA, EMT at 9:52 AM on 12/18/2020.

## 2020-12-20 ENCOUNTER — HEALTH MAINTENANCE LETTER (OUTPATIENT)
Age: 40
End: 2020-12-20

## 2020-12-21 ENCOUNTER — HOSPITAL ENCOUNTER (EMERGENCY)
Facility: CLINIC | Age: 40
Discharge: HOME | End: 2020-12-21
Attending: EMERGENCY MEDICINE | Admitting: EMERGENCY MEDICINE
Payer: COMMERCIAL

## 2020-12-21 VITALS
TEMPERATURE: 98 F | DIASTOLIC BLOOD PRESSURE: 70 MMHG | HEART RATE: 75 BPM | SYSTOLIC BLOOD PRESSURE: 118 MMHG | OXYGEN SATURATION: 100 % | RESPIRATION RATE: 18 BRPM

## 2020-12-21 DIAGNOSIS — F32.A DEPRESSION, UNSPECIFIED DEPRESSION TYPE: ICD-10-CM

## 2020-12-21 DIAGNOSIS — G89.29 OTHER CHRONIC PAIN: ICD-10-CM

## 2020-12-21 LAB
ALBUMIN SERPL-MCNC: 3.8 G/DL (ref 3.4–5)
ALBUMIN UR-MCNC: 30 MG/DL
ALP SERPL-CCNC: 66 U/L (ref 40–150)
ALT SERPL W P-5'-P-CCNC: 19 U/L (ref 0–50)
ANION GAP SERPL CALCULATED.3IONS-SCNC: 4 MMOL/L (ref 3–14)
APPEARANCE UR: ABNORMAL
AST SERPL W P-5'-P-CCNC: 15 U/L (ref 0–45)
BACTERIA #/AREA URNS HPF: ABNORMAL /HPF
BASOPHILS # BLD AUTO: 0.1 10E9/L (ref 0–0.2)
BASOPHILS NFR BLD AUTO: 0.7 %
BILIRUB SERPL-MCNC: 0.3 MG/DL (ref 0.2–1.3)
BILIRUB UR QL STRIP: NEGATIVE
BUN SERPL-MCNC: 13 MG/DL (ref 7–30)
CALCIUM SERPL-MCNC: 8.7 MG/DL (ref 8.5–10.1)
CAOX CRY #/AREA URNS HPF: ABNORMAL /HPF
CHLORIDE SERPL-SCNC: 107 MMOL/L (ref 94–109)
CO2 SERPL-SCNC: 27 MMOL/L (ref 20–32)
COLOR UR AUTO: YELLOW
CREAT SERPL-MCNC: 0.8 MG/DL (ref 0.52–1.04)
DIFFERENTIAL METHOD BLD: ABNORMAL
EOSINOPHIL # BLD AUTO: 0.2 10E9/L (ref 0–0.7)
EOSINOPHIL NFR BLD AUTO: 2.7 %
ERYTHROCYTE [DISTWIDTH] IN BLOOD BY AUTOMATED COUNT: 12 % (ref 10–15)
GFR SERPL CREATININE-BSD FRML MDRD: >90 ML/MIN/{1.73_M2}
GLUCOSE SERPL-MCNC: 112 MG/DL (ref 70–99)
GLUCOSE UR STRIP-MCNC: NEGATIVE MG/DL
HCT VFR BLD AUTO: 39.2 % (ref 35–47)
HGB BLD-MCNC: 12.7 G/DL (ref 11.7–15.7)
HGB UR QL STRIP: ABNORMAL
IMM GRANULOCYTES # BLD: 0 10E9/L (ref 0–0.4)
IMM GRANULOCYTES NFR BLD: 0.3 %
KETONES UR STRIP-MCNC: NEGATIVE MG/DL
LEUKOCYTE ESTERASE UR QL STRIP: ABNORMAL
LYMPHOCYTES # BLD AUTO: 2.6 10E9/L (ref 0.8–5.3)
LYMPHOCYTES NFR BLD AUTO: 36.8 %
MCH RBC QN AUTO: 32.6 PG (ref 26.5–33)
MCHC RBC AUTO-ENTMCNC: 32.4 G/DL (ref 31.5–36.5)
MCV RBC AUTO: 101 FL (ref 78–100)
MONOCYTES # BLD AUTO: 0.6 10E9/L (ref 0–1.3)
MONOCYTES NFR BLD AUTO: 7.9 %
MUCOUS THREADS #/AREA URNS LPF: PRESENT /LPF
NEUTROPHILS # BLD AUTO: 3.6 10E9/L (ref 1.6–8.3)
NEUTROPHILS NFR BLD AUTO: 51.6 %
NITRATE UR QL: POSITIVE
NRBC # BLD AUTO: 0 10*3/UL
NRBC BLD AUTO-RTO: 0 /100
PH UR STRIP: 6.5 PH (ref 5–7)
PLATELET # BLD AUTO: 347 10E9/L (ref 150–450)
POTASSIUM SERPL-SCNC: 4.2 MMOL/L (ref 3.4–5.3)
PROT SERPL-MCNC: 6.8 G/DL (ref 6.8–8.8)
RBC # BLD AUTO: 3.89 10E12/L (ref 3.8–5.2)
RBC #/AREA URNS AUTO: 6 /HPF (ref 0–2)
SODIUM SERPL-SCNC: 138 MMOL/L (ref 133–144)
SOURCE: ABNORMAL
SP GR UR STRIP: 1.02 (ref 1–1.03)
SQUAMOUS #/AREA URNS AUTO: 13 /HPF (ref 0–1)
UROBILINOGEN UR STRIP-MCNC: NORMAL MG/DL (ref 0–2)
WBC # BLD AUTO: 7 10E9/L (ref 4–11)
WBC #/AREA URNS AUTO: 7 /HPF (ref 0–5)

## 2020-12-21 PROCEDURE — 250N000013 HC RX MED GY IP 250 OP 250 PS 637: Performed by: EMERGENCY MEDICINE

## 2020-12-21 PROCEDURE — 80053 COMPREHEN METABOLIC PANEL: CPT | Performed by: EMERGENCY MEDICINE

## 2020-12-21 PROCEDURE — 99283 EMERGENCY DEPT VISIT LOW MDM: CPT | Performed by: EMERGENCY MEDICINE

## 2020-12-21 PROCEDURE — 99284 EMERGENCY DEPT VISIT MOD MDM: CPT | Performed by: EMERGENCY MEDICINE

## 2020-12-21 PROCEDURE — 85025 COMPLETE CBC W/AUTO DIFF WBC: CPT | Performed by: EMERGENCY MEDICINE

## 2020-12-21 PROCEDURE — 81001 URINALYSIS AUTO W/SCOPE: CPT | Performed by: EMERGENCY MEDICINE

## 2020-12-21 RX ORDER — ACETAMINOPHEN 325 MG/1
975 TABLET ORAL ONCE
Status: COMPLETED | OUTPATIENT
Start: 2020-12-21 | End: 2020-12-21

## 2020-12-21 RX ADMIN — ACETAMINOPHEN 975 MG: 325 TABLET, FILM COATED ORAL at 17:57

## 2020-12-21 ASSESSMENT — ENCOUNTER SYMPTOMS
WEAKNESS: 0
DECREASED CONCENTRATION: 1
ABDOMINAL PAIN: 0
NERVOUS/ANXIOUS: 1
DIZZINESS: 0

## 2020-12-21 NOTE — ED AVS SNAPSHOT
[facility] Emergency Department  [address] [address]  Phone: [phone]                                    Sheyla Ye   MRN: [id number]    Department: [facility] Emergency Department   Date of Visit: 12/21/2020           After Visit Summary Signature Page    I have received my discharge instructions, and my questions have been answered. I have discussed any challenges I see with this plan with the nurse or doctor.    ..........................................................................................................................................  Patient/Patient Representative Signature      ..........................................................................................................................................  Patient Representative Print Name and Relationship to Patient    ..................................................               ................................................  Date                                   Time    ..........................................................................................................................................  Reviewed by Signature/Title    ...................................................              ..............................................  Date                                               Time          22EPIC Rev 08/18

## 2020-12-21 NOTE — ED TRIAGE NOTES
Pt presents ambulatory to triage. Pt stated she had a seizure about a month ago and since then she is feeling that her mentation is progressively declining. Has difficulty with understanding what is being told to her and word finding. Denies any dizziness, balance problems, or visual disturbances. Pt stated she is not seeing a neurologist and was not put on any medication post seizure.  Lizabeth Carmona RN on 12/21/2020 at 3:49 PM

## 2020-12-22 NOTE — DISCHARGE INSTRUCTIONS
Recommend continued close follow up with Dr. Watson and your psychiatrist for ongoing evaluation and medication review.      Labwork obtained in the ED is reassuring.      Follow aftercare instructions provided.

## 2021-01-01 ENCOUNTER — HOSPITAL ENCOUNTER (INPATIENT)
Facility: CLINIC | Age: 41
LOS: 3 days | Discharge: LEFT BEFORE DISCHARGE | End: 2021-01-04
Attending: EMERGENCY MEDICINE | Admitting: PSYCHIATRY & NEUROLOGY
Payer: COMMERCIAL

## 2021-01-01 DIAGNOSIS — F32.A DEPRESSION, UNSPECIFIED DEPRESSION TYPE: ICD-10-CM

## 2021-01-01 DIAGNOSIS — N39.0 URINARY TRACT INFECTION WITHOUT HEMATURIA, SITE UNSPECIFIED: Primary | ICD-10-CM

## 2021-01-01 DIAGNOSIS — R41.82 ALTERED MENTAL STATUS, UNSPECIFIED ALTERED MENTAL STATUS TYPE: ICD-10-CM

## 2021-01-01 DIAGNOSIS — F33.3 SEVERE RECURRENT MAJOR DEPRESSION WITH PSYCHOTIC FEATURES (H): ICD-10-CM

## 2021-01-01 DIAGNOSIS — F22 PARANOIA (H): ICD-10-CM

## 2021-01-01 DIAGNOSIS — Z11.52 ENCOUNTER FOR SCREENING LABORATORY TESTING FOR SEVERE ACUTE RESPIRATORY SYNDROME CORONAVIRUS 2 (SARS-COV-2): ICD-10-CM

## 2021-01-01 PROBLEM — Z92.89 HISTORY OF USE OF ALTERNATIVE MEDICINE: Status: ACTIVE | Noted: 2021-01-01

## 2021-01-01 LAB
ALBUMIN SERPL-MCNC: 3.8 G/DL (ref 3.4–5)
ALP SERPL-CCNC: 51 U/L (ref 40–150)
ALT SERPL W P-5'-P-CCNC: 14 U/L (ref 0–50)
AMMONIA PLAS-SCNC: 19 UMOL/L (ref 10–50)
AMPHETAMINES UR QL SCN: POSITIVE
ANION GAP SERPL CALCULATED.3IONS-SCNC: 6 MMOL/L (ref 3–14)
AST SERPL W P-5'-P-CCNC: 10 U/L (ref 0–45)
BARBITURATES UR QL: NEGATIVE
BENZODIAZ UR QL: POSITIVE
BILIRUB SERPL-MCNC: 0.8 MG/DL (ref 0.2–1.3)
BUN SERPL-MCNC: 10 MG/DL (ref 7–30)
CALCIUM SERPL-MCNC: 8.7 MG/DL (ref 8.5–10.1)
CANNABINOIDS UR QL SCN: NEGATIVE
CHLORIDE SERPL-SCNC: 109 MMOL/L (ref 94–109)
CO2 SERPL-SCNC: 25 MMOL/L (ref 20–32)
COCAINE UR QL: NEGATIVE
CREAT SERPL-MCNC: 0.81 MG/DL (ref 0.52–1.04)
ERYTHROCYTE [DISTWIDTH] IN BLOOD BY AUTOMATED COUNT: 11.6 % (ref 10–15)
ETHANOL UR QL SCN: NEGATIVE
FLUABV+SARS-COV-2+RSV PNL RESP NAA+PROBE: NEGATIVE
FLUABV+SARS-COV-2+RSV PNL RESP NAA+PROBE: NEGATIVE
GFR SERPL CREATININE-BSD FRML MDRD: >90 ML/MIN/{1.73_M2}
GLUCOSE SERPL-MCNC: 86 MG/DL (ref 70–99)
HBA1C MFR BLD: 5.1 % (ref 0–5.6)
HCG UR QL: NEGATIVE
HCT VFR BLD AUTO: 37.6 % (ref 35–47)
HGB BLD-MCNC: 12.6 G/DL (ref 11.7–15.7)
LABORATORY COMMENT REPORT: NORMAL
MCH RBC QN AUTO: 32.4 PG (ref 26.5–33)
MCHC RBC AUTO-ENTMCNC: 33.5 G/DL (ref 31.5–36.5)
MCV RBC AUTO: 97 FL (ref 78–100)
OPIATES UR QL SCN: NEGATIVE
PLATELET # BLD AUTO: 322 10E9/L (ref 150–450)
POTASSIUM SERPL-SCNC: 4.1 MMOL/L (ref 3.4–5.3)
PROT SERPL-MCNC: 6.6 G/DL (ref 6.8–8.8)
RBC # BLD AUTO: 3.89 10E12/L (ref 3.8–5.2)
RSV RNA SPEC QL NAA+PROBE: NEGATIVE
SARS-COV-2 RNA SPEC QL NAA+PROBE: NEGATIVE
SODIUM SERPL-SCNC: 140 MMOL/L (ref 133–144)
SPECIMEN SOURCE: NORMAL
VIT B12 SERPL-MCNC: 1221 PG/ML (ref 193–986)
WBC # BLD AUTO: 7.4 10E9/L (ref 4–11)

## 2021-01-01 PROCEDURE — 99223 1ST HOSP IP/OBS HIGH 75: CPT | Performed by: NURSE PRACTITIONER

## 2021-01-01 PROCEDURE — 82607 VITAMIN B-12: CPT | Performed by: NURSE PRACTITIONER

## 2021-01-01 PROCEDURE — 80307 DRUG TEST PRSMV CHEM ANLYZR: CPT | Performed by: EMERGENCY MEDICINE

## 2021-01-01 PROCEDURE — 36415 COLL VENOUS BLD VENIPUNCTURE: CPT | Performed by: NURSE PRACTITIONER

## 2021-01-01 PROCEDURE — 99285 EMERGENCY DEPT VISIT HI MDM: CPT | Mod: 25 | Performed by: EMERGENCY MEDICINE

## 2021-01-01 PROCEDURE — 87636 SARSCOV2 & INF A&B AMP PRB: CPT | Performed by: EMERGENCY MEDICINE

## 2021-01-01 PROCEDURE — 80320 DRUG SCREEN QUANTALCOHOLS: CPT | Performed by: EMERGENCY MEDICINE

## 2021-01-01 PROCEDURE — C9803 HOPD COVID-19 SPEC COLLECT: HCPCS | Performed by: EMERGENCY MEDICINE

## 2021-01-01 PROCEDURE — 250N000013 HC RX MED GY IP 250 OP 250 PS 637: Performed by: PSYCHIATRY & NEUROLOGY

## 2021-01-01 PROCEDURE — 84425 ASSAY OF VITAMIN B-1: CPT | Performed by: NURSE PRACTITIONER

## 2021-01-01 PROCEDURE — 99223 1ST HOSP IP/OBS HIGH 75: CPT | Mod: 95 | Performed by: PSYCHIATRY & NEUROLOGY

## 2021-01-01 PROCEDURE — 83036 HEMOGLOBIN GLYCOSYLATED A1C: CPT | Performed by: NURSE PRACTITIONER

## 2021-01-01 PROCEDURE — 80053 COMPREHEN METABOLIC PANEL: CPT | Performed by: NURSE PRACTITIONER

## 2021-01-01 PROCEDURE — 124N000002 HC R&B MH UMMC

## 2021-01-01 PROCEDURE — 85027 COMPLETE CBC AUTOMATED: CPT | Performed by: NURSE PRACTITIONER

## 2021-01-01 PROCEDURE — 82140 ASSAY OF AMMONIA: CPT | Performed by: NURSE PRACTITIONER

## 2021-01-01 PROCEDURE — 90791 PSYCH DIAGNOSTIC EVALUATION: CPT

## 2021-01-01 PROCEDURE — 81025 URINE PREGNANCY TEST: CPT | Performed by: EMERGENCY MEDICINE

## 2021-01-01 PROCEDURE — 99285 EMERGENCY DEPT VISIT HI MDM: CPT | Performed by: EMERGENCY MEDICINE

## 2021-01-01 PROCEDURE — 99207 PR CONSULT E&M CHANGED TO INITIAL LEVEL: CPT | Performed by: NURSE PRACTITIONER

## 2021-01-01 RX ORDER — ACETAMINOPHEN 325 MG/1
650 TABLET ORAL EVERY 4 HOURS PRN
Status: DISCONTINUED | OUTPATIENT
Start: 2021-01-01 | End: 2021-01-03

## 2021-01-01 RX ORDER — LOPERAMIDE HCL 2 MG
2 CAPSULE ORAL 4 TIMES DAILY PRN
Status: DISCONTINUED | OUTPATIENT
Start: 2021-01-01 | End: 2021-01-04 | Stop reason: HOSPADM

## 2021-01-01 RX ORDER — DEXTROAMPHETAMINE SACCHARATE, AMPHETAMINE ASPARTATE MONOHYDRATE, DEXTROAMPHETAMINE SULFATE AND AMPHETAMINE SULFATE 7.5; 7.5; 7.5; 7.5 MG/1; MG/1; MG/1; MG/1
30 CAPSULE, EXTENDED RELEASE ORAL DAILY
Status: ON HOLD | COMMUNITY
End: 2021-01-04

## 2021-01-01 RX ORDER — DIAZEPAM 5 MG
5 TABLET ORAL EVERY 8 HOURS PRN
COMMUNITY

## 2021-01-01 RX ORDER — ONDANSETRON 4 MG/1
4 TABLET, ORALLY DISINTEGRATING ORAL EVERY 6 HOURS PRN
Status: DISCONTINUED | OUTPATIENT
Start: 2021-01-01 | End: 2021-01-04 | Stop reason: HOSPADM

## 2021-01-01 RX ORDER — ACETAMINOPHEN 500 MG
1000 TABLET ORAL EVERY 6 HOURS PRN
COMMUNITY

## 2021-01-01 RX ORDER — NORTRIPTYLINE HCL 10 MG
10 CAPSULE ORAL AT BEDTIME
Status: ON HOLD | COMMUNITY
Start: 2020-12-04 | End: 2021-01-04

## 2021-01-01 RX ORDER — LAMOTRIGINE 100 MG/1
200 TABLET ORAL DAILY
Status: DISCONTINUED | OUTPATIENT
Start: 2021-01-01 | End: 2021-01-04 | Stop reason: HOSPADM

## 2021-01-01 RX ORDER — MAGNESIUM HYDROXIDE/ALUMINUM HYDROXICE/SIMETHICONE 120; 1200; 1200 MG/30ML; MG/30ML; MG/30ML
30 SUSPENSION ORAL EVERY 4 HOURS PRN
Status: DISCONTINUED | OUTPATIENT
Start: 2021-01-01 | End: 2021-01-04 | Stop reason: HOSPADM

## 2021-01-01 RX ORDER — OLANZAPINE 5 MG/1
5-10 TABLET ORAL 3 TIMES DAILY PRN
Status: DISCONTINUED | OUTPATIENT
Start: 2021-01-01 | End: 2021-01-04 | Stop reason: HOSPADM

## 2021-01-01 RX ORDER — TRAZODONE HYDROCHLORIDE 50 MG/1
50 TABLET, FILM COATED ORAL
Status: DISCONTINUED | OUTPATIENT
Start: 2021-01-01 | End: 2021-01-01

## 2021-01-01 RX ORDER — AMOXICILLIN 250 MG
1 CAPSULE ORAL 2 TIMES DAILY PRN
Status: DISCONTINUED | OUTPATIENT
Start: 2021-01-01 | End: 2021-01-04 | Stop reason: HOSPADM

## 2021-01-01 RX ORDER — TRAZODONE HYDROCHLORIDE 50 MG/1
50 TABLET, FILM COATED ORAL
Status: DISCONTINUED | OUTPATIENT
Start: 2021-01-01 | End: 2021-01-04 | Stop reason: HOSPADM

## 2021-01-01 RX ORDER — DIAZEPAM 5 MG
5 TABLET ORAL 3 TIMES DAILY
Status: DISCONTINUED | OUTPATIENT
Start: 2021-01-01 | End: 2021-01-04 | Stop reason: HOSPADM

## 2021-01-01 RX ORDER — OLANZAPINE 10 MG/2ML
10 INJECTION, POWDER, FOR SOLUTION INTRAMUSCULAR 3 TIMES DAILY PRN
Status: DISCONTINUED | OUTPATIENT
Start: 2021-01-01 | End: 2021-01-04 | Stop reason: HOSPADM

## 2021-01-01 RX ORDER — DEXTROAMPHETAMINE SACCHARATE, AMPHETAMINE ASPARTATE, DEXTROAMPHETAMINE SULFATE AND AMPHETAMINE SULFATE 5; 5; 5; 5 MG/1; MG/1; MG/1; MG/1
20 TABLET ORAL DAILY PRN
Status: ON HOLD | COMMUNITY
End: 2021-01-04

## 2021-01-01 RX ORDER — HYDROXYZINE HYDROCHLORIDE 25 MG/1
25 TABLET, FILM COATED ORAL EVERY 4 HOURS PRN
Status: DISCONTINUED | OUTPATIENT
Start: 2021-01-01 | End: 2021-01-04 | Stop reason: HOSPADM

## 2021-01-01 RX ORDER — OLANZAPINE 10 MG/1
10 TABLET ORAL 3 TIMES DAILY PRN
Status: DISCONTINUED | OUTPATIENT
Start: 2021-01-01 | End: 2021-01-01

## 2021-01-01 RX ORDER — OLANZAPINE 10 MG/2ML
10 INJECTION, POWDER, FOR SOLUTION INTRAMUSCULAR 3 TIMES DAILY PRN
Status: DISCONTINUED | OUTPATIENT
Start: 2021-01-01 | End: 2021-01-01

## 2021-01-01 RX ORDER — DEXTROAMPHETAMINE SACCHARATE, AMPHETAMINE ASPARTATE MONOHYDRATE, DEXTROAMPHETAMINE SULFATE AND AMPHETAMINE SULFATE 2.5; 2.5; 2.5; 2.5 MG/1; MG/1; MG/1; MG/1
10 CAPSULE, EXTENDED RELEASE ORAL DAILY
Status: ON HOLD | COMMUNITY
End: 2021-01-04

## 2021-01-01 RX ORDER — GABAPENTIN 100 MG/1
100-200 CAPSULE ORAL 3 TIMES DAILY PRN
Status: DISCONTINUED | OUTPATIENT
Start: 2021-01-01 | End: 2021-01-04 | Stop reason: HOSPADM

## 2021-01-01 RX ORDER — MELOXICAM 7.5 MG/1
7.5 TABLET ORAL DAILY
Status: DISCONTINUED | OUTPATIENT
Start: 2021-01-01 | End: 2021-01-04 | Stop reason: HOSPADM

## 2021-01-01 RX ADMIN — DIAZEPAM 5 MG: 5 TABLET ORAL at 19:29

## 2021-01-01 RX ADMIN — NICOTINE 7 MG/24 HR DAILY TRANSDERMAL PATCH 1 PATCH: at 17:12

## 2021-01-01 RX ADMIN — ACETAMINOPHEN 650 MG: 325 TABLET, FILM COATED ORAL at 17:47

## 2021-01-01 RX ADMIN — OMEPRAZOLE 20 MG: 20 CAPSULE, DELAYED RELEASE ORAL at 17:15

## 2021-01-01 RX ADMIN — LAMOTRIGINE 200 MG: 100 TABLET ORAL at 17:47

## 2021-01-01 RX ADMIN — MELOXICAM 7.5 MG: 7.5 TABLET ORAL at 17:15

## 2021-01-01 RX ADMIN — DICLOFENAC SODIUM 2 G: 10 GEL TOPICAL at 20:52

## 2021-01-01 RX ADMIN — DICLOFENAC SODIUM 2 G: 10 GEL TOPICAL at 17:16

## 2021-01-01 RX ADMIN — DIAZEPAM 5 MG: 5 TABLET ORAL at 17:12

## 2021-01-01 ASSESSMENT — ENCOUNTER SYMPTOMS
HALLUCINATIONS: 0
ABDOMINAL PAIN: 0
COLOR CHANGE: 0
ARTHRALGIAS: 0
LIGHT-HEADEDNESS: 1
CHILLS: 1
NUMBNESS: 0
TREMORS: 1
DIFFICULTY URINATING: 0
FEVER: 0
DIZZINESS: 1
HEADACHES: 1
SHORTNESS OF BREATH: 0
EYE REDNESS: 0
CONFUSION: 0
NECK STIFFNESS: 0
DECREASED CONCENTRATION: 1

## 2021-01-01 ASSESSMENT — MIFFLIN-ST. JEOR
SCORE: 1181.17
SCORE: 1181.62

## 2021-01-01 ASSESSMENT — ACTIVITIES OF DAILY LIVING (ADL)
HEARING_DIFFICULTY_OR_DEAF: NO
WALKING_OR_CLIMBING_STAIRS_DIFFICULTY: NO
WEAR_GLASSES_OR_BLIND: NO
NUMBER_OF_TIMES_PATIENT_HAS_FALLEN_WITHIN_LAST_SIX_MONTHS: 2
FALL_HISTORY_WITHIN_LAST_SIX_MONTHS: YES
DOING_ERRANDS_INDEPENDENTLY_DIFFICULTY: NO
TOILETING_ISSUES: YES
DIFFICULTY_EATING/SWALLOWING: NO
DRESSING/BATHING_DIFFICULTY: NO
DIFFICULTY_COMMUNICATING: OTHER (SEE COMMENTS)
PATIENT_/_FAMILY_COMMUNICATION_STYLE: SPOKEN LANGUAGE (ENGLISH OR BILINGUAL)
CONCENTRATING,_REMEMBERING_OR_MAKING_DECISIONS_DIFFICULTY: NO

## 2021-01-01 NOTE — PROGRESS NOTES
Patient admitted to station 22 on a 72 hour hold. She was transferred her from the [place] ED. She was cooperative with clothing search. Patient came to the ED because she wanted a MRI. She believes her symptoms of poor memory and slowing thoughts. She had 2 seizures in Nov 2020 and thinks these symptoms may be a result of her concussion. She states that she works for the [employer] and that they are now controlling her mind and thoughts.She reports that she had been prescribed Risperidone and that it has not been helpful. 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 In conversation she is alert and oriented with good eye contact. Appears sad and she is occasionally tearful. She denies SI, HI and SIB.Maria Antonia Leos RN

## 2021-01-01 NOTE — CONSULTS
"  [facility] MEDICINE SERVICE  Hospital Medicine Consultation  2021    Sheyla Ye,  1980, MRN [id number]  Code status: Full Code    ASSESSMENT / PLAN   Discussion: Sheyla is a 40 year old female who is hospitalized under inpatient mental health care for evaluation and management of depression and altered mental status. She has past medical history including Ehler's Danlos, chronic pain syndrome, fibromyalgia, chronic osteoarthritis, lumbar degenerative disc disease, and anxiety. Hospital medicine consulted with concern for possible UTI and altered mental status.    #depression  #anxiety  #insomnia    -treatment per primary team    #altered mental status    -laboratory: CBC, CMP, ammonia, vit B12, vit B1, UA    -agree with MRI brain as ordered by psychiatry    #use of alternative medicine (kratom alkaloids)    -discussed with registered pharmacist.     -patient may need to be treated for symptoms of opioid withdrawal    -possible this contributed to her presentation. Also can cause seizure    #chronic pain  #fibromyalgia    -continue home pain regimen - pt states was not taking methocarbamol    #tobacco use    -nicotine patch, nicotine gum, encourage cessation    #abnormal UA  UA from ED visit 2020 positive for nitrite, trace blood, and WBCs. Unclear whether antibiotics were taken, but it does not seem that they were prescribed.    -repeat UA       SUBJECTIVE:  Chief complaint: \"I need to leave.\"    HPI:  Sheyla Ye is a 40 year old female with past medical history as listed above and below who presented to the ED with concern for altered mental status.  She reports that altered mental status has been occurring over the past 2 months after she suffered 2 seizures and a fall with a closed head injury. She was seen for altered mental status on 2020 in ED, 2020 with PCP and 2020 with PCP. She also has recent ED visits for UTI and for altered mental status and labored " breathing after taking 675 mg of CBD.    Sheyla states that she has been using kratom. Describes this as a powder which mixes into a tea. I did not clarify when she started taking this. She states that she wants to stop taking it. She has purchased this from a store and was utilizing for pain.     I did review current research on kratom alkaloids. There are not many studies regarding its effects. It can have opioid-like effects and cessation may cause withdrawal symptoms similar to opioid withdrawal. At low doses, it is considered a stimulant; at higher doses, it has sedative effects. It is illegal in some countries. The substance is legal in the [place] but is not regulated by the FDA. Adverse effects which have been observed do include seizures and psychosis. The half-life is unclear, especially due to multiple variants of the drug as it is unregulated.   Source:   CHANTEL Neil., KEVIN Terry., & EMILY Valles. 2012. Pharmacology of kratom: an emerging botanical agent with stimulant, analgesic and opioid-like effects. Journal of the [employer] Oste[employer] Association: v. 112 (792796).    Another concern was possible UTI. Sheyla had a UA result from an ED visit on 12/21/2020 which was positive for nitrite and WBCs. She is unsure if she received any antibiotics. She said that she took some macrobid but didn't finish the course; she is unsure if this was after the 12/21 visit to ED or the previous visit. She is not experiencing any urinary symptoms currently. As the UA in her chart is from 11 days ago and she denies current symptoms, I would like to obtain a repeat UA to see if treatment is necessary.    Sheyla's chief concern right now is that she wants to leave. She states that she doesn't belong in the psychiatric unit as she is not suicidal. States that she needs a cigarette. She states that she came to the ED for a physiological problem and is unsure why she was put on a 72-hour hold. She was encouraged to discuss  with primary team.    History was provided by patient and medical record.    Medical History  Sheyla has a past medical history of Anxiety, Chronic osteoarthritis, Chronic pain, Depressive disorder (1995), DJD (degenerative joint disease), lumbar, Munir-Danlos disease, Fibromyalgia, and Learning disability.      Surgical History  She has a past surgical history that includes wisdom teeth removal and Inject Epidural Cervical / Thoracic Single (N/A, 4/30/2019).       Social History  Reviewed, and she  reports that she has been smoking cigarettes. She started smoking about 21 years ago. She has a 7.50 pack-year smoking history. She has quit using smokeless tobacco. She reports previous alcohol use of about 6.7 standard drinks of alcohol per week. She reports that she does not use drugs.       Allergies  Allergies   Allergen Reactions     Seasonal Allergies     Family History  Reviewed, and   Family History   Problem Relation Age of Onset     Aneurysm Brother         20's, autopsy showed RP bleed     Thyroid Disease Mother      Anxiety Disorder Mother      Mental Illness Mother      Depression Mother      Genetic Disorder Mother      Migraines Mother      Aneurysm Other         brain aneurysm, 30's     Colon Cancer Other      Anxiety Disorder Sister      Mental Illness Sister      Cancer Paternal Grandmother      Hypertension Paternal Grandmother      Colon Cancer Paternal Grandmother      Cancer Paternal Grandfather      Hypertension Paternal Grandfather      Colon Cancer Paternal Grandfather      Depression Sister      Genetic Disorder Brother      Hypertension Father      Hyperlipidemia Father            Prior to Admission Medications  Medications Prior to Admission   Medication Sig Dispense Refill Last Dose     acetaminophen (TYLENOL) 500 MG tablet Take 1,000 mg by mouth every 6 hours as needed for mild pain        HERBALS         nortriptyline (PAMELOR) 10 MG capsule Take 10 mg by mouth At Bedtime         "amphetamine-dextroamphetamine (ADDERALL XR) 10 MG 24 hr capsule         amphetamine-dextroamphetamine (ADDERALL XR) 30 MG 24 hr capsule         amphetamine-dextroamphetamine (ADDERALL) 20 MG tablet Take 20 mg by mouth daily Take in afternoon        diazepam (VALIUM) 5 MG tablet Uses up to 3 daily.  0      diclofenac (VOLTAREN) 1 % topical gel Apply 2 g topically 4 times daily as needed for moderate pain 100 g 1      lamoTRIgine (LAMICTAL) 200 MG tablet Take 1 tablet (200 mg) by mouth daily 7 tablet 0      meloxicam (MOBIC) 7.5 MG tablet Take 1 tablet (7.5 mg) by mouth daily 30 tablet 5      methocarbamol (ROBAXIN) 500 MG tablet Take 1 tablet (500 mg) by mouth 4 times daily as needed for muscle spasms 60 tablet 3      omeprazole (PRILOSEC) 20 MG DR capsule Take 1 capsule (20 mg) by mouth daily 90 capsule 3      varenicline (CHANTIX CONTINUING MONTH DYLAN) 1 MG tablet Take 1 tablet (1 mg) by mouth 2 times daily Take 1 tablet by mouth twice daily. 60 tablet 3        Review of Systems:  12 point review of systems negative except for pertinent positives mentioned in HPI.    Physical Exam  BP 96/67   Pulse 83   Temp 98  F (36.7  C) (Oral)   Resp 16   Ht 1.676 m (5' 6\")   Wt 49.4 kg (109 lb)   SpO2 98%   BMI 17.59 kg/m      GENERAL APPEARANCE:  Alert, oriented, thin, anxious, cooperative  EYES:  PERRL, mydriasis, pupils 6mm  RESP:  respiratory effort and palpation of chest normal, lungs clear to auscultation , no respiratory distress  CV:  regular rate and rhythm, no murmur, rub, or gallop, no edema  ABDOMEN:  no guarding or rebound, bowel sounds hypoactive  M/S:   Gait and station normal  SKIN:  pale  NEURO:   TODD, no focal deficits  PSYCH:  oriented X 3, insight and judgement impaired, memory impaired , crying, anxious    Pertinent Labs   Recent Labs   Lab 01/01/21  1355      CO2 25   BUN 10   ALBUMIN 3.8   ALKPHOS 51   ALT 14   AST 10     Recent Labs   Lab 01/01/21  1355   WBC 7.4   HGB 12.6   HCT 37.6   PLT " 322     Lab Results: personally reviewed.     Imaging  CTA Head Neck with Contrast  Narrative: CTA  HEAD NECK WITH CONTRAST 11/11/2020 1:23 PM  CT angiogram of the neck   CT angiogram of the base of the brain with contrast  Reconstruction by the Radiologist on the 3D workstation  Provided History:  Syncope, possible seizure like activity, h/o Munir  Danlos  Comparison:  Head CT from 10/8/2018..    Impression: Impression:    1. Head CTA demonstrates no aneurysm or stenosis of the major  intracranial arteries.   2. Neck CTA demonstrates no stenosis of the major cervical arteries.    CT Head w/o Contrast  Narrative: CT HEAD W/O CONTRAST 11/11/2020 1:19 PM  History: S/p fall, syncope.  Comparison: Head CT from 10/8/2018.  Impression: Impression: No acute intracranial pathology.     Disposition plan:   Discharge planning per psychiatry.    Total time spent in direct patient care and management included more than 50% of time spent in coordination of care including time with the patient in face to face contact as well as coordination with staff, telephone consultation with pharmacy, research of patient's medications, checking all medications through medication interaction program, and review of medical records.    Lelia Blancas, APRN, CNP, ACN-[facility] Medicine Team  [facility]

## 2021-01-01 NOTE — ED PROVIDER NOTES
"  History     Chief Complaint   Patient presents with     Altered Mental Status     The history is provided by the patient and medical records.     Sheyla Ye is a 40 year old female with a medical history signficant for Munir-Danlos disease, fibromyalgia, DJD, anxiety, and depressive disorder who presents to the Emergency Department for evaluation of an altered mental status. Patient reports that her brain has been on \"complete repeat\"; there is a constant repeat of \"these negative and positive feeds\", song lyrics, or counting.  Patient states that this \"constant repeats\" started approximately 2 months ago after an episode of 2 simultaneous seizures which resulted in a fall and head trauma.  Patient denies being on seizure medications. Patient denies any SI or HI; however, notes that she is concerned that she will hurt others because her brain is on repeat.  When asked about hallucinations, patient reports that she is \"unsure what to report\" as she is \"trying to respect the government and want them to tell us what's going on\".  Additionally, patient report memory loss, states that she \"spaces out\".  Patient is able to state where she is and what year it is.  Patient reports that she had a phone call with her PCP who recommended that she be seen by the concussion ; patient notes that she has an appointment 03/2021.  Patient states that she has a psychiatrist, however, needs to set up therapy. She reports chills, intermittent head pain, dizziness, and tremors.  Patient denies any new falls or head trauma.  Denies any nausea, vomiting, chest pain, shortness of breath, abdominal pain, appetite change, or gait problem.  Reports that she smokes; however, denies any other substance use or alcohol use.  She reports a history of depression and anxiety and takes medications for them. Patient states that she currently lives with a friend.  Patient currently feels safe at the ED.     Per chart review, patient " "was seen here at the [facility] ED on 12/21/2020 for the same chief complaint of an altered mental status. During the visit, patient reported that her brain was \"stuck on repeat\" with song lyrics and thoughts after sustaining a head trauma approximately 1 month ago.  Patient was evaluated with labs which were unremarkable. Patient denied any SI and declined further mental health assessment.  She was recommended to follow-up with primary care team for further medical evaluation. During a visit with here PCP Patient was also seen on 11/11/2020 for a neurologic problem.  At that time, patient reported a seizure episode where she felt lightheaded, fell, hit her head, and lost conscious. Patient was evaluated with labs and imaging which were unremarkable.  Patient was virtually seen by her PCP, Dr. Myrna Watson, on 12/18/2020 for evaluation of the same chief complaint. Patient's CT were normal; it was also noted that the patient usage of CBD oil may be a possible contributor to her memory loss.    I have reviewed the Medications, Allergies, Past Medical and Surgical History, and Social History in the [employer] system.  PAST MEDICAL HISTORY:   Past Medical History:   Diagnosis Date     Anxiety      Chronic osteoarthritis      Chronic pain      Depressive disorder 1995     DJD (degenerative joint disease), lumbar      Munir-Danlos disease      Fibromyalgia      Learning disability        PAST SURGICAL HISTORY:   Past Surgical History:   Procedure Laterality Date     INJECT EPIDURAL CERVICAL / THORACIC SINGLE N/A 4/30/2019    Procedure: Cervical Epidural Steroid Injection;  Surgeon: Swapnil Carlisle MD;  Location: [facility] OR     wisdom teeth removal         Past medical history, past surgical history, medications, and allergies were reviewed with the patient. Additional pertinent items: None    FAMILY HISTORY:   Family History   Problem Relation Age of Onset     Aneurysm Brother         20's, autopsy showed RP bleed     " Thyroid Disease Mother      Anxiety Disorder Mother      Mental Illness Mother      Depression Mother      Genetic Disorder Mother      Migraines Mother      Aneurysm Other         brain aneurysm, 30's     Colon Cancer Other      Anxiety Disorder Sister      Mental Illness Sister      Cancer Paternal Grandmother      Hypertension Paternal Grandmother      Colon Cancer Paternal Grandmother      Cancer Paternal Grandfather      Hypertension Paternal Grandfather      Colon Cancer Paternal Grandfather      Depression Sister      Genetic Disorder Brother      Hypertension Father      Hyperlipidemia Father        SOCIAL HISTORY:   Social History     Tobacco Use     Smoking status: Current Every Day Smoker     Packs/day: 0.50     Years: 15.00     Pack years: 7.50     Types: Cigarettes     Start date: 9/1/1999     Smokeless tobacco: Former User   Substance Use Topics     Alcohol use: Not Currently     Alcohol/week: 6.7 standard drinks     Comment: Moderate      Social history was reviewed with the patient. Additional pertinent items: None      Current Discharge Medication List      CONTINUE these medications which have NOT CHANGED    Details   acetaminophen (TYLENOL) 500 MG tablet Take 1,000 mg by mouth every 6 hours as needed for mild pain      amphetamine-dextroamphetamine (ADDERALL XR) 10 MG 24 hr capsule Take 10 mg by mouth daily      amphetamine-dextroamphetamine (ADDERALL XR) 30 MG 24 hr capsule Take 30 mg by mouth daily      amphetamine-dextroamphetamine (ADDERALL) 20 MG tablet Take 20 mg by mouth daily as needed      diazepam (VALIUM) 5 MG tablet Take 5 mg by mouth every 8 hours as needed for anxiety      diclofenac (VOLTAREN) 1 % topical gel Apply 2 g topically 4 times daily as needed for moderate pain  Qty: 100 g, Refills: 1    Associated Diagnoses: Cervical spondylosis without myelopathy      HERBALS       lamoTRIgine (LAMICTAL) 200 MG tablet Take 1 tablet (200 mg) by mouth daily  Qty: 7 tablet, Refills: 0     "  meloxicam (MOBIC) 7.5 MG tablet Take 1 tablet (7.5 mg) by mouth daily  Qty: 30 tablet, Refills: 5    Associated Diagnoses: Cervical spondylosis      methocarbamol (ROBAXIN) 500 MG tablet Take 1 tablet (500 mg) by mouth 4 times daily as needed for muscle spasms  Qty: 60 tablet, Refills: 3    Associated Diagnoses: Cervical spondylosis      nortriptyline (PAMELOR) 10 MG capsule Take 10 mg by mouth At Bedtime      omeprazole (PRILOSEC) 20 MG DR capsule Take 1 capsule (20 mg) by mouth daily  Qty: 90 capsule, Refills: 3    Associated Diagnoses: Encounter for monitoring chronic NSAID therapy      varenicline (CHANTIX CONTINUING MONTH DYLAN) 1 MG tablet Take 1 tablet (1 mg) by mouth 2 times daily Take 1 tablet by mouth twice daily.  Qty: 60 tablet, Refills: 3    Associated Diagnoses: Tobacco use disorder                Allergies   Allergen Reactions     Seasonal Allergies         Review of Systems   Constitutional: Positive for chills. Negative for fever.   HENT: Negative for congestion.    Eyes: Negative for redness.   Respiratory: Negative for shortness of breath.    Cardiovascular: Negative for chest pain.   Gastrointestinal: Negative for abdominal pain.   Genitourinary: Negative for difficulty urinating.   Musculoskeletal: Negative for arthralgias and neck stiffness.   Skin: Negative for color change.   Neurological: Positive for dizziness, tremors, light-headedness and headaches. Negative for numbness.   Psychiatric/Behavioral: Positive for decreased concentration. Negative for confusion, hallucinations, self-injury and suicidal ideas.        Positive for memory loss     A complete review of systems was performed with pertinent positives and negatives noted in the HPI, and all other systems negative.    Physical Exam   BP: 122/61  Pulse: 89  Temp: 98  F (36.7  C)  Resp: 16  Height: 167.6 cm (5' 6\")  Weight: 49.5 kg (109 lb 1.6 oz)  SpO2: 100 %      Physical Exam   General: Afebrile, no acute distress   HEENT: " Normocephalic, atraumatic, conjunctivae normal. MMM  Neck: non-tender, supple  Cardio: regular rate. regular rhythm   Resp: Normal work of breathing, no respiratory distress, lungs clear bilaterally, no wheezing, rhonchi, rales  Chest/Back: no visual signs of trauma, no CVA tenderness   Abdomen: soft, non distension, no tenderness, no peritoneal signs   Neuro: alert and fully oriented. CN II-XII grossly intact. Grossly normal strength and sensation in all extremities.   MSK: no deformities. Normal range of motion  Integumentary/Skin: no rash visualized, normal color  Psych: odd affect, thought content is paranoid, no suicide ideation, no homicidal ideation, normal behavior    ED Course        Procedures     1:16 AM  The patient was seen and examined by  Alla Plaza MD in Room ED17.         Assessments & Plan (with Medical Decision Making)   Sheyla Ye is a 40 year old female with a medical history signficant for Munir-Danlos disease, fibromyalgia, DJD, anxiety, and depressive disorder who presents to the Emergency Department for evaluation of an altered mental status and feeling if her brain is on repeat.  Upon arrival patient is well-appearing, afebrile, no distress.  Patient reports ongoing symptoms of paranoia, thought broadcasting, being focused on one thing and unable to form memories with other things as she is focused on certain things and voices.  Patient feels like her mind is on repeat 24/7.  Denies any suicidal ideation, homicidal ideation, intent to self-harm.  Behavioral health  eval the patient as well as myself and at this time concern for possible depression with psychosis along with paranoia.  Given patient's impaired judgment, insight, ongoing psychotic symptoms and concerns for patient's ability to care for self will place patient on a 72-hour hold for inpatient psychiatric hospitalization for further evaluation, treatment, and stabilization of patient's psychosis,  paranoia.      I have reviewed the nursing notes.    I have reviewed the findings, diagnosis, plan and need for follow up with the patient.    Current Discharge Medication List          Final diagnoses:   Paranoia (H)   Depression, unspecified depression type   Altered mental status, unspecified altered mental status type     I, Deann Sarabia, am serving as a trained medical scribe to document services personally performed by Alla Plaza MD, based on the provider's statements to me.     I, Alla Plaza MD, was physically present and have reviewed and verified the accuracy of this note documented by Deann Sarabia.    Alla Plaza MD  1/1/2021   [facility] EMERGENCY DEPARTMENT     Alla Plaza MD  01/02/21 0625
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
## 2021-01-01 NOTE — ED TRIAGE NOTES
Patient arrives ambulatory to triage with complaints of memory issues.  She states it has been going on for a month.  Pt states symptoms have been getting worse.

## 2021-01-02 LAB
ALBUMIN UR-MCNC: 10 MG/DL
APPEARANCE UR: ABNORMAL
BACTERIA #/AREA URNS HPF: ABNORMAL /HPF
BILIRUB UR QL STRIP: NEGATIVE
CHOLEST SERPL-MCNC: 133 MG/DL
COLOR UR AUTO: YELLOW
ERYTHROCYTE [SEDIMENTATION RATE] IN BLOOD BY WESTERGREN METHOD: 5 MM/H (ref 0–20)
GLUCOSE UR STRIP-MCNC: NEGATIVE MG/DL
HDLC SERPL-MCNC: 64 MG/DL
HGB UR QL STRIP: NEGATIVE
KETONES UR STRIP-MCNC: NEGATIVE MG/DL
LDLC SERPL CALC-MCNC: 56 MG/DL
LEUKOCYTE ESTERASE UR QL STRIP: ABNORMAL
MUCOUS THREADS #/AREA URNS LPF: PRESENT /LPF
NITRATE UR QL: POSITIVE
NONHDLC SERPL-MCNC: 69 MG/DL
PH UR STRIP: 5.5 PH (ref 5–7)
RBC #/AREA URNS AUTO: 0 /HPF (ref 0–2)
SOURCE: ABNORMAL
SP GR UR STRIP: 1.02 (ref 1–1.03)
SQUAMOUS #/AREA URNS AUTO: 7 /HPF (ref 0–1)
TRIGL SERPL-MCNC: 64 MG/DL
TSH SERPL DL<=0.005 MIU/L-ACNC: 0.69 MU/L (ref 0.4–4)
UROBILINOGEN UR STRIP-MCNC: NORMAL MG/DL (ref 0–2)
WBC #/AREA URNS AUTO: 19 /HPF (ref 0–5)

## 2021-01-02 PROCEDURE — 82390 ASSAY OF CERULOPLASMIN: CPT | Performed by: PSYCHIATRY & NEUROLOGY

## 2021-01-02 PROCEDURE — 81001 URINALYSIS AUTO W/SCOPE: CPT | Performed by: NURSE PRACTITIONER

## 2021-01-02 PROCEDURE — 36415 COLL VENOUS BLD VENIPUNCTURE: CPT | Performed by: PSYCHIATRY & NEUROLOGY

## 2021-01-02 PROCEDURE — 80061 LIPID PANEL: CPT | Performed by: PSYCHIATRY & NEUROLOGY

## 2021-01-02 PROCEDURE — 87186 SC STD MICRODIL/AGAR DIL: CPT | Performed by: PSYCHIATRY & NEUROLOGY

## 2021-01-02 PROCEDURE — 85652 RBC SED RATE AUTOMATED: CPT | Performed by: PSYCHIATRY & NEUROLOGY

## 2021-01-02 PROCEDURE — 87088 URINE BACTERIA CULTURE: CPT | Performed by: PSYCHIATRY & NEUROLOGY

## 2021-01-02 PROCEDURE — 87389 HIV-1 AG W/HIV-1&-2 AB AG IA: CPT | Performed by: PSYCHIATRY & NEUROLOGY

## 2021-01-02 PROCEDURE — 250N000013 HC RX MED GY IP 250 OP 250 PS 637: Performed by: NURSE PRACTITIONER

## 2021-01-02 PROCEDURE — 250N000013 HC RX MED GY IP 250 OP 250 PS 637: Performed by: PSYCHIATRY & NEUROLOGY

## 2021-01-02 PROCEDURE — 124N000002 HC R&B MH UMMC

## 2021-01-02 PROCEDURE — 86618 LYME DISEASE ANTIBODY: CPT | Performed by: PSYCHIATRY & NEUROLOGY

## 2021-01-02 PROCEDURE — 86038 ANTINUCLEAR ANTIBODIES: CPT | Performed by: PSYCHIATRY & NEUROLOGY

## 2021-01-02 PROCEDURE — 87086 URINE CULTURE/COLONY COUNT: CPT | Performed by: PSYCHIATRY & NEUROLOGY

## 2021-01-02 PROCEDURE — 84443 ASSAY THYROID STIM HORMONE: CPT | Performed by: PSYCHIATRY & NEUROLOGY

## 2021-01-02 RX ORDER — NITROFURANTOIN 25; 75 MG/1; MG/1
100 CAPSULE ORAL EVERY 12 HOURS SCHEDULED
Status: DISCONTINUED | OUTPATIENT
Start: 2021-01-02 | End: 2021-01-04

## 2021-01-02 RX ADMIN — DICLOFENAC SODIUM 2 G: 10 GEL TOPICAL at 09:37

## 2021-01-02 RX ADMIN — OMEPRAZOLE 20 MG: 20 CAPSULE, DELAYED RELEASE ORAL at 09:28

## 2021-01-02 RX ADMIN — ACETAMINOPHEN 650 MG: 325 TABLET, FILM COATED ORAL at 02:15

## 2021-01-02 RX ADMIN — NITROFURANTOIN (MONOHYDRATE/MACROCRYSTALS) 100 MG: 75; 25 CAPSULE ORAL at 20:28

## 2021-01-02 RX ADMIN — OLANZAPINE 10 MG: 5 TABLET, FILM COATED ORAL at 12:08

## 2021-01-02 RX ADMIN — ACETAMINOPHEN 650 MG: 325 TABLET, FILM COATED ORAL at 09:32

## 2021-01-02 RX ADMIN — MELOXICAM 7.5 MG: 7.5 TABLET ORAL at 09:27

## 2021-01-02 RX ADMIN — LAMOTRIGINE 200 MG: 100 TABLET ORAL at 09:28

## 2021-01-02 RX ADMIN — DICLOFENAC SODIUM 2 G: 10 GEL TOPICAL at 16:06

## 2021-01-02 RX ADMIN — DIAZEPAM 5 MG: 5 TABLET ORAL at 09:28

## 2021-01-02 RX ADMIN — NITROFURANTOIN (MONOHYDRATE/MACROCRYSTALS) 100 MG: 75; 25 CAPSULE ORAL at 09:28

## 2021-01-02 RX ADMIN — NICOTINE 7 MG/24 HR DAILY TRANSDERMAL PATCH 1 PATCH: at 09:29

## 2021-01-02 RX ADMIN — DIAZEPAM 5 MG: 5 TABLET ORAL at 20:28

## 2021-01-02 RX ADMIN — DICLOFENAC SODIUM 2 G: 10 GEL TOPICAL at 20:26

## 2021-01-02 RX ADMIN — DIAZEPAM 5 MG: 5 TABLET ORAL at 16:06

## 2021-01-02 ASSESSMENT — ACTIVITIES OF DAILY LIVING (ADL)
ORAL_HYGIENE: INDEPENDENT
DRESS: SCRUBS (BEHAVIORAL HEALTH);INDEPENDENT
LAUNDRY: UNABLE TO COMPLETE
HYGIENE/GROOMING: INDEPENDENT

## 2021-01-02 NOTE — PROGRESS NOTES
"Initial Psychosocial Assessment    I have reviewed the chart, met with the patient, and developed Care Plan.  Information for assessment was obtained from:   Pt was not very forthcoming with information and she was very focused on being discharged. Pt repeated often that she is not mentally ill and is being held against her will.     Patient: Interview and Chart: Reviewed    Presenting Problem:  Per H&P: This patient is a 40 year old  female with history of concussion, depression, anxiety, and ADHD who presented to ED with symptoms of psychosis in context of frequent CBD oil use, daily Adderall use, Kratom use and recent concussion. Symptoms and presentation at this time is most consistent with psychosis, unspecified     History of Mental Health and Chemical Dependency:  Pt denies all mental health history but in chart review pt has had several encounters with mental health providers in the past few years.     Family Description (Constellation, Family Psychiatric History):  Pt reports depression and anxiety in her family.     Significant Life Events (Illness, Abuse, Trauma, Death):  Has a history of PTSD but does not want to discuss this.     Living Situation:  Lives with a disabled man whom she cares for in turn for free housing.    Educational Background:  Some nursing school    Occupational History:  Caregiver    Financial Status:  [place]    Legal Issues:  None    Ethnic/Cultural Issues:  American    Spiritual Orientation:  States that she is \"unsure\"     Service History:  None    Social Functioning (organization, interests):  Unable to assess    Current Treatment Providers are:  States that she does not want to give her provider information as she does not want them to know about this hospitalization.    Social Service Assessment/Plan:  Patient has been admitted for psychiatric stabilization for this acute crisis. Patient will meet with treatment team including a psychiatrist to have psychiatric " assessment and medication management. Team will coordinate with the any outpatient medication providers to review and adjust medications as appropriate. Staff will provide therapeutic programming and structure to help maintain a safe environment for healing. Staff will continue to assess patient as needed. Patient will participate in unit groups and activities. Patient will receive individual and group support on the unit. CTC will coordinate with outpatient providers and will place referrals to ensure appropriate follow up care is in place.

## 2021-01-02 NOTE — PROGRESS NOTES
"Brief Medicine Note  Today's vital signs, medications, and nursing notes were reviewed. Labs reviewed.     BP (!) 89/52   Pulse 72   Temp 98  F (36.7  C) (Oral)   Resp 16   Ht 1.676 m (5' 6\")   Wt 49.4 kg (109 lb)   SpO2 95%   BMI 17.59 kg/m      Assessment:  Patient admitted with altered mental status.  Work-up for psychosis is in progress.  Labs are negative to date with exception of a UA which is positive for UTI.    Plan:  1. Start macrobid course for UTI  2. Hospital med team to follow peripherally for culture/sensitivities of UC    Please page if other concerns arise.    Geni Blancas, APRN CNP    "

## 2021-01-02 NOTE — PLAN OF CARE
"This morning after received 0800 medications patient said \"I'm not suppose to be here. I came in as a voluntary patient and then they put me on a 72 hold. I want to know what's going on, why am I here, what my results are. Writer told patient would read chart and get back to patient. Middle morning writer spoke with patient and patient kept repeating herself, \"the doctor told me I could go after I had my lipids drawn and now I might not be able to go until Wednesday. Patient started shouting, \"I'm going to day, get someone on the phone who will release me. I want my Adderol, I want my Coke, I want to go outside and get some fresh air and have a cigarette. Writer would try and explain to patient why she couldn't leave today and patient just got more upset not letting writer speak. I respect the government, I'm saying things they told me not to say. I have a poly graph in my vagina. I'm going to go go go go go go go today!! I'm not staying here until Monday. I want to talk to the doctor. People lied to me here, the doctor did, she told me I could leave after I had my lipids drawn, the ER lied to me, they told me I was a voluntary patient and they put in on a hold, strapped me down and brought me here.\" Patient spoke/yelled for half an hour to 45 minutes. Writer told patient to stop yelling. Writer offered Zyprexa which patient declined. Writer encouraged patient to call patient relations. \"I'm not going to use the phone and be yelling in the hallway!\" Writer told patient she needed to stop yelling. Writer brought patient her book, chap stick and lemon lime soda. Patient quieted down for a short while and then started yelling in room The entire unit could hear patient yelling. Patient was in bathroom sitting on the floor yelling. \"I'm going home today. 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 Let me talk to someone who will sukhdeep me out of here! I'm am getting " "much worse in here! I've never been like this before. I'm only going to continue to get worse! I'm going today. Two nurses were speaking to patient to try to calm her down and encourage her to take Zyprexa which patient kept declining. Patient would not stop yelling and demanding to leave. Both writer's told patient if she did not take the oral Zyprexa she would get a shot. Patient took the oral Zyprexa and kept yelling for awhile, it's not going to help me!\" Patient eventually started to calm down. Patient ate lunch and then went to sleep.  "

## 2021-01-02 NOTE — PHARMACY-ADMISSION MEDICATION HISTORY
Admission Medication History Completed by Pharmacy    See [employer] Admission Navigator for allergy information, preferred outpatient pharmacy and prior to admission medications.     Medication History Sources:     Prescription fill history ([facility] and [facility]) via Epic Surescripts data        Additional Information:    Admission medication history completed to best ability.  Writer unable to interview patient due to current mental health status. PTA medication list updated based on pharmacy fill records. Last doses and overall medication adherence unknown. Any over the counter products, vitamins or supplements may not be accurately reflected in the PTA medication list.      Unit RN was able to confirm with patient last dose of lamotrigine was on 12/31/2020.      Majority of medications are filled at [facility] pharmacy. All refills up to date except for varenicline last filled in Sept 2020.       The following medications are filled at [facility] in [place] ([address]): Adderall XR 10mg, Adderall XR 30mg, Adderall 20mg, diazepam 5mg and lamotrigine 200mg. Due to long holiday weekend, pharmacy currently closed, so unable to verify exact prescription directions. However, based on quantity/day supply in , directions listed in PTA med list below appear accurate.      [place] :  1) Adderall XR 10mg capsules, qty #30 (30 days) last filled 12-2-2020  2) Adderall XR 30mg capsules, qty #30 (30 days) last filled 12-2-2020  3) Adderall 20mg tablets, qty #30 (30 days) last filled 12-2-2020  4) Diazepam 5mg tablets, qty #90 (30 days) last filled 12-2-2020    Prior to Admission medications    Medication Sig Last Dose  Auth Provider   acetaminophen (TYLENOL) 500 MG tablet Take 1,000 mg by mouth every 6 hours as needed for mild pain       Unknown, Entered By History   amphetamine-dextroamphetamine (ADDERALL XR) 10 MG 24 hr capsule     Take 10 mg by mouth daily   Unknown,  Entered By History   amphetamine-dextroamphetamine (ADDERALL XR) 30 MG 24 hr capsule     Take 30 mg by mouth daily   Unknown, Entered By History   amphetamine-dextroamphetamine (ADDERALL) 20 MG tablet     Take 20 mg by mouth daily as needed   Unknown, Entered By History   diazepam (VALIUM) 5 MG tablet Take 5 mg by mouth every 8 hours as needed for anxiety       Unknown, Entered By History   diclofenac (VOLTAREN) 1 % topical gel Apply 2 g topically 4 times daily as needed for moderate pain       Karen Rojas MD          lamoTRIgine (LAMICTAL) 200 MG tablet Take 1 tablet (200 mg) by mouth daily     12/31/2020  Luh Bernard MD   meloxicam (MOBIC) 7.5 MG tablet Take 1 tablet (7.5 mg) by mouth daily       Karen Rojas MD   methocarbamol (ROBAXIN) 500 MG tablet Take 1 tablet (500 mg) by mouth 4 times daily as needed for muscle spasms       Karen Rojas MD   nortriptyline (PAMELOR) 10 MG capsule Take 10 mg by mouth At Bedtime       Reported, Patient   omeprazole (PRILOSEC) 20 MG DR capsule     Take 1 capsule (20 mg) by mouth daily   Myrna Watson MD   varenicline (CHANTIX CONTINUING MONTH DYLAN) 1 MG tablet Take 1 tablet (1 mg) by mouth 2 times daily Take 1 tablet by mouth twice daily.       Myrna Watson MD     Date completed: 01/01/21    Medication history completed by:   Luh Mckeon, Pharm.D., [facility]  Behavioral Health Inpatient Pharmacist  [facility] ([facility] Emergency Department  Phone: *[phone] ([employer]) or [phone]

## 2021-01-02 NOTE — PLAN OF CARE
"Pt very upset about being on a 72 hr hold, \"I thought I was voluntary and that I could go out to smoke \". She is concerned about getting Adderall . She is placed on seizure precautions , opiate withdrawal scale is 2 . Pt stayed in room in bed most of shift , she did not want to eat because she states she is have her lipids drawn in the am. Denies si  "

## 2021-01-02 NOTE — PROGRESS NOTES
Observed to have slept well for approx 5.25 hrs overnight.  Pleasantly cooperative when awakened at appro 0215 for Opiate Scale Withdrawal Assessment - Score = 2.  B/P 89/52 - Encouraged and drank approx 360 ml's of water.   Seizure mats  on floor on each side of bed. Complained of recurrent headache and was medicated w/ Tylenol.  Ice pk declined. Urine collected and sent to lab for UA/UC which resulted in an abnormal U/A;  UC results in progress.  Awakened again by extremely disruptive peer but eventually able to get back to sleep following supportive, realistic intervention.  Safe, therapeutic environment maintained.

## 2021-01-03 LAB
BACTERIA SPEC CULT: ABNORMAL
HIV 1+2 AB+HIV1 P24 AG SERPL QL IA: NONREACTIVE
Lab: ABNORMAL
SPECIMEN SOURCE: ABNORMAL

## 2021-01-03 PROCEDURE — 250N000013 HC RX MED GY IP 250 OP 250 PS 637: Performed by: NURSE PRACTITIONER

## 2021-01-03 PROCEDURE — 124N000002 HC R&B MH UMMC

## 2021-01-03 PROCEDURE — 250N000013 HC RX MED GY IP 250 OP 250 PS 637: Performed by: PSYCHIATRY & NEUROLOGY

## 2021-01-03 RX ORDER — METHOCARBAMOL 500 MG/1
500 TABLET ORAL 4 TIMES DAILY PRN
Status: DISCONTINUED | OUTPATIENT
Start: 2021-01-03 | End: 2021-01-04 | Stop reason: HOSPADM

## 2021-01-03 RX ORDER — ACETAMINOPHEN 500 MG
1000 TABLET ORAL 3 TIMES DAILY PRN
Status: DISCONTINUED | OUTPATIENT
Start: 2021-01-03 | End: 2021-01-04 | Stop reason: HOSPADM

## 2021-01-03 RX ADMIN — ACETAMINOPHEN 650 MG: 325 TABLET, FILM COATED ORAL at 09:52

## 2021-01-03 RX ADMIN — TRAZODONE HYDROCHLORIDE 50 MG: 50 TABLET ORAL at 19:21

## 2021-01-03 RX ADMIN — HYDROXYZINE HYDROCHLORIDE 25 MG: 25 TABLET, FILM COATED ORAL at 19:21

## 2021-01-03 RX ADMIN — NITROFURANTOIN (MONOHYDRATE/MACROCRYSTALS) 100 MG: 75; 25 CAPSULE ORAL at 19:21

## 2021-01-03 RX ADMIN — DIAZEPAM 5 MG: 5 TABLET ORAL at 19:21

## 2021-01-03 RX ADMIN — NITROFURANTOIN (MONOHYDRATE/MACROCRYSTALS) 100 MG: 75; 25 CAPSULE ORAL at 09:31

## 2021-01-03 RX ADMIN — DICLOFENAC SODIUM 2 G: 10 GEL TOPICAL at 19:22

## 2021-01-03 RX ADMIN — GABAPENTIN 200 MG: 100 CAPSULE ORAL at 19:22

## 2021-01-03 RX ADMIN — DICLOFENAC SODIUM 2 G: 10 GEL TOPICAL at 16:12

## 2021-01-03 RX ADMIN — NICOTINE 7 MG/24 HR DAILY TRANSDERMAL PATCH 1 PATCH: at 09:30

## 2021-01-03 RX ADMIN — DIAZEPAM 5 MG: 5 TABLET ORAL at 14:06

## 2021-01-03 RX ADMIN — ACETAMINOPHEN 650 MG: 325 TABLET, FILM COATED ORAL at 14:06

## 2021-01-03 RX ADMIN — DIAZEPAM 5 MG: 5 TABLET ORAL at 09:29

## 2021-01-03 RX ADMIN — OMEPRAZOLE 20 MG: 20 CAPSULE, DELAYED RELEASE ORAL at 09:30

## 2021-01-03 RX ADMIN — ACETAMINOPHEN 1000 MG: 500 TABLET ORAL at 19:34

## 2021-01-03 RX ADMIN — METHOCARBAMOL 500 MG: 500 TABLET ORAL at 15:43

## 2021-01-03 RX ADMIN — OLANZAPINE 10 MG: 5 TABLET, FILM COATED ORAL at 18:31

## 2021-01-03 RX ADMIN — LAMOTRIGINE 200 MG: 100 TABLET ORAL at 09:29

## 2021-01-03 RX ADMIN — MELOXICAM 7.5 MG: 7.5 TABLET ORAL at 09:30

## 2021-01-03 NOTE — PROGRESS NOTES
Writer was unable to obtain any information on pt's mental status this evening. Pt spent the entire evening in her room and did not come out at all. Pt ate dinner in her room, her tray was brought to her, and it was taken out of her room by staff when she was done. Pt was withdrawn and socially isolative throughout the evening. Writer was BOUCHRA information regarding SI, SIB, HI, or other mental health symptoms. Nursing was informed.        01/02/21 9779   Psycho Education   Type of Intervention 1:1 intervention   Response unavailable   Treatment Detail check-in   Behavioral Health   Hallucinations   (Writer was BOUCHRA any information on pt's mental status )   Activity isolative;withdrawn   Activities of Daily Living   Laundry unable to complete

## 2021-01-03 NOTE — PROGRESS NOTES
"patient continued previously noted trends this afternoon. She spent almost an hour yelling in her room that she \"shouldn't be here\" and \"needs to go!\" patient somewhat open to redirection, but mostly overtalked staff during attempted interventions. Other than this, however, patient kept largely to herself throughout the shift and stayed in her room.    "

## 2021-01-03 NOTE — PROGRESS NOTES
Brief Medicine Note:    Medicine following peripherally for follow-up of pending UC/sensitivities. See initial consult note by Lelia Blancas on 1/1. Started on macrobid on 1/2.    UC growing > 100K E. Coli sensitive to nitrofurantoin (pansensitive)    Plan:  - continue macrobid 5 day course for UTI  - notify IM if having any fevers or develops urinary symptoms    Medicine will sign off at this time. Please reach out with any further questions or concerns.    Bee Peña PA-C  Internal Medicine [facility] Hospitalist  [facility]  Pager: [phone]

## 2021-01-03 NOTE — PROGRESS NOTES
"This morning patient told writer she shouldn't be here and wants to be discharged, \"I'm just going to sleep all day if I can't leave and if I can't get my Adderoll.\" Patient slept/laid in bed all morning. Ate breakfast and lunch. This afternoon patient came to nurses desk demanding to talk to someone who will discharge her. Patient also said was in a lot of pain, \"my pain is a 10 out of a 10.\" Writer spoke with patient in room and explained to patient several times was on a 72 hour hold and cannot let her go. Patient started talking louder and louder and writer kept telling patient to keep her voice down. Patient would repeat over many times wanted/demanded to speak with someone who could discharge her, \"I'm getting worse here, I'm only going to continue to get worse if I don't get out of here. There is a disabled man in a wheel chair who needs to be taken care of! I need to go go go go go go.\" Writer offered patient Zyprexa which patient declined. Patient was c/o nausea but declined Zofran. Patient said bed was uncomfortable, writer offered an egg crate mattress which patient declined. Writer spoke with  asking if patient could have more pain medication which Dr. Vann ordered. Writer told Dr. Vann patient was demanding to leave and wanted to speak with her. Dr. Vann said to tell patient writer confirmed with Dr. Vann the attending psychiatrist will see her Monday. Writer told patient what Dr. Vann said and that Dr. Vann ordered Robaxin and increased the Tylenol to ES. Writer asked patient if she would sign ROIs for her provider and family members which patient declined. Patient demanded many times to be sent to the ER on a cart with guards and \"will be fine down there.' Writer told patient to lower her voice. Declined Zyprexa.  Writer again told patient is on a 72 hour hold and cannot go to the ER. A short time later patient came to the nurses desk asking a for pain medication. Robaxin " was administered.

## 2021-01-03 NOTE — PROGRESS NOTES
Observed to have slept well w/o complaints or noted distress for approx 7.5 Hrs overnight.  Pleasantly cooperative when awakened by staff at approx 0245 for Opiate scale withdrawal assessment.  Score = 1;  No s/s of withdrawal at this time.  Respirations regular and unlabored.  Safe, therapeutic environment maintained.

## 2021-01-04 VITALS
HEART RATE: 93 BPM | DIASTOLIC BLOOD PRESSURE: 67 MMHG | WEIGHT: 109 LBS | OXYGEN SATURATION: 97 % | SYSTOLIC BLOOD PRESSURE: 108 MMHG | HEIGHT: 66 IN | BODY MASS INDEX: 17.52 KG/M2 | RESPIRATION RATE: 16 BRPM | TEMPERATURE: 97.6 F

## 2021-01-04 LAB
ANA SER QL IF: NEGATIVE
B BURGDOR IGG+IGM SER QL: 0.08 (ref 0–0.89)
CERULOPLASMIN SERPL-MCNC: 14 MG/DL (ref 20–60)
VIT B1 BLD-MCNC: 126 NMOL/L (ref 70–180)

## 2021-01-04 PROCEDURE — 99239 HOSP IP/OBS DSCHRG MGMT >30: CPT | Mod: 95 | Performed by: PSYCHIATRY & NEUROLOGY

## 2021-01-04 PROCEDURE — 250N000013 HC RX MED GY IP 250 OP 250 PS 637: Performed by: NURSE PRACTITIONER

## 2021-01-04 PROCEDURE — 250N000013 HC RX MED GY IP 250 OP 250 PS 637: Performed by: PSYCHIATRY & NEUROLOGY

## 2021-01-04 RX ORDER — NITROFURANTOIN 25; 75 MG/1; MG/1
100 CAPSULE ORAL ONCE
Status: COMPLETED | OUTPATIENT
Start: 2021-01-04 | End: 2021-01-04

## 2021-01-04 RX ORDER — NITROFURANTOIN 25; 75 MG/1; MG/1
100 CAPSULE ORAL EVERY 12 HOURS
Qty: 4 CAPSULE | Refills: 0 | Status: SHIPPED | OUTPATIENT
Start: 2021-01-04 | End: 2021-02-19

## 2021-01-04 RX ADMIN — NITROFURANTOIN (MONOHYDRATE/MACROCRYSTALS) 100 MG: 75; 25 CAPSULE ORAL at 08:47

## 2021-01-04 RX ADMIN — DIAZEPAM 5 MG: 5 TABLET ORAL at 08:47

## 2021-01-04 RX ADMIN — MELOXICAM 7.5 MG: 7.5 TABLET ORAL at 13:20

## 2021-01-04 RX ADMIN — DIAZEPAM 5 MG: 5 TABLET ORAL at 13:21

## 2021-01-04 RX ADMIN — OMEPRAZOLE 20 MG: 20 CAPSULE, DELAYED RELEASE ORAL at 08:47

## 2021-01-04 RX ADMIN — NICOTINE 7 MG/24 HR DAILY TRANSDERMAL PATCH 1 PATCH: at 08:48

## 2021-01-04 RX ADMIN — LAMOTRIGINE 200 MG: 100 TABLET ORAL at 08:47

## 2021-01-04 RX ADMIN — ACETAMINOPHEN 1000 MG: 500 TABLET ORAL at 09:01

## 2021-01-04 RX ADMIN — NITROFURANTOIN (MONOHYDRATE/MACROCRYSTALS) 100 MG: 75; 25 CAPSULE ORAL at 15:48

## 2021-01-04 ASSESSMENT — ACTIVITIES OF DAILY LIVING (ADL)
HYGIENE/GROOMING: SHOWER;INDEPENDENT
DRESS: SCRUBS (BEHAVIORAL HEALTH);INDEPENDENT
ORAL_HYGIENE: INDEPENDENT

## 2021-01-04 NOTE — PROGRESS NOTES
Pt was picked up by the cab at 1630. Took all belongings and a bottle of Microbid. Took one dose prior as ordered. No concerns.

## 2021-01-04 NOTE — PROGRESS NOTES
Read over discharge instructions with patient and answered questions. Patient is aware is leaving AMA. Will take a cab home.

## 2021-01-04 NOTE — PROGRESS NOTES
"  Pt again demanding to leave , \": I want to go to the Er or a neuro floor, I have a physical  problem , not a psychological problem. I hit my head a few   months ago during a seizure and I have pain in my head \". Pt began screaming , yelling , crying to leave . Pt given Zyprexa 10 mg  Oral which she took after a show of force was called for.    Pt about 30 minutes later began yelling very loudly and crying states she wants to leave or be transferred to the Er because she does not belong on a psych floor for a medical condition , Staff sat with pt allowing her to talk and calm down and she is agreeable to talk with her doctor in the morning about discharge , She  then took prn's of Gabapentin , Tylenol ,Hydroxazine . And Trazodone and scheduled Valium and is now calm and sleeping. Pt denies si , states she has never felt suicidal .  "

## 2021-01-04 NOTE — PLAN OF CARE
"Patient has been pleasant and cooperative this morning. Asked several times about speaking with the doctor this morning.  States is depressed and anxious about \"my circumstances being here.\" States thoughts are clear, \"all I can think about is leaving.\" States would like to take a shower or get washed up today. Apologized to writer for treatment of writer over the weekend. Requested Tylenol for pain this morning which was administered. Changed Mobic time to 1300 daily per patient's request.  "

## 2021-01-04 NOTE — DISCHARGE INSTRUCTIONS
Behavioral Discharge Planning and Instructions      Summary:  You were admitted on 1/1/2021 due to Disorganized Thinking/Behaviors.  You were treated by Dr. Liu and discharged on 1/4/2021 from [facility] to home to follow up with your outpatient providers. You are being discharged against medical advise.    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 Call at least 24 hours in advance if you need to reschedule an appointment to ensure continued access to your outpatient providers.   Major Treatments, Procedures and Findings:  You were provided with: a psychiatric assessment, assessed for medical stability, medication evaluation and/or management, group therapy, and milieu management.    Symptoms to Report: Feeling more aggressive, increased confusion, losing more sleep, mood getting worse, or thoughts of suicide.    Early warning signs can include: Increased depression or anxiety sleep disturbances increased thoughts or behaviors of suicide or self-harm  increased unusual thinking, such as paranoia or hearing voices.    Safety and Wellness:  Take all medicines as directed.  Make no changes unless your doctor suggests them. Follow treatment recommendations.  Refrain from alcohol and non-prescribed drugs.  Ask your support system to help you reduce your access to items that could harm yourself or others. If there is a concern for safety, call 911.    Resources:   Crisis Intervention: 462.624.4218 or 581-724-1512 (TTY: 228.571.6595).  Call anytime for help.  National [place] on Mental Illness (www.[place].org): 978.390.5434 or 832-635-1050.  Suicide Awareness Voices of Education (SAVE)  (www.save.org): 888-511-SAVE (7283)  National Suicide Prevention Line (www.mentalhealthmn.org): 279-646-EXIP (5780)  Mental Health Consumer/Survivor Network of [place] (www.mhcsn.net): 294.322.7763 or 329-305-3164  [facility] Crisis (COPE) Response - Adult 374 628-1121  Crisis Intervention: 547.552.8291 or 728-063-8205. Call anytime for help.   [facility] Crisis Team - Child: 138.466.4655    The treatment team has appreciated the opportunity to work with you. If you have any questions or concerns our unit number is [phone].  If you would like to obtain any specific documentation regarding your hospitalization after your discharge, contact [facility] of Information/Medical Records:  [phone].

## 2021-01-04 NOTE — PLAN OF CARE
Re: Discharge    Patient was discharged AMA. Psychosocial was not completed as a result.     Fanta Boyd, STACEYC, [facility]  Clinical Treatment Coordinator

## 2021-01-04 NOTE — DISCHARGE SUMMARY
"Psychiatric Discharge Summary    Sheyla Ye MRN# [id number]   Age: 40 year old YOB: 1980     Date of Admission:  1/1/2021  Date of Discharge:  1/4/2021  Admitting Physician:  Anel Vann MD  Discharge Physician:  Claudia Liu DO         Event Leading to Hospitalization:   On 6/24/2019, patient sustained a concussion with loss of consciousness when \"a door shut and pinned her head between door and door jam striking bilateral temporal regions of her head in the doorframe\" per chart review. Patient was seen in [facility]. At that time, patient reported impaired cognition and fatigue, which were suspected to be 2/2 concussion. She also reported mood changes.      In 10/2020, patient presented to ED for evaluation of AMS in the context of CBD ingestion. The patient's friend who accompanies her found her to be unable to speak and having the appearance of labored breathing at home.  She did admit to taking essentially a full bottle of CBD. She stated she did it because of pain.  She is delayed in her responses and is unable to provide much detail.  She denies it was an attempt at self-harm. Her friend that accompanied did express some concerns about her safety and mental health. She was ultimately discharged from ED.     In 11/2020, patient again presented to ED for a neurologic problem. She reported that for the past 2 weeks she had been feeling more tremulous. She reported an episode 2 weeks prior in which she was convulsing, but still coherent. She then reported a second episode a few days prior where she walked into her kitchen to get some food and began feeling lightheaded and then fell and lost consciousness. She fell and hit her head. She stated that a friend who was with her reported that she was shaking and her eyes were rolling back in her head.  She reported that she went on a \"holisitic health kick\" the last week of October and threw all of her prescriptions away and " "replaced them with CBD oil. She reported refilling all of her prescriptions except nortriptyline on 11/4 and taking them for 2 days before throwing them out again. She reports that she has been feeling increasingly anxious, shaky, stressed, and generally achy.  Her medications included diazepam 5 mg 3 times daily, Lamictal 200 mg daily, nortriptyline 10 mg daily and Adderall. During that visit, she did have a CT of the head as well as a CTA of the head and neck given her history of Munir-Danlos syndrome and reported headache to rule out evidence of aneurysm.  Imaging was unremarkable. It was suspected that symptoms may be related to withdrawal from Diazepam, Lamictal, and nortriptyline. Pt was offered DEC assessment, though declined. She was given medications and discharged to home.      On 12/18/2020 the patient attended a virtual visit with her PCP, Dr. Watson, in which she reported symptoms of her brain being \"on repeat\" as well as memory loss.     On 12/21/20, patient presented to ED for evaluation of difficulty concentrating and forgetfulness. At that time, patient reported sustaining a head injury approximately 1 month ago while having a witnessed seizure. Since that event she reports her brain is \"stuck on repeat\" in which songs lyrics and thoughts are constantly in her thoughts. Additionally, patient notes memory loss over this time. The patient reported being complaint with her medications including diazepam 5 mg 3 times daily, Lamictal 200 mg daily, nortriptyline 10 mg daily and Adderall. She denies beginning new medications. She was living with a close friend and reported feeling safe in the home.  Patient does follow with a psychiatrist. Patient was again discharged to home with plan to follow up with PCP and psychiatrist.      Patient again presented to ED on 1/1/2021. Per ED note:     Sheyla Ye is a 40 year old female with a medical history signficant for Munir-Danlos disease, fibromyalgia, " "DJD, anxiety, and depressive disorder who presents to the Emergency Department for evaluation of an altered mental status. Patient reports that her brain has been on \"complete repeat\"; there is a constant repeat of \"these negative and positive feeds\", song lyrics, or counting.  Patient states that this \"constant repeats\" started approximately 2 months ago after an episode of 2 simultaneous seizures which resulted in a fall and head trauma.  Patient denies being on seizure medications. Patient denies any SI or HI; however, notes that she is concerned that she will hurt others because her brain is on repeat.  When asked about hallucinations, patient reports that she is \"unsure what to report\" as she is \"trying to respect the government and want them to tell us what's going on\".  Additionally, patient report memory loss, states that she \"spaces out\".  Patient is able to state where she is and what year it is.  Patient reports that she had a phone call with her PCP who recommended that she be seen by the concussion ; patient notes that she has an appointment 03/2021.  Patient states that she has a psychiatrist, however, needs to set up therapy. She reports chills, intermittent head pain, dizziness, and tremors.  Patient denies any new falls or head trauma.  Denies any nausea, vomiting, chest pain, shortness of breath, abdominal pain, appetite change, or gait problem.  Reports that she smokes; however, denies any other substance use or alcohol use.  She reports a history of depression and anxiety and takes medications for them. Patient states that she currently lives with a friend.  Patient currently feels safe at the ED.      Sheyla Ye is a 40 year old female with a medical history signficant for Munir-Danlos disease, fibromyalgia, DJD, anxiety, and depressive disorder who presents to the Emergency Department for evaluation of an altered mental status and feeling if her brain is on repeat.  Upon arrival " "patient is well-appearing, afebrile, no distress.  Patient reports ongoing symptoms of paranoia, thought broadcasting, being focused on one thing and unable to form memories with other things as she is focused on certain things and voices.  Patient feels like her mind is on repeat 24/7.  Denies any suicidal ideation, homicidal ideation, intent to self-harm.  Behavioral health  eval the patient as well as myself and at this time concern for possible depression with psychosis along with paranoia.     Per DEC Assessment dated 1/1/2021:     Patient has been experiencing symptoms of psychosis for several months, however she remains focused on medical evaluations, not psychiatric, because she believes the symptoms are due to a concussion she sustained in November 2020 during 2 seizures.  Patient asked a friend to drive her to the ED tonight to request an MRI of her head.  The symptoms are worsening daily and include paranoia, thought broadcasting, thought insertion, ideas of reference, and feeling as though her thoughts are slowed down.  She made multiple complaints of poor memory and related having forgotten to turn off the stove after heating water for tea this morning.  Patient explained that she is working with the [employer] and [employer] entities, which are controlling her mind and putting thoughts into her head.  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  Patient stated that she became masochistic working with the [employer].  She described her brain being \"on repeat.\"  Patient stated that she has not told her psychiatrist of 4 years, Mariel Vo, about the symptoms, however she also stated that Dr. Vo prescribed Risperdal which was not helpful.  Patient denies thoughts of harm to self, others, or property.  She has a history of depression, anxiety, and PTSD.  Patient has no psychiatric hospitalizations, PHP, or IOP.  She has seen a therapist in the past, but not since " "Covid restrictions began last spring.  She denies use of alcohol or illicit drugs.      Patient was placed on 72 hour hold due to concerns about ability to care for self.      Upon my interview with patient:     Patient is slightly irritable, very guarded, and dismissive. She expressed frustration about involuntary hospitalization, and does not feel she is best suited on a behavioral health unit. She said that she came to the ED seeking medical attention, not psychiatric intervention. She does not feel she is experiencing symptoms of psychosis. She denied symptoms of psychosis, and said \"I am not insane and I am not having psychological problems. I am well aware that this is not psychosis.\" She mentioned that \"its the government...\" and then declined the elaborate. She expressed concerns about \"cognitive repeat, like I keep hearing the last words of a sentence over and over again.\" She is concerned about memory loss, noting difficulty recalling timeline of events. She reports heightened anxiety due to these symptoms, and has been using CBD drops by mouth to alleviate symptoms of anxiety and chronic pain. She agreed to abstain from CBD upon discharge, and was educated on potential contributing factors for current symptoms, including CBD use and Adderall. She was unable to tell me how long she has been prescribed Adderall, though records indicate that she has been on Adderall since at least 2016. She is not certain when it was last increased. Patient denies SI, HI, and SIB. She said \"I would never kill myself.\" Patient denied illicit substance use with exception of CBD oil. She did not report use of Kratom to this writer, though did report this to IM provider.        See Admission note by Anel Vann MD on 01/01/2021 for additional details.          Diagnoses:     Psychosis, unspecified (substance induced vs MDD w/psychotic features vs primary psychotic illness vs 2/2 medical condition)  ADHD  Munir-Danlos " Disease  Cervical stenosis, Lumbar DJD, chronic osteoarthritis, fibromyalgia and chronic pain syndrome  Concussion w/LOC in 6/2019  Kratom use  CBD abuse vs use disorder  Possible seizure history  Urinary Tract Infection, uncomplicated, started on Macrobid         Labs:     Recent Results (from the past 168 hour(s))   Asymptomatic Influenza A/B & SARS-CoV2 (COVID-19) Virus PCR Multiplex    Collection Time: 01/01/21  4:06 AM    Specimen: Nasopharyngeal   Result Value Ref Range    Flu A/B & SARS-COV-2 PCR Source Nasopharyngeal     SARS-CoV-2 PCR Result NEGATIVE     Influenza A PCR Negative NEG^Negative    Influenza B PCR Negative NEG^Negative    Respiratory Syncytial Virus PCR Negative NEG^Negative    Flu A/B & SARS-CoV-2 PCR Comment (Note)    Drug abuse screen 6 urine (tox)    Collection Time: 01/01/21  6:04 AM   Result Value Ref Range    Amphetamine Qual Urine Positive (A) NEG^Negative    Barbiturates Qual Urine Negative NEG^Negative    Benzodiazepine Qual Urine Positive (A) NEG^Negative    Cannabinoids Qual Urine Negative NEG^Negative    Cocaine Qual Urine Negative NEG^Negative    Ethanol Qual Urine Negative NEG^Negative    Opiates Qualitative Urine Negative NEG^Negative   HCG qualitative urine    Collection Time: 01/01/21  6:04 AM   Result Value Ref Range    HCG Qual Urine Negative NEG^Negative   Comprehensive metabolic panel    Collection Time: 01/01/21  1:55 PM   Result Value Ref Range    Sodium 140 133 - 144 mmol/L    Potassium 4.1 3.4 - 5.3 mmol/L    Chloride 109 94 - 109 mmol/L    Carbon Dioxide 25 20 - 32 mmol/L    Anion Gap 6 3 - 14 mmol/L    Glucose 86 70 - 99 mg/dL    Urea Nitrogen 10 7 - 30 mg/dL    Creatinine 0.81 0.52 - 1.04 mg/dL    GFR Estimate >90 >60 mL/min/[1.73_m2]    GFR Estimate If Black >90 >60 mL/min/[1.73_m2]    Calcium 8.7 8.5 - 10.1 mg/dL    Bilirubin Total 0.8 0.2 - 1.3 mg/dL    Albumin 3.8 3.4 - 5.0 g/dL    Protein Total 6.6 (L) 6.8 - 8.8 g/dL    Alkaline Phosphatase 51 40 - 150 U/L     ALT 14 0 - 50 U/L    AST 10 0 - 45 U/L   CBC with platelets    Collection Time: 01/01/21  1:55 PM   Result Value Ref Range    WBC 7.4 4.0 - 11.0 10e9/L    RBC Count 3.89 3.8 - 5.2 10e12/L    Hemoglobin 12.6 11.7 - 15.7 g/dL    Hematocrit 37.6 35.0 - 47.0 %    MCV 97 78 - 100 fl    MCH 32.4 26.5 - 33.0 pg    MCHC 33.5 31.5 - 36.5 g/dL    RDW 11.6 10.0 - 15.0 %    Platelet Count 322 150 - 450 10e9/L   Ammonia    Collection Time: 01/01/21  1:55 PM   Result Value Ref Range    Ammonia 19 10 - 50 umol/L   Hemoglobin A1c    Collection Time: 01/01/21  1:55 PM   Result Value Ref Range    Hemoglobin A1C 5.1 0 - 5.6 %   Vitamin B12    Collection Time: 01/01/21  1:55 PM   Result Value Ref Range    Vitamin B12 1,221 (H) 193 - 986 pg/mL   UA with Microscopic reflex to Culture    Collection Time: 01/02/21  2:50 AM    Specimen: Urine   Result Value Ref Range    Color Urine Yellow     Appearance Urine Slightly Cloudy     Glucose Urine Negative NEG^Negative mg/dL    Bilirubin Urine Negative NEG^Negative    Ketones Urine Negative NEG^Negative mg/dL    Specific Gravity Urine 1.018 1.003 - 1.035    Blood Urine Negative NEG^Negative    pH Urine 5.5 5.0 - 7.0 pH    Protein Albumin Urine 10 (A) NEG^Negative mg/dL    Urobilinogen mg/dL Normal 0.0 - 2.0 mg/dL    Nitrite Urine Positive (A) NEG^Negative    Leukocyte Esterase Urine Large (A) NEG^Negative    Source Urine     WBC Urine 19 (H) 0 - 5 /HPF    RBC Urine 0 0 - 2 /HPF    Bacteria Urine Moderate (A) NEG^Negative /HPF    Squamous Epithelial /HPF Urine 7 (H) 0 - 1 /HPF    Mucous Urine Present (A) NEG^Negative /LPF   Urine Culture Aerobic Bacterial    Collection Time: 01/02/21  2:50 AM    Specimen: Unspecified Urine   Result Value Ref Range    Specimen Description Unspecified Urine     Special Requests Specimen received in preservative     Culture Micro >100,000 colonies/mL  Escherichia coli   (A)        Susceptibility    Escherichia coli - MISBAH     AMPICILLIN 8 Sensitive ug/mL      CEFAZOLIN* <=4 Sensitive ug/mL      * Cefazolin MISBAH breakpoints are for the treatment of uncomplicated urinary tract infections.  For the treatment of systemic infections, please contact the laboratory for additional testing.     CEFOXITIN <=4 Sensitive ug/mL     CEFTAZIDIME <=1 Sensitive ug/mL     CEFTRIAXONE <=1 Sensitive ug/mL     CIPROFLOXACIN <=0.25 Sensitive ug/mL     GENTAMICIN <=1 Sensitive ug/mL     LEVOFLOXACIN <=0.12 Sensitive ug/mL     NITROFURANTOIN 32 Sensitive ug/mL     TOBRAMYCIN <=1 Sensitive ug/mL     Trimethoprim/Sulfa <=1/19 Sensitive ug/mL     AMPICILLIN/SULBACTAM <=2 Sensitive ug/mL     Piperacillin/Tazo <=4 Sensitive ug/mL     CEFEPIME <=1 Sensitive ug/mL   Lipid Profile    Collection Time: 21  7:17 AM   Result Value Ref Range    Cholesterol 133 <200 mg/dL    Triglycerides 64 <150 mg/dL    HDL Cholesterol 64 >49 mg/dL    LDL Cholesterol Calculated 56 <100 mg/dL    Non HDL Cholesterol 69 <130 mg/dL   Lyme Disease Carina with reflex to WB Serum    Collection Time: 21  7:17 AM   Result Value Ref Range    Lyme Disease Antibodies Serum 0.08 0.00 - 0.89   Erythrocyte sedimentation rate auto    Collection Time: 21  7:17 AM   Result Value Ref Range    Sed Rate 5 0 - 20 mm/h   Ceruloplasmin    Collection Time: 21  7:17 AM   Result Value Ref Range    Ceruloplasmin 14 (L) 20 - 60 mg/dL   HIV Antigen Antibody Combo    Collection Time: 21  7:17 AM   Result Value Ref Range    HIV Antigen Antibody Combo Nonreactive NR^Nonreactive       Anti Nuclear Carina IgG by IFA with Reflex    Collection Time: 21  7:17 AM   Result Value Ref Range    EDUARDO interpretation Negative NEG^Negative   TSH with free T4 reflex    Collection Time: 21  7:17 AM   Result Value Ref Range    TSH 0.69 0.40 - 4.00 mU/L            Consults:   Consultation during this admission received from internal medicine:    HOSPITAL MEDICINE SERVICE  [facility] Medicine Consultation  2021     KARI Mendoza  1980, MRN [id number]  Code status: Full Code     ASSESSMENT / PLAN   Discussion: Sheyla is a 40 year old female who is hospitalized under inpatient mental health care for evaluation and management of depression and altered mental status. She has past medical history including Ehler's Danlos, chronic pain syndrome, fibromyalgia, chronic osteoarthritis, lumbar degenerative disc disease, and anxiety. Hospital medicine consulted with concern for possible UTI and altered mental status.     #depression  #anxiety  #insomnia    -treatment per primary team     #altered mental status    -laboratory: CBC, CMP, ammonia, vit B12, vit B1, UA    -agree with MRI brain as ordered by psychiatry     #use of alternative medicine (kratom alkaloids)    -discussed with registered pharmacist.     -patient may need to be treated for symptoms of opioid withdrawal    -possible this contributed to her presentation. Also can cause seizure     #chronic pain  #fibromyalgia    -continue home pain regimen - pt states was not taking methocarbamol     #tobacco use    -nicotine patch, nicotine gum, encourage cessation     #abnormal UA  UA from ED visit 12/21/2020 positive for nitrite, trace blood, and WBCs. Unclear whether antibiotics were taken, but it does not seem that they were prescribed.    -repeat UA                 Hospital Course:   Sheyla Ye was admitted to [facility] with attending Claudia Liu DO on a 72 hour mental health hold. The patient was placed under status 15 (15 minute checks) to ensure patient safety. Labs obtained as above. PTA medications were continued with exception of Adderall and Nortriptyline with concern for worsening or contributing to patients presentation. IM consult also placed for concern of UTI and she was started on 5 day course of Macrobid. She was guarded on initial interview, denied psychosis, did endorse recent substance use and reported she would abstain from this upon discharge. First  "episode psychosis work up was initiated and majority of it completed with exception of MRI Brain which was still pending as only emergent imaging was completed over weekend. She was very dysregulated on the unit initially and had several episodes of irritability and was difficult to redirected. Much of this was around being placed on a 72HH and being in the hospital as an involuntary patient. She also made some delusional and paranoid statements about working for the [employer] and thoughts of mind control and hearing voices. She did receive a few doses of PRN olanzapine during periods of increased agitation. She did not require any use of seclusion or restraints. She became progressively more calm, but continued to be distressed by ongoing hospitalization. Dr. Liu met with patient Monday AM. Per RN report she had been calm and appropriate in interactions during they, showed insight into previous behavior on unit and apologized to RN staff. In interview she endorsed that she was acting unlike herself over weekend, felt bad for yelling at staff and was having difficulty tolerating being in the hospital on a hold. She did state she believes that the [employer] has the ability to read or control thoughts of others and that she had a friend who is familiar with [employer] training but she is unsure now on if this is true. She does not believe she works for the [employer] ([employer]), she reports that her statements made earlier in the hospitalization were \"misinterpreted\". She adamantly denies hearing voices or having VH. She denies feeling that others are out to get her or other paranoid ideas. She states she has never been suicidal or had thoughts of harming self or others. She does feel that a majority of her concerns revolve around neurological issues following her initial concussion. Her biggest complaint is that it is hard for her to get sentences out of her head, continues to report she feels her brain is on \"repeat\" at times. Discussed that " treatment team has had concerns that some of her symptoms she is reporting could be related to her mental health but she reports she is not experiencing psychosis or having concerns regarding her mood. She has been working with a psychiatrist for 4 years, reviewed reasoning behind Adderall and Nortriptyline being discontinued on admission and continued recommendation these not be resumed until she meets with her outpatient provider. Also discussed possibility of other substances contributing to symptoms prior to admission along with some behavior on unit earlier on in hospital stay that appears to be improving. She continues to agree to abstain from CBD or Kratom use. She continued to decline to start neuroleptic medication at this time but reported she would be open to considering this again with outpatient provider if symptoms worsen or persist. She continued to request to discharge. She agreed to sign LAURA for close friend, Joshua, who was contacted. He reports he has known patient for 1 year, sees her about 6 days per week and has had regular contact with her for the past few months. He has noticed she has been having more concerns about her cognition and other physical health concerns since her concussion. He is the one who brought her to the ED on several occasions.  He reports that although he does see that patient has been having some ongoing mental health concerns, he has never had concerns about her safety, or the safety of others. They have had discussions regarding self harm after she was brought into the ED when found at home with AMS and concern she overdosed on CBD oil, he states she has stated that she is not suicidal and this event was around treatment of pain and not with intent to cause herself harm. He states that in his interactions with her recently, she has shown organization and ability to care not only for herself but her roommate that she lives with and he does not feel that she would be at  risk of harming self or others if discharged. He also offered to provide ride for patient if needed. He is in communication with other friends in her support system and is aware he may contact emergency services if concerns for safety do arise.     Today Sheyla Ye reports that she is not having any thoughts of harming self or others and denies psychosis symptoms. In addition, she has notable risk factors for self-harm, including single status, substance abuse and comorbid medical condition of chronic pain. However, risk is mitigated by absence of past attempts, ability to volunteer a safety plan and history of seeking help when needed. Therefore, based on all available evidence including the factors cited above, she does not appear to be at imminent risk for self-harm, does not meet criteria for continuing 72-hr hold or to pursue mental health commitment, and therefore remains appropriate for ongoing outpatient level of care. She was given the option to remain in the hospital as a voluntary patient for ongoing work up including completing MRI Brain and following up on additional lab work given low ceruloplasmin noted prior to discharge but she declined and understood she would be discharging AMA. She plans to follow up with her outpatient providers.     Sheyla Ye was discharged to home. At the time of discharge Sheyla Ye was determined to not be a danger to herself or others.          Discharge Medications:     Current Discharge Medication List      START taking these medications    Details   nitroFURantoin macrocrystal-monohydrate (MACROBID) 100 MG capsule Take 1 capsule (100 mg) by mouth every 12 hours  Qty: 4 capsule, Refills: 0    Associated Diagnoses: Urinary tract infection without hematuria, site unspecified         CONTINUE these medications which have NOT CHANGED    Details   acetaminophen (TYLENOL) 500 MG tablet Take 1,000 mg by mouth every 6 hours as needed for mild pain      diazepam  (VALIUM) 5 MG tablet Take 5 mg by mouth every 8 hours as needed for anxiety      diclofenac (VOLTAREN) 1 % topical gel Apply 2 g topically 4 times daily as needed for moderate pain  Qty: 100 g, Refills: 1    Associated Diagnoses: Cervical spondylosis without myelopathy      lamoTRIgine (LAMICTAL) 200 MG tablet Take 1 tablet (200 mg) by mouth daily  Qty: 7 tablet, Refills: 0      meloxicam (MOBIC) 7.5 MG tablet Take 1 tablet (7.5 mg) by mouth daily  Qty: 30 tablet, Refills: 5    Associated Diagnoses: Cervical spondylosis      methocarbamol (ROBAXIN) 500 MG tablet Take 1 tablet (500 mg) by mouth 4 times daily as needed for muscle spasms  Qty: 60 tablet, Refills: 3    Associated Diagnoses: Cervical spondylosis      omeprazole (PRILOSEC) 20 MG DR capsule Take 1 capsule (20 mg) by mouth daily  Qty: 90 capsule, Refills: 3    Associated Diagnoses: Encounter for monitoring chronic NSAID therapy         STOP taking these medications       amphetamine-dextroamphetamine (ADDERALL XR) 10 MG 24 hr capsule Comments:   Reason for Stopping:         amphetamine-dextroamphetamine (ADDERALL XR) 30 MG 24 hr capsule Comments:   Reason for Stopping:         amphetamine-dextroamphetamine (ADDERALL) 20 MG tablet Comments:   Reason for Stopping:         HERBALS Comments:   Reason for Stopping:         nortriptyline (PAMELOR) 10 MG capsule Comments:   Reason for Stopping:         varenicline (CHANTIX CONTINUING MONTH DYLAN) 1 MG tablet Comments:   Reason for Stopping:                    Psychiatric Examination:   Appearance:  awake, alert and adequately groomed  Attitude:  cooperative  Eye Contact:  fair  Mood:  anxious about ongoing hospitalization, otherwise reports euthymia  Affect:  mood congruent and tearful when discussing involuntary hospitalization and wanting to discharge  Speech:  clear, coherent and normal prosody  Psychomotor Behavior, muscle strength/tone, gait/station:  no evidence of tardive dyskinesia, dystonia, or tics,  observed to ambulate independently  Thought Process:  linear and goal oriented  Associations:  no loose associations  Thought Content:  no evidence of suicidal ideation or homicidal ideation and it appeas she continues to have some experience of thought broadcasting and insertion but denies other psychosis symptoms  Insight:  limited  Judgment:  fair, adequate for safety  Oriented to:  time, person, and place  Attention Span and Concentration:  intact  Recent and Remote Memory:  fair  Language: English, fluent  Fund of Knowledge: appropriate         Discharge Plan:   Health Care Follow-up Appointments:   Psychiatry-  Date/Time: 1/29/2021 at 12:40pm  Provider: Mariel Vo  Address: [facility]  Phone: [phone]        Attend all scheduled appointments with your outpatient providers. Call at least 24 hours in advance if you need to reschedule an appointment to ensure continued access to your outpatient providers.     Also met with patient again and informed patient of results of lab work prior to discharge, including low ceruloplasmin and recommendation she have further work up with her PCP to rule out Silver's Disease. Again offered to have patient stay as voluntary patient and complete some of this work up during hospitalization but she continues to decline and requests to discharge home, understanding she is discharging AGAINST MEDICAL ADVICE. Stressed abstaining from all substances, including Kratom and CBD along with not resuming Adderall or Nortriptyline until evaluated at her appointment 1/29. She expressed understanding.     Attestation:  Patient has been seen and evaluated by me, Claudia Liu DO on day of discharge. 75 minutes were spent in coordination of discharge planning.       Video-Visit Details    Type of service:  Video Visit    Video #1 Start Time (time video started): 1020    Video #1 End Time (time video stopped): 1100    Video #2 Start Time (time video started): 1340    Video #2 End Time  (time video stopped): 1558    Originating Location (pt. Location): [place] [id number]    Distant Location (provider location): Provider remote location    Mode of Communication:  Video Conference via [employer]    Physician has received verbal consent for a Video Visit from the patient? Yes    Claudia Liu, DO

## 2021-01-04 NOTE — PROGRESS NOTES
Rec'd pt. Sleeping w/regular non-labored respirations as shift commenced.  Pleasantly cooperative when awakened at approx 0240 for Opiate Scale Withdrawal Assessment.  Score = 1;  No s/s's of withdrawal at this time.  Denied any discomforts/needs.  Observed to have slept for approx 7 Hrs overnight w/ regular nonlabored respirations.  Safe, therapeutic environment maintained.

## 2021-02-19 ENCOUNTER — HOSPITAL ENCOUNTER (EMERGENCY)
Facility: CLINIC | Age: 41
Discharge: HOME | End: 2021-02-19
Attending: EMERGENCY MEDICINE | Admitting: EMERGENCY MEDICINE
Payer: COMMERCIAL

## 2021-02-19 VITALS
SYSTOLIC BLOOD PRESSURE: 102 MMHG | TEMPERATURE: 97.5 F | HEART RATE: 84 BPM | DIASTOLIC BLOOD PRESSURE: 78 MMHG | WEIGHT: 114.7 LBS | BODY MASS INDEX: 18.51 KG/M2 | RESPIRATION RATE: 16 BRPM | OXYGEN SATURATION: 100 %

## 2021-02-19 DIAGNOSIS — Z11.52 ENCOUNTER FOR SCREENING LABORATORY TESTING FOR SEVERE ACUTE RESPIRATORY SYNDROME CORONAVIRUS 2 (SARS-COV-2): ICD-10-CM

## 2021-02-19 DIAGNOSIS — R52 GENERALIZED BODY ACHES: ICD-10-CM

## 2021-02-19 LAB
ALBUMIN UR-MCNC: NEGATIVE MG/DL
ANION GAP SERPL CALCULATED.3IONS-SCNC: 5 MMOL/L (ref 3–14)
APPEARANCE UR: ABNORMAL
BACTERIA #/AREA URNS HPF: ABNORMAL /HPF
BASOPHILS # BLD AUTO: 0.1 10E9/L (ref 0–0.2)
BASOPHILS NFR BLD AUTO: 0.7 %
BILIRUB UR QL STRIP: NEGATIVE
BUN SERPL-MCNC: 17 MG/DL (ref 7–30)
CALCIUM SERPL-MCNC: 8.6 MG/DL (ref 8.5–10.1)
CHLORIDE SERPL-SCNC: 109 MMOL/L (ref 94–109)
CK SERPL-CCNC: 43 U/L (ref 30–225)
CO2 SERPL-SCNC: 27 MMOL/L (ref 20–32)
COLOR UR AUTO: YELLOW
CREAT SERPL-MCNC: 0.8 MG/DL (ref 0.52–1.04)
DIFFERENTIAL METHOD BLD: ABNORMAL
EOSINOPHIL # BLD AUTO: 0.2 10E9/L (ref 0–0.7)
EOSINOPHIL NFR BLD AUTO: 2.2 %
ERYTHROCYTE [DISTWIDTH] IN BLOOD BY AUTOMATED COUNT: 11.8 % (ref 10–15)
FLUAV RNA RESP QL NAA+PROBE: NEGATIVE
FLUBV RNA RESP QL NAA+PROBE: NEGATIVE
GFR SERPL CREATININE-BSD FRML MDRD: >90 ML/MIN/{1.73_M2}
GLUCOSE SERPL-MCNC: 98 MG/DL (ref 70–99)
GLUCOSE UR STRIP-MCNC: NEGATIVE MG/DL
HCT VFR BLD AUTO: 35 % (ref 35–47)
HGB BLD-MCNC: 11.6 G/DL (ref 11.7–15.7)
HGB UR QL STRIP: NEGATIVE
IMM GRANULOCYTES # BLD: 0 10E9/L (ref 0–0.4)
IMM GRANULOCYTES NFR BLD: 0.3 %
KETONES UR STRIP-MCNC: NEGATIVE MG/DL
LABORATORY COMMENT REPORT: NORMAL
LEUKOCYTE ESTERASE UR QL STRIP: ABNORMAL
LYMPHOCYTES # BLD AUTO: 3.1 10E9/L (ref 0.8–5.3)
LYMPHOCYTES NFR BLD AUTO: 43.4 %
MCH RBC QN AUTO: 32.9 PG (ref 26.5–33)
MCHC RBC AUTO-ENTMCNC: 33.1 G/DL (ref 31.5–36.5)
MCV RBC AUTO: 99 FL (ref 78–100)
MONOCYTES # BLD AUTO: 0.6 10E9/L (ref 0–1.3)
MONOCYTES NFR BLD AUTO: 8 %
MUCOUS THREADS #/AREA URNS LPF: PRESENT /LPF
NEUTROPHILS # BLD AUTO: 3.3 10E9/L (ref 1.6–8.3)
NEUTROPHILS NFR BLD AUTO: 45.4 %
NITRATE UR QL: POSITIVE
NRBC # BLD AUTO: 0 10*3/UL
NRBC BLD AUTO-RTO: 0 /100
PH UR STRIP: 5.5 PH (ref 5–7)
PLATELET # BLD AUTO: 289 10E9/L (ref 150–450)
POTASSIUM SERPL-SCNC: 3.7 MMOL/L (ref 3.4–5.3)
RBC # BLD AUTO: 3.53 10E12/L (ref 3.8–5.2)
RBC #/AREA URNS AUTO: 2 /HPF (ref 0–2)
RSV RNA SPEC QL NAA+PROBE: NEGATIVE
SARS-COV-2 RNA RESP QL NAA+PROBE: NEGATIVE
SODIUM SERPL-SCNC: 142 MMOL/L (ref 133–144)
SOURCE: ABNORMAL
SP GR UR STRIP: 1.02 (ref 1–1.03)
SPECIMEN SOURCE: NORMAL
SQUAMOUS #/AREA URNS AUTO: 5 /HPF (ref 0–1)
TRANS CELLS #/AREA URNS HPF: <1 /HPF (ref 0–1)
UROBILINOGEN UR STRIP-MCNC: NORMAL MG/DL (ref 0–2)
WBC # BLD AUTO: 7.2 10E9/L (ref 4–11)
WBC #/AREA URNS AUTO: 5 /HPF (ref 0–5)

## 2021-02-19 PROCEDURE — 99285 EMERGENCY DEPT VISIT HI MDM: CPT | Mod: 25

## 2021-02-19 PROCEDURE — 99285 EMERGENCY DEPT VISIT HI MDM: CPT | Performed by: EMERGENCY MEDICINE

## 2021-02-19 PROCEDURE — 258N000003 HC RX IP 258 OP 636: Performed by: EMERGENCY MEDICINE

## 2021-02-19 PROCEDURE — 96375 TX/PRO/DX INJ NEW DRUG ADDON: CPT

## 2021-02-19 PROCEDURE — 96374 THER/PROPH/DIAG INJ IV PUSH: CPT

## 2021-02-19 PROCEDURE — 82550 ASSAY OF CK (CPK): CPT | Performed by: EMERGENCY MEDICINE

## 2021-02-19 PROCEDURE — 250N000011 HC RX IP 250 OP 636: Performed by: EMERGENCY MEDICINE

## 2021-02-19 PROCEDURE — 87636 SARSCOV2 & INF A&B AMP PRB: CPT | Performed by: EMERGENCY MEDICINE

## 2021-02-19 PROCEDURE — 85025 COMPLETE CBC W/AUTO DIFF WBC: CPT | Performed by: EMERGENCY MEDICINE

## 2021-02-19 PROCEDURE — C9803 HOPD COVID-19 SPEC COLLECT: HCPCS

## 2021-02-19 PROCEDURE — 87186 SC STD MICRODIL/AGAR DIL: CPT | Performed by: EMERGENCY MEDICINE

## 2021-02-19 PROCEDURE — 81001 URINALYSIS AUTO W/SCOPE: CPT | Performed by: EMERGENCY MEDICINE

## 2021-02-19 PROCEDURE — 96361 HYDRATE IV INFUSION ADD-ON: CPT

## 2021-02-19 PROCEDURE — 87086 URINE CULTURE/COLONY COUNT: CPT | Performed by: EMERGENCY MEDICINE

## 2021-02-19 PROCEDURE — 80048 BASIC METABOLIC PNL TOTAL CA: CPT | Performed by: EMERGENCY MEDICINE

## 2021-02-19 PROCEDURE — 87088 URINE BACTERIA CULTURE: CPT | Performed by: EMERGENCY MEDICINE

## 2021-02-19 RX ORDER — SODIUM CHLORIDE 9 MG/ML
INJECTION, SOLUTION INTRAVENOUS CONTINUOUS
Status: DISCONTINUED | OUTPATIENT
Start: 2021-02-19 | End: 2021-02-19 | Stop reason: HOSPADM

## 2021-02-19 RX ORDER — DEXTROAMPHETAMINE SACCHARATE, AMPHETAMINE ASPARTATE MONOHYDRATE, DEXTROAMPHETAMINE SULFATE AND AMPHETAMINE SULFATE 7.5; 7.5; 7.5; 7.5 MG/1; MG/1; MG/1; MG/1
30 CAPSULE, EXTENDED RELEASE ORAL DAILY
Status: ON HOLD | COMMUNITY
End: 2021-06-20

## 2021-02-19 RX ORDER — MORPHINE SULFATE 4 MG/ML
4 INJECTION, SOLUTION INTRAMUSCULAR; INTRAVENOUS
Status: COMPLETED | OUTPATIENT
Start: 2021-02-19 | End: 2021-02-19

## 2021-02-19 RX ORDER — KETOROLAC TROMETHAMINE 30 MG/ML
30 INJECTION, SOLUTION INTRAMUSCULAR; INTRAVENOUS ONCE
Status: COMPLETED | OUTPATIENT
Start: 2021-02-19 | End: 2021-02-19

## 2021-02-19 RX ADMIN — KETOROLAC TROMETHAMINE 30 MG: 30 INJECTION, SOLUTION INTRAMUSCULAR at 01:28

## 2021-02-19 RX ADMIN — SODIUM CHLORIDE 1000 ML: 9 INJECTION, SOLUTION INTRAVENOUS at 00:46

## 2021-02-19 RX ADMIN — SODIUM CHLORIDE: 9 INJECTION, SOLUTION INTRAVENOUS at 02:03

## 2021-02-19 RX ADMIN — MORPHINE SULFATE 4 MG: 4 INJECTION, SOLUTION INTRAMUSCULAR; INTRAVENOUS at 02:04

## 2021-02-19 ASSESSMENT — ENCOUNTER SYMPTOMS
NAUSEA: 0
HEADACHES: 1
VOMITING: 0
ABDOMINAL PAIN: 0
DYSURIA: 0
MYALGIAS: 1
FEVER: 0
WEAKNESS: 1

## 2021-02-19 NOTE — DISCHARGE INSTRUCTIONS
All of your laboratory tests are normal.  Follow-up with your outpatient providers including pain clinic, primary care provider and therapist.

## 2021-02-19 NOTE — ED TRIAGE NOTES
Pt dropped off to ED by friend with c/o full body pain and aches. Pt has PMH of degenerative disk disease which results in body pain. Pain has not been addressed with pts home medications. Pt states she normally takes 3 valium (5mg each) but took eight today just to be able to come into ED.

## 2021-02-19 NOTE — ED PROVIDER NOTES
Emergency Department Patient Sign-out       Brief HPI:  This is a 40 year old female signed out to me by Dr. Lombardi.  See initial ED Provider note for details of the presentation.       Exam:   Patient Vitals for the past 24 hrs:   BP Temp Temp src Pulse Resp SpO2 Weight   02/19/21 0019 103/76 97.5  F (36.4  C) Oral 86 18 100 % 52 kg (114 lb 11.2 oz)           ED RESULTS:   Results for orders placed or performed during the hospital encounter of 02/19/21 (from the past 24 hour(s))   Symptomatic Influenza A/B & SARS-CoV2 (COVID-19) Virus PCR Multiplex     Status: None    Collection Time: 02/19/21 12:37 AM    Specimen: Nasopharyngeal   Result Value Ref Range    Flu A/B & SARS-COV-2 PCR Source Nasopharyngeal     SARS-CoV-2 PCR Result NEGATIVE     Influenza A PCR Negative NEG^Negative    Influenza B PCR Negative NEG^Negative    Respiratory Syncytial Virus PCR Negative NEG^Negative    Flu A/B & SARS-CoV-2 PCR Comment (Note)    CBC with platelets differential     Status: Abnormal    Collection Time: 02/19/21 12:37 AM   Result Value Ref Range    WBC 7.2 4.0 - 11.0 10e9/L    RBC Count 3.53 (L) 3.8 - 5.2 10e12/L    Hemoglobin 11.6 (L) 11.7 - 15.7 g/dL    Hematocrit 35.0 35.0 - 47.0 %    MCV 99 78 - 100 fl    MCH 32.9 26.5 - 33.0 pg    MCHC 33.1 31.5 - 36.5 g/dL    RDW 11.8 10.0 - 15.0 %    Platelet Count 289 150 - 450 10e9/L    Diff Method Automated Method     % Neutrophils 45.4 %    % Lymphocytes 43.4 %    % Monocytes 8.0 %    % Eosinophils 2.2 %    % Basophils 0.7 %    % Immature Granulocytes 0.3 %    Nucleated RBCs 0 0 /100    Absolute Neutrophil 3.3 1.6 - 8.3 10e9/L    Absolute Lymphocytes 3.1 0.8 - 5.3 10e9/L    Absolute Monocytes 0.6 0.0 - 1.3 10e9/L    Absolute Eosinophils 0.2 0.0 - 0.7 10e9/L    Absolute Basophils 0.1 0.0 - 0.2 10e9/L    Abs Immature Granulocytes 0.0 0 - 0.4 10e9/L    Absolute Nucleated RBC 0.0    Basic metabolic panel     Status: None    Collection Time: 02/19/21 12:37 AM   Result Value Ref  Range    Sodium 142 133 - 144 mmol/L    Potassium 3.7 3.4 - 5.3 mmol/L    Chloride 109 94 - 109 mmol/L    Carbon Dioxide 27 20 - 32 mmol/L    Anion Gap 5 3 - 14 mmol/L    Glucose 98 70 - 99 mg/dL    Urea Nitrogen 17 7 - 30 mg/dL    Creatinine 0.80 0.52 - 1.04 mg/dL    GFR Estimate >90 >60 mL/min/[1.73_m2]    GFR Estimate If Black >90 >60 mL/min/[1.73_m2]    Calcium 8.6 8.5 - 10.1 mg/dL   CK total     Status: None    Collection Time: 02/19/21 12:37 AM   Result Value Ref Range    CK Total 43 30 - 225 U/L   UA with Microscopic     Status: Abnormal    Collection Time: 02/19/21  1:26 AM   Result Value Ref Range    Color Urine Yellow     Appearance Urine Slightly Cloudy     Glucose Urine Negative NEG^Negative mg/dL    Bilirubin Urine Negative NEG^Negative    Ketones Urine Negative NEG^Negative mg/dL    Specific Gravity Urine 1.025 1.003 - 1.035    Blood Urine Negative NEG^Negative    pH Urine 5.5 5.0 - 7.0 pH    Protein Albumin Urine Negative NEG^Negative mg/dL    Urobilinogen mg/dL Normal 0.0 - 2.0 mg/dL    Nitrite Urine Positive (A) NEG^Negative    Leukocyte Esterase Urine Large (A) NEG^Negative    Source Midstream Urine     WBC Urine 5 0 - 5 /HPF    RBC Urine 2 0 - 2 /HPF    Bacteria Urine Few (A) NEG^Negative /HPF    Squamous Epithelial /HPF Urine 5 (H) 0 - 1 /HPF    Transitional Epi <1 0 - 1 /HPF    Mucous Urine Present (A) NEG^Negative /LPF       ED MEDICATIONS:   Medications   0.9% sodium chloride BOLUS (0 mLs Intravenous Stopped 2/19/21 0153)     Followed by   sodium chloride 0.9% infusion ( Intravenous Stopped 2/19/21 0402)   ketorolac (TORADOL) injection 30 mg (30 mg Intravenous Given 2/19/21 0128)   morphine (PF) injection 4 mg (4 mg Intravenous Given 2/19/21 0204)         Impression:    ICD-10-CM    1. Generalized body aches  R52 Symptomatic Influenza A/B & SARS-CoV2 (COVID-19) Virus PCR Multiplex     UA with Microscopic       Plan:    Sheyla Ye is a 40 year old female with a history of chronic pain  syndrome, Munir-Danlos, depression, and anxiety who presents to the Emergency Department with myalgias and generalized weakness.  Patient had unremarkable work-up, plan for discharge home after IVF and pain medication.           MD Bola Monroy Linsey Gail, MD  03/01/21 0012

## 2021-02-19 NOTE — ED PROVIDER NOTES
[place] EMERGENCY DEPARTMENT ([facility])  2/19/21    History     Chief Complaint   Patient presents with     Fatigue     Generalized Body Aches     The history is provided by the patient and medical records.     Sheyla Ye is a 40 year old female with a history of chronic pain syndrome, Munir-Danlos, depression, and anxiety who presents to the Emergency Department with myalgias and generalized weakness.  Patient reports she is having intermittent head to toe body pain that has been getting progressively worse for the past multiple months.  She states this feels different than her chronic pain.  Patient reports she is also felt generally weak and can barely get out of bed.  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  Patient reports she also feels like her memory is getting worse and feels like it is on repeat.  She states her dexterity and coordination have been off.  Patient states she has had headaches.  Patient denies fevers, nausea, vomiting, abdominal pain, or rashes.  She has also been under a lot of stress due to her symptoms.  She has been seen in the ED multiple times in the past 3 months.  Patient has been seen once due to taking too much CBD.  She states she uses back pain and has also had Valium at the time.  She states she is still using CBD but her friend only gives her 3 Gummies a day to make sure she is using the correct amount.  She has also been seen in the ED due to seizure-like activity and UTI.  She reports she has had urgency, no dysuria.  Patient denies depression, SI, or hallucinations.  She states she has a therapist but has not seen them since Covid started.  No chance of pregnancy.  No recent medication changes beyond Adderall being lowered.    Past Medical History  Past Medical History:   Diagnosis Date     Anxiety      Chronic osteoarthritis      Chronic pain      Depressive disorder 1995     DJD (degenerative joint  disease), lumbar      Munir-Danlos disease      Fibromyalgia      Learning disability      Past Surgical History:   Procedure Laterality Date     INJECT EPIDURAL CERVICAL / THORACIC SINGLE N/A 4/30/2019    Procedure: Cervical Epidural Steroid Injection;  Surgeon: Swapnil Carlisle MD;  Location: [facility] OR     wisdom teeth removal       amphetamine-dextroamphetamine (ADDERALL XR) 30 MG 24 hr capsule  acetaminophen (TYLENOL) 500 MG tablet  diazepam (VALIUM) 5 MG tablet  diclofenac (VOLTAREN) 1 % topical gel  lamoTRIgine (LAMICTAL) 200 MG tablet  meloxicam (MOBIC) 7.5 MG tablet  methocarbamol (ROBAXIN) 500 MG tablet  omeprazole (PRILOSEC) 20 MG DR capsule      Allergies   Allergen Reactions     Seasonal Allergies      Family History  Family History   Problem Relation Age of Onset     Aneurysm Brother         20's, autopsy showed RP bleed     Thyroid Disease Mother      Anxiety Disorder Mother      Mental Illness Mother      Depression Mother      Genetic Disorder Mother      Migraines Mother      Aneurysm Other         brain aneurysm, 30's     Colon Cancer Other      Anxiety Disorder Sister      Mental Illness Sister      Cancer Paternal Grandmother      Hypertension Paternal Grandmother      Colon Cancer Paternal Grandmother      Cancer Paternal Grandfather      Hypertension Paternal Grandfather      Colon Cancer Paternal Grandfather      Depression Sister      Genetic Disorder Brother      Hypertension Father      Hyperlipidemia Father      Social History   Social History     Tobacco Use     Smoking status: Current Every Day Smoker     Packs/day: 0.50     Years: 15.00     Pack years: 7.50     Types: Cigarettes     Start date: 9/1/1999     Smokeless tobacco: Former User   Substance Use Topics     Alcohol use: Not Currently     Alcohol/week: 6.7 standard drinks     Comment: Moderate      Drug use: No      Past medical history, past surgical history, medications, allergies, family history, and social history were reviewed  with the patient. No additional pertinent items.       Review of Systems   Constitutional: Negative for fever.   Gastrointestinal: Negative for abdominal pain, nausea and vomiting.   Genitourinary: Positive for urgency. Negative for dysuria.   Musculoskeletal: Positive for myalgias.   Skin: Negative for rash.   Neurological: Positive for weakness (generalized) and headaches.   Psychiatric/Behavioral: Negative for suicidal ideas.   All other systems reviewed and are negative.    A complete review of systems was performed with pertinent positives and negatives noted in the HPI, and all other systems negative.    Physical Exam   BP: 103/76  Pulse: 86  Temp: 97.5  F (36.4  C)  Resp: 18  Weight: 52 kg (114 lb 11.2 oz)  SpO2: 100 %  Physical Exam  Vitals signs and nursing note reviewed.   Constitutional:       General: She is not in acute distress.     Appearance: She is well-developed.   HENT:      Head: Normocephalic and atraumatic.      Mouth/Throat:      Mouth: Mucous membranes are moist.   Eyes:      General: No scleral icterus.     Conjunctiva/sclera: Conjunctivae normal.      Pupils: Pupils are equal, round, and reactive to light.   Neck:      Musculoskeletal: Neck supple.   Cardiovascular:      Rate and Rhythm: Normal rate and regular rhythm.      Heart sounds: Normal heart sounds.   Pulmonary:      Effort: Pulmonary effort is normal. No respiratory distress.      Breath sounds: Normal breath sounds. No wheezing.   Abdominal:      General: Abdomen is flat.      Palpations: Abdomen is soft.   Skin:     General: Skin is warm and dry.   Neurological:      General: No focal deficit present.      Mental Status: She is alert and oriented to person, place, and time.      Cranial Nerves: No cranial nerve deficit.   Psychiatric:         Mood and Affect: Mood normal.         Behavior: Behavior normal.       ED Course   12:51 AM  The patient was seen and examined by Adria Lombardi MD in Room ED07.      Procedures         All labs reviewed by myself     Results for orders placed or performed during the hospital encounter of 02/19/21   Symptomatic Influenza A/B & SARS-CoV2 (COVID-19) Virus PCR Multiplex     Status: None    Specimen: Nasopharyngeal   Result Value Ref Range    Flu A/B & SARS-COV-2 PCR Source Nasopharyngeal     SARS-CoV-2 PCR Result NEGATIVE     Influenza A PCR Negative NEG^Negative    Influenza B PCR Negative NEG^Negative    Respiratory Syncytial Virus PCR Negative NEG^Negative    Flu A/B & SARS-CoV-2 PCR Comment (Note)    CBC with platelets differential     Status: Abnormal   Result Value Ref Range    WBC 7.2 4.0 - 11.0 10e9/L    RBC Count 3.53 (L) 3.8 - 5.2 10e12/L    Hemoglobin 11.6 (L) 11.7 - 15.7 g/dL    Hematocrit 35.0 35.0 - 47.0 %    MCV 99 78 - 100 fl    MCH 32.9 26.5 - 33.0 pg    MCHC 33.1 31.5 - 36.5 g/dL    RDW 11.8 10.0 - 15.0 %    Platelet Count 289 150 - 450 10e9/L    Diff Method Automated Method     % Neutrophils 45.4 %    % Lymphocytes 43.4 %    % Monocytes 8.0 %    % Eosinophils 2.2 %    % Basophils 0.7 %    % Immature Granulocytes 0.3 %    Nucleated RBCs 0 0 /100    Absolute Neutrophil 3.3 1.6 - 8.3 10e9/L    Absolute Lymphocytes 3.1 0.8 - 5.3 10e9/L    Absolute Monocytes 0.6 0.0 - 1.3 10e9/L    Absolute Eosinophils 0.2 0.0 - 0.7 10e9/L    Absolute Basophils 0.1 0.0 - 0.2 10e9/L    Abs Immature Granulocytes 0.0 0 - 0.4 10e9/L    Absolute Nucleated RBC 0.0    Basic metabolic panel     Status: None   Result Value Ref Range    Sodium 142 133 - 144 mmol/L    Potassium 3.7 3.4 - 5.3 mmol/L    Chloride 109 94 - 109 mmol/L    Carbon Dioxide 27 20 - 32 mmol/L    Anion Gap 5 3 - 14 mmol/L    Glucose 98 70 - 99 mg/dL    Urea Nitrogen 17 7 - 30 mg/dL    Creatinine 0.80 0.52 - 1.04 mg/dL    GFR Estimate >90 >60 mL/min/[1.73_m2]    GFR Estimate If Black >90 >60 mL/min/[1.73_m2]    Calcium 8.6 8.5 - 10.1 mg/dL   UA with Microscopic     Status: Abnormal   Result Value Ref Range    Color Urine Yellow     Appearance  Urine Slightly Cloudy     Glucose Urine Negative NEG^Negative mg/dL    Bilirubin Urine Negative NEG^Negative    Ketones Urine Negative NEG^Negative mg/dL    Specific Gravity Urine 1.025 1.003 - 1.035    Blood Urine Negative NEG^Negative    pH Urine 5.5 5.0 - 7.0 pH    Protein Albumin Urine Negative NEG^Negative mg/dL    Urobilinogen mg/dL Normal 0.0 - 2.0 mg/dL    Nitrite Urine Positive (A) NEG^Negative    Leukocyte Esterase Urine Large (A) NEG^Negative    Source Midstream Urine     WBC Urine 5 0 - 5 /HPF    RBC Urine 2 0 - 2 /HPF    Bacteria Urine Few (A) NEG^Negative /HPF    Squamous Epithelial /HPF Urine 5 (H) 0 - 1 /HPF    Transitional Epi <1 0 - 1 /HPF    Mucous Urine Present (A) NEG^Negative /LPF   CK total     Status: None   Result Value Ref Range    CK Total 43 30 - 225 U/L   Urine Culture     Status: Abnormal    Specimen: Urine Midstream; Unspecified Urine   Result Value Ref Range    Specimen Description Unspecified Urine     Special Requests Specimen received in preservative     Culture Micro >100,000 colonies/mL  Escherichia coli   (A)        Susceptibility    Escherichia coli - MISBAH     AMPICILLIN 8 Sensitive ug/mL     CEFAZOLIN* <=4 Sensitive ug/mL      * Cefazolin MISBAH breakpoints are for the treatment of uncomplicated urinary tract infections.  For the treatment of systemic infections, please contact the laboratory for additional testing.     CEFOXITIN <=4 Sensitive ug/mL     CEFTAZIDIME <=1 Sensitive ug/mL     CEFTRIAXONE <=1 Sensitive ug/mL     CIPROFLOXACIN <=0.25 Sensitive ug/mL     GENTAMICIN <=1 Sensitive ug/mL     LEVOFLOXACIN <=0.12 Sensitive ug/mL     NITROFURANTOIN 32 Sensitive ug/mL     TOBRAMYCIN <=1 Sensitive ug/mL     Trimethoprim/Sulfa <=1/19 Sensitive ug/mL     AMPICILLIN/SULBACTAM 4 Sensitive ug/mL     Piperacillin/Tazo <=4 Sensitive ug/mL     CEFEPIME <=1 Sensitive ug/mL     Medications   0.9% sodium chloride BOLUS (0 mLs Intravenous Stopped 2/19/21 0153)   ketorolac (TORADOL)  injection 30 mg (30 mg Intravenous Given 2/19/21 0128)   morphine (PF) injection 4 mg (4 mg Intravenous Given 2/19/21 0204)        Assessments & Plan (with Medical Decision Making)   This is a 4-year-old female with history of Ehler Danlos syndrome depression and chronic pain who comes to the emergency department complaining of myalgias and generalized weakness.  This is been going on for the last few months but has gotten worse recently.  Her symptoms do not localize.  Work-up here revealed normal labs and nonconcerning exam no evidence for urinary infection fever elevated white count and Covid screening was negative.  She was given IV fluids and pain medication.  At this point I do not see any additional work-up as being fruitful.  This seems to be an exacerbation of her chronic symptoms and I recommend follow-up with her primary care clinic and also with the pain clinic.  I am comfortable that there are no red flags for serious underlying illness.    I have reviewed the nursing notes. I have reviewed the findings, diagnosis, plan and need for follow up with the patient.    Discharge Medication List as of 2/19/2021  4:02 AM          Final diagnoses:   Generalized body aches     I, Eulalia Duggan, am serving as a trained medical scribe to document services personally performed by Adria Lombardi MD, based on the provider's statements to me.      I, Adria Lombardi MD, was physically present and have reviewed and verified the accuracy of this note documented by Eulalia Duggan.     --  Adria Lombardi MD  [facility] EMERGENCY DEPARTMENT  2/19/2021     Adria Lombardi MD  03/07/21 0366

## 2021-02-19 NOTE — ED NOTES
Rn attempts to discharge patient. Patient states that she is under the impression that she was being admitted to the hospital. Physician reeducates patient about plan of care

## 2021-02-20 LAB
BACTERIA SPEC CULT: ABNORMAL
Lab: ABNORMAL
SPECIMEN SOURCE: ABNORMAL

## 2021-02-24 ENCOUNTER — HOSPITAL ENCOUNTER (INPATIENT)
Facility: CLINIC | Age: 41
LOS: 1 days | Discharge: HOME | End: 2021-02-25
Attending: EMERGENCY MEDICINE | Admitting: PSYCHIATRY & NEUROLOGY
Payer: COMMERCIAL

## 2021-02-24 DIAGNOSIS — R45.89 AT HIGH RISK FOR SELF HARM: ICD-10-CM

## 2021-02-24 DIAGNOSIS — F33.9 RECURRENT MAJOR DEPRESSIVE DISORDER, REMISSION STATUS UNSPECIFIED (H): ICD-10-CM

## 2021-02-24 DIAGNOSIS — F22 PARANOIA (H): ICD-10-CM

## 2021-02-24 DIAGNOSIS — F41.9 ANXIETY: ICD-10-CM

## 2021-02-24 DIAGNOSIS — Z11.52 ENCOUNTER FOR SCREENING LABORATORY TESTING FOR SEVERE ACUTE RESPIRATORY SYNDROME CORONAVIRUS 2 (SARS-COV-2): ICD-10-CM

## 2021-02-24 DIAGNOSIS — F90.9 ATTENTION DEFICIT HYPERACTIVITY DISORDER (ADHD), UNSPECIFIED ADHD TYPE: ICD-10-CM

## 2021-02-24 LAB
AMPHETAMINES UR QL SCN: POSITIVE
BARBITURATES UR QL: NEGATIVE
BENZODIAZ UR QL: POSITIVE
CANNABINOIDS UR QL SCN: POSITIVE
COCAINE UR QL: NEGATIVE
ETHANOL UR QL SCN: NEGATIVE
HCG UR QL: NEGATIVE
OPIATES UR QL SCN: NEGATIVE

## 2021-02-24 PROCEDURE — 87086 URINE CULTURE/COLONY COUNT: CPT | Performed by: EMERGENCY MEDICINE

## 2021-02-24 PROCEDURE — 99285 EMERGENCY DEPT VISIT HI MDM: CPT | Mod: 25 | Performed by: EMERGENCY MEDICINE

## 2021-02-24 PROCEDURE — 80307 DRUG TEST PRSMV CHEM ANLYZR: CPT | Performed by: EMERGENCY MEDICINE

## 2021-02-24 PROCEDURE — 80320 DRUG SCREEN QUANTALCOHOLS: CPT | Performed by: EMERGENCY MEDICINE

## 2021-02-24 PROCEDURE — 87186 SC STD MICRODIL/AGAR DIL: CPT | Performed by: EMERGENCY MEDICINE

## 2021-02-24 PROCEDURE — 81025 URINE PREGNANCY TEST: CPT | Performed by: EMERGENCY MEDICINE

## 2021-02-24 PROCEDURE — 99285 EMERGENCY DEPT VISIT HI MDM: CPT | Performed by: EMERGENCY MEDICINE

## 2021-02-24 PROCEDURE — 250N000013 HC RX MED GY IP 250 OP 250 PS 637: Performed by: EMERGENCY MEDICINE

## 2021-02-24 PROCEDURE — 87088 URINE BACTERIA CULTURE: CPT | Performed by: EMERGENCY MEDICINE

## 2021-02-24 RX ORDER — OLANZAPINE 5 MG/1
5 TABLET, ORALLY DISINTEGRATING ORAL ONCE
Status: COMPLETED | OUTPATIENT
Start: 2021-02-24 | End: 2021-02-24

## 2021-02-24 RX ORDER — SODIUM CHLORIDE 9 MG/ML
INJECTION, SOLUTION INTRAVENOUS CONTINUOUS
Status: DISCONTINUED | OUTPATIENT
Start: 2021-02-25 | End: 2021-02-25 | Stop reason: HOSPADM

## 2021-02-24 RX ADMIN — OLANZAPINE 5 MG: 5 TABLET, ORALLY DISINTEGRATING ORAL at 23:57

## 2021-02-24 ASSESSMENT — ENCOUNTER SYMPTOMS
EYE REDNESS: 0
ABDOMINAL PAIN: 0
APPETITE CHANGE: 0
ARTHRALGIAS: 0
CONFUSION: 0
DIFFICULTY URINATING: 0
VOMITING: 0
BACK PAIN: 1
COLOR CHANGE: 0
NERVOUS/ANXIOUS: 1
NECK PAIN: 1
MYALGIAS: 1
FEVER: 0
NECK STIFFNESS: 0
COUGH: 0
NAUSEA: 0
RHINORRHEA: 0
HEADACHES: 1
DIARRHEA: 0

## 2021-02-25 VITALS
HEART RATE: 92 BPM | OXYGEN SATURATION: 96 % | BODY MASS INDEX: 18.16 KG/M2 | HEIGHT: 66 IN | TEMPERATURE: 99 F | RESPIRATION RATE: 18 BRPM | WEIGHT: 113 LBS | SYSTOLIC BLOOD PRESSURE: 101 MMHG | DIASTOLIC BLOOD PRESSURE: 70 MMHG

## 2021-02-25 PROBLEM — R45.89 AT HIGH RISK FOR SELF HARM: Status: ACTIVE | Noted: 2021-02-25

## 2021-02-25 LAB
ALBUMIN SERPL-MCNC: 4 G/DL (ref 3.4–5)
ALBUMIN UR-MCNC: 10 MG/DL
ALP SERPL-CCNC: 66 U/L (ref 40–150)
ALT SERPL W P-5'-P-CCNC: 22 U/L (ref 0–50)
ANION GAP SERPL CALCULATED.3IONS-SCNC: 7 MMOL/L (ref 3–14)
APPEARANCE UR: ABNORMAL
AST SERPL W P-5'-P-CCNC: 10 U/L (ref 0–45)
BACTERIA #/AREA URNS HPF: ABNORMAL /HPF
BASOPHILS # BLD AUTO: 0.1 10E9/L (ref 0–0.2)
BASOPHILS NFR BLD AUTO: 0.6 %
BILIRUB SERPL-MCNC: 0.5 MG/DL (ref 0.2–1.3)
BILIRUB UR QL STRIP: NEGATIVE
BUN SERPL-MCNC: 12 MG/DL (ref 7–30)
CALCIUM SERPL-MCNC: 8.6 MG/DL (ref 8.5–10.1)
CAOX CRY #/AREA URNS HPF: ABNORMAL /HPF
CHLORIDE SERPL-SCNC: 107 MMOL/L (ref 94–109)
CO2 SERPL-SCNC: 24 MMOL/L (ref 20–32)
COLOR UR AUTO: YELLOW
CREAT SERPL-MCNC: 0.78 MG/DL (ref 0.52–1.04)
DEPRECATED CALCIDIOL+CALCIFEROL SERPL-MC: 28 UG/L (ref 20–75)
DIFFERENTIAL METHOD BLD: ABNORMAL
EOSINOPHIL # BLD AUTO: 0.2 10E9/L (ref 0–0.7)
EOSINOPHIL NFR BLD AUTO: 1.8 %
ERYTHROCYTE [DISTWIDTH] IN BLOOD BY AUTOMATED COUNT: 11.9 % (ref 10–15)
FLUAV RNA RESP QL NAA+PROBE: NEGATIVE
FLUBV RNA RESP QL NAA+PROBE: NEGATIVE
GFR SERPL CREATININE-BSD FRML MDRD: >90 ML/MIN/{1.73_M2}
GLUCOSE SERPL-MCNC: 77 MG/DL (ref 70–99)
GLUCOSE UR STRIP-MCNC: NEGATIVE MG/DL
HCT VFR BLD AUTO: 36.9 % (ref 35–47)
HGB BLD-MCNC: 12.5 G/DL (ref 11.7–15.7)
HGB UR QL STRIP: NEGATIVE
IMM GRANULOCYTES # BLD: 0 10E9/L (ref 0–0.4)
IMM GRANULOCYTES NFR BLD: 0.3 %
KETONES UR STRIP-MCNC: 5 MG/DL
LABORATORY COMMENT REPORT: NORMAL
LEUKOCYTE ESTERASE UR QL STRIP: ABNORMAL
LYMPHOCYTES # BLD AUTO: 4.4 10E9/L (ref 0.8–5.3)
LYMPHOCYTES NFR BLD AUTO: 46 %
MCH RBC QN AUTO: 33.2 PG (ref 26.5–33)
MCHC RBC AUTO-ENTMCNC: 33.9 G/DL (ref 31.5–36.5)
MCV RBC AUTO: 98 FL (ref 78–100)
MONOCYTES # BLD AUTO: 0.7 10E9/L (ref 0–1.3)
MONOCYTES NFR BLD AUTO: 6.8 %
MUCOUS THREADS #/AREA URNS LPF: PRESENT /LPF
NEUTROPHILS # BLD AUTO: 4.3 10E9/L (ref 1.6–8.3)
NEUTROPHILS NFR BLD AUTO: 44.5 %
NITRATE UR QL: POSITIVE
NRBC # BLD AUTO: 0 10*3/UL
NRBC BLD AUTO-RTO: 0 /100
PH UR STRIP: 5.5 PH (ref 5–7)
PLATELET # BLD AUTO: 377 10E9/L (ref 150–450)
POTASSIUM SERPL-SCNC: 3.9 MMOL/L (ref 3.4–5.3)
PROT SERPL-MCNC: 7 G/DL (ref 6.8–8.8)
RBC # BLD AUTO: 3.76 10E12/L (ref 3.8–5.2)
RBC #/AREA URNS AUTO: 5 /HPF (ref 0–2)
RSV RNA SPEC QL NAA+PROBE: NEGATIVE
SARS-COV-2 RNA RESP QL NAA+PROBE: NEGATIVE
SODIUM SERPL-SCNC: 138 MMOL/L (ref 133–144)
SOURCE: ABNORMAL
SP GR UR STRIP: 1.03 (ref 1–1.03)
SPECIMEN SOURCE: NORMAL
SQUAMOUS #/AREA URNS AUTO: 4 /HPF (ref 0–1)
UROBILINOGEN UR STRIP-MCNC: NORMAL MG/DL (ref 0–2)
WBC # BLD AUTO: 9.6 10E9/L (ref 4–11)
WBC #/AREA URNS AUTO: 7 /HPF (ref 0–5)

## 2021-02-25 PROCEDURE — 99238 HOSP IP/OBS DSCHRG MGMT 30/<: CPT | Mod: GC | Performed by: PSYCHIATRY & NEUROLOGY

## 2021-02-25 PROCEDURE — 96374 THER/PROPH/DIAG INJ IV PUSH: CPT | Performed by: EMERGENCY MEDICINE

## 2021-02-25 PROCEDURE — C9803 HOPD COVID-19 SPEC COLLECT: HCPCS | Performed by: EMERGENCY MEDICINE

## 2021-02-25 PROCEDURE — 90791 PSYCH DIAGNOSTIC EVALUATION: CPT

## 2021-02-25 PROCEDURE — 96361 HYDRATE IV INFUSION ADD-ON: CPT | Performed by: EMERGENCY MEDICINE

## 2021-02-25 PROCEDURE — 82306 VITAMIN D 25 HYDROXY: CPT | Performed by: PSYCHIATRY & NEUROLOGY

## 2021-02-25 PROCEDURE — 258N000003 HC RX IP 258 OP 636: Performed by: EMERGENCY MEDICINE

## 2021-02-25 PROCEDURE — 85025 COMPLETE CBC W/AUTO DIFF WBC: CPT | Performed by: EMERGENCY MEDICINE

## 2021-02-25 PROCEDURE — 250N000013 HC RX MED GY IP 250 OP 250 PS 637: Performed by: STUDENT IN AN ORGANIZED HEALTH CARE EDUCATION/TRAINING PROGRAM

## 2021-02-25 PROCEDURE — 124N000002 HC R&B MH UMMC

## 2021-02-25 PROCEDURE — 87636 SARSCOV2 & INF A&B AMP PRB: CPT | Performed by: EMERGENCY MEDICINE

## 2021-02-25 PROCEDURE — 36415 COLL VENOUS BLD VENIPUNCTURE: CPT | Performed by: PSYCHIATRY & NEUROLOGY

## 2021-02-25 PROCEDURE — 250N000013 HC RX MED GY IP 250 OP 250 PS 637: Performed by: EMERGENCY MEDICINE

## 2021-02-25 PROCEDURE — 250N000011 HC RX IP 250 OP 636: Performed by: EMERGENCY MEDICINE

## 2021-02-25 PROCEDURE — 80053 COMPREHEN METABOLIC PANEL: CPT | Performed by: EMERGENCY MEDICINE

## 2021-02-25 PROCEDURE — 81001 URINALYSIS AUTO W/SCOPE: CPT | Performed by: EMERGENCY MEDICINE

## 2021-02-25 RX ORDER — ACETAMINOPHEN 500 MG
1000 TABLET ORAL EVERY 6 HOURS PRN
Status: DISCONTINUED | OUTPATIENT
Start: 2021-02-25 | End: 2021-02-25 | Stop reason: HOSPADM

## 2021-02-25 RX ORDER — DIAZEPAM 5 MG
5 TABLET ORAL EVERY 8 HOURS PRN
Status: DISCONTINUED | OUTPATIENT
Start: 2021-02-25 | End: 2021-02-25 | Stop reason: HOSPADM

## 2021-02-25 RX ORDER — OLANZAPINE 10 MG/2ML
10 INJECTION, POWDER, FOR SOLUTION INTRAMUSCULAR 3 TIMES DAILY PRN
Status: DISCONTINUED | OUTPATIENT
Start: 2021-02-25 | End: 2021-02-25 | Stop reason: HOSPADM

## 2021-02-25 RX ORDER — DICLOFENAC SODIUM 100 MG/1
100 TABLET, FILM COATED, EXTENDED RELEASE ORAL DAILY PRN
Status: DISCONTINUED | OUTPATIENT
Start: 2021-02-25 | End: 2021-02-25

## 2021-02-25 RX ORDER — NORTRIPTYLINE HCL 10 MG
10 CAPSULE ORAL AT BEDTIME
Status: DISCONTINUED | OUTPATIENT
Start: 2021-02-25 | End: 2021-02-25 | Stop reason: HOSPADM

## 2021-02-25 RX ORDER — OLANZAPINE 10 MG/1
10 TABLET ORAL 3 TIMES DAILY PRN
Status: DISCONTINUED | OUTPATIENT
Start: 2021-02-25 | End: 2021-02-25 | Stop reason: HOSPADM

## 2021-02-25 RX ORDER — NICOTINE 21 MG/24HR
1 PATCH, TRANSDERMAL 24 HOURS TRANSDERMAL ONCE
Status: DISCONTINUED | OUTPATIENT
Start: 2021-02-25 | End: 2021-02-25 | Stop reason: HOSPADM

## 2021-02-25 RX ORDER — AMOXICILLIN 250 MG
1 CAPSULE ORAL 2 TIMES DAILY PRN
Status: DISCONTINUED | OUTPATIENT
Start: 2021-02-25 | End: 2021-02-25 | Stop reason: HOSPADM

## 2021-02-25 RX ORDER — LANOLIN ALCOHOL/MO/W.PET/CERES
3 CREAM (GRAM) TOPICAL
Status: DISCONTINUED | OUTPATIENT
Start: 2021-02-25 | End: 2021-02-25 | Stop reason: HOSPADM

## 2021-02-25 RX ORDER — LAMOTRIGINE 200 MG/1
200 TABLET ORAL DAILY
Status: DISCONTINUED | OUTPATIENT
Start: 2021-02-25 | End: 2021-02-25 | Stop reason: HOSPADM

## 2021-02-25 RX ORDER — MELOXICAM 7.5 MG/1
7.5 TABLET ORAL DAILY PRN
Status: DISCONTINUED | OUTPATIENT
Start: 2021-02-25 | End: 2021-02-25 | Stop reason: HOSPADM

## 2021-02-25 RX ORDER — ACETAMINOPHEN 500 MG
1000 TABLET ORAL ONCE
Status: COMPLETED | OUTPATIENT
Start: 2021-02-25 | End: 2021-02-25

## 2021-02-25 RX ORDER — METHOCARBAMOL 500 MG/1
500 TABLET, FILM COATED ORAL 4 TIMES DAILY PRN
Status: DISCONTINUED | OUTPATIENT
Start: 2021-02-25 | End: 2021-02-25

## 2021-02-25 RX ORDER — MAGNESIUM HYDROXIDE/ALUMINUM HYDROXICE/SIMETHICONE 120; 1200; 1200 MG/30ML; MG/30ML; MG/30ML
30 SUSPENSION ORAL EVERY 4 HOURS PRN
Status: DISCONTINUED | OUTPATIENT
Start: 2021-02-25 | End: 2021-02-25 | Stop reason: HOSPADM

## 2021-02-25 RX ORDER — METHOCARBAMOL 500 MG/1
500 TABLET, FILM COATED ORAL ONCE
Status: COMPLETED | OUTPATIENT
Start: 2021-02-25 | End: 2021-02-25

## 2021-02-25 RX ORDER — KETOROLAC TROMETHAMINE 15 MG/ML
15 INJECTION, SOLUTION INTRAMUSCULAR; INTRAVENOUS ONCE
Status: COMPLETED | OUTPATIENT
Start: 2021-02-25 | End: 2021-02-25

## 2021-02-25 RX ORDER — NICOTINE 21 MG/24HR
1 PATCH, TRANSDERMAL 24 HOURS TRANSDERMAL DAILY
Status: DISCONTINUED | OUTPATIENT
Start: 2021-02-25 | End: 2021-02-25 | Stop reason: HOSPADM

## 2021-02-25 RX ORDER — MELOXICAM 7.5 MG/1
7.5 TABLET ORAL DAILY
Status: DISCONTINUED | OUTPATIENT
Start: 2021-02-25 | End: 2021-02-25 | Stop reason: HOSPADM

## 2021-02-25 RX ADMIN — METHOCARBAMOL 500 MG: 500 TABLET, FILM COATED ORAL at 02:21

## 2021-02-25 RX ADMIN — MELOXICAM 7.5 MG: 7.5 TABLET ORAL at 09:41

## 2021-02-25 RX ADMIN — Medication 1 PATCH: at 02:21

## 2021-02-25 RX ADMIN — SODIUM CHLORIDE: 9 INJECTION, SOLUTION INTRAVENOUS at 02:05

## 2021-02-25 RX ADMIN — NICOTINE 1 PATCH: 21 PATCH, EXTENDED RELEASE TRANSDERMAL at 09:45

## 2021-02-25 RX ADMIN — OMEPRAZOLE 20 MG: 20 CAPSULE, DELAYED RELEASE ORAL at 09:41

## 2021-02-25 RX ADMIN — KETOROLAC TROMETHAMINE 15 MG: 15 INJECTION, SOLUTION INTRAMUSCULAR; INTRAVENOUS at 02:03

## 2021-02-25 RX ADMIN — ACETAMINOPHEN 1000 MG: 500 TABLET ORAL at 02:03

## 2021-02-25 RX ADMIN — SODIUM CHLORIDE 1000 ML: 9 INJECTION, SOLUTION INTRAVENOUS at 00:04

## 2021-02-25 RX ADMIN — LAMOTRIGINE 200 MG: 200 TABLET ORAL at 09:41

## 2021-02-25 ASSESSMENT — ACTIVITIES OF DAILY LIVING (ADL)
DIFFICULTY_EATING/SWALLOWING: NO
DRESSING/BATHING_DIFFICULTY: NO
DRESS: INDEPENDENT;SCRUBS (BEHAVIORAL HEALTH)
DRESS: INDEPENDENT;SCRUBS (BEHAVIORAL HEALTH)
CONCENTRATING,_REMEMBERING_OR_MAKING_DECISIONS_DIFFICULTY: NO
HYGIENE/GROOMING: INDEPENDENT
HEARING_DIFFICULTY_OR_DEAF: NO
ORAL_HYGIENE: INDEPENDENT
DIFFICULTY_COMMUNICATING: NO
LAUNDRY: UNABLE TO COMPLETE
WEAR_GLASSES_OR_BLIND: NO
HYGIENE/GROOMING: INDEPENDENT
TOILETING_ISSUES: NO
DOING_ERRANDS_INDEPENDENTLY_DIFFICULTY: NO
WALKING_OR_CLIMBING_STAIRS_DIFFICULTY: NO
ORAL_HYGIENE: INDEPENDENT
FALL_HISTORY_WITHIN_LAST_SIX_MONTHS: NO
LAUNDRY: WITH SUPERVISION

## 2021-02-25 ASSESSMENT — MIFFLIN-ST. JEOR: SCORE: 1199.31

## 2021-02-25 NOTE — SAFE
Patient presented to the ED for evaluation of chronic pain and psychiatric symptoms. Patient did not endorse any suicidal or homicidal ideation.    Current suicidal ideation/self-injurious concerns/methods  -None observed/indicated    Inappropriate sexual behavior  -None observed/indicated    Aggression/homicidal ideation  -Agitation  -Impaired self-control  -Rumination    For additional details see full DEC assessment.  Neel Levine MA, LPCC, LADC

## 2021-02-25 NOTE — ED NOTES
spoke with behavioral health post assessment. Pt agreeable to be admitted to psych. Pt does not need 1:1 watch / searched per security, pt also okay to keep belongings per .
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
## 2021-02-25 NOTE — PROGRESS NOTES
02/25/21 0523   Patient Belongings   Did you bring any home meds/supplements to the hospital?  No   Patient Belongings locker;sent to security per site process   Patient Belongings Remaining with Patient none   Patient Belongings Put in Hospital Secure Location (Security or Locker, etc.) cash/credit card;cell phone/electronics;clothing;keys;purse/wallet;shoes   Belongings Search Yes   Clothing Search Yes   Second Staff Celia MACARIO     Belongings sent to security  .............................................    [employer] card...........[id number]  [employer] marilu card..............[id number]  [employer] card.............................[id number]        Belongings sent to locker  ...........................................  Phone /    A grey bag containing books  2 pairs of shoes with strings  2 pairs of sweat shirt  3 pairs of pant trousers with strings  Pair of socks  Pair of bra  Underwear/boxers  Ipod with ear plugs  Pack of cigarette with lighter  A key  Make up bag  A pair of black gown  A pair of gloves  A hair ban  A pair of winter jacket  Wallet containing- [place] license,gift cards and business cards.     A               Admission:  I am responsible for any personal items that are not sent to the safe or pharmacy.  [facility] is not responsible for loss, theft or damage of any property in my possession.    Signature:  _________________________________ Date: _______  Time: _____                                              Staff Signature:  ____________________________ Date: ________  Time: _____      2nd Staff person, if patient is unable/unwilling to sign:    Signature: ________________________________ Date: ________  Time: _____     Discharge:  [facility] has returned all of my personal belongings:    Signature: _________________________________ Date: ________  Time: _____                                          Staff Signature:  ____________________________ Date: ________  Time: _____

## 2021-02-25 NOTE — PLAN OF CARE
Problem: General Plan of Care (Inpatient Behavioral)  Goal: Individualization/Patient Specific Goal (IP Behavioral)  Description: The patient and/or their representative will achieve their patient-specific goals related to the plan of care.    The patient-specific goals include:  Flowsheets (Taken 2/25/2021 1221)  Patient Strengths:   Stable housing   Adherent to medication regime  Note: Patient's goals:  Unable/unwilling to participate    Plan for admission:  1. Stabilization of mood disorder symptoms  2. Absence of SI- safe with self  3. Medication management per MD's  4. Substance use addressed  5. Coordination of care with outpatient providers  6. Psychiatric follow up care in place

## 2021-02-25 NOTE — PLAN OF CARE
"BEHAVIORAL TEAM DISCUSSION    Participants:   Dr. Rodriguez, Dr. Guy, Dr. Martell, Lucinda Hinton RN, Gail Hoang MA..LP    Anticipated length of stay:   3-5 days    Continued Stay Criteria/Rationale:   SI in the context of chronic pain    Medical/Physical:  Chronic pain,   Osteoarthritis  Mild Degenerative Joint Disease  Munir Danlos Syndrome  Fibromyalgia      Precautions:   Behavioral Orders   Procedures     Code 1 - Restrict to Unit     Routine Programming     As clinically indicated     Self Injury Precaution     Status 15     Every 15 minutes.     Suicide precautions     Patients on Suicide Precautions should have a Combination Diet ordered that includes a Diet selection(s) AND a Behavioral Tray selection for Safe Tray - with utensils, or Safe Tray - NO utensils       Plan:  Patient refused to meet with team this morning.  Reported pain and \"nobody understands.\"  Patient will have ongoing psychiatric assessment.  Medications will be reviewed and adjusted per MD as indicated.  Outpatient providers will be contacted for care coordination.  Hospital staff will provide a safe environment and a therapeutic milieu. Patient will be encouraged to participate in unit groups and activities.   CTC will continue to assess needs and  ensure appropriate follow up care is in place.       Rationale for change in precautions or plan:  No change in plan/precautions      "

## 2021-02-25 NOTE — ED TRIAGE NOTES
Patient said she is at the end of her rope and unstable. Says she hasn't had suicidal ideations in years, but after deciding to come to ER started having ideations again and got really anxious.

## 2021-02-25 NOTE — PROGRESS NOTES
"Pt is a 40 year old female admitted in [facility] for SI and anxiety. Per report, pt has Hx of Depression, anxiety and PTSD. Pt states that she will hurt herself if she leaves the hospital, stated \" I cannot live like this\".  Pt reports she is unable to take care for herself at home; not been eating or sleeping , lays in bed all day.     Pt was cooperative with the search and interview. Denied SI/SIB/AH/VH. Denied any pain at this time. Pt is here voluntarily and did contract for safety while in the unit. Pt is covid Negative. Drug screen positive for amphetamines, benzos and cannabinoids. Pt was briefy oriented in the unit ; appreciated snack given. Pt slept 1.25 hours; arrived in the unit 0415.  Will continue to monitor and offer support.   "

## 2021-02-25 NOTE — ED NOTES
Patient was very anxious about having to leave triage room to go back to lobby. Writer discussed with patient. Patient verbally agreed that she would not hurt herself or others in lobby. Patient agreed that she will let staff know if she has increased thoughts of hurting herself or if she has any additional concerns while waiting in lobby. EMTALA RN aware of plan as well.

## 2021-02-25 NOTE — DISCHARGE INSTRUCTIONS
Behavioral Discharge Planning and Instructions    Summary:   You were admitted to [facility] on 2/25/21  with worsening depression in the context of chronic pain under the care of Dr. Rodriguez.  You met with Dr. Rodriguez and his team for psychiatric assessment and medication management but declined treatment.   You had opportunities to participate in therapeutic groups on the unit.   At this time you are asking to be discharged and report that you are not having thoughts or intent to harm yourself or others.   You will be discharged home and will resume care with your outpatient providers.    Disposition:  Home    Main Diagnosis:   Major Depressive Disorder  Anxiety Disorder  ADHD  Chronic Pain      Major Treatments, Procedures and Findings:   Medications were  managed throughout your stay. An internal medicine consult was completed during your stay. You had the opportunity to participate in treatment programming while on the unit including occupational therapy, mental health support and education and spiritual services.     Symptoms to Report:   Please report if you are experiencing increased aggression and/or confusion, problematic loss of sleep, worsening mood, or thoughts of suicide to your treatment team or notify your primary provider.   IF THE SYMPTOMS YOU ARE EXPERIENCING ARE A MEDICAL EMERGENCY, CALL 911 IMMEDIATELY    Lifestyle Adjustment:   1. Adjust your lifestyle to get enough sleep, relaxation, exercise and good nutrition.  Continue to develop healthy coping skills to decrease stress and promote a healthy and sober lifestyle.  2. Abstain from all substances of abuse.  3. Take medications as prescribed.  Please work with your doctor to discuss any concerns you have with your medications or side effects you may be experiencing.  4. Follow up with appointments as scheduled.        Psychiatry Follow-up:   Appointment: Psychiatry:  Mariel Vo: ( Patient will schedule)  [facility]:  "[address]  [place] [address]  [phone]    Appointment: Dr. Fausto Espitia: 3/8/21 at 2:45pm  Physical Medicine and Rehab:  [facility]: [address] [place] [address]  [phone]      Resources:   *[facility] Crisis: COPE: ([phone]) 24 hour mobile crisis support for people having a mental health crisis in [facility].   *[facility] ([phone]). 24-hour walk-in crisis psychiatric support at [facility]; Emergency Medications Clinic available 7:30am - 2:00pm  *[id number]: Text  \"life\"  to [phone]   A trained crisis counselor will respond immediately  *[facility]: ([phone])  24-hour confidential telephone counseling   *[facility] Emergency Room: [phone]    General Medication Instructions:   See your medication sheet(s) for instructions.   Take all medicines as directed.  Make no changes unless your doctor suggests them.   Go to all your doctor visits.  Be sure to have all your required lab tests. This way, your medicines can be refilled on time.  Do not use any drugs not prescribed by your doctor.    The treatment team has appreciated the opportunity to work with you.  We wish you the best in the future.    If you have any questions or concerns our unit number is [phone].     "

## 2021-02-25 NOTE — ED NOTES
"RN at bedside to assess patient. PT states \"I am not suicidal. I only said that to get back here faster. Someone told me to say that if I wanted to be seen. I'm just in pain all over. I've been here multiple times and they say nothing is wrong with me.\" Made  aware. No suicide precautions at this time.   "

## 2021-02-25 NOTE — PLAN OF CARE
"  Problem: Adult Inpatient Plan of Care  Goal: Plan of Care Review  Outcome: No Change    Pt has been isolative and withdrawn and spent most of the shift sleeping in her room. Denied SI/SIB or hallucinations. Pt is on a vegetarian diet which was put in. Pt only came out of her room to eat her meals. Writer asked pt what were her goals while here or the next 2 days, to which pt responded \"I don't know\".   "

## 2021-02-25 NOTE — ED PROVIDER NOTES
"ED Provider Note  [facility]      History     Chief Complaint   Patient presents with     Suicidal     The history is provided by the patient and medical records.     Sheyla Ye is a 40 year old female with a past medical history significant for Munir' Danlos, chronic pain syndrome, fibromyalgia, chronic osteoarthritis, lumbar degenerative disc disease and anxiety who presents to the ED for evaluation of generalized pain.  Per the patient's medical record, on 2/19/2021, the patient presented to the ED for evaluation of myalgia and generalized weakness.  The patient had an unremarkable work-up and was discharged after IV fluids, Toradol and morphine.  Today, the patient reports having head to toe pain and stating that everything hurts.  She specifically describes her pain in her head, back, shoulders and neck.  The patient reports that her pain migrates throughout her body.  She states that she has had pain for many months.  The patient initially stated that she was suicidal but then told the physician that she was told the say that so she could be seen by physician quicker and be more likely to be admitted.  She stated that \"she cannot get this at home and she is in okay.\"  She also stated that she would not hurt herself and that she said \"I have to tell you that to get admitted.\"  Today, she states that she cannot breathe and cannot stop crying due to the pain.  She reports taking 2 Valium prior to arrival.  When asked about her mental health, she states that she is still debilitated and \"has been laying in bed for months and can barely function.\"  She states that she has been taking her medications as prescribed such as Lamictal, Robaxin and Valium but is not been taking her Adderall over the last 3 days.  She denies any difficulty urinating, cough, rhinorrhea, vomiting, nausea, diarrhea or change in appetite.  She denies any drug or alcohol use but does smoke cigarettes.    Past " Medical History  Past Medical History:   Diagnosis Date     Anxiety      Chronic osteoarthritis      Chronic pain      Depressive disorder 1995     DJD (degenerative joint disease), lumbar      Munir-Danlos disease      Fibromyalgia      Learning disability      Past Surgical History:   Procedure Laterality Date     INJECT EPIDURAL CERVICAL / THORACIC SINGLE N/A 4/30/2019    Procedure: Cervical Epidural Steroid Injection;  Surgeon: Swapnil Carlisle MD;  Location: [facility] OR     wisdom teeth removal       acetaminophen (TYLENOL) 500 MG tablet  amphetamine-dextroamphetamine (ADDERALL XR) 30 MG 24 hr capsule  diazepam (VALIUM) 5 MG tablet  diclofenac (VOLTAREN) 1 % topical gel  lamoTRIgine (LAMICTAL) 200 MG tablet  meloxicam (MOBIC) 7.5 MG tablet  methocarbamol (ROBAXIN) 500 MG tablet  omeprazole (PRILOSEC) 20 MG DR capsule      Allergies   Allergen Reactions     Seasonal Allergies      Family History  Family History   Problem Relation Age of Onset     Aneurysm Brother         20's, autopsy showed RP bleed     Thyroid Disease Mother      Anxiety Disorder Mother      Mental Illness Mother      Depression Mother      Genetic Disorder Mother      Migraines Mother      Aneurysm Other         brain aneurysm, 30's     Colon Cancer Other      Anxiety Disorder Sister      Mental Illness Sister      Cancer Paternal Grandmother      Hypertension Paternal Grandmother      Colon Cancer Paternal Grandmother      Cancer Paternal Grandfather      Hypertension Paternal Grandfather      Colon Cancer Paternal Grandfather      Depression Sister      Genetic Disorder Brother      Hypertension Father      Hyperlipidemia Father      Social History   Social History     Tobacco Use     Smoking status: Current Every Day Smoker     Packs/day: 0.50     Years: 15.00     Pack years: 7.50     Types: Cigarettes     Start date: 9/1/1999     Smokeless tobacco: Former User   Substance Use Topics     Alcohol use: Not Currently     Alcohol/week: 6.7  "standard drinks     Comment: Moderate      Drug use: No      Past medical history, past surgical history, medications, allergies, family history, and social history were reviewed with the patient. No additional pertinent items.       Review of Systems   Constitutional: Negative for appetite change and fever.   HENT: Negative for congestion and rhinorrhea.    Eyes: Negative for redness.   Respiratory: Negative for cough.    Cardiovascular: Negative for chest pain.   Gastrointestinal: Negative for abdominal pain, diarrhea, nausea and vomiting.   Genitourinary: Negative for difficulty urinating.   Musculoskeletal: Positive for back pain, myalgias and neck pain. Negative for arthralgias and neck stiffness.        Positive for shoulder pain.   Skin: Negative for color change.   Neurological: Positive for headaches.   Psychiatric/Behavioral: Negative for confusion, self-injury and suicidal ideas. The patient is nervous/anxious.    All other systems reviewed and are negative.    A complete review of systems was performed with pertinent positives and negatives noted in the HPI, and all other systems negative.    Physical Exam   BP: 99/69  Pulse: 108  Temp: 97.8  F (36.6  C)  Resp: 16  Height: 167.6 cm (5' 6\")  SpO2: 98 %  Physical Exam  Vitals signs and nursing note reviewed.   Constitutional:       General: She is not in acute distress.     Appearance: Normal appearance.   HENT:      Head: Normocephalic.      Nose: Nose normal.   Eyes:      Pupils: Pupils are equal, round, and reactive to light.   Neck:      Musculoskeletal: Normal range of motion.   Cardiovascular:      Rate and Rhythm: Normal rate and regular rhythm.   Pulmonary:      Effort: Pulmonary effort is normal.   Abdominal:      General: There is no distension.   Musculoskeletal: Normal range of motion.         General: No deformity.   Skin:     General: Skin is warm.   Neurological:      Mental Status: She is alert and oriented to person, place, and time. "   Psychiatric:         Mood and Affect: Mood is anxious and depressed. Affect is labile, angry, tearful and inappropriate.         Speech: Speech is rapid and pressured.         Behavior: Behavior is agitated.         Thought Content: Thought content is paranoid and delusional.         Cognition and Memory: Cognition and memory normal.         Judgment: Judgment is impulsive and inappropriate.         ED Course       11:30 PM  The patient was seen and examined by Erick Larry DO in Room ED17.        Results for orders placed or performed during the hospital encounter of 02/24/21   HCG qualitative urine     Status: None   Result Value Ref Range    HCG Qual Urine Negative NEG^Negative   Drug abuse screen 6 urine (chem dep)     Status: Abnormal   Result Value Ref Range    Amphetamine Qual Urine Positive (A) NEG^Negative    Barbiturates Qual Urine Negative NEG^Negative    Benzodiazepine Qual Urine Positive (A) NEG^Negative    Cannabinoids Qual Urine Positive (A) NEG^Negative    Cocaine Qual Urine Negative NEG^Negative    Ethanol Qual Urine Negative NEG^Negative    Opiates Qualitative Urine Negative NEG^Negative   CBC with platelets differential     Status: Abnormal   Result Value Ref Range    WBC 9.6 4.0 - 11.0 10e9/L    RBC Count 3.76 (L) 3.8 - 5.2 10e12/L    Hemoglobin 12.5 11.7 - 15.7 g/dL    Hematocrit 36.9 35.0 - 47.0 %    MCV 98 78 - 100 fl    MCH 33.2 (H) 26.5 - 33.0 pg    MCHC 33.9 31.5 - 36.5 g/dL    RDW 11.9 10.0 - 15.0 %    Platelet Count 377 150 - 450 10e9/L    Diff Method Automated Method     % Neutrophils 44.5 %    % Lymphocytes 46.0 %    % Monocytes 6.8 %    % Eosinophils 1.8 %    % Basophils 0.6 %    % Immature Granulocytes 0.3 %    Nucleated RBCs 0 0 /100    Absolute Neutrophil 4.3 1.6 - 8.3 10e9/L    Absolute Lymphocytes 4.4 0.8 - 5.3 10e9/L    Absolute Monocytes 0.7 0.0 - 1.3 10e9/L    Absolute Eosinophils 0.2 0.0 - 0.7 10e9/L    Absolute Basophils 0.1 0.0 - 0.2 10e9/L    Abs Immature Granulocytes 0.0  "0 - 0.4 10e9/L    Absolute Nucleated RBC 0.0    Comprehensive metabolic panel     Status: None   Result Value Ref Range    Sodium 138 133 - 144 mmol/L    Potassium 3.9 3.4 - 5.3 mmol/L    Chloride 107 94 - 109 mmol/L    Carbon Dioxide 24 20 - 32 mmol/L    Anion Gap 7 3 - 14 mmol/L    Glucose 77 70 - 99 mg/dL    Urea Nitrogen 12 7 - 30 mg/dL    Creatinine 0.78 0.52 - 1.04 mg/dL    GFR Estimate >90 >60 mL/min/[1.73_m2]    GFR Estimate If Black >90 >60 mL/min/[1.73_m2]    Calcium 8.6 8.5 - 10.1 mg/dL    Bilirubin Total 0.5 0.2 - 1.3 mg/dL    Albumin 4.0 3.4 - 5.0 g/dL    Protein Total 7.0 6.8 - 8.8 g/dL    Alkaline Phosphatase 66 40 - 150 U/L    ALT 22 0 - 50 U/L    AST 10 0 - 45 U/L     Medications   0.9% sodium chloride BOLUS (0 mLs Intravenous Stopped 2/25/21 0100)     Followed by   sodium chloride 0.9% infusion (has no administration in time range)   OLANZapine zydis (zyPREXA) ODT tab 5 mg (5 mg Oral Given 2/24/21 9867)        Assessments & Plan (with Medical Decision Making)   Patient presents to the emergency department for evaluation extreme anxiety depression, thoughts of self-harm, diffuse chronic pain.    Patient is well-known to the emergency department for similar complaints.  She initially told triage that she was suicidal, but later told me that she is not suicidal but felt that would get her seen faster.  She says \"I cannot live like this\", spends majority of her day lying in bed in pain.  She is extremely anxious and depressed all the time.  Cannot function in society.  Constantly feels that people are talking about her.    Differential diagnosis includes polysubstance abuse, anxiety/depression, panic attack, major depressive disorder, suicidality, psychosis, paranoid schizophrenia    Patient's pain is mainly diffuse, but she did say is worse in her suprapubic area.  Urinalysis is unremarkable.  Labs without signs of serious medical issue.  Symptoms likely consistent with extreme anxiety and worsening " paranoia.    She was evaluated by the mental health team.  She states she is able to be safe while in the emergency department, but is worried she may hurt herself if she goes home.  Assessment team feels that she would be a good candidate for inpatient residential treatment.  Given risk of self-harm at home, will admit to psychiatry with plan to transition to residential treatment when able.    Asymptomatic Covid pending.    Patient was given 1 liter IV fluids and 5 mg Zyprexa    Update:  Pt will need to call care coordinator in the morning to help facilitate transfer.  The number is [phone]        I have reviewed the nursing notes. I have reviewed the findings, diagnosis, plan and need for follow up with the patient.    New Prescriptions    No medications on file       Final diagnoses:   Anxiety   Paranoia (H)   At high risk for self harm       --  ISiva, am serving as a trained medical scribe to document services personally performed by Erick Larry DO, based on the provider's statements to me.     Erick CHACON DO, was physically present and have reviewed and verified the accuracy of this note documented by Siva Thao.    Erick Larry DO  [facility] EMERGENCY DEPARTMENT  2/24/2021     Erick Larry DO  02/25/21 0143

## 2021-02-26 LAB
BACTERIA SPEC CULT: ABNORMAL
Lab: ABNORMAL
SPECIMEN SOURCE: ABNORMAL

## 2021-02-26 NOTE — DISCHARGE SUMMARY
"    ----------------------------------------------------------------------------------------------------------  [facility] [place]   Discharge Summary  Hospital Day #1  Sheyla Ye MRN# [id number]   Age: 40 year old YOB: 1980   Date of Admission:  2/24/2021  Date of Discharge:  2/25/2021  6:21 PM  Admitting Physician:  Luiz Rodriguez MD  Discharge Physician:  Luiz Rodriguez MD     Event Leading to Hospitalization:   Sheyla Ye is a 40 year old female previously diagnosed with anxiety, PTSD, ADHD and concussion (s/p TBI 2019) as well as chronic pain, Munir Danlos, and fibromyalgia who presented on 02/25/2021 with SI and anxiety in the context of chronic pain.     In the ED patient originally stated she was anxious and suicidal, then told her ED nurse and provider that she is not suicidal and anna t she said that because she was told that is what she needed to report in order to be seen faster and admitted to the hospital. Patient's nurse indicated patient would like to be admitted for medical treatment and not psychiatric. Stated  \"I am not suicidal. I only said that to get back here faster. Someone told me to say that if I wanted t be seen. I'm just in pain all over. Paz been here multiple times and they say nothing is wrong with me.\"  At one point stated \"I would never kill myself. I just won't do it but I will harm myself.\" Denied specific plan     States she cannot live in chronic pain anymore, affecting every aspect of her life. Daily depressive and anxiety symptoms; lays in bed all day without engaging in daily cares. Upset during assessment often yelling at or speaking over clinician, frequently tearful. Denied SI or HI though states if she leaves the hospital she would harm herself. Feels she has not gotten the help she needs from the hospital or her other providers.     See Admission note by Luiz Rodriguez MD on 2/24/2021 for additional details. " "     Objective:   B/P: 101/70, T: 99, P: 92, R: 18  Psychiatric Examination:  Appearance:  laying in bed sleeping though easily awakens. Covers head with blanket. Frequently tearful.   Muscle Strength and Tone: moves all extremities spontaneously  Gait and Station: not seen  Behavior (Psychomotor):  no evidence of tardive dyskinesia, dystonia, or tics and does appear to be fidgeting with blankets / uncomfortable in bed  Eye Contact:  poor  - often closed   Speech:  clear, coherent and normal prosody  Mood:  \"I can't describe it\"  Affect:  labile and tearful  Attitude:  uncooperative  Thought Process:  illogical - conflicting statements of needing help and then refuses all offers  Thought Content:  no evidence of psychotic thought  Associations:  no loose associations  Insight:  limited  Judgment:  limited  Oriented to:  time, person, and place  Attention Span and Concentration:  limited - early on and frequently requests to terminate interview  Recent and Remote Memory:  intact  Language: Fluent in English with appropriate syntax and vocabulary.  Fund of Knowledge: appropriate     Hospital Course:   Diagnostic Impression:               Sheyla Ye is a 40 year old single White female previously diagnosed with depressive disorder,  anxiety, PTSD, ADHD, concussion, cannabis-induced psychosis who presents with brief SI and ongoing symptoms in the context of chronic pain. Initially endorsed SI though once roomed in ED told DEC  and ED provider that this was not the case, she said this to be seen quicker in the hopes of being admitted; expressed she wanted to be admitted medically due to debilitating daily pain. On interview described staying in bed all day and crying often with an inability to do daily tasks which she attributes to pain. She advocated that she be admitted though once admitted to a psychiatric unit declined to speak to any providers. On exam she was tearful and irritable though coherent and " organized, able to contract for safety. Objectively had urine + for cannabis (records of CBD oil use for pain), amphetamines (adderall rx) and benzos (rx in chart). Substances are possibly a contributing factor to her presentation.  Biological contributions to mental health presentation include family history of mental illness, history of traumatic brain injury, ongoing substance use, and comorbid pain. Psychosocial factors contributing to current presentation include unemployment and ongoing pandemic circumstances.                The patient's presentation is consistent with depressive episode, unspecified with comorbid anxiety and cluster B traits. She would likely benefit from further medication adjustment as well as referral to outpatient programming (Lifestyle Renewal program, for example).     Psychiatric Course:  Sheyla Ye was admitted to Station 20 with attending Dr Luiz Rodriguez. PTA medication were continued (tylenol, lamotrigine, meloxicam, nortryptiline, omeprazole). On introduction to primary team patient declined to speak with anyone citing ongoing pain. Was able to complete safety assessment and stated she had never been suicidal but stated that in the ED in order to be admitted, no thoughts or plans to be dead, no AH or VH and did not appear to be disorganized or attending to internal stimuli. She declined to answer any questions beyond these (~5 mintue interview) citing pain and tiredness. Author offered additional prn medication for headache and suggested interview be conducted later after further rest though shortly after the patient requested to be discharged, which was approved by treatment team.     Medical Course:  Patient was medically cleared for admission to the unit. No medical issues arose during this hospitalization.     Consults:   None    Risk Assessment:   Sheyla Ye has notable risk factors for self-harm, including single status, anxiety and substance abuse. However,  risk is mitigated by absence of past attempts, ability to volunteer a safety plan and history of seeking help when needed. Additional steps taken to minimize risk include: providing additional resources. Therefore, based on all available evidence including the factors cited above, Sheyla Ye does not appear to be an imminent danger to self or others and is appropriate for outpatient level of care. However, if patient uses substances or is non-adherent with medication, their risk of decompensation and SI/HI will be elevated. This was discussed with the patient.    This document serves as a transfer of care to Sheyla Ye's outpatient providers.     Diagnoses:   Psychiatric diagnoses:   # MDD severe, recurrent r/o substance-induced mood disorder  # anxiety, unspecified  # cluster B traits      Discharge Plan:   Patient is being discharged to home with the following medications and appointments as detailed below:    Medications:  Added/Changed:  - none  Continued: ALL  Current Outpatient Medications   Medication     acetaminophen (TYLENOL) 500 MG tablet     amphetamine-dextroamphetamine (ADDERALL XR) 30 MG 24 hr capsule     diazepam (VALIUM) 5 MG tablet     diclofenac (VOLTAREN) 1 % topical gel     lamoTRIgine (LAMICTAL) 200 MG tablet     meloxicam (MOBIC) 7.5 MG tablet     methocarbamol (ROBAXIN) 500 MG tablet     omeprazole (PRILOSEC) 20 MG DR capsule   Discontinued:  - none    Psychiatric Appointments:   Psychiatry:  Mariel Vo: ( Patient will schedule)  [facility]: [address] [place] [id number]  [phone]    Psychotherapy Appointments: declined    Other Referrals:  encourged existing f/u with Dr. Fausto Espitia: 3/8/21 at 2:45pm  Physical Medicine and Rehab:  [facility]: [address] [place] [address] [address] [place] [address]  [phone]    Additional Resources Offered:  [facility]: COPE: ([phone]) 24 hour mobile crisis support for  "people having a mental health crisis in [facility].   # Acute Psychiatric Services ([phone]). 24-hour walk-in crisis psychiatric support at [facility]; Emergency Medications Clinic available 7:30am - 2:00pm  # Yamo8ztuf: Text  \"life\"  to [phone]   A trained crisis counselor will respond immediately  # Crisis Connection: ([phone])  24-hour confidential telephone counseling   [facility] Emergency Room: [phone]      Pt seen and discussed with my attending, MD Emma Monroe MD  PGY-1 Psychiatry Resident    Attestation:  I, Luiz Rodriguez, saw and evaluated the patient with the resident physician.  I agree with the findings and plan of care as documented in the resident note.  I have reviewed all labs and vital signs.  I, Luiz Rodriguez, have reviewed this summary and agree with the findings and discharge plan as written.         Appendix A: All Labs This Admission:     Results for orders placed or performed during the hospital encounter of 02/24/21   HCG qualitative urine     Status: None   Result Value Ref Range    HCG Qual Urine Negative NEG^Negative   Drug abuse screen 6 urine (chem dep)     Status: Abnormal   Result Value Ref Range    Amphetamine Qual Urine Positive (A) NEG^Negative    Barbiturates Qual Urine Negative NEG^Negative    Benzodiazepine Qual Urine Positive (A) NEG^Negative    Cannabinoids Qual Urine Positive (A) NEG^Negative    Cocaine Qual Urine Negative NEG^Negative    Ethanol Qual Urine Negative NEG^Negative    Opiates Qualitative Urine Negative NEG^Negative   CBC with platelets differential     Status: Abnormal   Result Value Ref Range    WBC 9.6 4.0 - 11.0 10e9/L    RBC Count 3.76 (L) 3.8 - 5.2 10e12/L    Hemoglobin 12.5 11.7 - 15.7 g/dL    Hematocrit 36.9 35.0 - 47.0 %    MCV 98 78 - 100 fl    MCH 33.2 (H) 26.5 - 33.0 pg    MCHC 33.9 31.5 - 36.5 g/dL    RDW 11.9 10.0 - 15.0 %    Platelet Count 377 150 - 450 " 10e9/L    Diff Method Automated Method     % Neutrophils 44.5 %    % Lymphocytes 46.0 %    % Monocytes 6.8 %    % Eosinophils 1.8 %    % Basophils 0.6 %    % Immature Granulocytes 0.3 %    Nucleated RBCs 0 0 /100    Absolute Neutrophil 4.3 1.6 - 8.3 10e9/L    Absolute Lymphocytes 4.4 0.8 - 5.3 10e9/L    Absolute Monocytes 0.7 0.0 - 1.3 10e9/L    Absolute Eosinophils 0.2 0.0 - 0.7 10e9/L    Absolute Basophils 0.1 0.0 - 0.2 10e9/L    Abs Immature Granulocytes 0.0 0 - 0.4 10e9/L    Absolute Nucleated RBC 0.0    Comprehensive metabolic panel     Status: None   Result Value Ref Range    Sodium 138 133 - 144 mmol/L    Potassium 3.9 3.4 - 5.3 mmol/L    Chloride 107 94 - 109 mmol/L    Carbon Dioxide 24 20 - 32 mmol/L    Anion Gap 7 3 - 14 mmol/L    Glucose 77 70 - 99 mg/dL    Urea Nitrogen 12 7 - 30 mg/dL    Creatinine 0.78 0.52 - 1.04 mg/dL    GFR Estimate >90 >60 mL/min/[1.73_m2]    GFR Estimate If Black >90 >60 mL/min/[1.73_m2]    Calcium 8.6 8.5 - 10.1 mg/dL    Bilirubin Total 0.5 0.2 - 1.3 mg/dL    Albumin 4.0 3.4 - 5.0 g/dL    Protein Total 7.0 6.8 - 8.8 g/dL    Alkaline Phosphatase 66 40 - 150 U/L    ALT 22 0 - 50 U/L    AST 10 0 - 45 U/L   UA reflex to Microscopic and Culture     Status: Abnormal    Specimen: Urine clean catch   Result Value Ref Range    Color Urine Yellow     Appearance Urine Slightly Cloudy     Glucose Urine Negative NEG^Negative mg/dL    Bilirubin Urine Negative NEG^Negative    Ketones Urine 5 (A) NEG^Negative mg/dL    Specific Gravity Urine 1.027 1.003 - 1.035    Blood Urine Negative NEG^Negative    pH Urine 5.5 5.0 - 7.0 pH    Protein Albumin Urine 10 (A) NEG^Negative mg/dL    Urobilinogen mg/dL Normal 0.0 - 2.0 mg/dL    Nitrite Urine Positive (A) NEG^Negative    Leukocyte Esterase Urine Moderate (A) NEG^Negative    Source Urine     RBC Urine 5 (H) 0 - 2 /HPF    WBC Urine 7 (H) 0 - 5 /HPF    Bacteria Urine Many (A) NEG^Negative /HPF    Squamous Epithelial /HPF Urine 4 (H) 0 - 1 /HPF    Mucous  Urine Present (A) NEG^Negative /LPF    Calcium Oxalate Many (A) NEG^Negative /HPF   Asymptomatic Influenza A/B & SARS-CoV2 (COVID-19) Virus PCR Multiplex     Status: None    Specimen: Nasopharyngeal   Result Value Ref Range    Flu A/B & SARS-COV-2 PCR Source Nasopharyngeal     SARS-CoV-2 PCR Result NEGATIVE     Influenza A PCR Negative NEG^Negative    Influenza B PCR Negative NEG^Negative    Respiratory Syncytial Virus PCR Negative NEG^Negative    Flu A/B & SARS-CoV-2 PCR Comment (Note)    Vitamin D     Status: None   Result Value Ref Range    Vitamin D Deficiency screening 28 20 - 75 ug/L   Urine Culture Aerobic Bacterial     Status: None (Preliminary result)    Specimen: Unspecified Urine   Result Value Ref Range    Specimen Description Unspecified Urine     Special Requests Specimen received in preservative     Culture Micro PENDING

## 2021-02-26 NOTE — PLAN OF CARE
Pt was discharged from station 20 2/25/2020 at about 1800 after signing a 12-hour intent to leave and being granted discharge. She was admitted for less than 24 hours. She denied all mental health symptoms. She denied thoughts to hurt herself, thoughts to hurt others, thoughts of death or suicide, . No changes were made to her medications. She reported being interested in pursuing IRTS placement, but was not admitted long enough to have social work to get referrals out for her. Per notes, she was provided a number for DEC  for assistance with IRTS referral. Pt reported she didn't have this. She reports she was frustrated that her pain was not managed on the unit. Pt's personal belongings were returned to her, AVS reviewed with pt, and pt left the unit with a bright affect in a cab called by unit.

## 2021-05-17 DIAGNOSIS — M47.812 CERVICAL SPONDYLOSIS: ICD-10-CM

## 2021-05-21 RX ORDER — MELOXICAM 7.5 MG/1
7.5 TABLET ORAL DAILY
Qty: 14 TABLET | Refills: 0 | Status: SHIPPED | OUTPATIENT
Start: 2021-05-21

## 2021-05-21 NOTE — TELEPHONE ENCOUNTER
"14 day prescription provided.     LPN called the pt's phone number but did not leave a message as the answering machine stated that the number was for \"Ahsan\".    LPN sent a Jebbitt message to the pt asking them to contact the clinic to schedule a follow up appointment.     Mariel Rodriguez LPN        "

## 2021-06-16 ENCOUNTER — APPOINTMENT (OUTPATIENT)
Dept: LAB | Facility: CLINIC | Age: 41
End: 2021-06-16
Payer: COMMERCIAL

## 2021-06-18 ENCOUNTER — HOSPITAL ENCOUNTER (INPATIENT)
Facility: CLINIC | Age: 41
LOS: 3 days | Discharge: LEFT BEFORE DISCHARGE | End: 2021-06-22
Attending: EMERGENCY MEDICINE | Admitting: PSYCHIATRY & NEUROLOGY
Payer: COMMERCIAL

## 2021-06-18 DIAGNOSIS — F17.200 TOBACCO USE DISORDER: Primary | ICD-10-CM

## 2021-06-18 DIAGNOSIS — F29 PSYCHOSIS, UNSPECIFIED PSYCHOSIS TYPE (H): ICD-10-CM

## 2021-06-18 LAB
ALBUMIN SERPL-MCNC: 4 G/DL (ref 3.4–5)
ALBUMIN UR-MCNC: NEGATIVE MG/DL
ALP SERPL-CCNC: 69 U/L (ref 40–150)
ALT SERPL W P-5'-P-CCNC: 16 U/L (ref 0–50)
AMPHETAMINES UR QL SCN: NEGATIVE
ANION GAP SERPL CALCULATED.3IONS-SCNC: 3 MMOL/L (ref 3–14)
APPEARANCE UR: CLEAR
AST SERPL W P-5'-P-CCNC: 12 U/L (ref 0–45)
BACTERIA #/AREA URNS HPF: ABNORMAL /HPF
BARBITURATES UR QL: NEGATIVE
BASOPHILS # BLD AUTO: 0.1 10E9/L (ref 0–0.2)
BASOPHILS NFR BLD AUTO: 0.9 %
BENZODIAZ UR QL: POSITIVE
BILIRUB SERPL-MCNC: 0.5 MG/DL (ref 0.2–1.3)
BILIRUB UR QL STRIP: NEGATIVE
BUN SERPL-MCNC: 6 MG/DL (ref 7–30)
CALCIUM SERPL-MCNC: 8.7 MG/DL (ref 8.5–10.1)
CANNABINOIDS UR QL SCN: NEGATIVE
CHLORIDE SERPL-SCNC: 111 MMOL/L (ref 94–109)
CO2 SERPL-SCNC: 26 MMOL/L (ref 20–32)
COCAINE UR QL: NEGATIVE
COLOR UR AUTO: ABNORMAL
CREAT SERPL-MCNC: 0.86 MG/DL (ref 0.52–1.04)
DIFFERENTIAL METHOD BLD: NORMAL
EOSINOPHIL # BLD AUTO: 0.1 10E9/L (ref 0–0.7)
EOSINOPHIL NFR BLD AUTO: 1.1 %
ERYTHROCYTE [DISTWIDTH] IN BLOOD BY AUTOMATED COUNT: 11.9 % (ref 10–15)
ETHANOL UR QL SCN: NEGATIVE
GFR SERPL CREATININE-BSD FRML MDRD: 84 ML/MIN/{1.73_M2}
GLUCOSE SERPL-MCNC: 80 MG/DL (ref 70–99)
GLUCOSE UR STRIP-MCNC: NEGATIVE MG/DL
HCG UR QL: NEGATIVE
HCT VFR BLD AUTO: 39 % (ref 35–47)
HGB BLD-MCNC: 13.3 G/DL (ref 11.7–15.7)
HGB UR QL STRIP: NEGATIVE
IMM GRANULOCYTES # BLD: 0 10E9/L (ref 0–0.4)
IMM GRANULOCYTES NFR BLD: 0.1 %
KETONES UR STRIP-MCNC: NEGATIVE MG/DL
LABORATORY COMMENT REPORT: NORMAL
LEUKOCYTE ESTERASE UR QL STRIP: ABNORMAL
LYMPHOCYTES # BLD AUTO: 3.4 10E9/L (ref 0.8–5.3)
LYMPHOCYTES NFR BLD AUTO: 41.7 %
MCH RBC QN AUTO: 32.3 PG (ref 26.5–33)
MCHC RBC AUTO-ENTMCNC: 34.1 G/DL (ref 31.5–36.5)
MCV RBC AUTO: 95 FL (ref 78–100)
MONOCYTES # BLD AUTO: 0.5 10E9/L (ref 0–1.3)
MONOCYTES NFR BLD AUTO: 6.7 %
MUCOUS THREADS #/AREA URNS LPF: PRESENT /LPF
NEUTROPHILS # BLD AUTO: 4 10E9/L (ref 1.6–8.3)
NEUTROPHILS NFR BLD AUTO: 49.5 %
NITRATE UR QL: NEGATIVE
NRBC # BLD AUTO: 0 10*3/UL
NRBC BLD AUTO-RTO: 0 /100
OPIATES UR QL SCN: NEGATIVE
PH UR STRIP: 5.5 PH (ref 5–7)
PLATELET # BLD AUTO: 391 10E9/L (ref 150–450)
POTASSIUM SERPL-SCNC: 4 MMOL/L (ref 3.4–5.3)
PROT SERPL-MCNC: 7.2 G/DL (ref 6.8–8.8)
RBC # BLD AUTO: 4.12 10E12/L (ref 3.8–5.2)
RBC #/AREA URNS AUTO: 2 /HPF (ref 0–2)
SARS-COV-2 RNA RESP QL NAA+PROBE: NEGATIVE
SODIUM SERPL-SCNC: 140 MMOL/L (ref 133–144)
SOURCE: ABNORMAL
SP GR UR STRIP: 1.01 (ref 1–1.03)
SPECIMEN SOURCE: NORMAL
SQUAMOUS #/AREA URNS AUTO: 7 /HPF (ref 0–1)
TSH SERPL DL<=0.005 MIU/L-ACNC: 0.68 MU/L (ref 0.4–4)
UROBILINOGEN UR STRIP-MCNC: NORMAL MG/DL (ref 0–2)
WBC # BLD AUTO: 8.1 10E9/L (ref 4–11)
WBC #/AREA URNS AUTO: 4 /HPF (ref 0–5)

## 2021-06-18 PROCEDURE — 81001 URINALYSIS AUTO W/SCOPE: CPT | Performed by: EMERGENCY MEDICINE

## 2021-06-18 PROCEDURE — C9803 HOPD COVID-19 SPEC COLLECT: HCPCS

## 2021-06-18 PROCEDURE — 81025 URINE PREGNANCY TEST: CPT | Performed by: EMERGENCY MEDICINE

## 2021-06-18 PROCEDURE — 80053 COMPREHEN METABOLIC PANEL: CPT | Performed by: EMERGENCY MEDICINE

## 2021-06-18 PROCEDURE — 80320 DRUG SCREEN QUANTALCOHOLS: CPT | Performed by: EMERGENCY MEDICINE

## 2021-06-18 PROCEDURE — 99285 EMERGENCY DEPT VISIT HI MDM: CPT | Performed by: EMERGENCY MEDICINE

## 2021-06-18 PROCEDURE — 99285 EMERGENCY DEPT VISIT HI MDM: CPT | Mod: 25

## 2021-06-18 PROCEDURE — 80307 DRUG TEST PRSMV CHEM ANLYZR: CPT | Performed by: EMERGENCY MEDICINE

## 2021-06-18 PROCEDURE — 84443 ASSAY THYROID STIM HORMONE: CPT | Performed by: EMERGENCY MEDICINE

## 2021-06-18 PROCEDURE — 90791 PSYCH DIAGNOSTIC EVALUATION: CPT

## 2021-06-18 PROCEDURE — 87088 URINE BACTERIA CULTURE: CPT | Performed by: EMERGENCY MEDICINE

## 2021-06-18 PROCEDURE — 250N000013 HC RX MED GY IP 250 OP 250 PS 637: Performed by: EMERGENCY MEDICINE

## 2021-06-18 PROCEDURE — 87186 SC STD MICRODIL/AGAR DIL: CPT | Performed by: EMERGENCY MEDICINE

## 2021-06-18 PROCEDURE — 87635 SARS-COV-2 COVID-19 AMP PRB: CPT | Performed by: EMERGENCY MEDICINE

## 2021-06-18 PROCEDURE — 87086 URINE CULTURE/COLONY COUNT: CPT | Performed by: EMERGENCY MEDICINE

## 2021-06-18 PROCEDURE — 85025 COMPLETE CBC W/AUTO DIFF WBC: CPT | Performed by: EMERGENCY MEDICINE

## 2021-06-18 RX ORDER — DIAZEPAM 5 MG
5 TABLET ORAL ONCE
Status: COMPLETED | OUTPATIENT
Start: 2021-06-18 | End: 2021-06-18

## 2021-06-18 RX ORDER — OLANZAPINE 5 MG/1
5 TABLET, ORALLY DISINTEGRATING ORAL ONCE
Status: COMPLETED | OUTPATIENT
Start: 2021-06-18 | End: 2021-06-18

## 2021-06-18 RX ORDER — NICOTINE 21 MG/24HR
1 PATCH, TRANSDERMAL 24 HOURS TRANSDERMAL ONCE
Status: DISCONTINUED | OUTPATIENT
Start: 2021-06-18 | End: 2021-06-19 | Stop reason: CLARIF

## 2021-06-18 RX ORDER — ACETAMINOPHEN 325 MG/1
975 TABLET ORAL ONCE
Status: COMPLETED | OUTPATIENT
Start: 2021-06-18 | End: 2021-06-18

## 2021-06-18 RX ADMIN — OLANZAPINE 5 MG: 5 TABLET, ORALLY DISINTEGRATING ORAL at 19:41

## 2021-06-18 RX ADMIN — NICOTINE 1 PATCH: 21 PATCH, EXTENDED RELEASE TRANSDERMAL at 22:44

## 2021-06-18 RX ADMIN — DIAZEPAM 5 MG: 5 TABLET ORAL at 22:43

## 2021-06-18 RX ADMIN — ACETAMINOPHEN 975 MG: 325 TABLET, FILM COATED ORAL at 22:42

## 2021-06-18 ASSESSMENT — ENCOUNTER SYMPTOMS
SHORTNESS OF BREATH: 0
FEVER: 0
ABDOMINAL PAIN: 0

## 2021-06-19 PROBLEM — F29 PSYCHOSIS, UNSPECIFIED PSYCHOSIS TYPE (H): Status: ACTIVE | Noted: 2021-06-19

## 2021-06-19 LAB
BACTERIA SPEC CULT: ABNORMAL
Lab: ABNORMAL
SPECIMEN SOURCE: ABNORMAL

## 2021-06-19 PROCEDURE — 250N000013 HC RX MED GY IP 250 OP 250 PS 637: Performed by: CLINICAL NURSE SPECIALIST

## 2021-06-19 PROCEDURE — 124N000002 HC R&B MH UMMC

## 2021-06-19 PROCEDURE — 99222 1ST HOSP IP/OBS MODERATE 55: CPT | Mod: AI | Performed by: CLINICAL NURSE SPECIALIST

## 2021-06-19 PROCEDURE — 250N000013 HC RX MED GY IP 250 OP 250 PS 637: Performed by: EMERGENCY MEDICINE

## 2021-06-19 RX ORDER — DIAZEPAM 2 MG
2 TABLET ORAL 3 TIMES DAILY PRN
Status: DISCONTINUED | OUTPATIENT
Start: 2021-06-19 | End: 2021-06-22 | Stop reason: HOSPADM

## 2021-06-19 RX ORDER — ACETAMINOPHEN 500 MG
1000 TABLET ORAL 2 TIMES DAILY
Status: DISCONTINUED | OUTPATIENT
Start: 2021-06-19 | End: 2021-06-22 | Stop reason: HOSPADM

## 2021-06-19 RX ORDER — ACETAMINOPHEN 325 MG/1
650 TABLET ORAL EVERY 4 HOURS PRN
Status: DISCONTINUED | OUTPATIENT
Start: 2021-06-19 | End: 2021-06-21

## 2021-06-19 RX ORDER — OLANZAPINE 2.5 MG/1
2.5-5 TABLET, FILM COATED ORAL 3 TIMES DAILY PRN
Status: DISCONTINUED | OUTPATIENT
Start: 2021-06-19 | End: 2021-06-21

## 2021-06-19 RX ORDER — MELOXICAM 7.5 MG/1
7.5 TABLET ORAL DAILY
Status: DISCONTINUED | OUTPATIENT
Start: 2021-06-19 | End: 2021-06-22 | Stop reason: HOSPADM

## 2021-06-19 RX ORDER — IBUPROFEN 600 MG/1
600 TABLET, FILM COATED ORAL ONCE
Status: COMPLETED | OUTPATIENT
Start: 2021-06-19 | End: 2021-06-19

## 2021-06-19 RX ORDER — OLANZAPINE 10 MG/2ML
10 INJECTION, POWDER, FOR SOLUTION INTRAMUSCULAR 3 TIMES DAILY PRN
Status: DISCONTINUED | OUTPATIENT
Start: 2021-06-19 | End: 2021-06-19

## 2021-06-19 RX ORDER — OLANZAPINE 5 MG/1
5-10 TABLET ORAL 3 TIMES DAILY PRN
Status: DISCONTINUED | OUTPATIENT
Start: 2021-06-19 | End: 2021-06-19

## 2021-06-19 RX ORDER — LAMOTRIGINE 200 MG/1
200 TABLET ORAL DAILY
Status: DISCONTINUED | OUTPATIENT
Start: 2021-06-19 | End: 2021-06-22 | Stop reason: HOSPADM

## 2021-06-19 RX ORDER — HYDROXYZINE HYDROCHLORIDE 25 MG/1
25-50 TABLET, FILM COATED ORAL EVERY 4 HOURS PRN
Status: DISCONTINUED | OUTPATIENT
Start: 2021-06-19 | End: 2021-06-22 | Stop reason: HOSPADM

## 2021-06-19 RX ORDER — OLANZAPINE 10 MG/2ML
5 INJECTION, POWDER, FOR SOLUTION INTRAMUSCULAR 3 TIMES DAILY PRN
Status: DISCONTINUED | OUTPATIENT
Start: 2021-06-19 | End: 2021-06-21

## 2021-06-19 RX ORDER — HYDROXYZINE HYDROCHLORIDE 25 MG/1
25 TABLET, FILM COATED ORAL EVERY 4 HOURS PRN
Status: DISCONTINUED | OUTPATIENT
Start: 2021-06-19 | End: 2021-06-19

## 2021-06-19 RX ORDER — NICOTINE 21 MG/24HR
1 PATCH, TRANSDERMAL 24 HOURS TRANSDERMAL DAILY
Status: DISCONTINUED | OUTPATIENT
Start: 2021-06-19 | End: 2021-06-22 | Stop reason: HOSPADM

## 2021-06-19 RX ORDER — MAGNESIUM HYDROXIDE/ALUMINUM HYDROXICE/SIMETHICONE 120; 1200; 1200 MG/30ML; MG/30ML; MG/30ML
30 SUSPENSION ORAL EVERY 4 HOURS PRN
Status: DISCONTINUED | OUTPATIENT
Start: 2021-06-19 | End: 2021-06-22 | Stop reason: HOSPADM

## 2021-06-19 RX ORDER — AMOXICILLIN 250 MG
1 CAPSULE ORAL 2 TIMES DAILY PRN
Status: DISCONTINUED | OUTPATIENT
Start: 2021-06-19 | End: 2021-06-22 | Stop reason: HOSPADM

## 2021-06-19 RX ORDER — TRAZODONE HYDROCHLORIDE 50 MG/1
50 TABLET, FILM COATED ORAL
Status: DISCONTINUED | OUTPATIENT
Start: 2021-06-19 | End: 2021-06-22 | Stop reason: HOSPADM

## 2021-06-19 RX ADMIN — NICOTINE 1 PATCH: 21 PATCH, EXTENDED RELEASE TRANSDERMAL at 14:49

## 2021-06-19 RX ADMIN — DIAZEPAM 2 MG: 2 TABLET ORAL at 16:28

## 2021-06-19 RX ADMIN — IBUPROFEN 600 MG: 600 TABLET, FILM COATED ORAL at 04:08

## 2021-06-19 RX ADMIN — LAMOTRIGINE 200 MG: 200 TABLET ORAL at 08:47

## 2021-06-19 RX ADMIN — TRAZODONE HYDROCHLORIDE 50 MG: 50 TABLET ORAL at 21:01

## 2021-06-19 RX ADMIN — Medication 5 MG: at 21:01

## 2021-06-19 RX ADMIN — ACETAMINOPHEN 650 MG: 325 TABLET, FILM COATED ORAL at 12:37

## 2021-06-19 RX ADMIN — ACETAMINOPHEN 650 MG: 325 TABLET, FILM COATED ORAL at 16:28

## 2021-06-19 RX ADMIN — ACETAMINOPHEN 1000 MG: 325 TABLET, FILM COATED ORAL at 21:00

## 2021-06-19 RX ADMIN — OLANZAPINE 5 MG: 2.5 TABLET, FILM COATED ORAL at 17:37

## 2021-06-19 RX ADMIN — MELOXICAM 7.5 MG: 7.5 TABLET ORAL at 08:47

## 2021-06-19 RX ADMIN — HYDROXYZINE HYDROCHLORIDE 50 MG: 25 TABLET, FILM COATED ORAL at 19:02

## 2021-06-19 RX ADMIN — OMEPRAZOLE 20 MG: 20 CAPSULE, DELAYED RELEASE ORAL at 08:47

## 2021-06-19 ASSESSMENT — ACTIVITIES OF DAILY LIVING (ADL)
HEARING_DIFFICULTY_OR_DEAF: NO
WEAR_GLASSES_OR_BLIND: NO
DRESS: INDEPENDENT
DIFFICULTY_COMMUNICATING: NO
DRESSING/BATHING_DIFFICULTY: NO
ORAL_HYGIENE: INDEPENDENT
DIFFICULTY_EATING/SWALLOWING: NO
FALL_HISTORY_WITHIN_LAST_SIX_MONTHS: NO
LAUNDRY: WITH SUPERVISION
HYGIENE/GROOMING: INDEPENDENT
DOING_ERRANDS_INDEPENDENTLY_DIFFICULTY: NO
TOILETING_ISSUES: NO
WALKING_OR_CLIMBING_STAIRS_DIFFICULTY: NO
CONCENTRATING,_REMEMBERING_OR_MAKING_DECISIONS_DIFFICULTY: NO
ADL_ASSESSMENT: WDL

## 2021-06-19 ASSESSMENT — MIFFLIN-ST. JEOR: SCORE: 1207.03
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
## 2021-06-19 NOTE — PROGRESS NOTES
06/19/21 1051   Patient Belongings   Patient Belongings remains with patient;locker;sent to security per site process   Patient Belongings Remaining with Patient clothing   Patient Belongings Put in Hospital Secure Location (Security or Locker, etc.) cell phone/electronics;glasses;purse/wallet   Belongings Search Yes   Clothing Search Yes   Second Staff Nurse     In Locker:  Grey purse   IPhone   Sun glasses  Planner X2  Sketch book X2  Reading book  Cigarette box quantity (1)  Lighter X2  Brown wallet  [place] Drivers License X2  Misc items    Sent to Security #[id number]  [employer] card *[id number]  [employer] visa *3023  EBT *[id number]  [employer] Visa *1193  [employer] *[id number]  [facility] cash (1000), (500), (250) DINARS    Addendum: June 21, 2021 Security envelope [id number] contains Covid 19 vaccination record card      ..A               Admission:  I am responsible for any personal items that are not sent to the safe or pharmacy.  [facility] is not responsible for loss, theft or damage of any property in my possession.    Signature:  _________________________________ Date: _______  Time: _____                                              Staff Signature:  ____________________________ Date: ________  Time: _____      2nd Staff person, if patient is unable/unwilling to sign:    Signature: ________________________________ Date: ________  Time: _____     Discharge:  [facility] has returned all of my personal belongings:    Signature: _________________________________ Date: ________  Time: _____                                          Staff Signature:  ____________________________ Date: ________  Time: _____

## 2021-06-19 NOTE — PROGRESS NOTES
"[facility]   ED Nurse to Floor Handoff     Sheyla Ye is a 40 year old female who speaks English and lives with others,  in a home  They arrived in the ED by car from home.    ED Chief Complaint: Delusional (\"I have been tortured by the M[employer], everything, my freedom of everything and they are hurting me,\" delusional and rambling on in triage, tearful.)    ED Dx;   Final diagnoses:   Psychosis, unspecified psychosis type (H)         Needed?: No    Allergies:   Allergies   Allergen Reactions     Seasonal Allergies    .  Past Medical Hx:   Past Medical History:   Diagnosis Date     Anxiety      Chronic osteoarthritis      Chronic pain      Depressive disorder 1995     DJD (degenerative joint disease), lumbar      Munir-Danlos disease      Fibromyalgia      Learning disability       Baseline Mental status: WDL  Current Mental Status changes: at basesline    Infection present or suspected this encounter: no  Sepsis suspected: No  Isolation type: No active isolations  Patient tested for COVID 19 prior to admission: YES     Activity level - Baseline/Home:  Independent  Activity Level - Current:   Independent    Bariatric equipment needed?: No    In the ED these meds were given:   Medications   nicotine (NICODERM CQ) 21 MG/24HR 24 hr patch 1 patch (1 patch Transdermal Patch/Med Applied 6/18/21 2244)   lamoTRIgine (LaMICtal) tablet 200 mg (200 mg Oral Given 6/19/21 0847)   meloxicam (MOBIC) tablet 7.5 mg (7.5 mg Oral Given 6/19/21 0847)   omeprazole (priLOSEC) CR capsule 20 mg (20 mg Oral Given 6/19/21 0847)   OLANZapine zydis (zyPREXA) ODT tab 5 mg (5 mg Oral Given 6/18/21 1941)   acetaminophen (TYLENOL) tablet 975 mg (975 mg Oral Given 6/18/21 2242)   diazepam (VALIUM) tablet 5 mg (5 mg Oral Given 6/18/21 2243)   ibuprofen (ADVIL/MOTRIN) tablet 600 mg (600 mg Oral Given 6/19/21 0408)       Drips running?  No    Home pump  No    Current LDAs       Labs results: "   Labs Ordered and Resulted from Time of ED Arrival Up to the Time of Departure from the ED   COMPREHENSIVE METABOLIC PANEL - Abnormal; Notable for the following components:       Result Value    Chloride 111 (*)     Urea Nitrogen 6 (*)     All other components within normal limits   UA MACROSCOPIC WITH REFLEX TO MICRO AND CULTURE - Abnormal; Notable for the following components:    Leukocyte Esterase Urine Moderate (*)     Bacteria Urine Few (*)     Squamous Epithelial /HPF Urine 7 (*)     Mucous Urine Present (*)     All other components within normal limits   DRUG ABUSE SCREEN 6 CHEM DEP URINE ([facility]) - Abnormal; Notable for the following components:    Benzodiazepine Qual Urine Positive (*)     All other components within normal limits   TSH WITH FREE T4 REFLEX   CBC WITH PLATELETS DIFFERENTIAL   HCG QUALITATIVE URINE   SARS-COV-2 (COVID-19) VIRUS RT-PCR   DOCUMENT   URINE CULTURE AEROBIC BACTERIAL       Imaging Studies: No results found for this or any previous visit (from the past 24 hour(s)).    Recent vital signs:   BP 95/55   Pulse 59   Temp 97.5  F (36.4  C) (Oral)   Resp 16   SpO2 100%        Skin/wound Issues: None    Code Status: Full Code    Pain control: pt had none    Nausea control: pt had none    Abnormal labs/tests/findings requiring intervention:    Family present during ED course? No   Family Comments/Social Situation comments:    Tasks needing completion: None    Emma Dykes RN  [facility] -- [phone] [place] [facility]  [phone] [facility]
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
## 2021-06-19 NOTE — ED NOTES
Patient came out to state that she is not getting the care that she came for and she needs to leave to care for the disabled person at her house; offered to let her call someone to make arrangements for his care; she declined stating that she needs to leave and be there for him. States she is in a lot of pain, was calm before we told her of the hold.

## 2021-06-19 NOTE — ED NOTES
[facility] ED Mental Health Handoff Note:       Brief HPI:  This is a 40 year old female signed out to me by Dr. Sellers.  See initial ED Provider note for full details of the presentation. Interval history is pertinent for delusions of being microchipped by MARY.    Home meds reviewed and ordered/administered: Yes    Medically stable for inpatient mental health admission: Yes.    Evaluated by mental health: Yes. The recommendation is for inpatient mental health treatment. Bed search in process    Safety concerns: At the time I received sign out, there were no safety concerns.    Hold Status:  Active Orders   Legal    Emergency Hospitalization Hold (72 Hr Hold)     Frequency: Effective Now     Start Date/Time: 06/18/21 2114      Number of Occurrences: Until Specified            Exam:   Patient Vitals for the past 24 hrs:   BP Temp Temp src Pulse Resp SpO2   06/18/21 1722 96/60 97.7  F (36.5  C) Oral 78 16 99 %           ED Course:    Medications   nicotine (NICODERM CQ) 21 MG/24HR 24 hr patch 1 patch (1 patch Transdermal Patch/Med Applied 6/18/21 2244)   OLANZapine zydis (zyPREXA) ODT tab 5 mg (5 mg Oral Given 6/18/21 1941)   acetaminophen (TYLENOL) tablet 975 mg (975 mg Oral Given 6/18/21 2242)   diazepam (VALIUM) tablet 5 mg (5 mg Oral Given 6/18/21 2243)            There were no significant events during my shift.    Patient was signed out to the oncoming provider      Impression:    ICD-10-CM    1. Psychosis, unspecified psychosis type (H)  F29 Urine Culture Aerobic Bacterial     Asymptomatic SARS-CoV-2 COVID-19 Virus (Coronavirus) by PCR       Plan:    1. Awaiting inpatient mental health admission/transfer.      RESULTS:   Results for orders placed or performed during the hospital encounter of 06/18/21 (from the past 24 hour(s))   TSH with free T4 reflex     Status: None    Collection Time: 06/18/21  7:54 PM   Result Value Ref Range    TSH 0.68 0.40 - 4.00 mU/L   Comprehensive metabolic panel     Status:  Abnormal    Collection Time: 06/18/21  7:54 PM   Result Value Ref Range    Sodium 140 133 - 144 mmol/L    Potassium 4.0 3.4 - 5.3 mmol/L    Chloride 111 (H) 94 - 109 mmol/L    Carbon Dioxide 26 20 - 32 mmol/L    Anion Gap 3 3 - 14 mmol/L    Glucose 80 70 - 99 mg/dL    Urea Nitrogen 6 (L) 7 - 30 mg/dL    Creatinine 0.86 0.52 - 1.04 mg/dL    GFR Estimate 84 >60 mL/min/[1.73_m2]    GFR Estimate If Black >90 >60 mL/min/[1.73_m2]    Calcium 8.7 8.5 - 10.1 mg/dL    Bilirubin Total 0.5 0.2 - 1.3 mg/dL    Albumin 4.0 3.4 - 5.0 g/dL    Protein Total 7.2 6.8 - 8.8 g/dL    Alkaline Phosphatase 69 40 - 150 U/L    ALT 16 0 - 50 U/L    AST 12 0 - 45 U/L   CBC with platelets differential     Status: None    Collection Time: 06/18/21  7:54 PM   Result Value Ref Range    WBC 8.1 4.0 - 11.0 10e9/L    RBC Count 4.12 3.8 - 5.2 10e12/L    Hemoglobin 13.3 11.7 - 15.7 g/dL    Hematocrit 39.0 35.0 - 47.0 %    MCV 95 78 - 100 fl    MCH 32.3 26.5 - 33.0 pg    MCHC 34.1 31.5 - 36.5 g/dL    RDW 11.9 10.0 - 15.0 %    Platelet Count 391 150 - 450 10e9/L    Diff Method Automated Method     % Neutrophils 49.5 %    % Lymphocytes 41.7 %    % Monocytes 6.7 %    % Eosinophils 1.1 %    % Basophils 0.9 %    % Immature Granulocytes 0.1 %    Nucleated RBCs 0 0 /100    Absolute Neutrophil 4.0 1.6 - 8.3 10e9/L    Absolute Lymphocytes 3.4 0.8 - 5.3 10e9/L    Absolute Monocytes 0.5 0.0 - 1.3 10e9/L    Absolute Eosinophils 0.1 0.0 - 0.7 10e9/L    Absolute Basophils 0.1 0.0 - 0.2 10e9/L    Abs Immature Granulocytes 0.0 0 - 0.4 10e9/L    Absolute Nucleated RBC 0.0    UA reflex to Microscopic and Culture     Status: Abnormal    Collection Time: 06/18/21  7:54 PM    Specimen: Urine clean catch; Midstream Urine   Result Value Ref Range    Color Urine Light Yellow     Appearance Urine Clear     Glucose Urine Negative NEG^Negative mg/dL    Bilirubin Urine Negative NEG^Negative    Ketones Urine Negative NEG^Negative mg/dL    Specific Gravity Urine 1.009 1.003 - 1.035     Blood Urine Negative NEG^Negative    pH Urine 5.5 5.0 - 7.0 pH    Protein Albumin Urine Negative NEG^Negative mg/dL    Urobilinogen mg/dL Normal 0.0 - 2.0 mg/dL    Nitrite Urine Negative NEG^Negative    Leukocyte Esterase Urine Moderate (A) NEG^Negative    Source Midstream Urine     RBC Urine 2 0 - 2 /HPF    WBC Urine 4 0 - 5 /HPF    Bacteria Urine Few (A) NEG^Negative /HPF    Squamous Epithelial /HPF Urine 7 (H) 0 - 1 /HPF    Mucous Urine Present (A) NEG^Negative /LPF   HCG qualitative urine (UPT)     Status: None    Collection Time: 06/18/21  7:54 PM   Result Value Ref Range    HCG Qual Urine Negative NEG^Negative   Drug abuse screen 6 urine (chem dep)     Status: Abnormal    Collection Time: 06/18/21  7:54 PM   Result Value Ref Range    Amphetamine Qual Urine Negative NEG^Negative    Barbiturates Qual Urine Negative NEG^Negative    Benzodiazepine Qual Urine Positive (A) NEG^Negative    Cannabinoids Qual Urine Negative NEG^Negative    Cocaine Qual Urine Negative NEG^Negative    Ethanol Qual Urine Negative NEG^Negative    Opiates Qualitative Urine Negative NEG^Negative   Urine Culture Aerobic Bacterial     Status: None (Preliminary result)    Collection Time: 06/18/21  7:54 PM    Specimen: Unspecified Urine   Result Value Ref Range    Specimen Description Unspecified Urine     Special Requests Specimen received in preservative     Culture Micro PENDING    Asymptomatic SARS-CoV-2 COVID-19 Virus (Coronavirus) by PCR     Status: None    Collection Time: 06/18/21  9:31 PM    Specimen: Nasopharyngeal   Result Value Ref Range    SARS-CoV-2 Virus Specimen Source Nasopharyngeal     SARS-CoV-2 PCR Result NEGATIVE     SARS-CoV-2 PCR Comment (Note)              MD Ho Tucker Matthew Joseph, MD  06/19/21 0103

## 2021-06-19 NOTE — ED NOTES
Patient given copy of her 72 hour hold, she stated that she is not suicidal or threatening to hurt others but this RN stated that the MD is concerned abo9ut her delusional thinking. She denied that and stood up and raised her voice and stated that her physiological needs have not been addressed, that she is going to leave. This nurse told her that she cannot leave. She apologized for raising her voice.

## 2021-06-20 PROCEDURE — 250N000013 HC RX MED GY IP 250 OP 250 PS 637: Performed by: CLINICAL NURSE SPECIALIST

## 2021-06-20 PROCEDURE — 124N000002 HC R&B MH UMMC

## 2021-06-20 PROCEDURE — 250N000013 HC RX MED GY IP 250 OP 250 PS 637: Performed by: EMERGENCY MEDICINE

## 2021-06-20 PROCEDURE — 250N000013 HC RX MED GY IP 250 OP 250 PS 637: Performed by: PSYCHIATRY & NEUROLOGY

## 2021-06-20 RX ORDER — POLYETHYLENE GLYCOL 3350 17 G
2 POWDER IN PACKET (EA) ORAL
Status: DISCONTINUED | OUTPATIENT
Start: 2021-06-20 | End: 2021-06-22 | Stop reason: HOSPADM

## 2021-06-20 RX ADMIN — ACETAMINOPHEN 1000 MG: 325 TABLET, FILM COATED ORAL at 20:14

## 2021-06-20 RX ADMIN — ACETAMINOPHEN 650 MG: 325 TABLET, FILM COATED ORAL at 14:33

## 2021-06-20 RX ADMIN — LAMOTRIGINE 200 MG: 200 TABLET ORAL at 10:15

## 2021-06-20 RX ADMIN — MELOXICAM 7.5 MG: 7.5 TABLET ORAL at 10:16

## 2021-06-20 RX ADMIN — Medication 5 MG: at 23:10

## 2021-06-20 RX ADMIN — DIAZEPAM 2 MG: 2 TABLET ORAL at 10:14

## 2021-06-20 RX ADMIN — DIAZEPAM 2 MG: 2 TABLET ORAL at 20:15

## 2021-06-20 RX ADMIN — NICOTINE 1 PATCH: 21 PATCH, EXTENDED RELEASE TRANSDERMAL at 10:15

## 2021-06-20 RX ADMIN — TRAZODONE HYDROCHLORIDE 50 MG: 50 TABLET ORAL at 23:10

## 2021-06-20 RX ADMIN — OLANZAPINE 5 MG: 2.5 TABLET, FILM COATED ORAL at 13:59

## 2021-06-20 RX ADMIN — DIAZEPAM 2 MG: 2 TABLET ORAL at 14:19

## 2021-06-20 RX ADMIN — OMEPRAZOLE 20 MG: 20 CAPSULE, DELAYED RELEASE ORAL at 10:16

## 2021-06-20 RX ADMIN — ACETAMINOPHEN 1000 MG: 325 TABLET, FILM COATED ORAL at 10:15

## 2021-06-20 RX ADMIN — NICOTINE POLACRILEX 2 MG: 2 LOZENGE ORAL at 20:14

## 2021-06-20 ASSESSMENT — ACTIVITIES OF DAILY LIVING (ADL)
HYGIENE/GROOMING: INDEPENDENT
ORAL_HYGIENE: INDEPENDENT
LAUNDRY: WITH SUPERVISION
DRESS: INDEPENDENT

## 2021-06-20 NOTE — PHARMACY-ADMISSION MEDICATION HISTORY
Admission Medication History Completed by Pharmacy    See [facility] Admission Navigator for allergy information, preferred outpatient pharmacy, prior to admission medications and immunization status.     Medication History Sources:     Per patient and surescripts     Changes made to PTA medication list (reason):    Added: None    Deleted: Amphetamine-Dextroamphetamine 30mg , Methocarbamol 500mg     Changed: None    Additional Information:    Patient was alert and able to answer questions. Pt stated that she hasn't used her diclofenac in a couple of months due to insurance not covering it. She would like to have diclofenac prescribed to her while here at the hospital. Pt not on Adderall xr and Robaxin, was able to verify with surescripts.     Prior to Admission medications    Medication Sig Last Dose Taking? Auth Provider   acetaminophen (TYLENOL) 500 MG tablet Take 1,000 mg by mouth every 6 hours as needed for mild pain 6/20/2021 Yes Unknown, Entered By History   diazepam (VALIUM) 5 MG tablet Take 5 mg by mouth every 8 hours as needed for anxiety Past Week Yes Unknown, Entered By History   lamoTRIgine (LAMICTAL) 200 MG tablet Take 1 tablet (200 mg) by mouth daily 6/20/2021 Yes Luh Bernard MD   meloxicam (MOBIC) 7.5 MG tablet Take 1 tablet (7.5 mg) by mouth daily 6/20/2021 Yes Karen Rojas MD   omeprazole (PRILOSEC) 20 MG DR capsule Take 1 capsule (20 mg) by mouth daily 6/20/2021 Yes Myrna Watson MD   diclofenac (VOLTAREN) 1 % topical gel Apply 2 g topically 4 times daily as needed for moderate pain More than a month  Karen Rojas MD   amitriptyline (ELAVIL) 10 MG tablet Take 1 tablet (10 mg) by mouth At Bedtime for 14 days   Star Davies, DO       Date completed: 06/20/21    Medication history completed by: Chalino Deleon, PD2 Intern.

## 2021-06-20 NOTE — PLAN OF CARE
"Patient presents with a blunted/irritable affect. Reports mood as \"irritable and anxious about being here, but more irritable.\" Is cooperative and polite. Thought content presents as delusional and paranoid. Speech is clear. Eye contact is good. Patient denies suicidal ideation, SIB, and homicidal ideation, and auditory/visual hallucinations. No medical issues noted. Vital signs stable. Medication compliant. Ate evening meal. Patient out in the milieu occasionally, but spent the majority of this shift in her room. Patient is a smoker and was requesting a nicotine inhaler. Refused offered nicotine gum stating that she doesn't like the nicotine gum. Patient agreed to try nicotine lozenge. Order for lozenge in place. Patient requested and received PRN Valium for complaint of anxiety and PRN Trazodone for sleep.    /70   Pulse 60   Temp 98.3  F (36.8  C) (Oral)   Resp 16   Ht 1.676 m (5' 6\")   Wt 52 kg (114 lb 11.2 oz)   SpO2 96%   BMI 18.51 kg/m      **Patient is requesting to speak with a doctor first thing tomorrow morning Monday 6/21.**      "

## 2021-06-20 NOTE — PLAN OF CARE
Patient has remained calm.Showered. Ate meals. Keeps to herself. Reading in her room. Had no complaints of pain . Did not ask to leave. At 1400 asked for her valium dose to be increased to 5 mg tid,(now 2 mg tid),as she was feeling more anxious and her pain was bothering her and she wasn't able to go outside and be in fresh air.Says that her pain is not being managed here and at home she can use CBD oil and homeopathic remedies. Patient now crying and has her arms wrapped around her chest. Volume of her voice is pleading that she be able to see a Doctor so she can leave.patient talking to herself when alone in her room.

## 2021-06-20 NOTE — PROVIDER NOTIFICATION
06/20/21 0700   Sleep/Rest/Relaxation   Sleep/Rest/Relaxation (WDL) WDL   Sleep/Rest/Relaxation appears asleep   Night Time # Hours 7 hours     NOC Shift Report    Pt in bed at beginning of shift, breathing quiet and unlabored. Pt slept through shift. Pt slept 7 hours.     No pt complaints or concerns at this time. No PRNs given. Will continue to monitor.

## 2021-06-21 PROCEDURE — 250N000013 HC RX MED GY IP 250 OP 250 PS 637: Performed by: EMERGENCY MEDICINE

## 2021-06-21 PROCEDURE — 250N000013 HC RX MED GY IP 250 OP 250 PS 637: Performed by: CLINICAL NURSE SPECIALIST

## 2021-06-21 PROCEDURE — 250N000011 HC RX IP 250 OP 636: Performed by: PSYCHIATRY & NEUROLOGY

## 2021-06-21 PROCEDURE — 99233 SBSQ HOSP IP/OBS HIGH 50: CPT | Performed by: PSYCHIATRY & NEUROLOGY

## 2021-06-21 PROCEDURE — 124N000002 HC R&B MH UMMC

## 2021-06-21 PROCEDURE — 250N000013 HC RX MED GY IP 250 OP 250 PS 637: Performed by: PSYCHIATRY & NEUROLOGY

## 2021-06-21 PROCEDURE — XW023U6 INTRODUCTION OF COVID-19 VACCINE INTO MUSCLE, PERCUTANEOUS APPROACH, NEW TECHNOLOGY GROUP 6: ICD-10-PCS | Performed by: PSYCHIATRY & NEUROLOGY

## 2021-06-21 RX ORDER — OLANZAPINE 10 MG/2ML
5-10 INJECTION, POWDER, FOR SOLUTION INTRAMUSCULAR 3 TIMES DAILY PRN
Status: DISCONTINUED | OUTPATIENT
Start: 2021-06-21 | End: 2021-06-22 | Stop reason: HOSPADM

## 2021-06-21 RX ORDER — ACETAMINOPHEN 325 MG/1
650 TABLET ORAL EVERY 8 HOURS PRN
Status: DISCONTINUED | OUTPATIENT
Start: 2021-06-21 | End: 2021-06-22 | Stop reason: HOSPADM

## 2021-06-21 RX ORDER — OLANZAPINE 5 MG/1
5-10 TABLET ORAL 3 TIMES DAILY PRN
Status: DISCONTINUED | OUTPATIENT
Start: 2021-06-21 | End: 2021-06-22 | Stop reason: HOSPADM

## 2021-06-21 RX ORDER — OLANZAPINE 5 MG/1
5 TABLET ORAL ONCE
Status: DISCONTINUED | OUTPATIENT
Start: 2021-06-21 | End: 2021-06-21 | Stop reason: CLARIF

## 2021-06-21 RX ORDER — DIPHENHYDRAMINE HCL 50 MG
50 CAPSULE ORAL
Status: DISCONTINUED | OUTPATIENT
Start: 2021-06-21 | End: 2021-06-22 | Stop reason: HOSPADM

## 2021-06-21 RX ORDER — DIPHENHYDRAMINE HYDROCHLORIDE 50 MG/ML
50 INJECTION INTRAMUSCULAR; INTRAVENOUS
Status: DISCONTINUED | OUTPATIENT
Start: 2021-06-21 | End: 2021-06-22 | Stop reason: HOSPADM

## 2021-06-21 RX ORDER — EPINEPHRINE 0.3 MG/.3ML
0.3 INJECTION SUBCUTANEOUS
Status: DISCONTINUED | OUTPATIENT
Start: 2021-06-21 | End: 2021-06-22 | Stop reason: HOSPADM

## 2021-06-21 RX ADMIN — LAMOTRIGINE 200 MG: 200 TABLET ORAL at 08:43

## 2021-06-21 RX ADMIN — TRAZODONE HYDROCHLORIDE 50 MG: 50 TABLET ORAL at 01:07

## 2021-06-21 RX ADMIN — NICOTINE POLACRILEX 2 MG: 2 LOZENGE ORAL at 19:24

## 2021-06-21 RX ADMIN — ACETAMINOPHEN 650 MG: 325 TABLET, FILM COATED ORAL at 01:06

## 2021-06-21 RX ADMIN — NICOTINE 1 PATCH: 21 PATCH, EXTENDED RELEASE TRANSDERMAL at 08:43

## 2021-06-21 RX ADMIN — TRAZODONE HYDROCHLORIDE 50 MG: 50 TABLET ORAL at 23:47

## 2021-06-21 RX ADMIN — HYDROXYZINE HYDROCHLORIDE 50 MG: 25 TABLET, FILM COATED ORAL at 01:39

## 2021-06-21 RX ADMIN — DIAZEPAM 2 MG: 2 TABLET ORAL at 10:50

## 2021-06-21 RX ADMIN — OLANZAPINE 5 MG: 5 TABLET, FILM COATED ORAL at 13:15

## 2021-06-21 RX ADMIN — MELOXICAM 7.5 MG: 7.5 TABLET ORAL at 08:43

## 2021-06-21 RX ADMIN — NICOTINE POLACRILEX 2 MG: 2 LOZENGE ORAL at 16:45

## 2021-06-21 RX ADMIN — Medication 5 MG: at 21:36

## 2021-06-21 RX ADMIN — DIAZEPAM 2 MG: 2 TABLET ORAL at 16:44

## 2021-06-21 RX ADMIN — DIAZEPAM 2 MG: 2 TABLET ORAL at 23:47

## 2021-06-21 RX ADMIN — OMEPRAZOLE 20 MG: 20 CAPSULE, DELAYED RELEASE ORAL at 08:43

## 2021-06-21 RX ADMIN — JNJ-78436735 0.5 ML: 50000000000 SUSPENSION INTRAMUSCULAR at 15:32

## 2021-06-21 RX ADMIN — OLANZAPINE 5 MG: 2.5 TABLET, FILM COATED ORAL at 02:18

## 2021-06-21 RX ADMIN — NICOTINE POLACRILEX 2 MG: 2 LOZENGE ORAL at 21:37

## 2021-06-21 RX ADMIN — ACETAMINOPHEN 650 MG: 325 TABLET, FILM COATED ORAL at 19:23

## 2021-06-21 RX ADMIN — ACETAMINOPHEN 1000 MG: 325 TABLET, FILM COATED ORAL at 12:21

## 2021-06-21 ASSESSMENT — ACTIVITIES OF DAILY LIVING (ADL)
ORAL_HYGIENE: INDEPENDENT
LAUNDRY: WITH SUPERVISION
HYGIENE/GROOMING: INDEPENDENT
DRESS: INDEPENDENT

## 2021-06-21 NOTE — PLAN OF CARE
"Pt appears to have slept 3.5 hours this shift.  Pt eventually appeared asleep at 0315, continues to appear asleep at this time.  Pt offered several paranoid and delusional statements this shift, mainly regarding the MARY doing experiments w/ her. Pt heard whispering to herself at times while in common areas.  Received the following prn's this shift: tylenol and MR trazodone at 0107, hydroxyzine 50mg at 0139 & zyprexa 5mg at 0218.           0215: Pt came to nursing desk & began yelling at staff \"I need to see a doctor right now!\"  It was explained to pt previously & again at this time that pt will be meeting with a doctor today.  Pt continued demanding to see a doctor immediately for pain. \"You need to get a doctor up here now!\" Pt then began crying & squatting on floor in hallway.  Pt had already received prn tylenol at 0107 for pain in \"my guts and everywhere\"  Pt was offered other pain interventions, accepted lavender aromatherapy.  Pt reported several different pain locations during the shift--some of which she stated were caused by the MARY.  Pt received prn zyprexa at 0218.      0235: Pt heard yelling from her room.  Pt noted crying and yelling statements about the MARY at this time, \"The MARY is doing experiments with me and I've kept quiet too long!\" On-call was contacted at this time as pt had received all available prn's and pt's agitation continued increasing.  Writer received one-time order for prn zyprexa however pt ended up falling asleep shortly after new order placed and pt therefore did not receive additional one-time dose zyprexa 5mg.      Pt asking different times to leave unit and be discharged during the night, was placed on elopement precautions.  Noted that pt often apologizes after yelling outbursts.  Will continue to monitor and support plan of care.  "

## 2021-06-21 NOTE — PLAN OF CARE
Assessment/Intervention/Current Symptoms and Care Coordination:   CTC called [facility] pre-petition screening, [phone]. Gave verbal report to Deirdre. Faxed petition to [phone].    Call from Jaya Abdalla, pre-petition screening. He will work this case. [phone]    Discharge Plan or Goal:  Petition for commitment has been made    Barriers to Discharge:  Pt is psychotic, paranoid, petition for commit has been submitted    Referral Status:   No referrals at this time.  Legal Status:   72 hour hold

## 2021-06-21 NOTE — PLAN OF CARE
"\"I can't stay here, I'm getting worse. I need treatment for pain, I have to go.\" Patient upset after meeting with her Doctor who is not going to drop her hold. Patient states that her pain from the MARY. Crying while huddled on the floor, pleading to be released. She asked for prn Zyprexa,Valium. Has had periods of calmness mixed with periods of crying in her room. Patient displays sad affect, at times labile.  "

## 2021-06-21 NOTE — PLAN OF CARE
BEHAVIORAL TEAM DISCUSSION    Participants: Claudia Liu DO; Vaishali Littlejohn RN; Brenda Lacy, [facility], CTC  Progress: Minimal  Anticipated length of stay: 7-10 days, pending stabilization and appropriate disposition plan.   Continued Stay Criteria/Rationale: Pt has been making delusional statements about bug-size drones, being experimented on by the MARY, being monitored 24/7, electromagnetic torture, and more. She requires symptom stabilization, medication management, and appropriate discharge plan. Pt is on 72HH, team planning to petition for MI commitment with [facility] in [facility].  Medical/Physical: History of concussions, Munir-Danlos syndrome. Chronic pain.  Precautions:   Behavioral Orders   Procedures     Code 1 - Restrict to Unit     Elopement precautions     Routine Programming     As clinically indicated     Single Room     psychosis     Status 15     Every 15 minutes.     Suicide precautions     Patients on Suicide Precautions should have a Combination Diet ordered that includes a Diet selection(s) AND a Behavioral Tray selection for Safe Tray - with utensils, or Safe Tray - NO utensils       Plan: Assess patient for mental health and medication needs. Considerations include: Return home with outpatient providers and [facility] ([facility])   Rationale for change in precautions or plan: Initial plan

## 2021-06-21 NOTE — PLAN OF CARE
"The patient and/or their representative will achieve their patient-specific goals related to the plan of care.    The patient-specific goals include:       \"Reasons you are in the hospital;\" The patient identifies the following reasons for current hospitalization:   1. Involuntary 72 hour hold  2. Presented unbearable pain to ER  3. Misdiagnosed as delusional/psychotic  4. Very upset about being admitted -> anger, frustration    \"Goals for Discharge\" The patient identifies the following goals for discharge:  1. Return to normal ADLs, responsibilities, stability  2. [Increase] self-esteem, security & support I feel have decreased during hospitalization  3. Make appointments my gynecologist, primary dr, psychiatrist, and therapist  "

## 2021-06-21 NOTE — PROGRESS NOTES
"[facility], [place]   Psychiatric Progress Note  Hospital Day: 2        Interim History:   The patient's care was discussed with the treatment team during the daily team meeting and/or staff's chart notes were reviewed.  Staff report patient has been requested every day and evening since admission to see the doctor first thing in the morning, focused on discharging, will be labile and agitated, yelling in room at times, taking olanzapine that was started on admission, appears guarded and paranoid.     Upon interview, the patient remembered writer from previous admission, states she came into the hospital due to \"physiological complaints, not psychological\", she is not wishing to discuss physical health concerns. She states she has chronic pain that she can more appropriately manage at home due to her \"expensive bed and CBD oil\". She is requesting to discharge, writer reviewed pt on 72HH and concern that patient is having paranoia and psychosis symptoms that are impacting her ability to function. She has been tolerating olanzapine but unsure of continuing this outside of the hospital as she does not want to \"add medication to current regimen\". She became increasingly dysregulated during discussion recommending ongoing hospitalization, starting to yell that writer cannot keep her in the hospital and that she will \"get sick and throw up\" if writer does not let her discharge. Reviewed plan to have county assess her for third opinion and appropriateness of ongoing involuntary hospitalization through commitment process, she continued to yell loudly about how she can not stay in the hospital and could not be redirected and writer terminated interview due to agitation.     Later patient handed writer a note, written on 2 pages of notebook paper, front and back an along all the margins with many delusional and paranoid statements such as \"please top 24/7 torture via EMF! Mind control burning my " "vagina, writing asshole in my anus, stant head ache forehead to neck tendon (right), typing in my vagina, fire up my chest and throat, death threats via writing after thinking about seeking justice\". The pages were filled with statement around pain and super computers. She is requesting to be \"let go\". A copy was made with originals and placed in patients chart.          Medications:       acetaminophen  1,000 mg Oral BID     lamoTRIgine  200 mg Oral Daily     meloxicam  7.5 mg Oral Daily     nicotine  1 patch Transdermal Daily     nicotine   Transdermal Q8H     OLANZapine zydis  5 mg Oral At Bedtime     omeprazole  20 mg Oral QAM AC          Allergies:     Allergies   Allergen Reactions     Seasonal Allergies           Labs:   No results found for this or any previous visit (from the past 48 hour(s)).       Psychiatric Examination:     /70   Pulse 60   Temp 98.3  F (36.8  C) (Oral)   Resp 16   Ht 1.676 m (5' 6\")   Wt 52 kg (114 lb 11.2 oz)   SpO2 96%   BMI 18.51 kg/m    Weight is 114 lbs 11.2 oz  Body mass index is 18.51 kg/m .    Orthostatic Vitals       Most Recent      Sitting Orthostatic /70 06/20 1015    Sitting Orthostatic Pulse (bpm) 84 06/20 1015    Standing Orthostatic BP 94/59 06/20 1015    Standing Orthostatic Pulse (bpm) 92 06/20 1015        Appearance: awake, alert and dressed in hospital scrubs  Attitude:  guarded and uncooperative  Eye Contact:  fair, intense  Mood:  irritable, labile, would become abruptly tearful and then start yelling and appeared angry   Affect:  mood congruent, intensity is heightened and labile  Speech:  clear, coherent and volume increasing with agitation  Language: fluent and intact in English  Psychomotor, Gait, Musculoskeletal:  no evidence of tardive dyskinesia, dystonia, or tics  Thought Process:  tangential, disorganized per written communication received  Associations:  loosening of associations present  Thought Content:  no evidence of suicidal " ideation or homicidal ideation and pt appears paranoid and delusional  Insight:  limited  Judgement:  limited  Oriented to:  time, person, and place  Attention Span and Concentration:  fair  Recent and Remote Memory:  limited  Fund of Knowledge:  appropriate    Clinical Global Impressions  First:  Considering your total clinical experience with this particular patient population, how severe are the patient's symptoms at this time?: 7 (06/21/21 1031)  Compared to the patient's condition at the START of treatment, this patient's condition is: 4 (06/21/21 1031)  Most recent:  Considering your total clinical experience with this particular patient population, how severe are the patient's symptoms at this time?: 7 (06/21/21 1031)  Compared to the patient's condition at the START of treatment, this patient's condition is: 4 (06/21/21 1031)         Precautions:     Behavioral Orders   Procedures     Code 1 - Restrict to Unit     Elopement precautions     Routine Programming     As clinically indicated     Single Room     psychosis     Status 15     Every 15 minutes.     Suicide precautions     Patients on Suicide Precautions should have a Combination Diet ordered that includes a Diet selection(s) AND a Behavioral Tray selection for Safe Tray - with utensils, or Safe Tray - NO utensils            Diagnoses:   Unspecified psychosis   R/O Substance induced psychosis vs MDD with psychotic features vs bipolar disorder  Unspecified mood disorder  General anxiety disorder per history   Cluster B traits per history  History of ADHD   History of concussion with LOC in June 2019  History of polysubstance use (Kratom, CBD)  Cervical stenosis, Lumbar DJD, chronic osteoarthritis, fibromyalgia and chronic pain syndrome  Munir-Danlos Disease  Tobacco use disorder  GERD         Assessment & Plan:   Assessment and hospital summary:  41 yo F with history of the above diagnosis who presented to the ED via ride from friend and noted make  several delusional statements in the ED with no insight and placed on 72 hour hold. Lab work up unremarkable. She has continued to make delusional statements, PTA medications were continued, including lamotrigine, with exception of PTA diazepam was restarted at 2mg TID PRN instead of 5mg. She was started on olanzapine 5mg at bedtime for psychosis symptoms by admitting APRN. She has continued to make delusional and paranoid statements, requesting discharge, continues to show no insight into mental health symptoms, given concern patient is risk to self and others due to extensive psychosis symptoms petition for MI commitment and Moise filed with [facility] 6/21.     Psychiatric treatment/inteventions:  Medications:   -continue olanzapine 5mg at bedtime for psychosis, will consider increasing when patient more able to engage in discussion of treatment plan   -continue PTA lamotrigine 200mg daily for mood stabilization   -continue PTA diazepam 2mg TID PRN anxiety     -PRN hydroxyzine for anxiety   -PRN olanzapine PO or IM for agitation/psychosis     -given ongoing psychosis symptoms and concern for safety and others patients 72HH continued and petition for MI commitment with Moise for olanzapine, aripiprazole, quetiapine and haloperidol submitted 6/21     Laboratory/Imaging: no new labs ordered      Patient will be treated in therapeutic milieu with appropriate individual and group therapies as described.     Medical treatment/interventions:  Medical concerns:   1) Pt requested Covid vaccine  -placed ordered for J&J vaccine per pts request 6/21    2) Chronic pain associated with asuncion danlos, DJD, cervical stenosis   -continue PTA acetaminophen scheduled and PRN   -restarted PTA voltaren gel 4x daily PRN pain  -continue PTA meloxicam 7.5mg daily     3) Tobacco use disorder   -continue nicotine replacement on unit and offer upon discharge     4) GERD  -continue PTA omeprazole       This note was created by  undersigned using a Dragon dictation system. All typing errors or contextual distortion are unintentional and software inherent.     Disposition Plan   Reason for ongoing admission: is unable to care for self due to severe psychosis or tony  Discharge location: TBD  Discharge Medications: not ordered  Follow-up Appointments: not scheduled  Legal Status: 72 hour hold 6/18 at 2114, expires 6/23 at 2114, petition for  commitment and Moise filed 6/21  Entered by: Claudia Liu on 6/21/2021 at 8:13 AM

## 2021-06-21 NOTE — PLAN OF CARE
"Assessment/Intervention/Current Symtoms and Care Coordination:  -Refer to psychosocial completed on 6/19/21 for assessment/social functioning  -Chart review  -Team meeting  -Team is petitioning for MI commitment with [facility] in [facility].  -Writer met with pt and provided copy of pt's Curriculethart message regarding COVID vaccine appointment scheduled for tomorrow. She is going to cancel this appointment as she will received the vaccine while in the hospital. She says she doesn't understand why the weekend time was not included in the 72HH and that \"being here only makes [her] health decrease which is known and has been seen by the doctor, cameras, and nurses\". Pt was asked to complete Personal Plan of Care and LAURA which she kept to work on by herself. She asked writer to \"put in a good word with Dr. Liu\" about discharging today \"even if it's AMA\". 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   Addendum 1532: Phone number for clinic where COVID vaccine was provided to pt as she said she is willing to cancel this appointment herself. She asked if DO is still here as she'd like to speak with her again about discharging today, even if it's AMA. Writer said the message can be communicated but Dr. Liu typically does not return to station 30 at this time and that she'd have the opportunity to see her again tomorrow. Pt received COVID vaccine.  Current Symptoms include the following: Psychosis, patient endorses poor sleep and paranoia    Discharge Plan or Goal:  Pending stabilization & development of a safe discharge plan.  Considerations include: Intensive Residential Treatment Services (IRTS): TBD    Barriers to Discharge:  Patient requires further psychiatric stabilization due to current symptomology, Medication management with possible adjustments and Pt is on 72HH    Referral Status:  Referrals TBD    Legal Status:  Patient on a 72-hour hold   Petition for MI commitment and Jose [facility] in [facility] " initiated 6/21/21

## 2021-06-21 NOTE — PLAN OF CARE
Problem: OT General Care Plan  Goal: OT Goal 1  Description: OT Goal 1  Will attend OT groups and participate actively in all OT opportunities. Will assess and set goals.    Pt has not attended scheduled occupational therapy sessions. Encourage attendance and participation.

## 2021-06-22 VITALS
WEIGHT: 114.7 LBS | OXYGEN SATURATION: 99 % | HEIGHT: 66 IN | SYSTOLIC BLOOD PRESSURE: 108 MMHG | HEART RATE: 80 BPM | BODY MASS INDEX: 18.43 KG/M2 | TEMPERATURE: 98.5 F | RESPIRATION RATE: 16 BRPM | DIASTOLIC BLOOD PRESSURE: 71 MMHG

## 2021-06-22 PROCEDURE — 250N000013 HC RX MED GY IP 250 OP 250 PS 637: Performed by: PSYCHIATRY & NEUROLOGY

## 2021-06-22 PROCEDURE — 250N000013 HC RX MED GY IP 250 OP 250 PS 637: Performed by: EMERGENCY MEDICINE

## 2021-06-22 PROCEDURE — 250N000013 HC RX MED GY IP 250 OP 250 PS 637: Performed by: CLINICAL NURSE SPECIALIST

## 2021-06-22 PROCEDURE — H2032 ACTIVITY THERAPY, PER 15 MIN: HCPCS

## 2021-06-22 PROCEDURE — 99239 HOSP IP/OBS DSCHRG MGMT >30: CPT | Performed by: PSYCHIATRY & NEUROLOGY

## 2021-06-22 RX ORDER — OLANZAPINE 10 MG/1
10 TABLET ORAL AT BEDTIME
Qty: 30 TABLET | Refills: 0 | Status: SHIPPED | OUTPATIENT
Start: 2021-06-22

## 2021-06-22 RX ORDER — POLYETHYLENE GLYCOL 3350 17 G
2 POWDER IN PACKET (EA) ORAL
Qty: 168 LOZENGE | Refills: 0 | Status: SHIPPED | OUTPATIENT
Start: 2021-06-22

## 2021-06-22 RX ORDER — NICOTINE 21 MG/24HR
1 PATCH, TRANSDERMAL 24 HOURS TRANSDERMAL DAILY
Qty: 28 PATCH | Refills: 0 | Status: SHIPPED | OUTPATIENT
Start: 2021-06-23

## 2021-06-22 RX ORDER — OLANZAPINE 10 MG/1
10 TABLET, ORALLY DISINTEGRATING ORAL AT BEDTIME
Status: DISCONTINUED | OUTPATIENT
Start: 2021-06-22 | End: 2021-06-22

## 2021-06-22 RX ORDER — OLANZAPINE 10 MG/1
10 TABLET ORAL AT BEDTIME
Status: DISCONTINUED | OUTPATIENT
Start: 2021-06-22 | End: 2021-06-22 | Stop reason: HOSPADM

## 2021-06-22 RX ADMIN — ACETAMINOPHEN 1000 MG: 325 TABLET, FILM COATED ORAL at 08:48

## 2021-06-22 RX ADMIN — DIAZEPAM 2 MG: 2 TABLET ORAL at 11:48

## 2021-06-22 RX ADMIN — NICOTINE POLACRILEX 2 MG: 2 LOZENGE ORAL at 15:39

## 2021-06-22 RX ADMIN — DIAZEPAM 2 MG: 2 TABLET ORAL at 15:37

## 2021-06-22 RX ADMIN — NICOTINE POLACRILEX 2 MG: 2 LOZENGE ORAL at 13:34

## 2021-06-22 RX ADMIN — ACETAMINOPHEN 650 MG: 325 TABLET, FILM COATED ORAL at 13:34

## 2021-06-22 RX ADMIN — MELOXICAM 7.5 MG: 7.5 TABLET ORAL at 08:48

## 2021-06-22 RX ADMIN — OMEPRAZOLE 20 MG: 20 CAPSULE, DELAYED RELEASE ORAL at 08:48

## 2021-06-22 RX ADMIN — LAMOTRIGINE 200 MG: 200 TABLET ORAL at 08:48

## 2021-06-22 RX ADMIN — NICOTINE 1 PATCH: 21 PATCH, EXTENDED RELEASE TRANSDERMAL at 08:48

## 2021-06-22 NOTE — PLAN OF CARE
"  Problem: Adult Inpatient Plan of Care  Goal: Plan of Care Review  Outcome: Adequate for Discharge  Goal: Patient-Specific Goal (Individualized)  Outcome: Adequate for Discharge  Goal: Absence of Hospital-Acquired Illness or Injury  Outcome: Adequate for Discharge  Goal: Optimal Comfort and Wellbeing  Outcome: Adequate for Discharge  Goal: Readiness for Transition of Care  Outcome: Adequate for Discharge    Sheyla is to discharge AMA this shift. Pt reports satisfaction with this plan.   Denies suicidal ideation, self-harm thoughts, and homicidal ideation. Denies AH/VH.  Discharge medications and AVS reviewed with pt who reports understanding. Plans to follow-up with outpt providers and appts scheduled on AVS.  Belongings sent with pt. Medications sent with Sheyla Being transported by Self. Stated \"I'm walking home from here.\"     "

## 2021-06-22 NOTE — PLAN OF CARE
Pt appears to have only slept 5.25 hours this shift, awake at the beginning of night and reporting anxiety and agitation, unable to fall asleep. Pt reporting pain in her back, neck, head, legs, etc but it was too soon for additional PRN Tylenol, pt received PRN Valium and Trazodone for sleep at 2345. Also provided with sleepytime tea, lavender essential oil and a soft care mattress, pt appeared effective and noted to be asleep shortly after this.     Pt continues on suicide, elopement and single room precautions.    No concerns noted or reported at this time, will continue to monitor and support plan of care.

## 2021-06-22 NOTE — DISCHARGE SUMMARY
"Psychiatric Discharge Summary    Sheyla Ye MRN# [id number]   Age: 40 year old YOB: 1980     Date of Admission:  6/18/2021  Date of Discharge:  6/22/2021  4:34 PM  Admitting Physician:  Claudia Liu DO  Discharge Physician:  Claudia Liu DO         Event Leading to Hospitalization:   Sheyla Ye is a 40-year-old single  female presenting with a history of depression, anxiety, ADD, paranoia, fibromyalgia, Munir-Danlos syndrome and delusional thinking.  Patient was brought in by a friend.  The patient states that she had some reluctance to say things for the past year.  The patient states it \"gets me put on a 72-hour hold and in the hospital.\"  The patient made multiple delusional statements.  The patient states that she sleeps with her window open at night.  She says drones the size of bugs have flown into her room and into her ear.  The patient states she knows this because plastic pieces will fall out of her ear when she is taking a shower.  The patient states that she has been experimented on by the [facility] without her consent.  The patient reports that the government is able to inflate and deflate her lungs.  The patient states that she is being monitored by a camera 24/7.  She talked about electromagnetic torture.  The patient states when she is out having a cigarette there are directed energy attacks on her.  She knows this because she can feel it in her vagina. Patient reports writing on each individual cell in her body.  The patient reports that she is having PTSD from being experimented on and targeted by the [facility].  Goal for this hospitalization is medication adjustment.       See Admission note by Alberta MORA on 6/19/21 for additional details.          Diagnoses:     Unspecified psychosis   R/O Substance induced psychosis vs MDD with psychotic features vs bipolar disorder  Unspecified mood disorder  General anxiety disorder per history   Cluster B traits per " history  History of ADHD   History of concussion with LOC in June 2019  History of polysubstance use (Kratom, CBD)  Cervical stenosis, Lumbar DJD, chronic osteoarthritis, fibromyalgia and chronic pain syndrome  Munir-Danlos Disease  Tobacco use disorder  GERD         Labs:     Recent Results (from the past 168 hour(s))   TSH with free T4 reflex    Collection Time: 06/18/21  7:54 PM   Result Value Ref Range    TSH 0.68 0.40 - 4.00 mU/L   Comprehensive metabolic panel    Collection Time: 06/18/21  7:54 PM   Result Value Ref Range    Sodium 140 133 - 144 mmol/L    Potassium 4.0 3.4 - 5.3 mmol/L    Chloride 111 (H) 94 - 109 mmol/L    Carbon Dioxide 26 20 - 32 mmol/L    Anion Gap 3 3 - 14 mmol/L    Glucose 80 70 - 99 mg/dL    Urea Nitrogen 6 (L) 7 - 30 mg/dL    Creatinine 0.86 0.52 - 1.04 mg/dL    GFR Estimate 84 >60 mL/min/[1.73_m2]    GFR Estimate If Black >90 >60 mL/min/[1.73_m2]    Calcium 8.7 8.5 - 10.1 mg/dL    Bilirubin Total 0.5 0.2 - 1.3 mg/dL    Albumin 4.0 3.4 - 5.0 g/dL    Protein Total 7.2 6.8 - 8.8 g/dL    Alkaline Phosphatase 69 40 - 150 U/L    ALT 16 0 - 50 U/L    AST 12 0 - 45 U/L   CBC with platelets differential    Collection Time: 06/18/21  7:54 PM   Result Value Ref Range    WBC 8.1 4.0 - 11.0 10e9/L    RBC Count 4.12 3.8 - 5.2 10e12/L    Hemoglobin 13.3 11.7 - 15.7 g/dL    Hematocrit 39.0 35.0 - 47.0 %    MCV 95 78 - 100 fl    MCH 32.3 26.5 - 33.0 pg    MCHC 34.1 31.5 - 36.5 g/dL    RDW 11.9 10.0 - 15.0 %    Platelet Count 391 150 - 450 10e9/L    Diff Method Automated Method     % Neutrophils 49.5 %    % Lymphocytes 41.7 %    % Monocytes 6.7 %    % Eosinophils 1.1 %    % Basophils 0.9 %    % Immature Granulocytes 0.1 %    Nucleated RBCs 0 0 /100    Absolute Neutrophil 4.0 1.6 - 8.3 10e9/L    Absolute Lymphocytes 3.4 0.8 - 5.3 10e9/L    Absolute Monocytes 0.5 0.0 - 1.3 10e9/L    Absolute Eosinophils 0.1 0.0 - 0.7 10e9/L    Absolute Basophils 0.1 0.0 - 0.2 10e9/L    Abs Immature Granulocytes 0.0 0 -  0.4 10e9/L    Absolute Nucleated RBC 0.0    UA reflex to Microscopic and Culture    Collection Time: 06/18/21  7:54 PM    Specimen: Urine clean catch; Midstream Urine   Result Value Ref Range    Color Urine Light Yellow     Appearance Urine Clear     Glucose Urine Negative NEG^Negative mg/dL    Bilirubin Urine Negative NEG^Negative    Ketones Urine Negative NEG^Negative mg/dL    Specific Gravity Urine 1.009 1.003 - 1.035    Blood Urine Negative NEG^Negative    pH Urine 5.5 5.0 - 7.0 pH    Protein Albumin Urine Negative NEG^Negative mg/dL    Urobilinogen mg/dL Normal 0.0 - 2.0 mg/dL    Nitrite Urine Negative NEG^Negative    Leukocyte Esterase Urine Moderate (A) NEG^Negative    Source Midstream Urine     RBC Urine 2 0 - 2 /HPF    WBC Urine 4 0 - 5 /HPF    Bacteria Urine Few (A) NEG^Negative /HPF    Squamous Epithelial /HPF Urine 7 (H) 0 - 1 /HPF    Mucous Urine Present (A) NEG^Negative /LPF   HCG qualitative urine (UPT)    Collection Time: 06/18/21  7:54 PM   Result Value Ref Range    HCG Qual Urine Negative NEG^Negative   Drug abuse screen 6 urine (chem dep)    Collection Time: 06/18/21  7:54 PM   Result Value Ref Range    Amphetamine Qual Urine Negative NEG^Negative    Barbiturates Qual Urine Negative NEG^Negative    Benzodiazepine Qual Urine Positive (A) NEG^Negative    Cannabinoids Qual Urine Negative NEG^Negative    Cocaine Qual Urine Negative NEG^Negative    Ethanol Qual Urine Negative NEG^Negative    Opiates Qualitative Urine Negative NEG^Negative   Urine Culture Aerobic Bacterial    Collection Time: 06/18/21  7:54 PM    Specimen: Unspecified Urine   Result Value Ref Range    Specimen Description Unspecified Urine     Special Requests Specimen received in preservative     Culture Micro 10,000 to 50,000 colonies/mL  Escherichia coli   (A)        Susceptibility    Escherichia coli - MISBAH     AMPICILLIN 8 Sensitive ug/mL     CEFAZOLIN* <=4 Sensitive ug/mL      * Cefazolin MISBAH breakpoints are for the treatment of  uncomplicated urinary tract infections.  For the treatment of systemic infections, please contact the laboratory for additional testing.     CEFOXITIN <=4 Sensitive ug/mL     CEFTAZIDIME <=1 Sensitive ug/mL     CEFTRIAXONE <=1 Sensitive ug/mL     CIPROFLOXACIN <=0.25 Sensitive ug/mL     GENTAMICIN <=1 Sensitive ug/mL     LEVOFLOXACIN <=0.12 Sensitive ug/mL     NITROFURANTOIN <=16 Sensitive ug/mL     TOBRAMYCIN <=1 Sensitive ug/mL     Trimethoprim/Sulfa <=1/19 Sensitive ug/mL     AMPICILLIN/SULBACTAM 4 Sensitive ug/mL     Piperacillin/Tazo <=4 Sensitive ug/mL     CEFEPIME <=1 Sensitive ug/mL   Asymptomatic SARS-CoV-2 COVID-19 Virus (Coronavirus) by PCR    Collection Time: 06/18/21  9:31 PM    Specimen: Nasopharyngeal   Result Value Ref Range    SARS-CoV-2 Virus Specimen Source Nasopharyngeal     SARS-CoV-2 PCR Result NEGATIVE     SARS-CoV-2 PCR Comment (Note)               Consults:   No consultations were requested during this admission         Hospital Course:   Sheyla Ye is a 41 yo F with history of psychosis, anxiety, mood disorder, cluster B traits, ADHD, concussion, chronic pain and polysubstance use who presented to the ED via ride from friend and noted make several delusional statements in the ED with no insight and placed on 72 hour hold. Lab work up unremarkable. She has continued to make delusional statements, PTA medications were continued, including lamotrigine, with exception of PTA diazepam was restarted at 2mg TID PRN instead of 5mg by admitting APRN. She was started on olanzapine 5mg at bedtime for psychosis symptoms by admitting APRN. She  continued to make delusional and paranoid statements, requesting discharge, continued to show no insight into mental health symptoms, given concern patient is risk to self and others due to extensive psychosis symptoms petition for MI commitment and Moise filed with [facility] 6/21. Patient became increasingly agitation and dysregulated around ongoing  admission and inability to discharge, often heard yelling in her room, making threatening statements that she was going to make herself ill if not allowed to discharge, she provided physician with two pages of notes which contained extensive delusional and paranoid thoughts, very disorganized thought patter evident in writings. She was adherent to olanzapine during stay but hesitant to take as outpatient. Petition was not supported by [facility], patient declined to remain in hospital as a voluntary patient despite strong recommendations from treatment team and discharged AGAINST MEDICAL ADVICE. She was provided with prescription of olanzapine and encouraged to continue to take this medication until evaluated by outpatient psychiatrist.     Today Sheyla Ye reports having no thoughts of harming self or others and reports feeling safe to discharge. In addition, she has notable risk factors for self-harm, including age, single status, substance use, psychosis and anxiety. However, risk is mitigated by absence of past attempts and history of seeking help when needed. Therefore, based on all available evidence including the factors cited above, she does not appear to be at imminent risk for self-harm, does not meet criteria for a 72-hr hold, and therefore remains appropriate for ongoing outpatient level of care. Ongoing voluntary admission recommended, patient declined.     Sheyla Ye was discharged AMA to home. At the time of discharge Sheyla Ye was determined to not be a danger to herself or others.          Discharge Medications:     Current Discharge Medication List      START taking these medications    Details   nicotine (COMMIT) 2 MG lozenge Place 1 lozenge (2 mg) inside cheek every hour as needed for smoking cessation  Qty: 168 lozenge, Refills: 0    Associated Diagnoses: Tobacco use disorder      nicotine (NICODERM CQ) 21 MG/24HR 24 hr patch Place 1 patch onto the skin daily  Qty: 28 patch,  "Refills: 0    Associated Diagnoses: Tobacco use disorder      OLANZapine (ZYPREXA) 10 MG tablet Take 1 tablet (10 mg) by mouth At Bedtime  Qty: 30 tablet, Refills: 0    Associated Diagnoses: Psychosis, unspecified psychosis type (H)         CONTINUE these medications which have NOT CHANGED    Details   acetaminophen (TYLENOL) 500 MG tablet Take 1,000 mg by mouth every 6 hours as needed for mild pain      diazepam (VALIUM) 5 MG tablet Take 5 mg by mouth every 8 hours as needed for anxiety      lamoTRIgine (LAMICTAL) 200 MG tablet Take 1 tablet (200 mg) by mouth daily  Qty: 7 tablet, Refills: 0      meloxicam (MOBIC) 7.5 MG tablet Take 1 tablet (7.5 mg) by mouth daily  Qty: 14 tablet, Refills: 0    Associated Diagnoses: Cervical spondylosis      omeprazole (PRILOSEC) 20 MG DR capsule Take 1 capsule (20 mg) by mouth daily  Qty: 90 capsule, Refills: 3    Associated Diagnoses: Encounter for monitoring chronic NSAID therapy      diclofenac (VOLTAREN) 1 % topical gel Apply 2 g topically 4 times daily as needed for moderate pain  Qty: 100 g, Refills: 1    Associated Diagnoses: Cervical spondylosis without myelopathy                  Psychiatric Examination:   Appearance: awake, alert and dressed in hospital scrubs  Attitude:  somewhat cooperative  Eye Contact:  fair  Mood: \"better\" due to going home  Affect:  mood congruent  Speech:  clear, coherent  Language: fluent and intact in English  Psychomotor, Gait, Musculoskeletal:  no evidence of tardive dyskinesia, dystonia, or tics  Thought Process:  tangential  Associations:  less loose  Thought Content:  no evidence of suicidal ideation or homicidal ideation and pt appears paranoid and delusional  Insight:  limited  Judgement:  limited  Oriented to:  time, person, and place  Attention Span and Concentration:  fair  Recent and Remote Memory:  limited  Fund of Knowledge:  appropriate         Discharge Plan:   Patient discharged Against Medical Advice after petition for MI " commitment was not supported and pt declined ongoing voluntary admission.    Health Care Follow-up:     Appointment Date/Time: Monday, June 28 10:20am via phone  Psychiatrist/Medication Management: Mariel Vo MS CNS PMHNP RN    Address: [facility] [address] [place] [address]   Phone Number: [phone]    Fax: [phone]     You indicated you have a regular therapist you see. You stated you have the business card/contact information available to you at home and that you'd be able to schedule an appointment with your therapist now that you have access to a cell phone. For this reason, an appointment was not scheduled with the understanding you would schedule yourself.                     Attend all scheduled appointments with your outpatient providers. Call at least 24 hours in advance if you need to reschedule an appointment to ensure continued access to your outpatient providers.     Attestation:  Patient has been seen and evaluated by me, Claudia Liu DO on day of discharge. 40 minutes were spent in coordination of discharge planning.

## 2021-06-22 NOTE — PLAN OF CARE
Assessment/Intervention/Current Symtoms and Care Coordination:  -Refer to psychosocial completed on 6/19/21 for assessment/social functioning  -Chart review  -Team meeting  -[facility] PPS not supporting petition. VM left for Jaya Abdalla from [facility] ([phone]) regarding team not pursuing appeal. Pt will likely discharge today as she is not reporting SI/HI. Discharge will be AMA.   Current Symptoms include the following: pt stable for discharge    Discharge Plan or Goal:  Pending stabilization & development of a safe discharge plan.  Considerations include: Return home with outpatient providers    Barriers to Discharge:  Pt discharging today AMA.      Referral Status:  No new referrals  Medication management appointment added to AVS. Pt will schedule therapy appointment with existing provider.     Legal Status:  Patient is voluntary (signed in) - 72HH was discontinued

## 2021-06-22 NOTE — PLAN OF CARE
"  Problem: Psychotic Symptoms  Goal: Psychotic Symptoms  Description: Signs and symptoms of listed problems will be absent or manageable.  Outcome: No Change  Flowsheets (Taken 6/21/2021 2152)  Psychotic Symptoms Assessed: all  Psychotic Symptoms Present:    anxiety    insight    thought process    affect    sleep    speech     Problem: Psychotic Symptoms  Goal: Social and Therapeutic (Psychotic Symptoms)  Description: Signs and symptoms of listed problems will be absent or manageable.  Recent Flowsheet Documentation  Taken 6/21/2021 2152 by Alyssa Mo RN  Psychotic Symptoms Assessed: all  Psychotic Symptoms Present:    anxiety    insight    thought process    affect    sleep    speech    Sheyla has spent the majority of the shift in her room. She stated \"I'm reading a book.\" She complains of lots of anxiety. Requested Valium earlier in the shift. At  requested Valium again but this writer asked her to wait a few hours as Olanzapine was scheduled at this time. Sheyla was acceptable of this. Sheyla requested that this writer \"Put in a good word to Dr. Liu that I would like to be discharged tomorrow. 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 Sheyla has not participated in any programming this evening.  Denies any MH symptoms. Nursing to continue to support current plan of care.      "

## 2021-06-22 NOTE — DISCHARGE INSTRUCTIONS
Behavioral Discharge Planning and Instructions    Summary: You were admitted on 6/18/2021 due to Delusional Thoughts. You were treated by Dr. Liu and discharged on 6/22/21 from [facility] to Home.    Main Diagnosis:   1.  Psychosis, unspecified.  2.  Rule out major depressive disorder, recurrent, severe with psychosis.  3.  General anxiety disorder.  4.  Munir-Danlos syndrome.  5.  Chronic pain.    Health Care Follow-up:     Appointment Date/Time: Monday, June 28 10:20am via phone  Psychiatrist/Medication Management: Mariel Vo MS CNS PMHNP RN    Address: [facility] [address] [place] [address]   Phone Number: [phone]    Fax: [phone]    You indicated you have a regular therapist you see. You stated you have the business card/contact information available to you at home and that you'd be able to schedule an appointment with your therapist now that you have access to a cell phone. For this reason, an appointment was not scheduled with the understanding you would schedule yourself.        Attend all scheduled appointments with your outpatient providers. Call at least 24 hours in advance if you need to reschedule an appointment to ensure continued access to your outpatient providers.     Major Treatments, Procedures and Findings:  You were provided with: a psychiatric assessment and medication evaluation and/or management    Symptoms to Report: Feeling more aggressive, increased confusion, losing more sleep, mood getting worse, or thoughts of suicide.    Early warning signs can include: Increased depression or anxiety sleep disturbances increased thoughts or behaviors of suicide or self-harm  increased unusual thinking, such as paranoia or hearing voices.    Safety and Wellness: Take all medicines as directed. Make no changes unless your doctor suggests them. Follow treatment recommendations. Refrain from alcohol and non-prescribed drugs. Ask your support system  "to help you reduce your access to items that could harm yourself or others. If there is a concern for safety, call 911.    Resources:   Crisis Intervention: 600.794.4160 or 097-927-7008 (TTY: 841.699.4843).  Call anytime for help.  National [place] on Mental Illness (www.[place].org): 713.744.5514 or 564-028-0081.  [place] Association for Children's Mental Health (www.macmh.org): 378.759.8482.  National Suicide Prevention Line (www.mentalhealthmn.org): 760-539-WMJU (0594)  Mental Health Consumer/Survivor Network of [place] (www.mhcsn.net): 243.767.9638 or 126-702-5045  Mental Health Association of [place] (www.mentalhealth.org): 464.584.5966 or 299-632-3845  Self- Management and Recovery Training., [employer]-- Toll free: 160.339.6553  www.[employer]  [facility] Crisis (COPE) Response - Adult 902 424-0887  Crisis text line: Text \"[place]\" to 442819. Free, confidential, 24/7.  Crisis Intervention: 733.308.1906 or 745-912-1240. Call anytime for help.     General Medication Instructions:     See your medication sheet(s) for instructions.     Take all medicines as directed.  Make no changes unless your doctor suggests them.     Go to all your doctor visits.    Be sure to have all your required lab tests. This way, your medicines can be refilled on time.    Do not use any drugs not prescribed by your doctor.    Avoid alcohol.    Advance Directives:   Scanned document on file with [facility]? No scanned doc  Is document scanned? No. Copy Requested.  Honoring Choices Your Rights Handout: Informed and given  Was more information offered? Pt declined    The Treatment team has appreciated the opportunity to work with you. If you have any questions or concerns about your recent admission, you can contact the unit which can receive your call 24 hours a day, 7 days a week. They will be able to get in touch with a Provider if needed. The unit number is [phone].       "

## 2021-06-22 NOTE — PLAN OF CARE
Patient preparing for discharge. No persistent complaints of electro magnetically pain by the government today. Showering, eating, socially appropriate.

## 2021-06-22 NOTE — PROGRESS NOTES
Pt participated in a dance/movement therapy (DMT) group that was offered in place of another patient's individual dance/movement psychotherapy session on the unit.  Pt welcomed the opportunity for this session that took place in the [facility] [place] for [facility] with music sharing a wide range of feelings and movement expressions.      Pt came to the session late and remained in a posture tightly wrapped around herself.  She responded well to concepts of DMT integration of the whole self including the body, mind and emotions.         06/22/21 1430   Dance Movement Therapy   Type of Intervention structured groups   Response participates with encouragement   Hours 0.5

## 2021-07-13 DIAGNOSIS — M47.812 CERVICAL SPONDYLOSIS: ICD-10-CM

## 2021-07-13 RX ORDER — MELOXICAM 7.5 MG/1
7.5 TABLET ORAL DAILY
Qty: 14 TABLET | Refills: 0 | OUTPATIENT
Start: 2021-07-13

## 2021-08-09 ENCOUNTER — TELEPHONE (OUTPATIENT)
Dept: INTERNAL MEDICINE | Facility: CLINIC | Age: 41
End: 2021-08-09

## 2021-08-09 DIAGNOSIS — M47.812 CERVICAL SPONDYLOSIS WITHOUT MYELOPATHY: ICD-10-CM

## 2021-08-09 NOTE — TELEPHONE ENCOUNTER
[facility] Call Center    Phone Message    May a detailed message be left on voicemail: yes     Reason for Call: Other: Corinne calling today to request a referral due to patient being on the restricted recipiant program. Patient is going to be seeing Mariel Vo on 8/10/21 NPI number is [id number] fax number is [phone]. Please call to discuss as referral is being requested as an urgent matter due to date of appointment thank you.      Action Taken: Message routed to:  [facility]    Travel Screening: Not Applicable

## 2021-08-10 ENCOUNTER — MEDICAL CORRESPONDENCE (OUTPATIENT)
Dept: HEALTH INFORMATION MANAGEMENT | Facility: CLINIC | Age: 41
End: 2021-08-10

## 2021-08-10 NOTE — TELEPHONE ENCOUNTER
[employer] Restricted Recipient Form was completed and faxed to [employer] and also to [facility].    Teresita Sahni RN on 8/10/2021 at 11:56 AM

## 2021-08-12 NOTE — TELEPHONE ENCOUNTER
Pharmacist at [facility] pharmacy called as they had a medication refill request for:  methocarbamol (ROBAXIN) 500 MG tablet   Sig - Route: Take 1 tablet (500 mg) by mouth 4 times daily as needed for muscle spasms - Oral   Sent to pharmacy as: Methocarbamol 500 MG Oral Tablet (ROBAXIN)   Prescribed By: Dr. Karen Rojas MD    Medication was discontinued on 6/20/21 for: Medication reconciliation clean up.     Pt is now restricted to Dr. Watson (PCP). If ok with [facility] for pt to be taking Rx, will then consult with Dr. Watson if agreeable to fill Rx.   Karine Betancur LPN 8/12/2021 2:46 PM    Message routed to Dr Watson.  Barbara Coelho RN 3:37 PM on 8/12/2021.

## 2021-08-14 DIAGNOSIS — Z79.1 ENCOUNTER FOR MONITORING CHRONIC NSAID THERAPY: ICD-10-CM

## 2021-08-14 DIAGNOSIS — Z51.81 ENCOUNTER FOR MONITORING CHRONIC NSAID THERAPY: ICD-10-CM

## 2021-10-03 ENCOUNTER — HEALTH MAINTENANCE LETTER (OUTPATIENT)
Age: 41
End: 2021-10-03

## 2021-10-25 ENCOUNTER — TELEPHONE (OUTPATIENT)
Dept: INTERNAL MEDICINE | Facility: CLINIC | Age: 41
End: 2021-10-25

## 2021-10-25 DIAGNOSIS — R30.0 DYSURIA: Primary | ICD-10-CM

## 2021-10-25 DIAGNOSIS — M50.30 DDD (DEGENERATIVE DISC DISEASE), CERVICAL: ICD-10-CM

## 2021-10-25 RX ORDER — NITROFURANTOIN 25; 75 MG/1; MG/1
100 CAPSULE ORAL 2 TIMES DAILY
Qty: 14 CAPSULE | Refills: 0 | Status: SHIPPED | OUTPATIENT
Start: 2021-10-25

## 2021-10-25 RX ORDER — MELOXICAM 7.5 MG/1
7.5 TABLET ORAL
Qty: 30 TABLET | Refills: 5 | Status: SHIPPED | OUTPATIENT
Start: 2021-10-25

## 2021-11-15 ENCOUNTER — TELEPHONE (OUTPATIENT)
Dept: INTERNAL MEDICINE | Facility: CLINIC | Age: 41
End: 2021-11-15
Payer: COMMERCIAL

## 2021-11-15 NOTE — TELEPHONE ENCOUNTER
[facility] Prior Authorization Team Request    Medication: omeprazole 20mg caps  Dosing: one capsule daily  Qty: 30  Day Supply: 30  NDC (required for Medicaid members): [address]-59     Insurance   BIN: [id number]  PCN: [id number]   Grp: [id number]  ID: [id number]    CoverMyMeds Key (if applicable):     Additional documentation: Insurance requires qty limit exception for more than 120 caps in a year    Filling Pharmacy: [facility]  Phone Number: [phone]  Contact:    Pharmacy NPI (required for Medicaid members): [id number]

## 2021-11-16 NOTE — TELEPHONE ENCOUNTER
PRIOR AUTHORIZATION DENIED    Medication: omeprazole 20mg-PA DENIED     Denial Date: 11/16/2021    Denial Rational:           Appeal Information:

## 2021-11-16 NOTE — TELEPHONE ENCOUNTER
Central Prior Authorization Team   Phone: [phone]    PA Initiation    Medication: omeprazole 20mg  Insurance Company: [employer] - Phone [phone] Fax [phone]  Pharmacy Filling the Rx: [facility], [place] - [address] [place] [address] [id number]  Filling Pharmacy Phone: [phone]  Filling Pharmacy Fax: [phone]  Start Date: 11/16/2021

## 2021-11-18 DIAGNOSIS — F07.81 POST CONCUSSION SYNDROME: Primary | ICD-10-CM

## 2021-11-18 DIAGNOSIS — G44.309 POST-CONCUSSION HEADACHE: ICD-10-CM

## 2021-11-18 NOTE — TELEPHONE ENCOUNTER
nortriptyline (PAMELOR) 10 MG capsule  Last Written Prescription Date:  ?  Last Fill Quantity: ?,   # refills: ?  Last Office Visit :  12/18/2020  Future Office visit:  None    Routing refill request to provider for review/approval because:  Drug not active on patient's medication list      Deann Cardenas RN  Central Triage Red Flags/Med Refills

## 2021-11-19 RX ORDER — NORTRIPTYLINE HCL 10 MG
10 CAPSULE ORAL AT BEDTIME
Qty: 30 CAPSULE | Refills: 0 | Status: CANCELLED | OUTPATIENT
Start: 2021-11-19

## 2021-11-19 NOTE — TELEPHONE ENCOUNTER
RN called patient, and relayed Dr. Watson message.  Patient does plan to follow up with new PCP, Dr. Malcolm Merchant, and patient has already updated insurance that she will be new PCP for restricted recipient program.  RN call Dr Malcolm Merchant's office and left a voice message to have staff call RN back at direct number, so they know updated pharmacy, [facility], [address] [place] [address] Rd[address] [place], [place], [phone], and also about need for Pamelor refill.    Teresita Sahni RN on 11/19/2021 at 12:41 PM

## 2021-11-19 NOTE — TELEPHONE ENCOUNTER
Left message with Primary Care clinic number requesting patient to call back to schedule annual physical appointment with PCP as last visit was on 12/18/20

## 2021-11-19 NOTE — TELEPHONE ENCOUNTER
Looks like she may have establish with Dr. Malcolm Merchant through [facility]?  I'd suggest she contact that provider for a refill if her plan is to follow-up with her.

## 2021-11-29 RX ORDER — AMITRIPTYLINE HYDROCHLORIDE 10 MG/1
10 TABLET ORAL AT BEDTIME
Status: CANCELLED | OUTPATIENT
Start: 2021-11-29

## 2021-11-29 NOTE — TELEPHONE ENCOUNTER
RN called patient, and she does plan to follow up with new PCP, Dr. Malcolm Merchant, and patient has already updated insurance that she will be new PCP for restricted recipient program.  RN called Dr. Malcolm Merchant's office, and let staff know that patient will receive future Rx fills from this new PCP.    Teresita Sahni RN on 11/29/2021 at 12:10 PM

## 2021-11-29 NOTE — TELEPHONE ENCOUNTER
amitriptyline (ELAVIL) 10 MG tablet  Last Written Prescription Date:  ?  Last Fill Quantity: ?,   # refills: ?  Last Office Visit : 12/18/2020  Future Office visit:  None  Routing refill request to provider for review/approval because:  Drug not active on patient's medication list      Deann Cardenas RN  Central Triage Red Flags/Med Refills     Additional Safety/Bands:

## 2022-09-11 ENCOUNTER — HEALTH MAINTENANCE LETTER (OUTPATIENT)
Age: 42
End: 2022-09-11

## 2023-01-22 ENCOUNTER — HEALTH MAINTENANCE LETTER (OUTPATIENT)
Age: 43
End: 2023-01-22

## 2024-02-18 ENCOUNTER — HEALTH MAINTENANCE LETTER (OUTPATIENT)
Age: 44
End: 2024-02-18

## (undated) DEVICE — NDL EPIDURAL TUOHY 20GA 3.5" 405028

## (undated) DEVICE — GLOVE PROTEXIS POWDER FREE SMT 8.0  2D72PT80X

## (undated) DEVICE — SYR 10ML PERFIX LL 332152

## (undated) DEVICE — PREP CHLORAPREP W/ORANGE TINT 10.5ML 260715

## (undated) DEVICE — TRAY PAIN INJECTION 97A 640

## (undated) RX ORDER — BUPIVACAINE HYDROCHLORIDE 5 MG/ML
INJECTION, SOLUTION EPIDURAL; INTRACAUDAL
Status: DISPENSED
Start: 2018-11-28

## (undated) RX ORDER — DIAZEPAM 5 MG
TABLET ORAL
Status: DISPENSED
Start: 2019-06-11

## (undated) RX ORDER — BETAMETHASONE SODIUM PHOSPHATE AND BETAMETHASONE ACETATE 3; 3 MG/ML; MG/ML
INJECTION, SUSPENSION INTRA-ARTICULAR; INTRALESIONAL; INTRAMUSCULAR; SOFT TISSUE
Status: DISPENSED
Start: 2019-04-30

## (undated) RX ORDER — BUPIVACAINE HYDROCHLORIDE 5 MG/ML
INJECTION, SOLUTION EPIDURAL; INTRACAUDAL
Status: DISPENSED
Start: 2020-08-27

## (undated) RX ORDER — ACETAMINOPHEN 500 MG
TABLET ORAL
Status: DISPENSED
Start: 2019-04-30